# Patient Record
Sex: FEMALE | Race: WHITE | NOT HISPANIC OR LATINO
[De-identification: names, ages, dates, MRNs, and addresses within clinical notes are randomized per-mention and may not be internally consistent; named-entity substitution may affect disease eponyms.]

---

## 2019-01-01 ENCOUNTER — EXTERNAL RECORD (OUTPATIENT)
Dept: HEALTH INFORMATION MANAGEMENT | Facility: OTHER | Age: 69
End: 2019-01-01

## 2019-03-05 ENCOUNTER — PRIOR ORIGINAL RECORDS (OUTPATIENT)
Dept: HEALTH INFORMATION MANAGEMENT | Facility: OTHER | Age: 69
End: 2019-03-05

## 2019-03-18 ENCOUNTER — HOSPITAL (OUTPATIENT)
Dept: OTHER | Age: 69
End: 2019-03-18

## 2019-03-18 LAB
ABS LYMPH: 1.1 K/CUMM (ref 1–3.5)
ABS MONO: 0.4 K/CUMM (ref 0.1–0.8)
ABS NEUTRO: 5.7 K/CUMM (ref 2–8)
ANION GAP SERPL CALC-SCNC: 12 MEQ/L (ref 10–20)
BASOPHIL: 0 % (ref 0–1)
BUN SERPL-MCNC: 31 MG/DL (ref 6–20)
CALCIUM: 9.2 MG/DL (ref 8.4–10.2)
CHLORIDE: 106 MEQ/L (ref 97–107)
CREATININE: 1.39 MG/DL (ref 0.6–1.3)
DIFF_TYPE?: ABNORMAL
EOSINOPHIL: 3 % (ref 0–6)
GLUCOSE LVL: 209 MG/DL (ref 70–99)
HCT VFR BLD CALC: 35 % (ref 33–45)
HEMOLYSIS 2+: NEGATIVE
HGB BLD-MCNC: 11.8 G/DL (ref 11–15)
IMMATURE GRAN: 0.3 % (ref 0–0.3)
INSTR WBC: 7.4 K/CUMM (ref 4–11)
LYMPHOCYTE: 15 %
MCH RBC QN AUTO: 31 PG (ref 25–35)
MCHC RBC AUTO-ENTMCNC: 34 G/DL (ref 32–37)
MCV RBC AUTO: 92 FL (ref 78–97)
MONOCYTE: 5 %
NEUTROPHIL: 77 %
NRBC BLD MANUAL-RTO: 0 % (ref 0–0.2)
PLATELET: 141 K/CUMM (ref 150–450)
POTASSIUM: 4 MEQ/L (ref 3.5–5.1)
RBC # BLD: 3.82 M/CUMM (ref 3.7–5.2)
RDW: 13.4 % (ref 11.5–14.5)
SODIUM: 138 MEQ/L (ref 136–145)
TCO2: 24 MEQ/L (ref 19–29)
WBC # BLD: 7.4 K/CUMM (ref 4–11)

## 2019-03-28 ENCOUNTER — DIAGNOSTIC TRANS (OUTPATIENT)
Dept: OTHER | Age: 69
End: 2019-03-28

## 2019-03-28 ENCOUNTER — HOSPITAL (OUTPATIENT)
Dept: OTHER | Age: 69
End: 2019-03-28
Attending: EMERGENCY MEDICINE

## 2019-03-28 LAB
ANALYZER ANC (IANC): ABNORMAL
ANION GAP SERPL CALC-SCNC: 17 MMOL/L (ref 10–20)
BASOPHILS # BLD: 0 THOUSAND/MCL (ref 0–0.3)
BASOPHILS NFR BLD: 0 %
BUN SERPL-MCNC: 36 MG/DL (ref 6–20)
BUN/CREAT SERPL: 25 (ref 7–25)
CALCIUM SERPL-MCNC: 9.1 MG/DL (ref 8.4–10.2)
CHLORIDE: 105 MMOL/L (ref 98–107)
CO2 SERPL-SCNC: 21 MMOL/L (ref 21–32)
CREAT SERPL-MCNC: 1.46 MG/DL (ref 0.51–0.95)
D DIMER PPP FEU-MCNC: 1.47 MG/L FEU
DIFFERENTIAL METHOD BLD: ABNORMAL
EOSINOPHIL # BLD: 0.4 THOUSAND/MCL (ref 0.1–0.5)
EOSINOPHIL NFR BLD: 6 %
ERYTHROCYTE [DISTWIDTH] IN BLOOD: 13.3 % (ref 11–15)
GLUCOSE BLDC GLUCOMTR-MCNC: 183 MG/DL (ref 65–99)
GLUCOSE SERPL-MCNC: 200 MG/DL (ref 65–99)
HEMATOCRIT: 34.7 % (ref 36–46.5)
HGB BLD-MCNC: 11.7 GM/DL (ref 12–15.5)
IMM GRANULOCYTES # BLD AUTO: 0 THOUSAND/MCL (ref 0–0.2)
IMM GRANULOCYTES NFR BLD: 0 %
LYMPHOCYTES # BLD: 1.4 THOUSAND/MCL (ref 1–4)
LYMPHOCYTES NFR BLD: 23 %
MCH RBC QN AUTO: 31 PG (ref 26–34)
MCHC RBC AUTO-ENTMCNC: 33.7 GM/DL (ref 32–36.5)
MCV RBC AUTO: 92 FL (ref 78–100)
MONOCYTES # BLD: 0.5 THOUSAND/MCL (ref 0.3–0.9)
MONOCYTES NFR BLD: 8 %
NEUTROPHILS # BLD: 4 THOUSAND/MCL (ref 1.8–7.7)
NEUTROPHILS NFR BLD: 63 %
NEUTS SEG NFR BLD: ABNORMAL %
NRBC (NRBCRE): 0 /100 WBC
PLATELET # BLD: 171 THOUSAND/MCL (ref 140–450)
POTASSIUM SERPL-SCNC: 4.1 MMOL/L (ref 3.4–5.1)
RBC # BLD: 3.77 MILLION/MCL (ref 4–5.2)
SODIUM SERPL-SCNC: 139 MMOL/L (ref 135–145)
TROPONIN I SERPL HS-MCNC: 0.04 NG/ML
WBC # BLD: 6.3 THOUSAND/MCL (ref 4.2–11)

## 2019-03-29 ENCOUNTER — HOSPITAL (OUTPATIENT)
Dept: OTHER | Age: 69
End: 2019-03-29

## 2019-03-29 LAB
TROPONIN I SERPL HS-MCNC: 0.05 NG/ML
TROPONIN I SERPL HS-MCNC: 0.07 NG/ML
TSH SERPL-ACNC: 1.43 MCUNIT/ML (ref 0.35–5)

## 2019-03-30 ENCOUNTER — HOSPITAL (OUTPATIENT)
Dept: OTHER | Age: 69
End: 2019-03-30
Attending: INTERNAL MEDICINE

## 2019-03-30 LAB
AMORPH SED URNS QL MICRO: ABNORMAL
ANALYZER ANC (IANC): ABNORMAL
ANION GAP SERPL CALC-SCNC: 14 MMOL/L (ref 10–20)
APPEARANCE UR: CLEAR
BACTERIA #/AREA URNS HPF: ABNORMAL /HPF
BILIRUB UR QL: NEGATIVE
BUN SERPL-MCNC: 24 MG/DL (ref 6–20)
BUN/CREAT SERPL: 17 (ref 7–25)
CALCIUM SERPL-MCNC: 8.4 MG/DL (ref 8.4–10.2)
CAOX CRY URNS QL MICRO: ABNORMAL
CHLORIDE: 107 MMOL/L (ref 98–107)
CO2 SERPL-SCNC: 24 MMOL/L (ref 21–32)
COLOR UR: YELLOW
CREAT SERPL-MCNC: 1.38 MG/DL (ref 0.51–0.95)
EPITH CASTS #/AREA URNS LPF: ABNORMAL /[LPF]
ERYTHROCYTE [DISTWIDTH] IN BLOOD: 13.2 % (ref 11–15)
FATTY CASTS #/AREA URNS LPF: ABNORMAL /[LPF]
GLUCOSE BLDC GLUCOMTR-MCNC: 129 MG/DL (ref 65–99)
GLUCOSE BLDC GLUCOMTR-MCNC: 130 MG/DL (ref 65–99)
GLUCOSE BLDC GLUCOMTR-MCNC: 138 MG/DL (ref 65–99)
GLUCOSE BLDC GLUCOMTR-MCNC: 142 MG/DL (ref 65–99)
GLUCOSE SERPL-MCNC: 143 MG/DL (ref 65–99)
GLUCOSE UR-MCNC: NEGATIVE MG/DL
GRAN CASTS #/AREA URNS LPF: ABNORMAL /[LPF]
HEMATOCRIT: 33.2 % (ref 36–46.5)
HGB BLD-MCNC: 11.4 GM/DL (ref 12–15.5)
HGB UR QL: ABNORMAL
HYALINE CASTS #/AREA URNS LPF: ABNORMAL /LPF (ref 0–5)
KETONES UR-MCNC: NEGATIVE MG/DL
LEUKOCYTE ESTERASE UR QL STRIP: ABNORMAL
MAGNESIUM SERPL-MCNC: 1.7 MG/DL (ref 1.7–2.4)
MCH RBC QN AUTO: 31.2 PG (ref 26–34)
MCHC RBC AUTO-ENTMCNC: 34.3 GM/DL (ref 32–36.5)
MCV RBC AUTO: 91 FL (ref 78–100)
MIXED CELL CASTS #/AREA URNS LPF: ABNORMAL /[LPF]
MUCOUS THREADS URNS QL MICRO: ABNORMAL
NITRITE UR QL: NEGATIVE
NRBC (NRBCRE): 0 /100 WBC
PH UR: 6 UNIT (ref 5–7)
PLATELET # BLD: 157 THOUSAND/MCL (ref 140–450)
POTASSIUM SERPL-SCNC: 3.5 MMOL/L (ref 3.4–5.1)
PROT UR QL: 100 MG/DL
RBC # BLD: 3.65 MILLION/MCL (ref 4–5.2)
RBC #/AREA URNS HPF: ABNORMAL /HPF (ref 0–3)
RBC CASTS #/AREA URNS LPF: ABNORMAL /[LPF]
RENAL EPI CELLS #/AREA URNS HPF: ABNORMAL /[HPF]
SODIUM SERPL-SCNC: 141 MMOL/L (ref 135–145)
SP GR UR: 1.01 (ref 1–1.03)
SPECIMEN SOURCE: ABNORMAL
SPERM URNS QL MICRO: ABNORMAL
SQUAMOUS #/AREA URNS HPF: ABNORMAL /HPF (ref 0–5)
T VAGINALIS URNS QL MICRO: ABNORMAL
TRI-PHOS CRY URNS QL MICRO: ABNORMAL
URATE CRY URNS QL MICRO: ABNORMAL
URINE REFLEX: ABNORMAL
URNS CMNT MICRO: ABNORMAL
UROBILINOGEN UR QL: 0.2 MG/DL (ref 0–1)
WAXY CASTS #/AREA URNS LPF: ABNORMAL /[LPF]
WBC # BLD: 5.4 THOUSAND/MCL (ref 4.2–11)
WBC #/AREA URNS HPF: ABNORMAL /HPF (ref 0–5)
WBC CASTS #/AREA URNS LPF: ABNORMAL /[LPF]
YEAST HYPHAE URNS QL MICRO: ABNORMAL
YEAST URNS QL MICRO: ABNORMAL

## 2019-03-31 LAB
ANION GAP SERPL CALC-SCNC: 13 MMOL/L (ref 10–20)
BUN SERPL-MCNC: 25 MG/DL (ref 6–20)
BUN/CREAT SERPL: 19 (ref 7–25)
CALCIUM SERPL-MCNC: 8.6 MG/DL (ref 8.4–10.2)
CHLORIDE: 108 MMOL/L (ref 98–107)
CO2 SERPL-SCNC: 25 MMOL/L (ref 21–32)
CREAT SERPL-MCNC: 1.31 MG/DL (ref 0.51–0.95)
GLUCOSE BLDC GLUCOMTR-MCNC: 135 MG/DL (ref 65–99)
GLUCOSE BLDC GLUCOMTR-MCNC: 151 MG/DL (ref 65–99)
GLUCOSE BLDC GLUCOMTR-MCNC: 153 MG/DL (ref 65–99)
GLUCOSE BLDC GLUCOMTR-MCNC: 163 MG/DL (ref 65–99)
GLUCOSE BLDC GLUCOMTR-MCNC: 170 MG/DL (ref 65–99)
GLUCOSE SERPL-MCNC: 149 MG/DL (ref 65–99)
MAGNESIUM SERPL-MCNC: 1.7 MG/DL (ref 1.7–2.4)
POTASSIUM SERPL-SCNC: 4.1 MMOL/L (ref 3.4–5.1)
SODIUM SERPL-SCNC: 142 MMOL/L (ref 135–145)
TROPONIN I SERPL HS-MCNC: 0.04 NG/ML

## 2019-04-01 ENCOUNTER — HOSPITAL (OUTPATIENT)
Dept: OTHER | Age: 69
End: 2019-04-01

## 2019-04-01 LAB
CREAT SERPL-MCNC: 1.34 MG/DL (ref 0.51–0.95)
GLUCOSE BLDC GLUCOMTR-MCNC: 104 MG/DL (ref 65–99)
GLUCOSE BLDC GLUCOMTR-MCNC: 133 MG/DL (ref 65–99)
GLUCOSE BLDC GLUCOMTR-MCNC: 145 MG/DL (ref 65–99)
GLUCOSE BLDC GLUCOMTR-MCNC: 187 MG/DL (ref 65–99)
MAGNESIUM SERPL-MCNC: 1.9 MG/DL (ref 1.7–2.4)
TROPONIN I SERPL HS-MCNC: 0.04 NG/ML

## 2019-04-02 LAB
GLUCOSE BLDC GLUCOMTR-MCNC: 121 MG/DL (ref 65–99)
GLUCOSE BLDC GLUCOMTR-MCNC: 142 MG/DL (ref 65–99)
GLUCOSE BLDC GLUCOMTR-MCNC: 155 MG/DL (ref 65–99)
GLUCOSE BLDC GLUCOMTR-MCNC: 156 MG/DL (ref 65–99)
GLUCOSE BLDC GLUCOMTR-MCNC: 183 MG/DL (ref 65–99)

## 2019-04-03 LAB
GLUCOSE BLDC GLUCOMTR-MCNC: 105 MG/DL (ref 65–99)
GLUCOSE BLDC GLUCOMTR-MCNC: 113 MG/DL (ref 65–99)
GLUCOSE BLDC GLUCOMTR-MCNC: 120 MG/DL (ref 65–99)
GLUCOSE BLDC GLUCOMTR-MCNC: 153 MG/DL (ref 65–99)
GLUCOSE BLDC GLUCOMTR-MCNC: 181 MG/DL (ref 65–99)
MAGNESIUM SERPL-MCNC: 1.8 MG/DL (ref 1.7–2.4)
POTASSIUM SERPL-SCNC: 4.6 MMOL/L (ref 3.4–5.1)

## 2019-04-11 ENCOUNTER — HOSPITAL (OUTPATIENT)
Dept: OTHER | Age: 69
End: 2019-04-11

## 2019-04-17 RX ORDER — ZOLPIDEM TARTRATE 5 MG/1
TABLET ORAL
Refills: 0 | COMMUNITY
Start: 2019-04-04

## 2019-04-17 RX ORDER — NITROGLYCERIN 0.4 MG/1
TABLET SUBLINGUAL
Refills: 0 | COMMUNITY
Start: 2019-04-04

## 2019-04-22 ENCOUNTER — APPOINTMENT (OUTPATIENT)
Dept: CARDIOLOGY | Age: 69
End: 2019-04-22

## 2019-05-28 ENCOUNTER — HOSPITAL ENCOUNTER (EMERGENCY)
Facility: HOSPITAL | Age: 69
Discharge: HOME | End: 2019-05-28
Attending: EMERGENCY MEDICINE
Payer: MEDICARE

## 2019-05-28 VITALS
SYSTOLIC BLOOD PRESSURE: 130 MMHG | OXYGEN SATURATION: 91 % | TEMPERATURE: 98 F | RESPIRATION RATE: 16 BRPM | DIASTOLIC BLOOD PRESSURE: 79 MMHG | HEART RATE: 61 BPM | WEIGHT: 190 LBS

## 2019-05-28 DIAGNOSIS — N30.00 ACUTE CYSTITIS WITHOUT HEMATURIA: Primary | ICD-10-CM

## 2019-05-28 PROCEDURE — 87088 URINE BACTERIA CULTURE: CPT | Performed by: EMERGENCY MEDICINE

## 2019-05-28 PROCEDURE — 99284 EMERGENCY DEPT VISIT MOD MDM: CPT

## 2019-05-28 PROCEDURE — 87086 URINE CULTURE/COLONY COUNT: CPT | Performed by: EMERGENCY MEDICINE

## 2019-05-28 PROCEDURE — 96365 THER/PROPH/DIAG IV INF INIT: CPT

## 2019-05-28 PROCEDURE — 87186 SC STD MICRODIL/AGAR DIL: CPT | Performed by: EMERGENCY MEDICINE

## 2019-05-28 PROCEDURE — 81001 URINALYSIS AUTO W/SCOPE: CPT | Performed by: EMERGENCY MEDICINE

## 2019-05-28 PROCEDURE — 85025 COMPLETE CBC W/AUTO DIFF WBC: CPT | Performed by: EMERGENCY MEDICINE

## 2019-05-28 PROCEDURE — 80048 BASIC METABOLIC PNL TOTAL CA: CPT | Performed by: EMERGENCY MEDICINE

## 2019-05-28 RX ORDER — NITROFURANTOIN 25; 75 MG/1; MG/1
100 CAPSULE ORAL 2 TIMES DAILY
Qty: 14 CAPSULE | Refills: 0 | Status: SHIPPED | OUTPATIENT
Start: 2019-05-28 | End: 2019-06-04

## 2019-05-28 RX ORDER — PHENAZOPYRIDINE HYDROCHLORIDE 200 MG/1
200 TABLET, FILM COATED ORAL 3 TIMES DAILY PRN
Qty: 6 TABLET | Refills: 0 | Status: SHIPPED | OUTPATIENT
Start: 2019-05-28 | End: 2019-05-30

## 2019-05-29 NOTE — ED PROVIDER NOTES
Patient Seen in: [facility] AND [facility] Emergency Department    History   Patient presents with:  Urinary Symptoms (urologic)    Stated Complaint: dysuria    HPI    67-year-old female patient presents complaining of dysuria that has been progressively worseni injuries  Neuro: Normal speech, slightly weaker left upper and lower extremity.   Psych: Appropriate, not agitated      ED Course     Labs Reviewed   URINALYSIS WITH CULTURE REFLEX - Abnormal; Notable for the following components:       Result Value    Clar disposition on file for this visit. There is no disposition time on file for this visit.     Follow-up:  Ligia Rogers MD  [address] [facility] [address]          [place] [address]  [phone]

## 2019-05-29 NOTE — ED NOTES
Reviewed discharge information with patient. Patient verbalized understanding, no further questions or complaints at this time. Patient is alert and orientated x4, in no apparent distress. IV discontinued.  Instructed patient to return to ED for any new or

## 2019-06-08 ENCOUNTER — APPOINTMENT (OUTPATIENT)
Dept: GENERAL RADIOLOGY | Facility: HOSPITAL | Age: 69
DRG: 689 | End: 2019-06-08
Attending: EMERGENCY MEDICINE
Payer: MEDICARE

## 2019-06-08 ENCOUNTER — APPOINTMENT (OUTPATIENT)
Dept: CT IMAGING | Facility: HOSPITAL | Age: 69
DRG: 689 | End: 2019-06-08
Attending: EMERGENCY MEDICINE
Payer: MEDICARE

## 2019-06-08 ENCOUNTER — APPOINTMENT (OUTPATIENT)
Dept: GENERAL RADIOLOGY | Facility: HOSPITAL | Age: 69
DRG: 689 | End: 2019-06-08
Attending: UROLOGY
Payer: MEDICARE

## 2019-06-08 ENCOUNTER — HOSPITAL ENCOUNTER (INPATIENT)
Facility: HOSPITAL | Age: 69
LOS: 10 days | Discharge: HOME HEALTH | DRG: 689 | End: 2019-06-18
Attending: EMERGENCY MEDICINE | Admitting: INTERNAL MEDICINE
Payer: MEDICARE

## 2019-06-08 DIAGNOSIS — N20.1 LEFT URETERAL STONE: ICD-10-CM

## 2019-06-08 DIAGNOSIS — N30.01 ACUTE CYSTITIS WITH HEMATURIA: Primary | ICD-10-CM

## 2019-06-08 DIAGNOSIS — Z96.0 URETERAL STENT RETAINED: ICD-10-CM

## 2019-06-08 PROBLEM — R79.89 AZOTEMIA: Status: ACTIVE | Noted: 2019-06-08

## 2019-06-08 PROCEDURE — 71045 X-RAY EXAM CHEST 1 VIEW: CPT | Performed by: EMERGENCY MEDICINE

## 2019-06-08 PROCEDURE — 74176 CT ABD & PELVIS W/O CONTRAST: CPT | Performed by: EMERGENCY MEDICINE

## 2019-06-08 PROCEDURE — 72100 X-RAY EXAM L-S SPINE 2/3 VWS: CPT | Performed by: EMERGENCY MEDICINE

## 2019-06-08 PROCEDURE — 74021 RADEX ABDOMEN 3+ VIEWS: CPT | Performed by: UROLOGY

## 2019-06-08 PROCEDURE — 99223 1ST HOSP IP/OBS HIGH 75: CPT | Performed by: UROLOGY

## 2019-06-08 RX ORDER — POTASSIUM CHLORIDE 20 MEQ/1
20 TABLET, EXTENDED RELEASE ORAL DAILY
Status: DISCONTINUED | OUTPATIENT
Start: 2019-06-09 | End: 2019-06-18

## 2019-06-08 RX ORDER — POTASSIUM CHLORIDE 20 MEQ/1
20 TABLET, EXTENDED RELEASE ORAL DAILY
Status: ON HOLD | COMMUNITY
End: 2020-01-13 | Stop reason: ALTCHOICE

## 2019-06-08 RX ORDER — ATORVASTATIN CALCIUM 40 MG/1
40 TABLET, FILM COATED ORAL DAILY
Status: DISCONTINUED | OUTPATIENT
Start: 2019-06-09 | End: 2019-06-18

## 2019-06-08 RX ORDER — CHOLECALCIFEROL (VITAMIN D3) 125 MCG
100 CAPSULE ORAL DAILY
Status: DISCONTINUED | OUTPATIENT
Start: 2019-06-08 | End: 2019-06-08 | Stop reason: RX

## 2019-06-08 RX ORDER — FUROSEMIDE 20 MG/1
20 TABLET ORAL 2 TIMES DAILY WITH MEALS
Status: ON HOLD | COMMUNITY
End: 2019-06-18

## 2019-06-08 RX ORDER — LIDOCAINE 50 MG/G
1 PATCH TOPICAL EVERY 24 HOURS
Status: ON HOLD | COMMUNITY
End: 2020-01-13 | Stop reason: ALTCHOICE

## 2019-06-08 RX ORDER — METOCLOPRAMIDE 10 MG/1
5 TABLET ORAL EVERY 6 HOURS PRN
Status: DISCONTINUED | OUTPATIENT
Start: 2019-06-08 | End: 2019-06-18

## 2019-06-08 RX ORDER — ACETAMINOPHEN 325 MG/1
650 TABLET ORAL EVERY 6 HOURS PRN
Status: DISCONTINUED | OUTPATIENT
Start: 2019-06-08 | End: 2019-06-18

## 2019-06-08 RX ORDER — HYDRALAZINE HYDROCHLORIDE 25 MG/1
25 TABLET, FILM COATED ORAL 4 TIMES DAILY
Status: DISCONTINUED | OUTPATIENT
Start: 2019-06-08 | End: 2019-06-18

## 2019-06-08 RX ORDER — CHOLECALCIFEROL (VITAMIN D3) 125 MCG
100 CAPSULE ORAL DAILY
Status: ON HOLD | COMMUNITY
End: 2019-06-18

## 2019-06-08 RX ORDER — NITROGLYCERIN 0.4 MG/1
0.4 TABLET SUBLINGUAL EVERY 5 MIN PRN
COMMUNITY

## 2019-06-08 RX ORDER — ISOSORBIDE DINITRATE 20 MG/1
20 TABLET ORAL 3 TIMES DAILY
Status: DISCONTINUED | OUTPATIENT
Start: 2019-06-08 | End: 2019-06-18

## 2019-06-08 RX ORDER — ACETAMINOPHEN 325 MG/1
650 TABLET ORAL EVERY 6 HOURS PRN
COMMUNITY

## 2019-06-08 RX ORDER — TRAMADOL HYDROCHLORIDE 50 MG/1
50 TABLET ORAL ONCE
Status: DISCONTINUED | OUTPATIENT
Start: 2019-06-08 | End: 2019-06-08

## 2019-06-08 RX ORDER — LEVOTHYROXINE SODIUM 0.05 MG/1
50 TABLET ORAL
Status: ON HOLD | COMMUNITY
End: 2020-11-16

## 2019-06-08 RX ORDER — MELATONIN
325 2 TIMES DAILY WITH MEALS
Status: DISCONTINUED | OUTPATIENT
Start: 2019-06-08 | End: 2019-06-18

## 2019-06-08 RX ORDER — METOPROLOL SUCCINATE 25 MG/1
25 TABLET, EXTENDED RELEASE ORAL DAILY
Status: DISCONTINUED | OUTPATIENT
Start: 2019-06-09 | End: 2019-06-12

## 2019-06-08 RX ORDER — PANTOPRAZOLE SODIUM 40 MG/1
40 TABLET, DELAYED RELEASE ORAL
Status: DISCONTINUED | OUTPATIENT
Start: 2019-06-09 | End: 2019-06-18

## 2019-06-08 RX ORDER — BACLOFEN 10 MG/1
10 TABLET ORAL 2 TIMES DAILY PRN
Status: ON HOLD | COMMUNITY
End: 2020-03-05

## 2019-06-08 RX ORDER — BACLOFEN 10 MG/1
10 TABLET ORAL 2 TIMES DAILY
Status: DISCONTINUED | OUTPATIENT
Start: 2019-06-08 | End: 2019-06-18

## 2019-06-08 RX ORDER — NITROGLYCERIN 0.4 MG/1
0.4 TABLET SUBLINGUAL EVERY 5 MIN PRN
Status: DISCONTINUED | OUTPATIENT
Start: 2019-06-08 | End: 2019-06-18

## 2019-06-08 RX ORDER — ATORVASTATIN CALCIUM 40 MG/1
80 TABLET, FILM COATED ORAL NIGHTLY
COMMUNITY

## 2019-06-08 RX ORDER — AMLODIPINE BESYLATE 5 MG/1
5 TABLET ORAL DAILY
Status: DISCONTINUED | OUTPATIENT
Start: 2019-06-09 | End: 2019-06-15

## 2019-06-08 RX ORDER — FUROSEMIDE 10 MG/ML
40 INJECTION INTRAMUSCULAR; INTRAVENOUS ONCE
Status: COMPLETED | OUTPATIENT
Start: 2019-06-08 | End: 2019-06-08

## 2019-06-08 RX ORDER — METOPROLOL SUCCINATE 25 MG/1
25 TABLET, EXTENDED RELEASE ORAL DAILY
Status: ON HOLD | COMMUNITY
End: 2019-06-18

## 2019-06-08 RX ORDER — TRAMADOL HYDROCHLORIDE 50 MG/1
50 TABLET ORAL ONCE
Status: COMPLETED | OUTPATIENT
Start: 2019-06-08 | End: 2019-06-08

## 2019-06-08 RX ORDER — CHOLECALCIFEROL (VITAMIN D3) 125 MCG
5 CAPSULE ORAL NIGHTLY
Status: ON HOLD | COMMUNITY
End: 2020-01-13 | Stop reason: ALTCHOICE

## 2019-06-08 RX ORDER — TRAMADOL HYDROCHLORIDE 50 MG/1
50 TABLET ORAL 4 TIMES DAILY PRN
Status: DISCONTINUED | OUTPATIENT
Start: 2019-06-08 | End: 2019-06-08

## 2019-06-08 RX ORDER — MELATONIN
325 2 TIMES DAILY WITH MEALS
Status: ON HOLD | COMMUNITY
End: 2020-01-13 | Stop reason: ALTCHOICE

## 2019-06-08 RX ORDER — SODIUM CHLORIDE 9 MG/ML
INJECTION, SOLUTION INTRAVENOUS CONTINUOUS
Status: ACTIVE | OUTPATIENT
Start: 2019-06-08 | End: 2019-06-08

## 2019-06-08 RX ORDER — PHENAZOPYRIDINE HYDROCHLORIDE 200 MG/1
200 TABLET, FILM COATED ORAL 3 TIMES DAILY PRN
Status: ON HOLD | COMMUNITY
End: 2019-06-18

## 2019-06-08 RX ORDER — TRAMADOL HYDROCHLORIDE 50 MG/1
50 TABLET ORAL EVERY 12 HOURS PRN
Status: DISCONTINUED | OUTPATIENT
Start: 2019-06-08 | End: 2019-06-18

## 2019-06-08 RX ORDER — HYDRALAZINE HYDROCHLORIDE 25 MG/1
25 TABLET, FILM COATED ORAL 4 TIMES DAILY
Status: ON HOLD | COMMUNITY
End: 2020-01-07

## 2019-06-08 RX ORDER — PANTOPRAZOLE SODIUM 40 MG/1
40 TABLET, DELAYED RELEASE ORAL
Status: ON HOLD | COMMUNITY
End: 2020-02-10

## 2019-06-08 RX ORDER — FOLIC ACID/VIT B COMPLEX AND C 400 MCG
1 TABLET ORAL DAILY
Status: DISCONTINUED | OUTPATIENT
Start: 2019-06-09 | End: 2019-06-18

## 2019-06-08 RX ORDER — METOCLOPRAMIDE 5 MG/1
5 TABLET ORAL EVERY 6 HOURS PRN
Status: ON HOLD | COMMUNITY
End: 2020-01-13 | Stop reason: ALTCHOICE

## 2019-06-08 RX ORDER — ISOSORBIDE DINITRATE 20 MG/1
60 TABLET ORAL DAILY
Status: ON HOLD | COMMUNITY
End: 2020-02-10

## 2019-06-08 RX ORDER — AMLODIPINE BESYLATE 5 MG/1
5 TABLET ORAL DAILY
Status: ON HOLD | COMMUNITY
End: 2019-06-18

## 2019-06-08 RX ORDER — LEVOTHYROXINE SODIUM 0.05 MG/1
50 TABLET ORAL
Status: DISCONTINUED | OUTPATIENT
Start: 2019-06-09 | End: 2019-06-18

## 2019-06-08 RX ORDER — CLOPIDOGREL BISULFATE 75 MG/1
75 TABLET ORAL DAILY
Status: ON HOLD | COMMUNITY
End: 2019-06-18

## 2019-06-08 RX ORDER — ASPIRIN 81 MG/1
81 TABLET ORAL DAILY
Status: ON HOLD | COMMUNITY
End: 2019-06-18

## 2019-06-08 NOTE — CONSULTS
[facility]    Report of Consultation    Yadiel Quirogaallen Patient Status:  Inpatient    1950 MRN [id number]   Location [facility] 5SW/SE Attending Jessi Carcamo MD   Hosp Day # 0 PCP Sarmad Morataya MD     Date of Admission: disease ([facility]    • Anemia    • Congestive heart disease ([facility].)    • Diabetes ([facility].)    • Dysphagia    • Esophageal reflux    • Essential hypertension    • Heart attack ([facility]    • Hemiplegia and hemiparesis following cerebral infarction affecting unspecified and easy bruising  Integumentary:  Negative for pruritus and rash  Musculoskeletal: Severe left back pain on 6/7/2019.   Psychiatric:  Negative for inappropriate interaction and psychiatric symptoms  Respiratory:  Negative for cough, dyspnea and wheezing 51* 54*   CREATSERUM 1.87* 1.94*   BUNCREA 27.3* 27.8*   GFRAA 32* 30*   GFRNAA 27* 26*       Urinalysis Results (last three years):  Recent Labs     05/28/19 2027 06/08/19  1001   COLORUR Yellow Yellow   CLARITY Hazy* Hazy*   SPECGRAVITY 1.015 1.014   PH at that time; she recalls that a left ureteral stent was placed and then patient could not follow-up at [facility]. \"Care everywhere\" in epic--reveals that December 2018 [facility] left ureteral stent placed.   Patient states that she was to stop Heber the inferior pole moiety;     --4, suspected UTI  Urinalysis 6/8/2019 consistent with UTI--see above; urine culture sent 6/8/2019 and afterwards patient started on IV ceftriaxone, which patient is receiving.  5/20/2019 patient had UTI urine culture greater

## 2019-06-08 NOTE — ED PROVIDER NOTES
Patient Seen in: [facility] AND CLINICS 5sw/se    History   Patient presents with:  Back Pain (musculoskeletal)    Stated Complaint: Ureteral stent retained    HPI    The patient is a 50-year-old female who presents with left lower back pain that started yest Current:/71 (BP Location: Right arm)   Pulse 66   Temp 97.4 °F (36.3 °C) (Oral)   Resp 20   Ht 165.1 cm (5' 5\")   Wt 86.2 kg   SpO2 94%   BMI 31.62 kg/m²         Physical Exam   Constitutional: She is oriented to person, place, and time.  She appears BASIC METABOLIC PANEL (8) - Abnormal; Notable for the following components:    Glucose 114 (*)     BUN 54 (*)     Creatinine 1.94 (*)     BUN/CREA Ratio 27.8 (*)     Calculated Osmolality 306 (*)     GFR, Non- 26 (*)     GFR, -Americ 06/08/19 : 66  , sinus,  normal      Radiology findings: Xr Lumbar Spine (min 2 Views) (cpt=72100)    Result Date: 6/8/2019  CONCLUSION:  1. Mild anterolisthesis of L4 in relation L5. Mild anterior wedging of T12 more likely chronic.   Scattered spondylosi Radiology exams  Viewed and reviewed by myself and findings discussed with patient including need for follow up      Admission disposition: 6/8/2019 11:53 AM                 Disposition and Plan     Clinical Impression:  Acute cystitis with hematuria  (mary

## 2019-06-08 NOTE — ED NOTES
Orders for admission, patient is aware of plan and ready to go upstairs. Any questions, please call ED RN Rica Brain  at extension [phone]. Pt from [place], hx of stroke with residual left sided facial droop.

## 2019-06-08 NOTE — PLAN OF CARE
Blood glucose monitored as ordered.  No insulin coverage ordered at this time  Pt up with standby assist and walker, reported left sided weakness d/t hx of stroke     Problem: Patient Centered Care  Goal: Patient preferences are identified and integrated in

## 2019-06-09 PROBLEM — E11.59 TYPE 2 DIABETES MELLITUS WITH CIRCULATORY DISORDER, WITHOUT LONG-TERM CURRENT USE OF INSULIN (HCC): Status: ACTIVE | Noted: 2019-06-09

## 2019-06-09 PROBLEM — I50.43 ACUTE ON CHRONIC COMBINED SYSTOLIC AND DIASTOLIC HEART FAILURE (HCC): Status: ACTIVE | Noted: 2019-06-09

## 2019-06-09 PROBLEM — I25.9 ISCHEMIC HEART DISEASE: Status: ACTIVE | Noted: 2019-06-09

## 2019-06-09 PROBLEM — I34.0 NON-RHEUMATIC MITRAL REGURGITATION: Status: ACTIVE | Noted: 2019-06-09

## 2019-06-09 PROCEDURE — 99233 SBSQ HOSP IP/OBS HIGH 50: CPT | Performed by: UROLOGY

## 2019-06-09 RX ORDER — FUROSEMIDE 10 MG/ML
40 INJECTION INTRAMUSCULAR; INTRAVENOUS
Status: DISCONTINUED | OUTPATIENT
Start: 2019-06-09 | End: 2019-06-12

## 2019-06-09 RX ORDER — FUROSEMIDE 10 MG/ML
40 INJECTION INTRAMUSCULAR; INTRAVENOUS ONCE
Status: COMPLETED | OUTPATIENT
Start: 2019-06-09 | End: 2019-06-09

## 2019-06-09 NOTE — PROGRESS NOTES
Rapid response note    A rapid response was called to the patient's room for shortness of breath. Patient admitted earlier today for acute cystitis with hematuria. Given Rocephin in the ED and admitted.   Patient states that she suddenly started feeling s

## 2019-06-09 NOTE — PLAN OF CARE
RRT    *See RRT Documentation Record*    Reason the RRT was called: shortness of breath  Assessment of patient leading up to RRT: decreasing o2 sat on RA,   Interventions/Testing: NC 2l applied, then non rebreather 15 L , cxr, iv lasix,   Patient Outcome/D

## 2019-06-09 NOTE — H&P
[facility]    History and Physical    Diego Brambila Patient Status:  Inpatient    1950 MRN [id number]   Location [facility] 5SW/SE Attending Cathy Mccoy MD   Hosp Day # 1 PCP Pavan Mendez MD     Date:  2019  Date MG Oral Tab Take 10 mg by mouth 2 (two) times daily. Clopidogrel Bisulfate 75 MG Oral Tab Take 75 mg by mouth daily. Coenzyme Q-10 100 MG Oral Cap Take 100 mg by mouth daily.    ferrous sulfate 325 (65 FE) MG Oral Tab EC Take 325 mg by mouth 2 (two) violette %.    Nursing note and vitals reviewed. Constitutional: She is oriented to person, place, and time. She appears well-developed and well-nourished. No distress. HENT:   Head: Normocephalic and atraumatic.    Eyes: Pupils are equal, round, and reactive to present with the proximal coil situated in the renal pelvis of the upper pole moiety. At the lower pole moiety, there appears to be a calyceal calculus lodged at the pelvis along the course of the left ureteral stent.   2. Asymmetric left renal edema and le Dictated by (CST): Regis Barber MD on 6/08/2019 at 18:13     Approved by (CST): Regis Barber MD on 6/08/2019 at 18:16          Ekg 12-lead    Result Date: 6/9/2019  ECG Report  Interpretation  --------------------------       Assessment/Plan:     Acute

## 2019-06-09 NOTE — PROGRESS NOTES
[facility] Pharmacy Note:  Renal Dose Adjustment for Tramadol Janeen [facility]    Brian Chino has been prescribed Tramadol (ULTRAM) 50 mg orally every 6 hours as needed for pain. Estimated Creatinine Clearance: 25.3 mL/min (A) (based on SCr of 1.94 mg/dL (H)).

## 2019-06-09 NOTE — PROGRESS NOTES
Harry Juan is a 80year old female. HPI:   Patient presents with:  Back Pain (musculoskeletal)      History provided by patient and review of records.     6/8/2019 patient in [facility] ER for back pain and during work-up for back pain, LS plain spine fi • Essential hypertension    • Heart attack [facility])    • Hemiplegia and hemiparesis following cerebral infarction affecting unspecified side (HCC)    • Hyperlipidemia    • Muscle weakness    • NSTEMI (non-ST elevated myocardial infarction) (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   Patient [place] [facility] [place] when e patient started on IV ceftriaxone. .  5/20/2019 patient had UTI urine culture greater than 100,000 E. coli resistant to nitrofurantoin but sensitive to other agents tested and was treated with Keflex 500 mg twice daily for 7 days at that time.   Continue IV

## 2019-06-09 NOTE — CONSULTS
Below note from [facility]. Patient was interviewed and examined. Agree with assessment and plan with edits to the document performed as necessary.     [facility] AND [facility]  Report of Consultation    Leni Johnson Patient Status:  Inpatient     Onset   • Other (ULCERS) Father    • Cancer Mother         UTERINE/COLON      reports that she has quit smoking. She has never used smokeless tobacco. She reports that she drank alcohol. She reports that she does not use drugs.     Allergies:    Metformin (36.6 °C), Min:97.3 °F (36.3 °C), Max:98.8 °F (37.1 °C)    Wt Readings from Last 3 Encounters:  06/08/19 : 190 lb (86.2 kg)  05/28/19 : 190 lb (86.2 kg)      Telemetry:   General: Alert and oriented in no apparent distress. HEENT: No focal deficits.   Neck function is    not significantly reduced.    Right Atrium    The right atrium is normal in size.    Right Ventricle    The right ventricle is grossly normal in size and contractility.    Pericardial Effusion    No pericardial effusion is noted.     Labs:

## 2019-06-10 ENCOUNTER — APPOINTMENT (OUTPATIENT)
Dept: CV DIAGNOSTICS | Facility: HOSPITAL | Age: 69
DRG: 689 | End: 2019-06-10
Attending: NURSE PRACTITIONER
Payer: MEDICARE

## 2019-06-10 PROBLEM — I50.33 ACUTE ON CHRONIC DIASTOLIC CHF (CONGESTIVE HEART FAILURE) (HCC): Status: ACTIVE | Noted: 2019-06-10

## 2019-06-10 PROCEDURE — 93306 TTE W/DOPPLER COMPLETE: CPT | Performed by: NURSE PRACTITIONER

## 2019-06-10 PROCEDURE — 99231 SBSQ HOSP IP/OBS SF/LOW 25: CPT | Performed by: NURSE PRACTITIONER

## 2019-06-10 RX ORDER — CETIRIZINE HYDROCHLORIDE 5 MG/1
5 TABLET ORAL DAILY
Status: DISCONTINUED | OUTPATIENT
Start: 2019-06-10 | End: 2019-06-18

## 2019-06-10 NOTE — CM/SW NOTE
06/10/19 0900   CM/SW Screening   Referral Source Nurse;Case [facility] staff;Nursing rounds; Chart review   Patient's Mental Status Alert;Oriented   Patient's Home Environment Assisted Living  [facility])   [facility] Aneudy Name   (Conc

## 2019-06-10 NOTE — PLAN OF CARE
Problem: Patient Centered Care  Goal: Patient preferences are identified and integrated in the patient's plan of care  Description  Interventions:  - What would you like us to know as we care for you?  I have left sided weakness from a stroke  - Provide t

## 2019-06-10 NOTE — PLAN OF CARE
Blood glucose monitored as ordered  PRN pain medication given x1   Attempted to wean pt down on O2 from 15L.  Pt on 13L high flow nasal canula 95%  Pt denies SOB and chest pain during shift    Problem: Patient Centered Care  Goal: Patient preferences are id

## 2019-06-10 NOTE — PROGRESS NOTES
[facility] HOSP - [facility]    History and Physical    Eve Mejia Patient Status:  Inpatient    1950 MRN [id number]   Location [facility] 5SW/SE Attending Trevor Medrano MD   Hosp Day # 2 PCP Charity Elizondo MD     Date:  2019  Date MG Oral Tab Take 10 mg by mouth 2 (two) times daily. Clopidogrel Bisulfate 75 MG Oral Tab Take 75 mg by mouth daily. Coenzyme Q-10 100 MG Oral Cap Take 100 mg by mouth daily.    ferrous sulfate 325 (65 FE) MG Oral Tab EC Take 325 mg by mouth 2 (two) violette %.    Nursing note and vitals reviewed. Constitutional: She is oriented to person, place, and time. She appears well-developed and well-nourished. No distress. HENT:   Head: Normocephalic and atraumatic.    Eyes: Pupils are equal, round, and reactive to left stent at the proximal left ureter. Correlate clinically. Moderate feces throughout the colon.     Dictated by (CST): Angelica Lawler MD on 6/08/2019 at 18:13     Approved by (CST): Angelica Lawler MD on 6/08/2019 at 18:16          Ekg 12-lead    Result of illness, I anticipate that, after discharge, patient will require TBD.         Betsey Frye MD  6/10/2019

## 2019-06-10 NOTE — PROGRESS NOTES
Cardiology progress note    24 h events: echo      Medications:    Current Facility-Administered Medications:   •  furosemide (LASIX) injection 40 mg, 40 mg, Intravenous, BID (Diuretic)  •  CefTRIAXone Sodium (ROCEPHIN) 1 g in sodium chloride 0.9% 100 mL M +1 piting edema BLE. Peripheral pulses are 2+ bilateral pedal and radial sites. Neurologic: Alert and oriented, normal affect. Skin: Warm and dry.      Laboratories and Data:  Diagnostics:    CXR 6/8/19  =====  CONCLUSION: Cardiomegaly, pulmonary vascula Calculated Osmolality 275 - 295 mOsm/kg 306High     GFR, Non- >=60 26Low     GFR, -American >=60 30Low       Assessment/Plan:    1.  Acute  CHF  - has volume overload on exam as well as on CXR and pBNP elevated  - received lasix 40

## 2019-06-10 NOTE — PROGRESS NOTES
[place] [facility] - [facility]    Progress Note    [facility] Patient Status:  Inpatient    1950 MRN [id number]   Location [facility] 5SW/SE Attending Rachell Bloch, MD   Hosp Day # 2 PCP Sudha Barreto MD       Subjective:   Dewayne Session but left pelvocaliectasis present, particularly in the inferior pole moiety; On 6/8/2019 KUB reveals only one stone in the proximal most left ureter and the radiologist measures it to be 11.7 mm whereas I measured to be 14 mm.          I explain to patient 6/9/2019  CONCLUSION: Cardiomegaly, pulmonary vascular congestion, and patchy alveolar opacities throughout the lungs. Findings suggest CHF with possible superimposed edema. Multifocal pneumonia could also potentially give this appearance.    [employer]

## 2019-06-10 NOTE — PAYOR COMM NOTE
--------------  ADMISSION REVIEW     Payor: Zulma Funez #:  [id number]  Authorization Number: [id number]    Admit date: 6/8/19  Admit time: 1303       Admitting Physician: Olegario Mckeon MD  Attending Physician:  Olegario Mckeon MD  Primary Care Ph Tobacco Use      Smoking status: Former Smoker      Smokeless tobacco: Never Used    Alcohol use: Not Currently    Drug use: Never      Review of Systems    Positive for stated complaint: Ureteral stent retained  Other systems are as noted in HPI.   Const Differential diagnosis includes lumbar strain, sacroiliitis, kidney stone, UTI        ED Course     Labs Reviewed   URINALYSIS WITH CULTURE REFLEX - Abnormal; Notable for the following components:       Result Value    Clarity Urine Hazy (*)     Protein Ur Patient recently seen for UTI and placed on Macrobid. On lumbar spine film patient noted to have stent and when old records were reviewed from outside hospitals patient was found to have stent placed in December 2018 and never had urology follow-up.   Janeen CONCLUSION:  1. There appears to be duplication of the left collecting system. A left-sided double-J ureteral stent is present with the proximal coil situated in the renal pelvis of the upper pole moiety.  At the lower pole moiety, there appears to be a ca Present on Admission  Date Reviewed: 6/8/2019          ICD-10-CM Noted POA    * (Principal) Acute cystitis with hematuria N30.01 6/8/2019 Unknown    Azotemia R79.89 6/8/2019 Yes    Left ureteral stone N20.1 6/8/2019     Ureteral stent retained Z96.0 6/8/20 • UTI (urinary tract infection)      Past Surgical History:   Procedure Laterality Date   • CHOLECYSTECTOMY     • SINUS SURGERY         Family History   Problem Relation Age of Onset   • Other (ULCERS) Father    • Cancer Mother         UTERINE/COLON     So Pantoprazole Sodium 40 MG Oral Tab EC Take 40 mg by mouth every morning before breakfast.   Phenazopyridine HCl 200 MG Oral Tab Take 200 mg by mouth 3 (three) times daily as needed for Pain.    Potassium Chloride ER 20 MEQ Oral Tab CR Take 20 mEq by mouth d CREATSERUM 1.94 (H) 06/08/2019    BUN 54 (H) 06/08/2019     06/08/2019    K 4.4 06/08/2019     06/08/2019    CO2 22.0 06/08/2019     (H) 06/08/2019    CA 8.7 06/08/2019    TROP <0.045 06/09/2019     Xr Lumbar Spine (min 2 Views) (cpt=72 CONCLUSION:  1. Left-sided ureteral stent with an 11.7 x 8.7 mm calcified ovoid stone lung the course of the proximal left stent at the proximal left ureter. Correlate clinically. Moderate feces throughout the colon.     Dictated by (CST): Ez Simmons, Electronically signed by Hollis Baig MD on 6/9/2019  4:37 PM       Consults signed by Britney Wilde DO at 6/9/2019  6:05 PM     Author: Britney Wilde DO Service: Cardiology Author Type: Physician   Filed: 6/9/2019  6:05 PM Date of Service: 6/9/2019 • Congestive heart disease (HCC)     • Diabetes ([facility]     • Dysphagia     • Esophageal reflux     • Essential hypertension     • Heart attack [facility])     • Hemiplegia and hemiparesis following cerebral infarction affecting unspecified side ([facility]     • Hyperl •  Metoprolol Succinate ER (Toprol XL) 24 hr tab 25 mg, 25 mg, Oral, Daily  •  nitroGLYCERIN (NITROSTAT) SL tab 0.4 mg, 0.4 mg, Sublingual, Q5 Min PRN  •  Pantoprazole Sodium (PROTONIX) EC tab 40 mg, 40 mg, Oral, QAM AC  •  Potassium Chloride ER (K-DUR M20 anteriorly directed wall hugging jet. There is systolic blunting of    pulmonary venous flow. Moderate mitral annular calcification is present.    Aortic Valve    The aortic valve is trileaflet; its leaflets are thickened but open    adequately.  Mild aorti 5. Acute cystitis with hematuria  - left ureteral stone and stent     6. CKD 4. No ACEI or ARB.    Plan: CXR image personally reviewed, CHF. Will monitor patient on tele, give lasix 40 mg IV BID, check echo, EKG as above.  Trop negative.     Thank you.    Started 6/7/2019 when patient was at [facility] rehab facility; location was left lower back; severity was 8–9/10; quality was sharp; duration was constant; it was worse with standing up; laying still made it better.   Went to [facility] ER as above; there on 6/8/20 Genitourinary:  Negative for dysuria and hematuria  Gastrointestinal:  Negative for abdominal pain, constipation, decreased appetite, diarrhea and vomiting  Neurological: Positive for gait disturbance; patient states that prior to this admission, she was w Flanks--nontender bilaterally with no palpable flank masses  Bladder normal.  Normal secondary sexual characteristics   Pelvic exam--deferred.   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 coli resistant to nitrofurantoin but sensitive to other antimicrobials; patient treated with Keflex 500 mg 3 times daily x7 days [facility] ER.     I spent 70 minutes with patient, and majority of this time was spen Largely not urological in nature since has right kidney with no obstruction on CT so there is impaired function of the right kidney; furthermore, 6/8/2019 CT reports mild left pelvocaliectasis but no true left hydronephrosis.   Continue to follow with chris General: Mild respiratory distress, alert and oriented  Respiratory: Mild rales in bilateral bases, mildly elevated respiratory rate  Cardiac: Regular rate and rhythm, pulses intact all extremities  GI: Abdomen soft and nondistended  MS: Mild edema in bila •  ferrous sulfate EC tab 325 mg, 325 mg, Oral, BID with meals  •  hydrALAzine HCl (APRESOLINE) tab 25 mg, 25 mg, Oral, QID  •  isosorbide dinitrate (ISORDIL) tab 20 mg, 20 mg, Oral, TID  •  Levothyroxine Sodium (SYNTHROID) tab 50 mcg, 50 mcg, Oral, Before Septal- Anterolateral infarct (cited on or before 28-MAR-2019)  Prolonged QT/qu interval  Abnormal ECG     TTE 06/19  1. Left ventricle: The cavity size was normal. Wall thickness was     increased in a pattern of mild LVH.  Systolic function was     normal - controlled  - on amlodipine 5 mg daily, hydralazine 25 mg QID, metoprolol succinate 25 mg daily     5. Acute cystitis with hematuria  - left ureteral stone and stent     6. CKD 4. No ACEI or ARB.    Plan: CXR image personally reviewed, CHF.  Will monitor 6/9/2019 2032 Given 25 mg Oral Britany Hong RN    6/9/2019 1720 Given 25 mg Oral Belkis Aragon RN    6/9/2019 1405 Given 25 mg Oral Belkis Aragon RN      isosorbide dinitrate (ISORDIL) tab 20 mg     Date Action Dose Route User    6/10/2019 8651 Give

## 2019-06-11 PROCEDURE — 99231 SBSQ HOSP IP/OBS SF/LOW 25: CPT | Performed by: NURSE PRACTITIONER

## 2019-06-11 NOTE — PROGRESS NOTES
Cardiology progress note    24 h events: bradycardic       Medications:    Current Facility-Administered Medications:   •  Cetirizine HCl (ZYRTEC) 5 MG tab 5 mg, 5 mg, Oral, Daily  •  furosemide (LASIX) injection 40 mg, 40 mg, Intravenous, BID (Diuretic) excursions and effort. Abdomen: Soft, non-tender. Extremities: +1 piting edema BLE. Peripheral pulses are 2+ bilateral pedal and radial sites. Neurologic: Alert and oriented, normal affect. Skin: Warm and dry.      Laboratories and Data:  Diagnostics: Ratio 10.0 - 20.0 27.8High     Calcium, Total 8.5 - 10.1 mg/dL 8.7    Calculated Osmolality 275 - 295 mOsm/kg 306High     GFR, Non- >=60 26Low     GFR, -American >=60 30Low       Assessment/Plan:    1.  Acute  CHF  - has volume overlo

## 2019-06-11 NOTE — PROGRESS NOTES
[facility]    History and Physical    Yolette Mendez Patient Status:  Inpatient    1950 MRN [id number]   Location [facility] 5SW/SE Attending Jabari Sinclair MD   Hosp Day # 3 PCP Shankar Baum MD     Date:  2019  Date MG Oral Tab Take 10 mg by mouth 2 (two) times daily. Clopidogrel Bisulfate 75 MG Oral Tab Take 75 mg by mouth daily. Coenzyme Q-10 100 MG Oral Cap Take 100 mg by mouth daily.    ferrous sulfate 325 (65 FE) MG Oral Tab EC Take 325 mg by mouth 2 (two) violette %.    Nursing note and vitals reviewed. Constitutional: She is oriented to person, place, and time. She appears well-developed and well-nourished. No distress. HENT:   Head: Normocephalic and atraumatic.    Eyes: Pupils are equal, round, and reactive to stents. Long-term use of aspirin therapy  As above. Acute on chronic diastolic heart failure (HCC)  Acute pulmonary edema last night and B-NP > 6700. Echo showing grade 2 DD with preserved EF.  Dyspnea / hypoxia much improved after IV Lasix but ra

## 2019-06-11 NOTE — PLAN OF CARE
Dr. Augustine Arshad notified--Tele called X 2: patient's heart rate was 39-40--patient was sleeping at the time, easily aroused, no complaints. BP now 118/47, heart rate:46; order given to hold Apresoline and Isordil if systolic  BP less than 063.   No order given

## 2019-06-11 NOTE — PROGRESS NOTES
[facility]    Progress Note    Nicole Roche Patient Status:  Inpatient    1950 MRN [id number]   Location [facility] 5SW/SE Attending João Fam MD   Hosp Day # 3 PCP Angi Shin MD       Subjective:   Erica Owens 6/8/2019 KUB reveals only one stone in the proximal most left ureter and the radiologist measures it to be 11.7 mm whereas I measured to be 14 mm.         I explain to patient that  we will contemplate surgery on the stone only when she is completely stabl APRN  6/11/2019

## 2019-06-12 NOTE — PROGRESS NOTES
[facility] HOSP - [facility]    History and Physical    Jenene Cabot Patient Status:  Inpatient    1950 MRN [id number]   Location [facility] 5SW/SE Attending Bebo Ledesma MD   Hosp Day # 4 PCP Monika Meek MD     Date:  2019  Date MG Oral Tab Take 10 mg by mouth 2 (two) times daily. Clopidogrel Bisulfate 75 MG Oral Tab Take 75 mg by mouth daily. Coenzyme Q-10 100 MG Oral Cap Take 100 mg by mouth daily.    ferrous sulfate 325 (65 FE) MG Oral Tab EC Take 325 mg by mouth 2 (two) violette 100 %.    Nursing note and vitals reviewed. Constitutional: She is oriented to person, place, and time. She appears well-developed and well-nourished. No distress. HENT:   Head: Normocephalic and atraumatic.    Eyes: Pupils are equal, round, and reactiv cardiac stents. Long-term use of aspirin therapy  As above. Acute on chronic diastolic heart failure (HCC)  Acute pulmonary edema last night and B-NP > 6700. Echo showing grade 2 DD with preserved EF.  Dyspnea / hypoxia have improved after IV [facility] present. Pulmonary/Chest: Effort normal. She has rales. Abdominal: Soft. Bowel sounds are normal. She exhibits no distension. There is no tenderness. Musculoskeletal: Normal range of motion.    Neurological: She is alert and oriented to person, place,

## 2019-06-12 NOTE — PROGRESS NOTES
Tele called that patients HR dropped down to 35. Vitals and assessment were done on pt. Cardiology notified. Per order- stop metoprolol for now.

## 2019-06-12 NOTE — PLAN OF CARE
Problem: Diabetes/Glucose Control  Goal: Glucose maintained within prescribed range  Description  INTERVENTIONS:  - Monitor Blood Glucose as ordered  - Assess for signs and symptoms of hyperglycemia and hypoglycemia  - Administer ordered medications to m appropriate  Outcome: Progressing     Problem: PAIN - ADULT  Goal: Verbalizes/displays adequate comfort level or patient's stated pain goal  Description  INTERVENTIONS:  - Encourage pt to monitor pain and request assistance  - Assess pain using appropriate resources and transportation as appropriate  - Identify discharge learning needs (meds, wound care, etc)  - Arrange for interpreters to assist at discharge as needed  - Consider post-discharge preferences of patient/family/discharge partner  - Complete THAO

## 2019-06-12 NOTE — PROGRESS NOTES
Sob better. Mejia to 35s asx.     Medications:    Current Facility-Administered Medications:   •  Cetirizine HCl (ZYRTEC) 5 MG tab 5 mg, 5 mg, Oral, Daily  •  furosemide (LASIX) injection 40 mg, 40 mg, Intravenous, BID (Diuretic)  •  CefTRIAXone Sodium (JESSI bilateral pedal and radial sites. Neurologic: Alert and oriented, normal affect. Skin: Warm and dry.      Laboratories and Data:  Diagnostics:    CXR 6/8/19  =====  CONCLUSION: Cardiomegaly, pulmonary vascular congestion, and patchy alveolar opacities thr 306High     GFR, Non- >=60 26Low     GFR, -American >=60 30Low       Assessment/Plan:    1.  Acute  CHF  - has volume overload on exam as well as on CXR and pBNP elevated  - received lasix 40 mg IV  - possible she has systolic CHF- pr

## 2019-06-12 NOTE — PLAN OF CARE
Problem: Patient Centered Care  Goal: Patient preferences are identified and integrated in the patient's plan of care  Description  Interventions:  - What would you like us to know as we care for you?  I have left sided weakness from a stroke  - Provide t for signs and symptoms of electrolyte imbalances  - Administer electrolyte replacement as ordered  - Monitor response to electrolyte replacements, including rhythm and repeat lab results as appropriate  - Fluid restriction as ordered  - Instruct patient on FALL  Goal: Free from fall injury  Description  INTERVENTIONS:  - Assess pt frequently for physical needs  - Identify cognitive and physical deficits and behaviors that affect risk of falls.   - [place] fall precautions as indicated by assessment.  - Educ

## 2019-06-13 PROCEDURE — 99233 SBSQ HOSP IP/OBS HIGH 50: CPT | Performed by: UROLOGY

## 2019-06-13 NOTE — PROGRESS NOTES
Slowly improved status.     Medications:    Current Facility-Administered Medications:   •  furosemide (LASIX) tab 60 mg, 60 mg, Oral, BID (Diuretic)  •  Cetirizine HCl (ZYRTEC) 5 MG tab 5 mg, 5 mg, Oral, Daily  •  CefTRIAXone Sodium (ROCEPHIN) 1 g in sodiu normal affect. Skin: Warm and dry. Laboratories and Data:  Diagnostics:    CXR 6/8/19  =====  CONCLUSION: Cardiomegaly, pulmonary vascular congestion, and patchy alveolar opacities throughout the lungs.   Findings suggest CHF with possible superimposed -American >=60 30Low       Assessment/Plan:    1. Acute  CHF  - has volume overload on exam as well as on CXR and pBNP elevated  - received lasix 40 mg IV  - previous CONRAD stated overall preserved EF and HK in the anterior wall in LAD distribution.  Jose Manuel Prater

## 2019-06-13 NOTE — PLAN OF CARE
Problem: Diabetes/Glucose Control  Goal: Glucose maintained within prescribed range  Description  INTERVENTIONS:  - Monitor Blood Glucose as ordered  - Assess for signs and symptoms of hyperglycemia and hypoglycemia  - Administer ordered medications to m goal  Description  INTERVENTIONS:  - Encourage pt to monitor pain and request assistance  - Assess pain using appropriate pain scale  - Administer analgesics based on type and severity of pain and evaluate response  - Implement non-pharmacological measures

## 2019-06-13 NOTE — PROGRESS NOTES
[facility] HOSP - [facility]    History and Physical    [facility] Patient Status:  Inpatient    1950 MRN [id number]   Location [facility] 5SW/SE Attending Jasper Stuart MD   Hosp Day # 5 PCP Jas Cr MD     Date:  2019  Date MG Oral Tab Take 10 mg by mouth 2 (two) times daily. Clopidogrel Bisulfate 75 MG Oral Tab Take 75 mg by mouth daily. Coenzyme Q-10 100 MG Oral Cap Take 100 mg by mouth daily.    ferrous sulfate 325 (65 FE) MG Oral Tab EC Take 325 mg by mouth 2 (two) violette %.    Nursing note and vitals reviewed. Constitutional: She is oriented to person, place, and time. She appears well-developed and well-nourished. No distress. HENT:   Head: Normocephalic and atraumatic.    Eyes: Pupils are equal, round, and reactive to stents. Long-term use of aspirin therapy  As above. Acute on chronic diastolic heart failure (HCC)  Acute pulmonary edema last night and B-NP > 6700. Echo showing grade 2 DD with preserved EF.  Dyspnea / hypoxia have improved after IV Lasix but ra

## 2019-06-13 NOTE — PLAN OF CARE
Problem: Diabetes/Glucose Control  Goal: Glucose maintained within prescribed range  Description  INTERVENTIONS:  - Monitor Blood Glucose as ordered  - Assess for signs and symptoms of hyperglycemia and hypoglycemia  - Administer ordered medications to m appropriate  Outcome: Progressing     Problem: PAIN - ADULT  Goal: Verbalizes/displays adequate comfort level or patient's stated pain goal  Description  INTERVENTIONS:  - Encourage pt to monitor pain and request assistance  - Assess pain using appropriate

## 2019-06-14 RX ORDER — MORPHINE SULFATE 2 MG/ML
2 INJECTION, SOLUTION INTRAMUSCULAR; INTRAVENOUS ONCE
Status: COMPLETED | OUTPATIENT
Start: 2019-06-14 | End: 2019-06-14

## 2019-06-14 RX ORDER — MORPHINE SULFATE 4 MG/ML
4 INJECTION, SOLUTION INTRAMUSCULAR; INTRAVENOUS EVERY 2 HOUR PRN
Status: DISCONTINUED | OUTPATIENT
Start: 2019-06-14 | End: 2019-06-18

## 2019-06-14 RX ORDER — FUROSEMIDE 10 MG/ML
60 INJECTION INTRAMUSCULAR; INTRAVENOUS ONCE
Status: COMPLETED | OUTPATIENT
Start: 2019-06-14 | End: 2019-06-14

## 2019-06-14 RX ORDER — FUROSEMIDE 10 MG/ML
60 INJECTION INTRAMUSCULAR; INTRAVENOUS ONCE
Status: DISCONTINUED | OUTPATIENT
Start: 2019-06-14 | End: 2019-06-14

## 2019-06-14 RX ORDER — MORPHINE SULFATE 2 MG/ML
2 INJECTION, SOLUTION INTRAMUSCULAR; INTRAVENOUS EVERY 2 HOUR PRN
Status: DISCONTINUED | OUTPATIENT
Start: 2019-06-14 | End: 2019-06-18

## 2019-06-14 NOTE — PROGRESS NOTES
Iris Ford is a 80year old female. HPI:   Patient presents with:  Back Pain (musculoskeletal)      History provided by patient and review of records.     6/8/2019 patient in [facility] ER for back pain and during work-up for back pain, LS plain spine fi valve regurgitation  \"Follow-up with cardiologist Kayleigh Gabriel outpatient for possible mitral valve repair and catheterization; he has implanted her stent\" as per Dr. Scottie Galvez on 6/13/2019    HISTORY:  Past Medical History:   Diagnosis Date   • Acute isch Before breakfast  •  lidocaine-menthol 4-1 % 1 patch, 1 patch, Transdermal, Q24H  •  linagliptin (TRADJENTA) tab TABS 5 mg, 5 mg, Oral, Daily  •  melatonin cap/tab 5 mg, 5 mg, Oral, Nightly  •  Metoclopramide HCl (REGLAN) tab 5 mg, 5 mg, Oral, Q6H PRN  • NITRITE Negative Negative   UROBILINOGEN <2.0 <2.0   LEUUR Large* Large*   UASA Negative Negative   WBCUR 47* 136*   RBCUR 13* 11*   BACUR Many* Moderate*         Hematology:  WBC   Date Value Ref Range Status   06/08/2019 7.5 4.0 - 11.0 x10(3) uL Final Yahir Amos MD on 6/08/2019 at 10:56               6/9/2019 EKG  Sinus bradycardia. Diffuse low voltage. Old inferior infarct.       Review of previous records:      Patient recalls being in [facility] December 2018 when evaluated for left kidney sto ureteral stent present with proximal coil in upper pole moiety; in the lower pole moiety renal pelvis, 1.1 cm calculus along the course of left ureteral stent; in the lower pole moiety, additional 7 mm calculus.   No hydronephrosis but left pelvocaliectasis urological surgery on this patient because our group does not accept patient's insurance. Patient understands.                     Yi Matthews MD

## 2019-06-14 NOTE — PLAN OF CARE
Problem: Patient Centered Care  Goal: Patient preferences are identified and integrated in the patient's plan of care  Description  Interventions:  - What would you like us to know as we care for you?  I have left sided weakness from a stroke  - Provide t of fever/infection during anticipated neutropenic period  Description  INTERVENTIONS  - Monitor WBC  - Administer growth factors as ordered  - Implement neutropenic guidelines  Outcome: Progressing     Problem: SAFETY ADULT - FALL  Goal: Free from fall inj

## 2019-06-14 NOTE — PLAN OF CARE
Problem: Patient Centered Care  Goal: Patient preferences are identified and integrated in the patient's plan of care  Description  Interventions:  - What would you like us to know as we care for you?  I have left sided weakness from a stroke  - Provide t for signs and symptoms of electrolyte imbalances  - Administer electrolyte replacement as ordered  - Monitor response to electrolyte replacements, including rhythm and repeat lab results as appropriate  - Fluid restriction as ordered  - Instruct patient on FALL  Goal: Free from fall injury  Description  INTERVENTIONS:  - Assess pt frequently for physical needs  - Identify cognitive and physical deficits and behaviors that affect risk of falls.   - Jbphh fall precautions as indicated by assessment.  - Educ

## 2019-06-14 NOTE — PAYOR COMM NOTE
--------------  CONTINUED STAY REVIEW    Payor: [place] #:  [id number]  Authorization Number: [id number]    Admit date: 6/8/19  Admit time: 1303    Admitting Physician: Mindi Kan MD  Attending Physician:  Mindi Kan MD  Primary Car Date Action Dose Route User    6/14/2019 0930 Given 25 mg Oral Amanda Kingston, KATIE    6/13/2019 2135 Given 25 mg Oral Bianka Browne RN    6/13/2019 1632 Given 25 mg Oral Darlene Sharma RN    6/13/2019 1259 Given 25 mg Oral Darlene Sharma RN      isos •  CefTRIAXone Sodium (ROCEPHIN) 1 g in sodium chloride 0.9% 100 mL MBP/ADD-vantage, 1 g, Intravenous, Q24H  •  acetaminophen (TYLENOL) tab 650 mg, 650 mg, Oral, Q6H PRN  •  amLODIPine Besylate (NORVASC) tab 5 mg, 5 mg, Oral, Daily  •  atorvastatin (LIPITO Diagnostics:     CXR 6/8/19  =====  CONCLUSION: Cardiomegaly, pulmonary vascular congestion, and patchy alveolar opacities throughout the lungs.  Findings suggest CHF with possible superimposed edema.  Multifocal pneumonia could also potentially give this - has volume overload on exam as well as on CXR and pBNP elevated  - received lasix 40 mg IV  - possible she has systolic CHF- previous CONRAD stated overall preserved EF and HK in the anterior wall in LAD distribution.  She is on hydralazine/isosorbide combo Meg Castellanos is a(n) 80year old female. Resting in bed. Denies pain. On 12L NC, states she feels like her breathing is improving. No dysuria.   No gross hematuria.     Objective:   Vital Signs:  Blood pressure 106/44, pulse 64, temperature 97.9 °F       I explain to patient that  we will contemplate surgery on the stone only when she is completely stable from a respiratory as well as cardiac         4,  suspected UTI  Urinalysis 6/8/2019 consistent with UTI--see above; urine culture sent 6/8/2019 an ED to Hosp-Admission (Current) on 6/8/2019            Detailed Report        Chart Review: Note Routing History     No Routing History on File     6/12  Rosemary Mejia DO   Physician   Cardiology   Progress Notes   Signed   Date of Service:  6/12/2019  4: Blood pressure 110/42, pulse 60, temperature 97.8 °F (36.6 °C), resp. rate 18, height 165.1 cm (5' 5\"), weight 178 lb 4.8 oz (80.9 kg), SpO2 97 %.     General: Alert and oriented in no apparent distress. HEENT: No focal deficits.   Neck: + JVD, carotids 2 5. Tricuspid valve: Moderate regurgitation.   Labs:      Glucose 70 - 99 mg/dL 114High     Sodium 136 - 145 mmol/L 140    Potassium 3.5 - 5.1 mmol/L 4.4    Chloride 98 - 112 mmol/L 110    CO2 21.0 - 32.0 mmol/L 22.0    Anion Gap 0 - 18 mmol/L 8    BUN 7 - 1 •  furosemide (LASIX) tab 60 mg, 60 mg, Oral, BID (Diuretic)  •  Cetirizine HCl (ZYRTEC) 5 MG tab 5 mg, 5 mg, Oral, Daily  •  CefTRIAXone Sodium (ROCEPHIN) 1 g in sodium chloride 0.9% 100 mL MBP/ADD-vantage, 1 g, Intravenous, Q24H  •  acetaminophen (TYLENO CXR 6/8/19  =====  CONCLUSION: Cardiomegaly, pulmonary vascular congestion, and patchy alveolar opacities throughout the lungs.  Findings suggest CHF with possible superimposed edema.  Multifocal pneumonia could also potentially give this appearance.     E - has volume overload on exam as well as on CXR and pBNP elevated  - received lasix 40 mg IV  - previous CONRAD stated overall preserved EF and HK in the anterior wall in LAD distribution.  She is on hydralazine/isosorbide combo as well as metoprolol succinate Started 6/7/2019 when patient was at [facility] rehab facility; location was left lower back; severity was 8–9/10; quality was sharp; duration was constant; it was worse with standing up; laying still made it better.  Went to [facility] ER as above; there on 6/8/20 • Thyroid disease     • UTI (urinary tract infection)                 Past Surgical History:   Procedure Laterality Date   • CHOLECYSTECTOMY       • SINUS SURGERY             Family History   Problem Relation Age of Onset   • Other (ULCERS) Father     • Ca General review of systems -- please see HPI section above. She does have shortness of breath. Denies chest pain. Denies constipation. She has loose stool and is incontinent of stool  PHYSICAL EXAM:   Temperature 97.6. Pulse 58.   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          I explain to patient that urological surgery is not appropriate now because there is no urological Detailed Report        Chart Review: Note Routing History     No Routing History on File     PLEASE FAX DAYS CERTIFIED AND NEXT REVIEW DATE

## 2019-06-14 NOTE — PROGRESS NOTES
[facility]    History and Physical    Ayala Nino Patient Status:  Inpatient    1950 MRN [id number]   Location [facility] 5SW/SE Attending Dhara Valentin MD   Hosp Day # 6 PCP Alesha Hercules MD     Date:  2019  Date MG Oral Tab Take 10 mg by mouth 2 (two) times daily. Clopidogrel Bisulfate 75 MG Oral Tab Take 75 mg by mouth daily. Coenzyme Q-10 100 MG Oral Cap Take 100 mg by mouth daily.    ferrous sulfate 325 (65 FE) MG Oral Tab EC Take 325 mg by mouth 2 (two) violette %.    Nursing note and vitals reviewed. Constitutional: She is oriented to person, place, and time. She appears well-developed and well-nourished. No distress. HENT:   Head: Normocephalic and atraumatic.    Eyes: Pupils are equal, round, and reactive to Long-term use of aspirin therapy  As above. Acute on chronic diastolic heart failure (HCC)  Acute pulmonary edema last night and B-NP > 6700. Echo showing grade 2 DD with preserved EF.  Dyspnea / hypoxia have improved after IV Lasix but rales and edema

## 2019-06-14 NOTE — PROGRESS NOTES
Cardiology progress note    24 h events VS stable       Medications:    Current Facility-Administered Medications:   •  furosemide (LASIX) tab 60 mg, 60 mg, Oral, BID (Diuretic)  •  Cetirizine HCl (ZYRTEC) 5 MG tab 5 mg, 5 mg, Oral, Daily  •  CefTRIAXone S sites.  Neurologic: Alert and oriented, normal affect. Skin: Warm and dry. Laboratories and Data:  Diagnostics:    CXR 6/8/19  =====  CONCLUSION: Cardiomegaly, pulmonary vascular congestion, and patchy alveolar opacities throughout the lungs.   Finding American >=60 26Low     GFR, -American >=60 30Low       Assessment/Plan:    1.  Acute  CHF  - has volume overload on exam as well as on CXR and pBNP elevated  - received lasix 40 mg IV  - previous CONRAD stated overall preserved EF and HK in the anterio

## 2019-06-14 NOTE — CARDIAC REHAB
CARDIAC REHAB HEART FAILURE EDUCATION    Handouts provided and reviewed: CHF Booklet.      Activity: Chair for all meals: yes       Ambulation: up to bathroom w assist       Tolerated Activity: walker used         Disease Process: Disease process reviewed w

## 2019-06-15 NOTE — SIGNIFICANT EVENT
ICU nocturnalist    RRT called to room 529. Patient is a 43-year-old female, in bed appears comfortable however complaining of some chest tightness, RRT was called for chest pain.   Patient has been complaining of shortness of breath prior notes indicate

## 2019-06-15 NOTE — PROGRESS NOTES
[facility]    History and Physical    Tereso Gregory Patient Status:  Inpatient    1950 MRN [id number]   Location [facility] 5SW/SE Attending Sabra Ureña MD   Hosp Day # 7 PCP Edilberto Beck MD     Date:  2019  Date MG Oral Tab Take 10 mg by mouth 2 (two) times daily. Clopidogrel Bisulfate 75 MG Oral Tab Take 75 mg by mouth daily. Coenzyme Q-10 100 MG Oral Cap Take 100 mg by mouth daily.    ferrous sulfate 325 (65 FE) MG Oral Tab EC Take 325 mg by mouth 2 (two) violette kg), SpO2 97 %. Nursing note and vitals reviewed. Constitutional: She is oriented to person, place, and time. She appears well-developed and well-nourished. No distress. HENT:   Head: Normocephalic and atraumatic.    Eyes: Pupils are equal, round, an diuresis suggesting possible prerenal component. .      On clopidogrel therapy  This is for cardiac stents. Long-term use of aspirin therapy  As above.       Acute on chronic diastolic heart failure (HCC)  Acute pulmonary edema last night and B-NP > 670

## 2019-06-15 NOTE — PLAN OF CARE
RRT    *See RRT Documentation Record*    Reason the RRT was called: Chest pain   Assessment of patient leading up to RRT: Vitals, no shortness of breath, patient currently on 3L of oxygen.   Interventions/Testin lead EKG, Troponin, Nitroglycerin admini

## 2019-06-15 NOTE — CM/SW NOTE
MDO received for SNF    SW met w/ pt to discuss. Pt agreeable. Pt stated she has hx of [facility]. Pt requesting referral to [facility] as pt's MD goes there. SW placed referral to [facility] and requested DON from [facility].     Magnus Denver, [address]

## 2019-06-15 NOTE — PLAN OF CARE
Problem: Patient Centered Care  Goal: Patient preferences are identified and integrated in the patient's plan of care  Description  Interventions:  - What would you like us to know as we care for you?  I have left sided weakness from a stroke  - Provide t Consider cultural and social influences on pain and pain management  - Manage/alleviate anxiety  - Utilize distraction and/or relaxation techniques  - Monitor for opioid side effects  - Notify MD/LIP if interventions unsuccessful or patient reports new justice

## 2019-06-15 NOTE — CM/SW NOTE
MDO received for SNF    SW spoke w/ RN and requested PT/OT to evaluate    Kelly Lopez, [address]

## 2019-06-15 NOTE — PLAN OF CARE
Problem: METABOLIC/FLUID AND ELECTROLYTES - ADULT  Goal: Glucose maintained within prescribed range  Description  INTERVENTIONS:  - Monitor Blood Glucose as ordered  - Assess for signs and symptoms of hyperglycemia and hypoglycemia  - Administer ordered me on pain and pain management  - Manage/alleviate anxiety  - Utilize distraction and/or relaxation techniques  - Monitor for opioid side effects  - Notify MD/LIP if interventions unsuccessful or patient reports new pain  - Anticipate increased pain with acti services based on physician/LIP order or complex needs related to functional status, cognitive ability or social support system  Outcome: Progressing    Positive orthostatics this am  - Bp stable this evening and meds resumed per Dr Yuri Ortiz  Pt comfortabl

## 2019-06-15 NOTE — PROGRESS NOTES
Had sob last night. Stated in the chart cp but asking pt she states sob. Better.  Had dizziness and orthostatsis earlier    Medications:    Current Facility-Administered Medications:   •  morphINE sulfate (PF) 2 MG/ML injection 2 mg, 2 mg, Intravenous, Q2H carotids 2+ no bruits. Cardiac: Regular rate and rhythm, 1/6 murmur apex  Lungs: Crackles but improved  Abdomen: Soft, non-tender. Extremities: edema improved  Peripheral pulses are 2+ bilateral pedal and radial sites.   Neurologic: Alert and oriented, n 18 mg/dL 54High     Creatinine 0.55 - 1.02 mg/dL 1. 94High     BUN/CREA Ratio 10.0 - 20.0 27.8High     Calcium, Total 8.5 - 10.1 mg/dL 8.7    Calculated Osmolality 275 - 295 mOsm/kg 306High     GFR, Non- >=60 26Low     GFR, -American

## 2019-06-16 NOTE — PROGRESS NOTES
No chest pains or shortness of breath.     Medications:    Current Facility-Administered Medications:   •  Vancomycin HCl (FIRVANQ) 50 MG/ML oral solution 125 mg, 125 mg, Oral, Daily  •  morphINE sulfate (PF) 2 MG/ML injection 2 mg, 2 mg, Intravenous, Q2H P apex  Lungs: Clear to auscultation  Abdomen: Soft, non-tender. Extremities: edema improved  Peripheral pulses are 2+ bilateral pedal and radial sites. Neurologic: Alert and oriented, normal affect. Skin: Warm and dry.      Laboratories and Data:  [facility] BUN/CREA Ratio 10.0 - 20.0 27.8High     Calcium, Total 8.5 - 10.1 mg/dL 8.7    Calculated Osmolality 275 - 295 mOsm/kg 306High     GFR, Non- >=60 26Low     GFR, -American >=60 30Low       Assessment/Plan:    1.  Acute  CHF  - has vo

## 2019-06-16 NOTE — PLAN OF CARE
Problem: Patient Centered Care  Goal: Patient preferences are identified and integrated in the patient's plan of care  Description  Interventions:  - What would you like us to know as we care for you?  I have left sided weakness from a stroke  - Provide t adequate nutritional intake and initiate nutrition consult as needed  - Instruct patient on self management of diabetes  Outcome: Progressing  Goal: Electrolytes maintained within normal limits  Description  INTERVENTIONS:  - Monitor labs and rhythm and as RISK FOR INFECTION - ADULT  Goal: Absence of fever/infection during anticipated neutropenic period  Description  INTERVENTIONS  - Monitor WBC  - Administer growth factors as ordered  - Implement neutropenic guidelines  Outcome: Progressing     Problem: SAF

## 2019-06-16 NOTE — PLAN OF CARE
Problem: Patient Centered Care  Goal: Patient preferences are identified and integrated in the patient's plan of care  Description  Interventions:  - What would you like us to know as we care for you?  I have left sided weakness from a stroke  - Provide t for signs and symptoms of electrolyte imbalances  - Administer electrolyte replacement as ordered  - Monitor response to electrolyte replacements, including rhythm and repeat lab results as appropriate  - Fluid restriction as ordered  - Instruct patient on FALL  Goal: Free from fall injury  Description  INTERVENTIONS:  - Assess pt frequently for physical needs  - Identify cognitive and physical deficits and behaviors that affect risk of falls.   - [place] fall precautions as indicated by assessment.  - Educ

## 2019-06-16 NOTE — PROGRESS NOTES
[facility]    CDI Prediction Tool Protocol (Vancomycin Initiated)    OVP (oral vancomycin prophylaxis) 125 mg PO Daily is being started in this patient based on a score of 13.       Score Breakdown:  High risk antibiotic use (5 points)  Hospital

## 2019-06-17 NOTE — PAYOR COMM NOTE
--------------  CONTINUED STAY REVIEW    Payor: Trena Burton #:  [id number]  Authorization Number: [id number]    Admit date: 6/8/19  Admit time: 1303    Admitting Physician: Nadine Mayorga MD  Attending Physician:  Nadine Mayorga MD  Primary Car Oral Maritza Ha, RN    6/16/2019 8363 Given 25 mg Oral Maritza Ha, RN      isosorbide dinitrate (ISORDIL) tab 20 mg     Date Action Dose Route User    6/17/2019 0829 Given 20 mg Oral [place]    6/16/2019 2013 Given 20 mg Oral Ale Humphreys

## 2019-06-17 NOTE — PROGRESS NOTES
No chest pains or shortness of breath.     Medications:    Current Facility-Administered Medications:   •  Vancomycin HCl (FIRVANQ) 50 MG/ML oral solution 125 mg, 125 mg, Oral, Daily  •  morphINE sulfate (PF) 2 MG/ML injection 2 mg, 2 mg, Intravenous, Q2H P are 2+ bilateral pedal and radial sites. Neurologic: Alert and oriented, normal affect. Skin: Warm and dry.      Laboratories and Data:  Diagnostics:    CXR 6/8/19  =====  CONCLUSION: Cardiomegaly, pulmonary vascular congestion, and patchy alveolar opacit 306High     GFR, Non- >=60 26Low     GFR, -American >=60 30Low       Assessment/Plan:    1.  Acute  CHF  - has volume overload on exam as well as on CXR and pBNP elevated  - received lasix 40 mg IV  - previous CONRAD stated overall prese

## 2019-06-17 NOTE — PROGRESS NOTES
[facility]    History and Physical    Diego Brambila Patient Status:  Inpatient    1950 MRN [id number]   Location [facility] 5SW/SE Attending Cathy Mccoy MD   [facility] Day # 9 PCP Pavan Mendez MD     Date:  2019  Date MG Oral Tab Take 10 mg by mouth 2 (two) times daily. Clopidogrel Bisulfate 75 MG Oral Tab Take 75 mg by mouth daily. Coenzyme Q-10 100 MG Oral Cap Take 100 mg by mouth daily.    ferrous sulfate 325 (65 FE) MG Oral Tab EC Take 325 mg by mouth 2 (two) violette SpO2 94 %. Nursing note and vitals reviewed. Constitutional: She is oriented to person, place, and time. She appears well-developed and well-nourished. No distress. HENT:   Head: Normocephalic and atraumatic.    Eyes: Pupils are equal, round, and jigna therapy  This is for cardiac stents. Long-term use of aspirin therapy  As above. Acute on chronic diastolic heart failure (HCC)  Acute pulmonary edema last night and B-NP > 6700. Echo showing grade 2 DD with preserved EF.  Dyspnea / hypoxia have i

## 2019-06-17 NOTE — PLAN OF CARE
Problem: Patient Centered Care  Goal: Patient preferences are identified and integrated in the patient's plan of care  Description  Interventions:  - What would you like us to know as we care for you?  I have left sided weakness from a stroke  - Provide t adequate nutritional intake and initiate nutrition consult as needed  - Instruct patient on self management of diabetes  Outcome: Progressing  Goal: Electrolytes maintained within normal limits  Description  INTERVENTIONS:  - Monitor labs and rhythm and as RISK FOR INFECTION - ADULT  Goal: Absence of fever/infection during anticipated neutropenic period  Description  INTERVENTIONS  - Monitor WBC  - Administer growth factors as ordered  - Implement neutropenic guidelines  Outcome: Progressing     Problem: SAF done.

## 2019-06-18 VITALS
SYSTOLIC BLOOD PRESSURE: 120 MMHG | HEIGHT: 65 IN | BODY MASS INDEX: 28.27 KG/M2 | TEMPERATURE: 98 F | OXYGEN SATURATION: 96 % | DIASTOLIC BLOOD PRESSURE: 80 MMHG | HEART RATE: 73 BPM | WEIGHT: 169.69 LBS | RESPIRATION RATE: 18 BRPM

## 2019-06-18 RX ORDER — FUROSEMIDE 20 MG/1
60 TABLET ORAL
Qty: 180 TABLET | Refills: 0 | Status: ON HOLD | OUTPATIENT
Start: 2019-06-18 | End: 2020-01-13 | Stop reason: ALTCHOICE

## 2019-06-18 RX ORDER — TRAMADOL HYDROCHLORIDE 50 MG/1
50 TABLET ORAL EVERY 12 HOURS PRN
Qty: 60 TABLET | Refills: 1 | Status: ON HOLD | OUTPATIENT
Start: 2019-06-18 | End: 2020-01-07

## 2019-06-18 NOTE — PHYSICAL THERAPY NOTE
PHYSICAL THERAPY EVALUATION - INPATIENT     Room Number: 529/529-A  Evaluation Date: 6/18/2019  Type of Evaluation: Initial   Physician Order: PT Eval and Treat    Presenting Problem: Acute cystitis without hematuria and left lower back pain  Reason for T Good  Frequency (Obs): 3x/week       PHYSICAL THERAPY MEDICAL/SOCIAL HISTORY     History related to current admission: Patient with hx of UTI, CVA, NSTEMI, HLD, hemiplegia, anemia, and CVA      Problem List  Principal Problem:    Acute cystitis with hematu Bed/chair alarm  Fall Risk: Standard fall risk    WEIGHT BEARING RESTRICTION  Weight Bearing Restriction: None                PAIN ASSESSMENT  Ratin     Management Techniques: (\"Just stiff\")    COGNITION  · Overall Cognitive Status:  WFL - within fun Transfers: Supervision     Exercise/Education Provided:  Bed mobility  Body mechanics  Energy conservation  Gait training  Posture  Strengthening  Transfer training    Patient End of Session: Up in chair;RN aware of session/findings; Needs met;Call [facility]

## 2019-06-18 NOTE — PLAN OF CARE
Problem: Patient Centered Care  Goal: Patient preferences are identified and integrated in the patient's plan of care  Description  Interventions:  - What would you like us to know as we care for you?  I have left sided weakness from a stroke  - Provide t Assess barriers to adequate nutritional intake and initiate nutrition consult as needed  - Instruct patient on self management of diabetes  Outcome: Progressing  Goal: Electrolytes maintained within normal limits  Description  INTERVENTIONS:  - Monitor lab Progressing     Problem: RISK FOR INFECTION - ADULT  Goal: Absence of fever/infection during anticipated neutropenic period  Description  INTERVENTIONS  - Monitor WBC  - Administer growth factors as ordered  - Implement neutropenic guidelines  Outcome: Pro

## 2019-06-18 NOTE — PLAN OF CARE
Problem: Patient Centered Care  Goal: Patient preferences are identified and integrated in the patient's plan of care  Description  Interventions:  - What would you like us to know as we care for you?  I have left sided weakness from a stroke  - Provide t within target range  - Assess barriers to adequate nutritional intake and initiate nutrition consult as needed  - Instruct patient on self management of diabetes  Outcome: Adequate for Discharge  Goal: Electrolytes maintained within normal limits  Descript activity and pre-medicate as appropriate  Outcome: Adequate for Discharge     Problem: RISK FOR INFECTION - ADULT  Goal: Absence of fever/infection during anticipated neutropenic period  Description  INTERVENTIONS  - Monitor WBC  - Administer growth factor

## 2019-06-18 NOTE — OCCUPATIONAL THERAPY NOTE
OCCUPATIONAL THERAPY EVALUATION - INPATIENT     Room Number: 529/529-A  Evaluation Date: 6/18/2019  Type of Evaluation: Initial  Presenting Problem: cystitis, pulmonary edema    Physician Order: IP Consult to Occupational Therapy  Reason for Therapy: ADL/I Acute on chronic diastolic CHF (congestive heart failure) [facility])      Past Medical History  Past Medical History:   Diagnosis Date   • Acute ischemic heart disease ([facility] [address]    • Anemia    • Congestive heart disease ([facility] [address]    • Diabetes ([facility] [address]    • Dysphagia    • Short Form  How much help from another person does the patient currently need…  -   Putting on and taking off regular lower body clothing?: None  -   Bathing (including washing, rinsing, drying)?: A Little  -   Toileting, which includes using toilet, bedpa

## 2019-06-18 NOTE — PAYOR COMM NOTE
--------------  CONTINUED STAY REVIEW    Payor: Alberto Sayminoo #:  [id number]  Authorization Number: [id number]    Admit date: 6/8/19  Admit time: 1303    Admitting Physician: Marck Castano MD  Attending Physician:  Marck Castano MD  Primary Car 6/18/2019 0852 Given 25 mg Oral Ashley [place]    6/17/2019 2055 Given 25 mg Oral Luz Winter RN    6/17/2019 1721 Given 25 mg Oral Imtiaz Fofana RN      isosorbide dinitrate (ISORDIL) tab 20 mg     Date Action Dose Route User    6/18/2019 252

## 2019-06-18 NOTE — CM/SW NOTE
Hollie met with patient at bedside   Patient refusing [address] and is requesting to go to [address] [facility] [address] where she resides    Orders received for home health care  Referral sent to [address] in [facility]- unable to accept due to insurance  Multiple re

## 2019-06-18 NOTE — PROGRESS NOTES
[facility]    History and Physical    Tonny Winters Patient Status:  Inpatient    1950 MRN [id number]   Location [facility] 5SW/SE Attending Brice Jacobs MD   Hosp Day # 8 PCP Nieves Hunter MD     Date:  2019  Abrahan Joshua mouth 2 (two) times daily with meals. hydrALAzine HCl 25 MG Oral Tab Take 25 mg by mouth 4 (four) times daily. isosorbide dinitrate 20 MG Oral Tab Take 20 mg by mouth 3 (three) times daily.    Levothyroxine Sodium 50 MCG Oral Tab Take 50 mcg by mouth be 98.3 °F (36.8 °C), temperature source Oral, resp. rate 18, height 5' 5\" (1.651 m), weight 169 lb 11.2 oz (77 kg), SpO2 94 %. Nursing note and vitals reviewed. Constitutional: She is oriented to person, place, and time.  She appears well-developed and creat has gone up with diuresis suggesting possible prerenal component. .6/16 creat now stable at a CKD 4 level. On clopidogrel therapy  This is for cardiac stents. Long-term use of aspirin therapy  As above.       Acute on chronic diastolic heart

## 2019-06-24 NOTE — PAYOR COMM NOTE
--------------  DISCHARGE REVIEW    Payor: Lenny Hernando #:  [id number]  Authorization Number: [id number]    Admit date: 6/8/19  Admit time:  1303  Discharge Date: 6/18/2019  6:00 PM     Admitting Physician: Allison Harrell MD  Attending Physician

## 2019-07-02 ENCOUNTER — HOSPITAL ENCOUNTER (INPATIENT)
Facility: HOSPITAL | Age: 69
LOS: 3 days | Discharge: HOME | DRG: 286 | End: 2019-07-05
Attending: EMERGENCY MEDICINE | Admitting: HOSPITALIST
Payer: MEDICARE

## 2019-07-02 ENCOUNTER — APPOINTMENT (OUTPATIENT)
Dept: GENERAL RADIOLOGY | Facility: HOSPITAL | Age: 69
DRG: 286 | End: 2019-07-02
Attending: EMERGENCY MEDICINE
Payer: MEDICARE

## 2019-07-02 DIAGNOSIS — I34.0 MITRAL VALVE INSUFFICIENCY, UNSPECIFIED ETIOLOGY: Primary | ICD-10-CM

## 2019-07-02 DIAGNOSIS — R07.9 ACUTE CHEST PAIN: ICD-10-CM

## 2019-07-02 LAB
ANION GAP SERPL CALC-SCNC: 9 MMOL/L (ref 0–18)
BASOPHILS # BLD AUTO: 0.02 X10(3) UL (ref 0–0.2)
BASOPHILS NFR BLD AUTO: 0.2 %
BUN BLD-MCNC: 46 MG/DL (ref 7–18)
BUN/CREAT SERPL: 23.6 (ref 10–20)
CALCIUM BLD-MCNC: 8.7 MG/DL (ref 8.5–10.1)
CHLORIDE SERPL-SCNC: 105 MMOL/L (ref 98–112)
CO2 SERPL-SCNC: 25 MMOL/L (ref 21–32)
CREAT BLD-MCNC: 1.95 MG/DL (ref 0.55–1.02)
DEPRECATED RDW RBC AUTO: 47.4 FL (ref 35.1–46.3)
EOSINOPHIL # BLD AUTO: 0.23 X10(3) UL (ref 0–0.7)
EOSINOPHIL NFR BLD AUTO: 2.5 %
ERYTHROCYTE [DISTWIDTH] IN BLOOD BY AUTOMATED COUNT: 13.5 % (ref 11–15)
EST. AVERAGE GLUCOSE BLD GHB EST-MCNC: 128 MG/DL (ref 68–126)
GLUCOSE BLD-MCNC: 238 MG/DL (ref 70–99)
GLUCOSE BLDC GLUCOMTR-MCNC: 105 MG/DL (ref 70–99)
GLUCOSE BLDC GLUCOMTR-MCNC: 136 MG/DL (ref 70–99)
HBA1C MFR BLD HPLC: 6.1 % (ref ?–5.7)
HCT VFR BLD AUTO: 37.3 % (ref 35–48)
HGB BLD-MCNC: 12.6 G/DL (ref 12–16)
IMM GRANULOCYTES # BLD AUTO: 0.03 X10(3) UL (ref 0–1)
IMM GRANULOCYTES NFR BLD: 0.3 %
LYMPHOCYTES # BLD AUTO: 1.38 X10(3) UL (ref 1–4)
LYMPHOCYTES NFR BLD AUTO: 15.1 %
MCH RBC QN AUTO: 32.3 PG (ref 26–34)
MCHC RBC AUTO-ENTMCNC: 33.8 G/DL (ref 31–37)
MCV RBC AUTO: 95.6 FL (ref 80–100)
MONOCYTES # BLD AUTO: 0.5 X10(3) UL (ref 0.1–1)
MONOCYTES NFR BLD AUTO: 5.5 %
MRSA DNA SPEC QL NAA+PROBE: NEGATIVE
NEUTROPHILS # BLD AUTO: 6.95 X10 (3) UL (ref 1.5–7.7)
NEUTROPHILS # BLD AUTO: 6.95 X10(3) UL (ref 1.5–7.7)
NEUTROPHILS NFR BLD AUTO: 76.4 %
NT-PROBNP SERPL-MCNC: 4275 PG/ML (ref ?–125)
OSMOLALITY SERPL CALC.SUM OF ELEC: 308 MOSM/KG (ref 275–295)
PLATELET # BLD AUTO: 154 10(3)UL (ref 150–450)
POTASSIUM SERPL-SCNC: 3.9 MMOL/L (ref 3.5–5.1)
RBC # BLD AUTO: 3.9 X10(6)UL (ref 3.8–5.3)
SODIUM SERPL-SCNC: 139 MMOL/L (ref 136–145)
TROPONIN I SERPL-MCNC: <0.045 NG/ML (ref ?–0.04)
WBC # BLD AUTO: 9.1 X10(3) UL (ref 4–11)

## 2019-07-02 PROCEDURE — 71045 X-RAY EXAM CHEST 1 VIEW: CPT | Performed by: EMERGENCY MEDICINE

## 2019-07-02 PROCEDURE — 99223 1ST HOSP IP/OBS HIGH 75: CPT | Performed by: HOSPITALIST

## 2019-07-02 RX ORDER — ACETAMINOPHEN 325 MG/1
650 TABLET ORAL EVERY 6 HOURS PRN
Status: DISCONTINUED | OUTPATIENT
Start: 2019-07-02 | End: 2019-07-05

## 2019-07-02 RX ORDER — METOCLOPRAMIDE 10 MG/1
5 TABLET ORAL EVERY 6 HOURS PRN
Status: DISCONTINUED | OUTPATIENT
Start: 2019-07-02 | End: 2019-07-05

## 2019-07-02 RX ORDER — FUROSEMIDE 10 MG/ML
40 INJECTION INTRAMUSCULAR; INTRAVENOUS 2 TIMES DAILY
Status: DISCONTINUED | OUTPATIENT
Start: 2019-07-02 | End: 2019-07-03

## 2019-07-02 RX ORDER — DEXTROSE MONOHYDRATE 25 G/50ML
50 INJECTION, SOLUTION INTRAVENOUS AS NEEDED
Status: DISCONTINUED | OUTPATIENT
Start: 2019-07-02 | End: 2019-07-05

## 2019-07-02 RX ORDER — ATORVASTATIN CALCIUM 40 MG/1
40 TABLET, FILM COATED ORAL DAILY
Status: DISCONTINUED | OUTPATIENT
Start: 2019-07-03 | End: 2019-07-05

## 2019-07-02 RX ORDER — MELATONIN
325 2 TIMES DAILY WITH MEALS
Status: DISCONTINUED | OUTPATIENT
Start: 2019-07-02 | End: 2019-07-05

## 2019-07-02 RX ORDER — SODIUM CHLORIDE 0.9 % (FLUSH) 0.9 %
3 SYRINGE (ML) INJECTION AS NEEDED
Status: DISCONTINUED | OUTPATIENT
Start: 2019-07-02 | End: 2019-07-05

## 2019-07-02 RX ORDER — HYDRALAZINE HYDROCHLORIDE 25 MG/1
25 TABLET, FILM COATED ORAL 4 TIMES DAILY
Status: DISCONTINUED | OUTPATIENT
Start: 2019-07-02 | End: 2019-07-03

## 2019-07-02 RX ORDER — TRAMADOL HYDROCHLORIDE 50 MG/1
50 TABLET ORAL EVERY 12 HOURS PRN
Status: DISCONTINUED | OUTPATIENT
Start: 2019-07-02 | End: 2019-07-05

## 2019-07-02 RX ORDER — ONDANSETRON 2 MG/ML
4 INJECTION INTRAMUSCULAR; INTRAVENOUS EVERY 6 HOURS PRN
Status: DISCONTINUED | OUTPATIENT
Start: 2019-07-02 | End: 2019-07-05

## 2019-07-02 RX ORDER — BACLOFEN 10 MG/1
10 TABLET ORAL 2 TIMES DAILY
Status: DISCONTINUED | OUTPATIENT
Start: 2019-07-02 | End: 2019-07-05

## 2019-07-02 RX ORDER — ASPIRIN 81 MG/1
81 TABLET, CHEWABLE ORAL DAILY
Status: DISCONTINUED | OUTPATIENT
Start: 2019-07-02 | End: 2019-07-05

## 2019-07-02 RX ORDER — ACETAMINOPHEN 325 MG/1
650 TABLET ORAL EVERY 6 HOURS PRN
Status: DISCONTINUED | OUTPATIENT
Start: 2019-07-02 | End: 2019-07-02

## 2019-07-02 RX ORDER — HEPARIN SODIUM 5000 [USP'U]/ML
5000 INJECTION, SOLUTION INTRAVENOUS; SUBCUTANEOUS EVERY 12 HOURS SCHEDULED
Status: DISCONTINUED | OUTPATIENT
Start: 2019-07-02 | End: 2019-07-05

## 2019-07-02 RX ORDER — SODIUM CHLORIDE 0.9 % (FLUSH) 0.9 %
10 SYRINGE (ML) INJECTION AS NEEDED
Status: DISCONTINUED | OUTPATIENT
Start: 2019-07-02 | End: 2019-07-05

## 2019-07-02 RX ORDER — NITROGLYCERIN 0.4 MG/1
0.4 TABLET SUBLINGUAL EVERY 5 MIN PRN
Status: DISCONTINUED | OUTPATIENT
Start: 2019-07-02 | End: 2019-07-02

## 2019-07-02 RX ORDER — NITROGLYCERIN 0.4 MG/1
0.4 TABLET SUBLINGUAL EVERY 5 MIN PRN
Status: DISCONTINUED | OUTPATIENT
Start: 2019-07-02 | End: 2019-07-05

## 2019-07-02 RX ORDER — PANTOPRAZOLE SODIUM 40 MG/1
40 TABLET, DELAYED RELEASE ORAL
Status: DISCONTINUED | OUTPATIENT
Start: 2019-07-03 | End: 2019-07-05

## 2019-07-02 RX ORDER — LEVOTHYROXINE SODIUM 0.05 MG/1
50 TABLET ORAL
Status: DISCONTINUED | OUTPATIENT
Start: 2019-07-03 | End: 2019-07-05

## 2019-07-02 RX ORDER — ISOSORBIDE DINITRATE 20 MG/1
20 TABLET ORAL 3 TIMES DAILY
Status: DISCONTINUED | OUTPATIENT
Start: 2019-07-02 | End: 2019-07-05

## 2019-07-02 NOTE — PROGRESS NOTES
[facility] Note:  Renal Adjustment for cefazolin (ANCEF)    Malen Mates is a 80year old female who has been prescribed cefazolin (ANCEF) 1000 mg every 8 hrs.   CrCl is estimated creatinine clearance is 25.2 mL/min (A) (based on SCr of 1.95 mg/dL (H

## 2019-07-02 NOTE — CONSULTS
[facility] HOSP - [facility]    Report of Consultation    Leni Elizabeth Patient Status:  Emergency    1950 MRN [id number]   Location [address] Attending Quita Peng MD   Hosp Day # 0 PCP Linda Ma MD     Shawn RASH    Review of Systems:    Pertinent items are noted in HPI. Physical Exam:   Blood pressure 131/82, pulse 84, temperature 98 °F (36.7 °C), temperature source Oral, resp. rate 19, height 165.1 cm (5' 5\"), weight 193 lb (87.5 kg), SpO2 97 %.   Intake/ euvolemic and pBNP down  - continue lasix 60 po BID  -  She is on hydralazine/isosorbide combo as well as metoprolol succinate  - current echo preserved EF     3. Severe mitral regurg.  Follow up with her cardiologist Winsome Apodaca outpatient for possible MV

## 2019-07-02 NOTE — ED INITIAL ASSESSMENT (HPI)
Pt from [facility] with sternal chest pain which onset today after eating breakfast. Rates pain 3-4/10. States pain worse with deep inspirations. Full dose aspirin received pta. Pt hospitalized last month with mitral valve problem.

## 2019-07-02 NOTE — H&P
[facility]    PATIENT'S NAME: Aniya Hernandez   ATTENDING PHYSICIAN: Ruth Palacio MD   PATIENT ACCOUNT#:   [id number]    LOCATION:  [place] [address]  MEDICAL RECORD #:   [id number]       YOB: 1950  ADMISSION DATE:       07/0 use.  Again, noncompliant with low-salt diet. REVIEW OF SYSTEMS:  The patient reports progressive dyspnea on exertion, increased leg swelling and mild erythema both legs, increased tightness in her chest.  The patient had orthopnea.   She said she aziza for her bilateral lower leg cellulitis. Further recommendations to follow.     Dictated By Cruz Singleton MD  d: 07/02/2019 15:55:42  t: 07/02/2019 16:15:25  [facility] [id number]  /

## 2019-07-02 NOTE — ED PROVIDER NOTES
Patient Seen in: [facility] AND [facility] Emergency Department    History   Patient presents with:  Chest Pain Angina (cardiovascular)    Stated Complaint: chest pain    HPI    This patient presents with chest discomfort that she states that she has had chronic EC,  Take 325 mg by mouth 2 (two) times daily with meals. hydrALAzine HCl 25 MG Oral Tab,  Take 25 mg by mouth 4 (four) times daily. isosorbide dinitrate 20 MG Oral Tab,  Take 20 mg by mouth 3 (three) times daily.    Levothyroxine Sodium 50 MCG Oral Tab edema to the lower extremities  Respiratory:  Lungs clear to auscultation bilaterally with good effort. No wheezes, ronchi, or rales. Abdomen:  Soft, non-tender, non-distended. No masses, no hepato-splenomegaly. Musculoskeletal:  Good muscle tone.   [place] panel order CBC WITH DIFFERENTIAL WITH PLATELET.   Procedure                               Abnormality         Status                     ---------                               -----------         ------                     CBC W/ DIFFERENTIAL[[id number]]

## 2019-07-03 ENCOUNTER — APPOINTMENT (OUTPATIENT)
Dept: INTERVENTIONAL RADIOLOGY/VASCULAR | Facility: HOSPITAL | Age: 69
DRG: 286 | End: 2019-07-03
Attending: INTERNAL MEDICINE
Payer: MEDICARE

## 2019-07-03 ENCOUNTER — APPOINTMENT (OUTPATIENT)
Dept: NUCLEAR MEDICINE | Facility: HOSPITAL | Age: 69
DRG: 286 | End: 2019-07-03
Attending: HOSPITALIST
Payer: MEDICARE

## 2019-07-03 ENCOUNTER — APPOINTMENT (OUTPATIENT)
Dept: CV DIAGNOSTICS | Facility: HOSPITAL | Age: 69
DRG: 286 | End: 2019-07-03
Attending: HOSPITALIST
Payer: MEDICARE

## 2019-07-03 LAB
ANION GAP SERPL CALC-SCNC: 5 MMOL/L (ref 0–18)
BASOPHILS # BLD AUTO: 0.01 X10(3) UL (ref 0–0.2)
BASOPHILS NFR BLD AUTO: 0.2 %
BUN BLD-MCNC: 41 MG/DL (ref 7–18)
BUN/CREAT SERPL: 25.8 (ref 10–20)
CALCIUM BLD-MCNC: 8.7 MG/DL (ref 8.5–10.1)
CHLORIDE SERPL-SCNC: 107 MMOL/L (ref 98–112)
CO2 SERPL-SCNC: 31 MMOL/L (ref 21–32)
CREAT BLD-MCNC: 1.59 MG/DL (ref 0.55–1.02)
DEPRECATED RDW RBC AUTO: 46.5 FL (ref 35.1–46.3)
EOSINOPHIL # BLD AUTO: 0.2 X10(3) UL (ref 0–0.7)
EOSINOPHIL NFR BLD AUTO: 3 %
ERYTHROCYTE [DISTWIDTH] IN BLOOD BY AUTOMATED COUNT: 13.2 % (ref 11–15)
GLUCOSE BLD-MCNC: 120 MG/DL (ref 70–99)
GLUCOSE BLDC GLUCOMTR-MCNC: 118 MG/DL (ref 70–99)
GLUCOSE BLDC GLUCOMTR-MCNC: 137 MG/DL (ref 70–99)
GLUCOSE BLDC GLUCOMTR-MCNC: 162 MG/DL (ref 70–99)
HAV IGM SER QL: 2.1 MG/DL (ref 1.6–2.6)
HCT VFR BLD AUTO: 36.7 % (ref 35–48)
HGB BLD-MCNC: 12.4 G/DL (ref 12–16)
IMM GRANULOCYTES # BLD AUTO: 0.02 X10(3) UL (ref 0–1)
IMM GRANULOCYTES NFR BLD: 0.3 %
LYMPHOCYTES # BLD AUTO: 1.33 X10(3) UL (ref 1–4)
LYMPHOCYTES NFR BLD AUTO: 20.1 %
MCH RBC QN AUTO: 32.3 PG (ref 26–34)
MCHC RBC AUTO-ENTMCNC: 33.8 G/DL (ref 31–37)
MCV RBC AUTO: 95.6 FL (ref 80–100)
MONOCYTES # BLD AUTO: 0.41 X10(3) UL (ref 0.1–1)
MONOCYTES NFR BLD AUTO: 6.2 %
NEUTROPHILS # BLD AUTO: 4.64 X10 (3) UL (ref 1.5–7.7)
NEUTROPHILS # BLD AUTO: 4.64 X10(3) UL (ref 1.5–7.7)
NEUTROPHILS NFR BLD AUTO: 70.2 %
OSMOLALITY SERPL CALC.SUM OF ELEC: 307 MOSM/KG (ref 275–295)
PLATELET # BLD AUTO: 133 10(3)UL (ref 150–450)
POTASSIUM SERPL-SCNC: 3.6 MMOL/L (ref 3.5–5.1)
POTASSIUM SERPL-SCNC: 4.4 MMOL/L (ref 3.5–5.1)
RBC # BLD AUTO: 3.84 X10(6)UL (ref 3.8–5.3)
SODIUM SERPL-SCNC: 143 MMOL/L (ref 136–145)
WBC # BLD AUTO: 6.6 X10(3) UL (ref 4–11)

## 2019-07-03 PROCEDURE — 4A023N7 MEASUREMENT OF CARDIAC SAMPLING AND PRESSURE, LEFT HEART, PERCUTANEOUS APPROACH: ICD-10-PCS | Performed by: INTERNAL MEDICINE

## 2019-07-03 PROCEDURE — 78452 HT MUSCLE IMAGE SPECT MULT: CPT | Performed by: HOSPITALIST

## 2019-07-03 PROCEDURE — B2111ZZ FLUOROSCOPY OF MULTIPLE CORONARY ARTERIES USING LOW OSMOLAR CONTRAST: ICD-10-PCS | Performed by: INTERNAL MEDICINE

## 2019-07-03 PROCEDURE — 93017 CV STRESS TEST TRACING ONLY: CPT | Performed by: HOSPITALIST

## 2019-07-03 PROCEDURE — 99232 SBSQ HOSP IP/OBS MODERATE 35: CPT | Performed by: HOSPITALIST

## 2019-07-03 RX ORDER — HYDRALAZINE HYDROCHLORIDE 50 MG/1
50 TABLET, FILM COATED ORAL 4 TIMES DAILY
Status: DISCONTINUED | OUTPATIENT
Start: 2019-07-03 | End: 2019-07-03

## 2019-07-03 RX ORDER — POTASSIUM CHLORIDE 20 MEQ/1
40 TABLET, EXTENDED RELEASE ORAL EVERY 4 HOURS
Status: COMPLETED | OUTPATIENT
Start: 2019-07-03 | End: 2019-07-03

## 2019-07-03 RX ORDER — DIPHENHYDRAMINE HYDROCHLORIDE 50 MG/ML
INJECTION INTRAMUSCULAR; INTRAVENOUS
Status: COMPLETED
Start: 2019-07-03 | End: 2019-07-03

## 2019-07-03 RX ORDER — AMLODIPINE BESYLATE 5 MG/1
5 TABLET ORAL DAILY
Status: DISCONTINUED | OUTPATIENT
Start: 2019-07-03 | End: 2019-07-05

## 2019-07-03 RX ORDER — SODIUM CHLORIDE 9 MG/ML
INJECTION, SOLUTION INTRAVENOUS
Status: DISPENSED
Start: 2019-07-03 | End: 2019-07-04

## 2019-07-03 RX ORDER — 0.9 % SODIUM CHLORIDE 0.9 %
VIAL (ML) INJECTION
Status: COMPLETED
Start: 2019-07-03 | End: 2019-07-03

## 2019-07-03 RX ORDER — LIDOCAINE HYDROCHLORIDE 20 MG/ML
INJECTION, SOLUTION EPIDURAL; INFILTRATION; INTRACAUDAL; PERINEURAL
Status: COMPLETED
Start: 2019-07-03 | End: 2019-07-03

## 2019-07-03 RX ORDER — HYDRALAZINE HYDROCHLORIDE 50 MG/1
50 TABLET, FILM COATED ORAL EVERY 8 HOURS SCHEDULED
Status: DISCONTINUED | OUTPATIENT
Start: 2019-07-03 | End: 2019-07-05

## 2019-07-03 RX ORDER — FUROSEMIDE 40 MG/1
40 TABLET ORAL
Status: DISCONTINUED | OUTPATIENT
Start: 2019-07-03 | End: 2019-07-03

## 2019-07-03 NOTE — PROGRESS NOTES
Patient seen in follow up. Symptoms discussed. No cp today.     07/03/19  1653   BP: 156/83   Pulse: 80   Resp: 15   Temp:        Intake/Output Summary (Last 24 hours) at 7/3/2019 1703  Last data filed at 7/3/2019 1600  Gross per 24 hour   Intake 790 Levothyroxine Sodium (SYNTHROID) tab 50 mcg 50 mcg Oral Before breakfast   lidocaine-menthol 4-1 % 1 patch 1 patch Transdermal Daily PRN   linagliptin (TRADJENTA) tab TABS 5 mg 5 mg Oral Daily   melatonin cap/tab 5 mg 5 mg Oral Nightly   Metoclopramide HCl CONCLUSION:  1. Mild cardiomegaly. Prominent central pulmonary vasculature. 2. Pulmonary vascular congestive changes have improved. Mild residual perihilar bibasilar interstitial congestion remaining.     Dictated by (CST): Amy Hanley MD on 7/02/

## 2019-07-03 NOTE — OPERATIVE REPORT
Preop diagnosis: cp  Post op diagnosis: cad  Procedures: [facility] cors  Findings: patent stents, small vessel disease  EBL: 20 mls  Specimens: None

## 2019-07-03 NOTE — PROGRESS NOTES
[facility]    Progress Note    Goyo Filter Patient Status:  Inpatient    1950 MRN [id number]   Location [facility] 3W/SW Attending Kena Block., MD   Hosp Day # 1 PCP Janice Prather MD        Subjective:     Con cardiology, Follow up with her cardiologist Pramod Dean outpatient for possible MV repair and cath    Hypertension  -controlled  -amlodipine 5 mg daily, hydralazine 25 mg QID, imdur    Diabetes mellitus type 2  - A1C- 6.1  - Sliding scale with meals   - Kelly Archer 12-lead    Result Date: 7/2/2019  ECG Report  Interpretation  -------------------------- Possible atrial fibrillation Low voltage in precordial leads Right bundle branch block. -Old inferior-apical infarct -Old anterior infarct.  ABNORMAL When compared with

## 2019-07-03 NOTE — CARDIAC REHAB
CARDIAC REHAB HEART FAILURE EDUCATION    Handouts provided and reviewed: CHF Booklet. Activity: Chair for all meals: Yes       Ambulation: Only to bathroom       Tolerated Activity: Well         Disease Process: Disease process reviewed.     Reviewed th

## 2019-07-03 NOTE — PLAN OF CARE
Stress Test done. Pt had her lunch upon coming back to the floor. Cardiologist called RN to schedule pt for cardiac cath. Doctor aware that patient had just eaten. Doctor talked to patient over the phone. Skin prep done. Consent obtained.

## 2019-07-03 NOTE — PLAN OF CARE
Problem: Patient Centered Care  Goal: Patient preferences are identified and integrated in the patient's plan of care  Description  Interventions:  - What would you like us to know as we care for you?   - Provide timely, complete, and accurate informatio artery perfusion - ex.  Angina  - Evaluate fluid balance, assess for edema, trend weights  Outcome: Progressing  Goal: Absence of cardiac arrhythmias or at baseline  Description  INTERVENTIONS:  - Continuous cardiac monitoring, monitor vital signs, obtain 1

## 2019-07-03 NOTE — PAYOR COMM NOTE
--------------  ADMISSION REVIEW     Payor: Josee Claribel #:  [id number]  Authorization Number: [id number]    Admit date: 7/2/19  Admit time: 5051       Admitting Physician: Lorena Belcher MD  Attending Physician:  MD Mesfin Rodas MG Oral Tab,  Take 1 tablet (50 mg total) by mouth every 12 (twelve) hours as needed. atorvastatin 40 MG Oral Tab,  Take 40 mg by mouth daily. B Complex-C-Folic Acid Oral Tab,  Take 1 tablet by mouth daily.    acetaminophen 325 MG Oral Tab,  Take 650 mg 131/82   Pulse 84   Temp 98 °F (36.7 °C) (Oral)   Resp 19   Ht 165.1 cm (5' 5\")   Wt 87.5 kg   SpO2 97%   BMI 32.12 kg/m²          Physical Exam  Constitutional:  Alert, well nourished adult lying in bed in no distress. Vital signs noted.   Eye:  No scler PEPTIDE - Abnormal; Notable for the following components:    Pro-Beta Natriuretic Peptide 4,275 (*)     All other components within normal limits   CBC W/ DIFFERENTIAL - Abnormal; Notable for the following components:    RDW-SD 47.4 (*)     All other compo in today to the emergency department for evaluation of progressive shortness of breath and chest tightness. EKG showed atrial fibrillation. Compared to prior EKGs from recent hospitalization, she was in sinus bradycardia.   Today, troponin 2 sets negative Atraumatic. Oropharynx clear. Moist mucous membranes. Normal hard and soft palate. Eyes:  Anicteric sclerae. Pupils equal, round, reactive to light and accommodation. Extraocular muscle movement intact. Ears, nose normal.  NECK:  Supple.   No lymphad Action Dose Route User    7/2/2019 1557 New Bag 1 g Intravenous Mateus Michelle, KATIE      ceFAZolin (ANCEF) IVPB 1g/100ml in 0.9% NaCl minibag/add-van     Date Action Dose Route User    7/3/2019 0534 New Bag 1 g Intravenous Sammy Cai, RN      ferrous s ADMISSION

## 2019-07-03 NOTE — PROGRESS NOTES
Songsamuel Ceci  [id number]  7/3/2019    Post procedure/ recovery hand-off report given to roc WILSON. Patient's vital signs stable, access site dry and intact, no signs and symptoms of bleeding/ hematoma.     Tigre Fair RN

## 2019-07-04 LAB
ALBUMIN SERPL-MCNC: 2.7 G/DL (ref 3.4–5)
ALBUMIN/GLOB SERPL: 0.7 {RATIO} (ref 1–2)
ALP LIVER SERPL-CCNC: 198 U/L (ref 55–142)
ALT SERPL-CCNC: 19 U/L (ref 13–56)
ANION GAP SERPL CALC-SCNC: 6 MMOL/L (ref 0–18)
AST SERPL-CCNC: 25 U/L (ref 15–37)
BASOPHILS # BLD AUTO: 0.02 X10(3) UL (ref 0–0.2)
BASOPHILS NFR BLD AUTO: 0.4 %
BILIRUB SERPL-MCNC: 0.5 MG/DL (ref 0.1–2)
BUN BLD-MCNC: 43 MG/DL (ref 7–18)
BUN/CREAT SERPL: 23.9 (ref 10–20)
CALCIUM BLD-MCNC: 8.9 MG/DL (ref 8.5–10.1)
CHLORIDE SERPL-SCNC: 106 MMOL/L (ref 98–112)
CO2 SERPL-SCNC: 29 MMOL/L (ref 21–32)
CREAT BLD-MCNC: 1.8 MG/DL (ref 0.55–1.02)
DEPRECATED RDW RBC AUTO: 45.4 FL (ref 35.1–46.3)
EOSINOPHIL # BLD AUTO: 0.17 X10(3) UL (ref 0–0.7)
EOSINOPHIL NFR BLD AUTO: 3.1 %
ERYTHROCYTE [DISTWIDTH] IN BLOOD BY AUTOMATED COUNT: 13.1 % (ref 11–15)
GLOBULIN PLAS-MCNC: 3.9 G/DL (ref 2.8–4.4)
GLUCOSE BLD-MCNC: 100 MG/DL (ref 70–99)
GLUCOSE BLDC GLUCOMTR-MCNC: 107 MG/DL (ref 70–99)
GLUCOSE BLDC GLUCOMTR-MCNC: 129 MG/DL (ref 70–99)
GLUCOSE BLDC GLUCOMTR-MCNC: 171 MG/DL (ref 70–99)
GLUCOSE BLDC GLUCOMTR-MCNC: 182 MG/DL (ref 70–99)
HCT VFR BLD AUTO: 34 % (ref 35–48)
HGB BLD-MCNC: 11.4 G/DL (ref 12–16)
IMM GRANULOCYTES # BLD AUTO: 0.01 X10(3) UL (ref 0–1)
IMM GRANULOCYTES NFR BLD: 0.2 %
LYMPHOCYTES # BLD AUTO: 1.48 X10(3) UL (ref 1–4)
LYMPHOCYTES NFR BLD AUTO: 26.7 %
M PROTEIN MFR SERPL ELPH: 6.6 G/DL (ref 6.4–8.2)
MCH RBC QN AUTO: 31.9 PG (ref 26–34)
MCHC RBC AUTO-ENTMCNC: 33.5 G/DL (ref 31–37)
MCV RBC AUTO: 95.2 FL (ref 80–100)
MONOCYTES # BLD AUTO: 0.37 X10(3) UL (ref 0.1–1)
MONOCYTES NFR BLD AUTO: 6.7 %
NEUTROPHILS # BLD AUTO: 3.5 X10 (3) UL (ref 1.5–7.7)
NEUTROPHILS # BLD AUTO: 3.5 X10(3) UL (ref 1.5–7.7)
NEUTROPHILS NFR BLD AUTO: 62.9 %
OSMOLALITY SERPL CALC.SUM OF ELEC: 303 MOSM/KG (ref 275–295)
PLATELET # BLD AUTO: 125 10(3)UL (ref 150–450)
POTASSIUM SERPL-SCNC: 3.9 MMOL/L (ref 3.5–5.1)
RBC # BLD AUTO: 3.57 X10(6)UL (ref 3.8–5.3)
SODIUM SERPL-SCNC: 141 MMOL/L (ref 136–145)
WBC # BLD AUTO: 5.6 X10(3) UL (ref 4–11)

## 2019-07-04 PROCEDURE — 99232 SBSQ HOSP IP/OBS MODERATE 35: CPT | Performed by: HOSPITALIST

## 2019-07-04 NOTE — PLAN OF CARE
Problem: Patient Centered Care  Goal: Patient preferences are identified and integrated in the patient's plan of care  Description  Interventions:  - What would you like us to know as we care for you?   - Provide timely, complete, and accurate informatio vasoactive medications to optimize hemodynamic stability  - Monitor arterial and/or venous puncture sites for bleeding and/or hematoma  - Assess quality of pulses, skin color and temperature  - Assess for signs of decreased coronary artery perfusion - ex.

## 2019-07-04 NOTE — PLAN OF CARE
Problem: Diabetes/Glucose Control  Goal: Glucose maintained within prescribed range  Description  INTERVENTIONS:  - Monitor Blood Glucose as ordered  - Assess for signs and symptoms of hyperglycemia and hypoglycemia  - Administer ordered medications to m medications as ordered  - Initiate emergency measures for life threatening arrhythmias  - Monitor electrolytes and administer replacement therapy as ordered  Outcome: Progressing     Left heart cath completed, no interventions done.  Heparin SQ for DVT prop

## 2019-07-04 NOTE — PROGRESS NOTES
Patient seen in follow up. Symptoms discussed. No cp today.     07/04/19  0827   BP: 140/78   Pulse: 72   Resp: 19   Temp: 97.5 °F (36.4 °C)       Intake/Output Summary (Last 24 hours) at 7/4/2019 1000  Last data filed at 7/4/2019 0915  Gross per 24 h isosorbide dinitrate (ISORDIL) tab 20 mg 20 mg Oral TID   Levothyroxine Sodium (SYNTHROID) tab 50 mcg 50 mcg Oral Before breakfast   lidocaine-menthol 4-1 % 1 patch 1 patch Transdermal Daily PRN   linagliptin (TRADJENTA) tab TABS 5 mg 5 mg Oral Daily   jose c CONCLUSION:  1. Mild cardiomegaly. Prominent central pulmonary vasculature. 2. Pulmonary vascular congestive changes have improved. Mild residual perihilar bibasilar interstitial congestion remaining.     Dictated by (CST): Ry Calvin MD on 7/02/

## 2019-07-04 NOTE — PROGRESS NOTES
[facility]    Progress Note    Nat Bruno Patient Status:  Inpatient    1950 MRN [id number]   Location [facility] 3W/SW Attending Chirag Amador MD   Hosp Day # 2 PCP Omid Huynh MD        Subjective:     Con Severe mitral regurgitation  - per cardiology, Follow up with her cardiologist Addie Stephen outpatient for possible MV repair    Hypertension  -controlled  -amlodipine 5 mg daily, hydralazine 25 mg QID, isordil     Diabetes mellitus type 2  - A1C- 6.1 ECG of 07/02/2019 10:38:24 No change Electronically signed on 07/02/2019 at 15:39 by DONTE Johns MD  7/4/2019    ATTENDING ADDENDUM  PT WAS SEEN AND EXAMINED INDEPENDENTLY  AGREE WITH ABOVE  MONITOR CR  DC IN AM  WILL JULIA

## 2019-07-05 VITALS
DIASTOLIC BLOOD PRESSURE: 46 MMHG | SYSTOLIC BLOOD PRESSURE: 137 MMHG | WEIGHT: 177.88 LBS | RESPIRATION RATE: 18 BRPM | HEART RATE: 70 BPM | TEMPERATURE: 98 F | BODY MASS INDEX: 29.63 KG/M2 | OXYGEN SATURATION: 93 % | HEIGHT: 65 IN

## 2019-07-05 LAB
ANION GAP SERPL CALC-SCNC: 6 MMOL/L (ref 0–18)
BUN BLD-MCNC: 45 MG/DL (ref 7–18)
BUN/CREAT SERPL: 23.8 (ref 10–20)
CALCIUM BLD-MCNC: 8.7 MG/DL (ref 8.5–10.1)
CHLORIDE SERPL-SCNC: 104 MMOL/L (ref 98–112)
CO2 SERPL-SCNC: 27 MMOL/L (ref 21–32)
CREAT BLD-MCNC: 1.89 MG/DL (ref 0.55–1.02)
GLUCOSE BLD-MCNC: 126 MG/DL (ref 70–99)
GLUCOSE BLDC GLUCOMTR-MCNC: 117 MG/DL (ref 70–99)
GLUCOSE BLDC GLUCOMTR-MCNC: 133 MG/DL (ref 70–99)
OSMOLALITY SERPL CALC.SUM OF ELEC: 297 MOSM/KG (ref 275–295)
POTASSIUM SERPL-SCNC: 3.8 MMOL/L (ref 3.5–5.1)
SODIUM SERPL-SCNC: 137 MMOL/L (ref 136–145)

## 2019-07-05 PROCEDURE — 99239 HOSP IP/OBS DSCHRG MGMT >30: CPT | Performed by: HOSPITALIST

## 2019-07-05 RX ORDER — POTASSIUM CHLORIDE 20 MEQ/1
40 TABLET, EXTENDED RELEASE ORAL ONCE
Status: DISCONTINUED | OUTPATIENT
Start: 2019-07-05 | End: 2019-07-05

## 2019-07-05 RX ORDER — AMLODIPINE BESYLATE 10 MG/1
10 TABLET ORAL DAILY
Status: DISCONTINUED | OUTPATIENT
Start: 2019-07-06 | End: 2019-07-05

## 2019-07-05 RX ORDER — CLINDAMYCIN HYDROCHLORIDE 300 MG/1
300 CAPSULE ORAL 3 TIMES DAILY
Qty: 30 CAPSULE | Refills: 0 | Status: SHIPPED | OUTPATIENT
Start: 2019-07-05 | End: 2019-07-15

## 2019-07-05 RX ORDER — POTASSIUM CHLORIDE 20 MEQ/1
40 TABLET, EXTENDED RELEASE ORAL ONCE
Status: COMPLETED | OUTPATIENT
Start: 2019-07-05 | End: 2019-07-05

## 2019-07-05 RX ORDER — ASPIRIN 81 MG/1
81 TABLET, CHEWABLE ORAL DAILY
Qty: 30 TABLET | Refills: 0 | Status: SHIPPED | OUTPATIENT
Start: 2019-07-06

## 2019-07-05 RX ORDER — AMLODIPINE BESYLATE 5 MG/1
10 TABLET ORAL DAILY
Qty: 60 TABLET | Refills: 0 | Status: SHIPPED | OUTPATIENT
Start: 2019-07-06 | End: 2019-08-05

## 2019-07-05 NOTE — CM/SW NOTE
07/05/19 1500   Discharge disposition   Expected discharge disposition [facility]   Name of [address] [place] [address],[address] [address] Nurs   Discharge transportation   [facility])    consulted to assist with transportation home to [facility]

## 2019-07-05 NOTE — PROGRESS NOTES
Patient seen in follow up. Symptoms discussed. No cp today.     07/05/19  0900   BP:    Pulse:    Resp:    Temp: 97.6 °F (36.4 °C)       Intake/Output Summary (Last 24 hours) at 7/5/2019 1426  Last data filed at 7/5/2019 1100  Gross per 24 hour   [place] isosorbide dinitrate (ISORDIL) tab 20 mg 20 mg Oral TID   Levothyroxine Sodium (SYNTHROID) tab 50 mcg 50 mcg Oral Before breakfast   lidocaine-menthol 4-1 % 1 patch 1 patch Transdermal Daily PRN   linagliptin (TRADJENTA) tab TABS 5 mg 5 mg Oral Daily   jose c BUN 45 07/05/2019     07/05/2019    K 3.8 07/05/2019     07/05/2019    CO2 27.0 07/05/2019     07/05/2019    CA 8.7 07/05/2019       1. Chronic diastolic CHF   lasix 60 bid at home resume     2. Severe mitral regurg.  Follow up with her

## 2019-07-05 NOTE — PLAN OF CARE
Problem: Patient Centered Care  Goal: Patient preferences are identified and integrated in the patient's plan of care  Description  Interventions:  - What would you like us to know as we care for you? \"I live at [facility]. \"  - Provide violette hypotension and signs of decreased cardiac output  - Evaluate effectiveness of vasoactive medications to optimize hemodynamic stability  - Monitor arterial and/or venous puncture sites for bleeding and/or hematoma  - Assess quality of pulses, skin color an

## 2019-07-05 NOTE — PLAN OF CARE
Problem: Diabetes/Glucose Control  Goal: Glucose maintained within prescribed range  Description  INTERVENTIONS:  - Monitor Blood Glucose as ordered  - Assess for signs and symptoms of hyperglycemia and hypoglycemia  - Administer ordered medications to m medications as ordered  - Initiate emergency measures for life threatening arrhythmias  - Monitor electrolytes and administer replacement therapy as ordered  Outcome: Progressing     Paient up with 1 assist and walker. IV ABX continue for BLE cellulitis.  P

## 2019-07-05 NOTE — PROCEDURES
[facility]    PATIENT'S NAME: Travsi Diallo   ATTENDING PHYSICIAN: Aurelia Reed MD   OPERATING PHYSICIAN: Sebastian Butt DO   PATIENT ACCOUNT#:   [de-identified]    LOCATION:  [place] [address]  MEDICAL RECORD #:   [id number]       DATE left coronary system, again, demonstrates diffuse stents all the way up to the LAD ostium. The LAD ostium has about a 40% to 50% in-stent restenosis, but noncritical.   Then, the vessel is diffusely stented all the way up to the mid to apical portion.   Sulema Barthel

## 2019-07-05 NOTE — DISCHARGE SUMMARY
[facility] Hospitalist Discharge Summary   Patient ID:  Eve Mejia  [id number]  80year old  11/9/1950    Admit date: 7/2/2019  Discharge date: 7/5/2019  Primary Care Physician:  Charity Elizondo MD   Attending Physician: Aminata Sauer MD   Consults: mellitus type 2  - A1C- 6.1  - Sliding scale with meals   - Linagliptin 5mg daily      Chronic renal insufficiency stage 3  - BUN/Creat at baseline.   - monitor      H/x Left renal calculus  - Urology follow up once cardiac issues stabilized  - Had stent p PROTONIX     Potassium Chloride ER 20 MEQ Tbcr  Commonly known as:  K-DUR M20     traMADol HCl 50 MG Tabs  Commonly known as:  ULTRAM  Take 1 tablet (50 mg total) by mouth every 12 (twelve) hours as needed.            Where to Get Your Medications      You

## 2019-07-05 NOTE — PAYOR COMM NOTE
--------------  CONTINUED STAY REVIEW    Payor: Jeronimo Swartz #:  [id number]  Authorization Number: [id number]    Admit date: 7/2/19  Admit time: 1629    Admitting Physician: Becca Okeefe MD  Attending Physician:  Will Chambers here or in [facility] as outpatient, ok to discharge home    Jose Agustin MD   General Cardiology & Advanced Heart Failure, Cardiac Transplant and Assisted Devices     Dispo: pending, possibly d/c tomorrow  Component Value Date     WBC 5.6 07/04/2019     HGB 11 Fletcher Mcdowell RN      ceFAZolin (ANCEF) IVPB 1g/100ml in 0.9% NaCl minibag/add-van     Date Action Dose Route User    7/5/2019 0310 New Bag 1 g Intravenous Fletcher Mcdowell RN    7/4/2019 1607 New Bag 1 g Intravenous Carla Grande RN      ferrous Harmon-Pro (K-DUR M20) CR tab 40 mEq     Date Action Dose Route User    7/5/2019 0856 Given 40 mEq Oral Laura Pierre RN      traMADol HCl Karlynn Section) tab 50 mg     Date Action Dose Route User    7/5/2019 0010 Given 50 mg Oral Carol Mohamud RN      furosemide (LASIX)

## 2019-07-05 NOTE — DISCHARGE PLANNING
Patient states that she is from the [facility] facility states that her room number is 906.  I called and spoke with nurse Afia Muller RN and she is aware of patient's angiogram to right femoral artery with need to follow up in 1 w

## 2019-07-06 NOTE — PAYOR COMM NOTE
--------------  DISCHARGE REVIEW    Payor: [facility] #:  [id number]  Authorization Number: [id number]    Admit date: 7/2/19  Admit time:  1629  Discharge Date: 7/5/2019  4:10 PM     Admitting Physician: Ivet Rodriguez MD  [facility] on consult  - Deborah Speaker 7/3, abnormal  - s/p cath, patent stents. Small vessel disease.   - ASA 81mg daily .  Started on antianginal therapy with amlodipine.   - Troponin negative      Acute on chronic diastolic heart failure  - pBNP down from 6/9 Tabs  Commonly known as:  LIORESAL     ferrous sulfate 325 (65 FE) MG Tbec     furosemide 20 MG Tabs  Commonly known as:  LASIX  Take 3 tablets (60 mg total) by mouth BID (Diuretic).      hydrALAzine HCl 25 MG Tabs  Commonly known as:  APRESOLINE     [facility].            Total Time Coordinating Care: Greater than 30 minutes    Patient had opportunity to ask questions, state understanding, and agree with therapeutic plan as outlined    Khalif Quiles MD  Hospitalist  7/5/2019      Electronically signed

## 2019-07-17 ENCOUNTER — HOSPITAL ENCOUNTER (EMERGENCY)
Facility: HOSPITAL | Age: 69
Discharge: HOME | End: 2019-07-17
Attending: EMERGENCY MEDICINE
Payer: MEDICARE

## 2019-07-17 VITALS
SYSTOLIC BLOOD PRESSURE: 106 MMHG | HEIGHT: 65 IN | RESPIRATION RATE: 16 BRPM | OXYGEN SATURATION: 94 % | HEART RATE: 70 BPM | DIASTOLIC BLOOD PRESSURE: 59 MMHG | BODY MASS INDEX: 30.82 KG/M2 | TEMPERATURE: 99 F | WEIGHT: 185 LBS

## 2019-07-17 DIAGNOSIS — M79.89 LEG SWELLING: Primary | ICD-10-CM

## 2019-07-17 LAB
ANION GAP SERPL CALC-SCNC: 9 MMOL/L (ref 0–18)
BASOPHILS # BLD AUTO: 0.02 X10(3) UL (ref 0–0.2)
BASOPHILS NFR BLD AUTO: 0.3 %
BUN BLD-MCNC: 54 MG/DL (ref 7–18)
BUN/CREAT SERPL: 26 (ref 10–20)
CALCIUM BLD-MCNC: 8.5 MG/DL (ref 8.5–10.1)
CHLORIDE SERPL-SCNC: 104 MMOL/L (ref 98–112)
CO2 SERPL-SCNC: 25 MMOL/L (ref 21–32)
CREAT BLD-MCNC: 2.08 MG/DL (ref 0.55–1.02)
DEPRECATED RDW RBC AUTO: 46.2 FL (ref 35.1–46.3)
EOSINOPHIL # BLD AUTO: 0.26 X10(3) UL (ref 0–0.7)
EOSINOPHIL NFR BLD AUTO: 4 %
ERYTHROCYTE [DISTWIDTH] IN BLOOD BY AUTOMATED COUNT: 13.4 % (ref 11–15)
GLUCOSE BLD-MCNC: 130 MG/DL (ref 70–99)
HCT VFR BLD AUTO: 36.2 % (ref 35–48)
HGB BLD-MCNC: 12.4 G/DL (ref 12–16)
IMM GRANULOCYTES # BLD AUTO: 0.01 X10(3) UL (ref 0–1)
IMM GRANULOCYTES NFR BLD: 0.2 %
LYMPHOCYTES # BLD AUTO: 1.56 X10(3) UL (ref 1–4)
LYMPHOCYTES NFR BLD AUTO: 23.9 %
MCH RBC QN AUTO: 32.3 PG (ref 26–34)
MCHC RBC AUTO-ENTMCNC: 34.3 G/DL (ref 31–37)
MCV RBC AUTO: 94.3 FL (ref 80–100)
MONOCYTES # BLD AUTO: 0.57 X10(3) UL (ref 0.1–1)
MONOCYTES NFR BLD AUTO: 8.7 %
NEUTROPHILS # BLD AUTO: 4.11 X10 (3) UL (ref 1.5–7.7)
NEUTROPHILS # BLD AUTO: 4.11 X10(3) UL (ref 1.5–7.7)
NEUTROPHILS NFR BLD AUTO: 62.9 %
NT-PROBNP SERPL-MCNC: 3288 PG/ML (ref ?–125)
OSMOLALITY SERPL CALC.SUM OF ELEC: 303 MOSM/KG (ref 275–295)
PLATELET # BLD AUTO: 171 10(3)UL (ref 150–450)
POTASSIUM SERPL-SCNC: 3.7 MMOL/L (ref 3.5–5.1)
RBC # BLD AUTO: 3.84 X10(6)UL (ref 3.8–5.3)
SODIUM SERPL-SCNC: 138 MMOL/L (ref 136–145)
WBC # BLD AUTO: 6.5 X10(3) UL (ref 4–11)

## 2019-07-17 PROCEDURE — 85025 COMPLETE CBC W/AUTO DIFF WBC: CPT | Performed by: EMERGENCY MEDICINE

## 2019-07-17 PROCEDURE — 80048 BASIC METABOLIC PNL TOTAL CA: CPT | Performed by: EMERGENCY MEDICINE

## 2019-07-17 PROCEDURE — 83880 ASSAY OF NATRIURETIC PEPTIDE: CPT | Performed by: EMERGENCY MEDICINE

## 2019-07-17 PROCEDURE — 99283 EMERGENCY DEPT VISIT LOW MDM: CPT

## 2019-07-17 PROCEDURE — 36415 COLL VENOUS BLD VENIPUNCTURE: CPT

## 2019-07-17 NOTE — ED PROVIDER NOTES
Patient Seen in: [facility] AND [facility] Emergency Department    History   Patient presents with:  Cellulitis (integumentary, infectious)    Stated Complaint: cellulitis    HPI    58-year-old female with history of CHF, diabetes, hypertension, hyperlipidemia, nausea and vomiting. Genitourinary: Negative for dysuria, flank pain and frequency. Musculoskeletal: Negative for back pain. Skin: Positive for rash. Neurological: Negative for weakness, light-headedness and headaches.    All other systems reviewed is warm and dry. No rash noted. She is not diaphoretic. Psychiatric: She has a normal mood and affect. Nursing note and vitals reviewed. ED Course     Pulse Oximeter:  Pulse oximetry on room air is 96%, indicating adequate oxygenation.     PROC Available and Reviewed by me while in ED:          EMERGENCY DEPARTMENT COURSE AND TREATMENT:  Patient's condition was stable during Emergency Department evaluation.      68yoF with leg swelling/redness  - I personally reviewed and interpreted all the ED vi pressure.     Medications Prescribed:  Current Discharge Medication List

## 2019-07-17 NOTE — ED NOTES
Patient cleared for discharge by MD. Santanas with patient. Patient discharge instructions reviewed with patient including when and how to follow up  with healthcare provider and when to seek medical treatment. Peripheral IV removed.

## 2019-07-17 NOTE — CM/SW NOTE
Called by ER triage to complete PCS form as [facility] here to pick patient up. PCS form completed. [facility], copy given to registration to scan and second copy placed in completed PCS file in [place] [facility] office.

## 2019-07-17 NOTE — DISCHARGE SUMMARY
[facility]    PATIENT'S NAME: Theresa Armenta   ATTENDING PHYSICIAN: Sharyle Asa.  John Thompson MD   PATIENT ACCOUNT#:   [id number]    LOCATION:  [address] [place] [address] [place] RECORD #:   [id number]       YOB: 1950  ADMISSION DATE:       06/08/ was alert and oriented x3. There were no residual cranial nerve sensory or motor deficits from prior stroke. SKIN:  Intact. PSYCHIATRIC:  Normal mood and affect. LABORATORY DATA:  White count was 7.5, hemoglobin 11.2, and platelet count 615,306.

## 2019-09-05 PROCEDURE — 99305 1ST NF CARE MODERATE MDM 35: CPT | Performed by: NURSE PRACTITIONER

## 2019-09-10 NOTE — ED INITIAL ASSESSMENT (HPI)
Pt from [address] for \"infection\". Pt states she was hospitalized 2 weeks ago for the same thing, finished her ABx at home and isn't feeling any better. Denies fevers or chills or any other symptoms.  BLE edema noted - pt states she doen't really follow The patient has been re-examined and I agree with the above assessment or I updated with my findings. The patient has been re-examined and I agree with the above assessment or I updated with my findings.

## 2019-09-12 PROCEDURE — 99308 SBSQ NF CARE LOW MDM 20: CPT | Performed by: INTERNAL MEDICINE

## 2019-09-29 ENCOUNTER — HOSPITAL ENCOUNTER (EMERGENCY)
Facility: HOSPITAL | Age: 69
Discharge: HOME | End: 2019-09-29
Attending: EMERGENCY MEDICINE
Payer: COMMERCIAL

## 2019-09-29 VITALS
HEART RATE: 67 BPM | DIASTOLIC BLOOD PRESSURE: 77 MMHG | OXYGEN SATURATION: 95 % | BODY MASS INDEX: 33 KG/M2 | TEMPERATURE: 98 F | RESPIRATION RATE: 20 BRPM | SYSTOLIC BLOOD PRESSURE: 144 MMHG | WEIGHT: 197 LBS

## 2019-09-29 DIAGNOSIS — R07.9 CHEST PAIN OF UNCERTAIN ETIOLOGY: Primary | ICD-10-CM

## 2019-09-29 PROCEDURE — 99283 EMERGENCY DEPT VISIT LOW MDM: CPT

## 2019-09-29 PROCEDURE — 80048 BASIC METABOLIC PNL TOTAL CA: CPT | Performed by: EMERGENCY MEDICINE

## 2019-09-29 PROCEDURE — 84484 ASSAY OF TROPONIN QUANT: CPT | Performed by: EMERGENCY MEDICINE

## 2019-09-29 PROCEDURE — 93005 ELECTROCARDIOGRAM TRACING: CPT

## 2019-09-29 PROCEDURE — 85025 COMPLETE CBC W/AUTO DIFF WBC: CPT | Performed by: EMERGENCY MEDICINE

## 2019-09-29 PROCEDURE — 36415 COLL VENOUS BLD VENIPUNCTURE: CPT

## 2019-09-29 PROCEDURE — 93010 ELECTROCARDIOGRAM REPORT: CPT | Performed by: EMERGENCY MEDICINE

## 2019-09-29 NOTE — ED NOTES
The patient is cleared for discharge per Emergency Department physician. Discharge instructions were reviewed with patient including when and how to follow up with healthcare providers and when to seek emergency care. Peripheral IV discontinued.

## 2019-09-29 NOTE — ED PROVIDER NOTES
Patient Seen in: [facility] AND [facility] Emergency Department    History   Patient presents with:  Chest Pain Angina (cardiovascular)      HPI    Patient presents to the ED from [address] living for chest pain for the past several hours.   She sta negative. Constitutional and vital signs reviewed. Social History and Family History elements reviewed from today, pertinent positives to the presenting problem noted.     Physical Exam     ED Triage Vitals [09/29/19 0300]   BP (!) 161/90   Pulse 55 CBC W/ DIFFERENTIAL[[id number]]                              Final result                 Please view results for these tests on the individual orders.    RAINBOW DRAW BLUE   RAINBOW DRAW LAVENDER   RAINBOW DRAW LIGHT GREEN   RAINBOW DRAW GOLD uncertain etiology  (primary encounter diagnosis)    Disposition:  Discharge    Follow-up:  Dolly Fabry, [place]  [address] [facility] [address] [phone]    Schedule an appointment as soon as possible for a visit in 3 days        Medicatio

## 2019-09-29 NOTE — ED INITIAL ASSESSMENT (HPI)
The patient arrived via EMS from [facility] AND [facility] with complaint of one hour of chest pain with shortness of breath. EMS reported giving 324 mg of aspirin, and 1 SL nitro.

## 2019-09-29 NOTE — ED NOTES
Report attempted to [facility] who do not have a RN on duty at this time.

## 2019-10-14 ENCOUNTER — APPOINTMENT (OUTPATIENT)
Dept: GENERAL RADIOLOGY | Facility: HOSPITAL | Age: 69
End: 2019-10-14
Attending: EMERGENCY MEDICINE
Payer: COMMERCIAL

## 2019-10-14 ENCOUNTER — HOSPITAL ENCOUNTER (EMERGENCY)
Facility: HOSPITAL | Age: 69
Discharge: HOME | End: 2019-10-14
Attending: EMERGENCY MEDICINE
Payer: COMMERCIAL

## 2019-10-14 VITALS
TEMPERATURE: 98 F | BODY MASS INDEX: 29.99 KG/M2 | RESPIRATION RATE: 16 BRPM | HEIGHT: 65 IN | WEIGHT: 180 LBS | OXYGEN SATURATION: 94 % | HEART RATE: 58 BPM | DIASTOLIC BLOOD PRESSURE: 95 MMHG | SYSTOLIC BLOOD PRESSURE: 144 MMHG
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
## 2019-10-14 DIAGNOSIS — S39.012A STRAIN OF LUMBAR REGION, INITIAL ENCOUNTER: ICD-10-CM

## 2019-10-14 DIAGNOSIS — S46.911A STRAIN OF RIGHT SHOULDER, INITIAL ENCOUNTER: ICD-10-CM

## 2019-10-14 PROCEDURE — 99284 EMERGENCY DEPT VISIT MOD MDM: CPT

## 2019-10-14 PROCEDURE — 72100 X-RAY EXAM L-S SPINE 2/3 VWS: CPT | Performed by: EMERGENCY MEDICINE

## 2019-10-14 PROCEDURE — 73030 X-RAY EXAM OF SHOULDER: CPT | Performed by: EMERGENCY MEDICINE

## 2019-10-14 PROCEDURE — 93005 ELECTROCARDIOGRAM TRACING: CPT

## 2019-10-14 PROCEDURE — 93010 ELECTROCARDIOGRAM REPORT: CPT | Performed by: EMERGENCY MEDICINE

## 2019-10-14 RX ORDER — HYDROCODONE BITARTRATE AND ACETAMINOPHEN 5; 325 MG/1; MG/1
1 TABLET ORAL ONCE
Status: COMPLETED | OUTPATIENT
Start: 2019-10-14 | End: 2019-10-14

## 2019-10-14 NOTE — ED INITIAL ASSESSMENT (HPI)
Arrived via ems for fall in shower d/t slipping. Pt was in shower for 1hr before staff at [facility] found patient. Fell on bottom. No LOC. Did not hit head. No emesis. Orientated x4.

## 2019-10-14 NOTE — ED NOTES
Patient slipped in the shower this morning and was unable to get up until someone came to get her an hour later. States there was no LOC and denies hitting head. Now c/o right shoulder and tailbone pain. Patient alert and oriented.

## 2019-10-14 NOTE — ED PROVIDER NOTES
Patient Seen in: [facility] AND [facility] Emergency Department      History   Patient presents with:  Fall (musculoskeletal, neurologic)    Stated Complaint: fall    HPI    60-year-old female presents for complaint of a fall.   Patient states that she was in the fall  Other systems are as noted in HPI. Constitutional and vital signs reviewed. All other systems reviewed and negative except as noted above.     Physical Exam     ED Triage Vitals [10/14/19 0658]   BP 97/46   Pulse (!) 49   Resp 18   Temp 97.6 °F sign.   Musculoskeletal:      Right shoulder: She exhibits tenderness. She exhibits normal range of motion, no bony tenderness, no swelling, no effusion, no crepitus and no deformity.       Left shoulder: Normal.      Right elbow: Normal.     Left elbow: No SHOULDER, COMPLETE (MIN 2 VIEWS), RIGHT (CPT=73030)  COMPARISON: None. INDICATIONS: Anterior shoulder pain post fall today. TECHNIQUE: 3 views were obtained. FINDINGS:  BONES: The humeral head is dislocated anteriorly from the glenoid.   No well-defined unchanged. Minimal anterolisthesis of L4 in relation to L5 less conspicuous than seen previously. Mild spondylosis as discussed above. Left ureteral stent with left lower pole kidney stones identified as discussed.     Dictated by (CST): Malissa Marcano

## 2019-10-14 NOTE — ED NOTES
Pt alert and interactive. Denies pain. Appears sob and deep inspirations. LS clear bilaterally. Speaking in short sentences. Does not normally wear O2 at home, stable on room air.

## 2019-11-19 ENCOUNTER — HOSPITAL ENCOUNTER (EMERGENCY)
Facility: HOSPITAL | Age: 69
Discharge: HOME | End: 2019-11-19
Attending: EMERGENCY MEDICINE
Payer: MEDICARE

## 2019-11-19 ENCOUNTER — APPOINTMENT (OUTPATIENT)
Dept: GENERAL RADIOLOGY | Facility: HOSPITAL | Age: 69
End: 2019-11-19
Payer: MEDICARE

## 2019-11-19 VITALS
BODY MASS INDEX: 28.13 KG/M2 | SYSTOLIC BLOOD PRESSURE: 128 MMHG | TEMPERATURE: 99 F | HEART RATE: 86 BPM | WEIGHT: 175 LBS | HEIGHT: 66 IN | DIASTOLIC BLOOD PRESSURE: 74 MMHG | OXYGEN SATURATION: 95 % | RESPIRATION RATE: 15 BRPM

## 2019-11-19 DIAGNOSIS — R42 DIZZINESS: ICD-10-CM

## 2019-11-19 DIAGNOSIS — R07.9 CHEST PAIN OF UNCERTAIN ETIOLOGY: Primary | ICD-10-CM

## 2019-11-19 PROCEDURE — 80048 BASIC METABOLIC PNL TOTAL CA: CPT | Performed by: EMERGENCY MEDICINE

## 2019-11-19 PROCEDURE — 84484 ASSAY OF TROPONIN QUANT: CPT | Performed by: EMERGENCY MEDICINE

## 2019-11-19 PROCEDURE — 80048 BASIC METABOLIC PNL TOTAL CA: CPT

## 2019-11-19 PROCEDURE — 93005 ELECTROCARDIOGRAM TRACING: CPT

## 2019-11-19 PROCEDURE — 85025 COMPLETE CBC W/AUTO DIFF WBC: CPT | Performed by: EMERGENCY MEDICINE

## 2019-11-19 PROCEDURE — 36415 COLL VENOUS BLD VENIPUNCTURE: CPT

## 2019-11-19 PROCEDURE — 85025 COMPLETE CBC W/AUTO DIFF WBC: CPT

## 2019-11-19 PROCEDURE — 84484 ASSAY OF TROPONIN QUANT: CPT

## 2019-11-19 PROCEDURE — 99284 EMERGENCY DEPT VISIT MOD MDM: CPT

## 2019-11-19 PROCEDURE — 71045 X-RAY EXAM CHEST 1 VIEW: CPT

## 2019-11-19 PROCEDURE — 93010 ELECTROCARDIOGRAM REPORT: CPT | Performed by: INTERNAL MEDICINE

## 2019-11-19 NOTE — ED INITIAL ASSESSMENT (HPI)
Pt from [facility]. States she felt dizzy and short of breath while in PT. C/o chest tightness also. 4 baby aspirin given en route by medics.

## 2019-11-20 NOTE — ED PROVIDER NOTES
Patient Seen in: [facility] AND [facility] Emergency Department    History   Patient presents with:  Dizziness (neurologic)  Chest Pain Angina (cardiovascular)  Dyspnea CINDY SOB (respiratory)      HPI    Patient presents to the ED from [address] [place] [address] reviewed from today, pertinent positives to the presenting problem noted.     Physical Exam     ED Triage Vitals [11/19/19 1657]   /75   Pulse 89   Resp 22   Temp 99.4 °F (37.4 °C)   Temp src Oral   SpO2 97 %   O2 Device None (Room air)       Physical -----------         ------                                     CBC W/ DIFFERENTIAL[[id number]]          Abnormal            Final result                                                 Please view results for these tests on the individual orders.    RAINBOW Work-up unremarkable for acute coronary process. Recent angiography without significant obstructive CAD. Patient reassured and stable for discharge home.     Additional verbal instructions and return precautions were discussed with the patient and/or care

## 2019-12-06 ENCOUNTER — APPOINTMENT (OUTPATIENT)
Dept: GENERAL RADIOLOGY | Facility: HOSPITAL | Age: 69
End: 2019-12-06
Attending: EMERGENCY MEDICINE
Payer: MEDICARE

## 2019-12-06 ENCOUNTER — HOSPITAL ENCOUNTER (EMERGENCY)
Facility: HOSPITAL | Age: 69
Discharge: HOME | End: 2019-12-06
Attending: EMERGENCY MEDICINE
Payer: MEDICARE

## 2019-12-06 VITALS
WEIGHT: 190 LBS | OXYGEN SATURATION: 96 % | DIASTOLIC BLOOD PRESSURE: 71 MMHG | HEART RATE: 54 BPM | TEMPERATURE: 98 F | RESPIRATION RATE: 15 BRPM | SYSTOLIC BLOOD PRESSURE: 156 MMHG | HEIGHT: 65 IN | BODY MASS INDEX: 31.65 KG/M2

## 2019-12-06 DIAGNOSIS — R07.9 CHEST PAIN, UNSPECIFIED TYPE: Primary | ICD-10-CM

## 2019-12-06 PROCEDURE — 96374 THER/PROPH/DIAG INJ IV PUSH: CPT

## 2019-12-06 PROCEDURE — 85025 COMPLETE CBC W/AUTO DIFF WBC: CPT | Performed by: EMERGENCY MEDICINE

## 2019-12-06 PROCEDURE — 99285 EMERGENCY DEPT VISIT HI MDM: CPT

## 2019-12-06 PROCEDURE — 85379 FIBRIN DEGRADATION QUANT: CPT | Performed by: EMERGENCY MEDICINE

## 2019-12-06 PROCEDURE — 71045 X-RAY EXAM CHEST 1 VIEW: CPT | Performed by: EMERGENCY MEDICINE

## 2019-12-06 PROCEDURE — 80048 BASIC METABOLIC PNL TOTAL CA: CPT | Performed by: EMERGENCY MEDICINE

## 2019-12-06 PROCEDURE — 93005 ELECTROCARDIOGRAM TRACING: CPT

## 2019-12-06 PROCEDURE — 93010 ELECTROCARDIOGRAM REPORT: CPT | Performed by: EMERGENCY MEDICINE

## 2019-12-06 PROCEDURE — 84484 ASSAY OF TROPONIN QUANT: CPT | Performed by: EMERGENCY MEDICINE

## 2019-12-06 RX ORDER — RANOLAZINE 500 MG/1
500 TABLET, EXTENDED RELEASE ORAL 2 TIMES DAILY
Qty: 60 TABLET | Refills: 0 | Status: ON HOLD | OUTPATIENT
Start: 2019-12-06 | End: 2020-01-07

## 2019-12-06 RX ORDER — MORPHINE SULFATE 4 MG/ML
2 INJECTION, SOLUTION INTRAMUSCULAR; INTRAVENOUS ONCE
Status: COMPLETED | OUTPATIENT
Start: 2019-12-06 | End: 2019-12-06

## 2019-12-06 NOTE — ED INITIAL ASSESSMENT (HPI)
Patient here form [facility] with substernal chest pain that began about an hour ago. Patient took two nitro's without relief, and EMS gave 4 ASA.  Denies SOB

## 2019-12-07 NOTE — ED PROVIDER NOTES
Patient Seen in: [facility] AND [facility] Emergency Department      History   Patient presents with:  Chest Pain Angina    Stated Complaint: Chest pain    HPI    43-year-old female with history of hypertension, hyperlipidemia, coronary artery disease status pos Temp 98 °F (36.7 °C)   Temp src    SpO2 94 %   O2 Device None (Room air)       Current:/71   Pulse 54   Temp 98 °F (36.7 °C)   Resp 15   Ht 165.1 cm (5' 5\")   Wt 86.2 kg   SpO2 96%   BMI 31.62 kg/m²         Physical Exam      General Appearance: a DIFFERENTIAL[[id number]]          Abnormal            Final result                 Please view results for these tests on the individual orders. EKG    Rate, intervals and axes as noted on EKG Report.   Rate: 65  Rhythm: Sinus Rhythm  Axis: Normal  Readi

## 2019-12-07 NOTE — ED NOTES
Second Troponin and EKG performed as ordered. Patient alert and oriented x 4. Respirations even and unlabored. No acute distress noted. Patient stable aware of plan of care.

## 2020-01-03 ENCOUNTER — HOSPITAL ENCOUNTER (INPATIENT)
Facility: HOSPITAL | Age: 70
LOS: 3 days | Discharge: HOME | DRG: 309 | End: 2020-01-07
Attending: EMERGENCY MEDICINE | Admitting: INTERNAL MEDICINE
Payer: MEDICARE

## 2020-01-03 ENCOUNTER — APPOINTMENT (OUTPATIENT)
Dept: GENERAL RADIOLOGY | Facility: HOSPITAL | Age: 70
DRG: 309 | End: 2020-01-03
Payer: MEDICARE

## 2020-01-03 DIAGNOSIS — R07.9 ACUTE CHEST PAIN: ICD-10-CM

## 2020-01-03 DIAGNOSIS — I44.2 COMPLETE HEART BLOCK (HCC): Primary | ICD-10-CM

## 2020-01-03 LAB
ANION GAP SERPL CALC-SCNC: 8 MMOL/L (ref 0–18)
BASOPHILS # BLD AUTO: 0.02 X10(3) UL (ref 0–0.2)
BASOPHILS NFR BLD AUTO: 0.2 %
BUN BLD-MCNC: 38 MG/DL (ref 7–18)
BUN/CREAT SERPL: 17 (ref 10–20)
CALCIUM BLD-MCNC: 8.5 MG/DL (ref 8.5–10.1)
CHLORIDE SERPL-SCNC: 103 MMOL/L (ref 98–112)
CO2 SERPL-SCNC: 25 MMOL/L (ref 21–32)
CREAT BLD-MCNC: 2.24 MG/DL (ref 0.55–1.02)
DEPRECATED RDW RBC AUTO: 46.5 FL (ref 35.1–46.3)
EOSINOPHIL # BLD AUTO: 0.25 X10(3) UL (ref 0–0.7)
EOSINOPHIL NFR BLD AUTO: 2.9 %
ERYTHROCYTE [DISTWIDTH] IN BLOOD BY AUTOMATED COUNT: 13.5 % (ref 11–15)
GLUCOSE BLD-MCNC: 108 MG/DL (ref 70–99)
HCT VFR BLD AUTO: 34.2 % (ref 35–48)
HGB BLD-MCNC: 11.8 G/DL (ref 12–16)
IMM GRANULOCYTES # BLD AUTO: 0.03 X10(3) UL (ref 0–1)
IMM GRANULOCYTES NFR BLD: 0.4 %
LYMPHOCYTES # BLD AUTO: 2.24 X10(3) UL (ref 1–4)
LYMPHOCYTES NFR BLD AUTO: 26.4 %
MCH RBC QN AUTO: 32.6 PG (ref 26–34)
MCHC RBC AUTO-ENTMCNC: 34.5 G/DL (ref 31–37)
MCV RBC AUTO: 94.5 FL (ref 80–100)
MONOCYTES # BLD AUTO: 0.56 X10(3) UL (ref 0.1–1)
MONOCYTES NFR BLD AUTO: 6.6 %
NEUTROPHILS # BLD AUTO: 5.4 X10 (3) UL (ref 1.5–7.7)
NEUTROPHILS # BLD AUTO: 5.4 X10(3) UL (ref 1.5–7.7)
NEUTROPHILS NFR BLD AUTO: 63.5 %
OSMOLALITY SERPL CALC.SUM OF ELEC: 292 MOSM/KG (ref 275–295)
PLATELET # BLD AUTO: 166 10(3)UL (ref 150–450)
POTASSIUM SERPL-SCNC: 4.5 MMOL/L (ref 3.5–5.1)
RBC # BLD AUTO: 3.62 X10(6)UL (ref 3.8–5.3)
SODIUM SERPL-SCNC: 136 MMOL/L (ref 136–145)
TROPONIN I SERPL-MCNC: <0.045 NG/ML (ref ?–0.04)
WBC # BLD AUTO: 8.5 X10(3) UL (ref 4–11)

## 2020-01-03 PROCEDURE — 93005 ELECTROCARDIOGRAM TRACING: CPT

## 2020-01-03 PROCEDURE — 83036 HEMOGLOBIN GLYCOSYLATED A1C: CPT | Performed by: INTERNAL MEDICINE

## 2020-01-03 PROCEDURE — 96374 THER/PROPH/DIAG INJ IV PUSH: CPT

## 2020-01-03 PROCEDURE — 84484 ASSAY OF TROPONIN QUANT: CPT | Performed by: EMERGENCY MEDICINE

## 2020-01-03 PROCEDURE — 99291 CRITICAL CARE FIRST HOUR: CPT

## 2020-01-03 PROCEDURE — 93010 ELECTROCARDIOGRAM REPORT: CPT | Performed by: EMERGENCY MEDICINE

## 2020-01-03 PROCEDURE — 96375 TX/PRO/DX INJ NEW DRUG ADDON: CPT

## 2020-01-03 PROCEDURE — 71046 X-RAY EXAM CHEST 2 VIEWS: CPT | Performed by: EMERGENCY MEDICINE

## 2020-01-03 PROCEDURE — 80048 BASIC METABOLIC PNL TOTAL CA: CPT | Performed by: EMERGENCY MEDICINE

## 2020-01-03 PROCEDURE — 85025 COMPLETE CBC W/AUTO DIFF WBC: CPT | Performed by: EMERGENCY MEDICINE

## 2020-01-03 PROCEDURE — 96376 TX/PRO/DX INJ SAME DRUG ADON: CPT

## 2020-01-03 RX ORDER — ACETAMINOPHEN 325 MG/1
650 TABLET ORAL ONCE
Status: COMPLETED | OUTPATIENT
Start: 2020-01-03 | End: 2020-01-03

## 2020-01-03 RX ORDER — MORPHINE SULFATE 4 MG/ML
4 INJECTION, SOLUTION INTRAMUSCULAR; INTRAVENOUS ONCE
Status: COMPLETED | OUTPATIENT
Start: 2020-01-03 | End: 2020-01-03

## 2020-01-03 RX ORDER — ATORVASTATIN CALCIUM 40 MG/1
40 TABLET, FILM COATED ORAL NIGHTLY
Status: DISCONTINUED | OUTPATIENT
Start: 2020-01-03 | End: 2020-01-07

## 2020-01-03 RX ORDER — ASPIRIN 81 MG/1
81 TABLET, CHEWABLE ORAL DAILY
Status: DISCONTINUED | OUTPATIENT
Start: 2020-01-04 | End: 2020-01-07

## 2020-01-03 NOTE — ED PROVIDER NOTES
Patient Seen in: [facility] Emergency Department      History   Patient presents with:  Chest Pain Angina    Stated Complaint: midsternal chest pain     HPI    52-year-old female with past medical history significant for CAD, CHF, diabetes, GERD, 01/03/20 1734 18   Temp 01/03/20 1734 98.2 °F (36.8 °C)   Temp src 01/03/20 1734 Oral   SpO2 01/03/20 1734 96 %   O2 Device 01/03/20 1739 None (Room air)       Current:BP (!) 120/109   Pulse (!) 48   Temp 98.2 °F (36.8 °C) (Oral)   Resp 19   Ht 165.1 cm (5 DIFFERENTIAL[[id number]]          Abnormal            Final result                 Please view results for these tests on the individual orders.    RAINBOW DRAW BLUE   RAINBOW DRAW LAVENDER   RAINBOW DRAW LIGHT GREEN   RAINBOW DRAW GOLD     EKG    Rate, inte provider specified. We recommend that you schedule follow up care with a primary care provider within the next three months to obtain basic health screening including reassessment of your blood pressure.     Medications Prescribed:  Current Discharge Medic

## 2020-01-03 NOTE — ED INITIAL ASSESSMENT (HPI)
Pt to the ed with midsternal chest pain that started today around 1615 today. Hx of MI. Pt states that she feels sob but denies any dizziness.

## 2020-01-04 ENCOUNTER — APPOINTMENT (OUTPATIENT)
Dept: CV DIAGNOSTICS | Facility: HOSPITAL | Age: 70
DRG: 309 | End: 2020-01-04
Attending: HOSPITALIST
Payer: MEDICARE

## 2020-01-04 PROBLEM — I50.32 CHRONIC DIASTOLIC HF (HEART FAILURE) (HCC): Status: ACTIVE | Noted: 2019-06-10

## 2020-01-04 LAB
BILIRUB UR QL: NEGATIVE
COLOR UR: YELLOW
EST. AVERAGE GLUCOSE BLD GHB EST-MCNC: 126 MG/DL (ref 68–126)
GLUCOSE BLDC GLUCOMTR-MCNC: 106 MG/DL (ref 70–99)
GLUCOSE BLDC GLUCOMTR-MCNC: 113 MG/DL (ref 70–99)
GLUCOSE BLDC GLUCOMTR-MCNC: 123 MG/DL (ref 70–99)
GLUCOSE BLDC GLUCOMTR-MCNC: 208 MG/DL (ref 70–99)
GLUCOSE BLDC GLUCOMTR-MCNC: 98 MG/DL (ref 70–99)
GLUCOSE UR-MCNC: NEGATIVE MG/DL
HBA1C MFR BLD HPLC: 6 % (ref ?–5.7)
KETONES UR-MCNC: NEGATIVE MG/DL
MRSA DNA SPEC QL NAA+PROBE: NEGATIVE
NITRITE UR QL STRIP.AUTO: NEGATIVE
PH UR: 5 [PH] (ref 5–8)
PROT UR-MCNC: 100 MG/DL
RBC #/AREA URNS AUTO: 88 /HPF
SP GR UR STRIP: 1.02 (ref 1–1.03)
T4 FREE SERPL-MCNC: 1.7 NG/DL (ref 0.8–1.7)
TROPONIN I SERPL-MCNC: <0.045 NG/ML (ref ?–0.04)
TROPONIN I SERPL-MCNC: <0.045 NG/ML (ref ?–0.04)
TSI SER-ACNC: 2.43 MIU/ML (ref 0.36–3.74)
UROBILINOGEN UR STRIP-ACNC: <2
WBC #/AREA URNS AUTO: 208 /HPF

## 2020-01-04 PROCEDURE — 87086 URINE CULTURE/COLONY COUNT: CPT | Performed by: INTERNAL MEDICINE

## 2020-01-04 PROCEDURE — 87186 SC STD MICRODIL/AGAR DIL: CPT | Performed by: INTERNAL MEDICINE

## 2020-01-04 PROCEDURE — 84484 ASSAY OF TROPONIN QUANT: CPT | Performed by: HOSPITALIST

## 2020-01-04 PROCEDURE — 81001 URINALYSIS AUTO W/SCOPE: CPT | Performed by: INTERNAL MEDICINE

## 2020-01-04 PROCEDURE — 84439 ASSAY OF FREE THYROXINE: CPT | Performed by: HOSPITALIST

## 2020-01-04 PROCEDURE — 87077 CULTURE AEROBIC IDENTIFY: CPT | Performed by: INTERNAL MEDICINE

## 2020-01-04 PROCEDURE — 93306 TTE W/DOPPLER COMPLETE: CPT | Performed by: HOSPITALIST

## 2020-01-04 PROCEDURE — 82962 GLUCOSE BLOOD TEST: CPT

## 2020-01-04 PROCEDURE — 87641 MR-STAPH DNA AMP PROBE: CPT | Performed by: INTERNAL MEDICINE

## 2020-01-04 PROCEDURE — 84443 ASSAY THYROID STIM HORMONE: CPT | Performed by: HOSPITALIST

## 2020-01-04 RX ORDER — LISINOPRIL 10 MG/1
10 TABLET ORAL DAILY
Status: DISCONTINUED | OUTPATIENT
Start: 2020-01-05 | End: 2020-01-07

## 2020-01-04 RX ORDER — PANTOPRAZOLE SODIUM 40 MG/1
40 TABLET, DELAYED RELEASE ORAL
Status: DISCONTINUED | OUTPATIENT
Start: 2020-01-04 | End: 2020-01-07

## 2020-01-04 RX ORDER — LEVOTHYROXINE SODIUM 0.05 MG/1
50 TABLET ORAL
Status: DISCONTINUED | OUTPATIENT
Start: 2020-01-05 | End: 2020-01-07

## 2020-01-04 RX ORDER — TRAMADOL HYDROCHLORIDE 50 MG/1
50 TABLET ORAL EVERY 12 HOURS PRN
Status: DISCONTINUED | OUTPATIENT
Start: 2020-01-04 | End: 2020-01-07

## 2020-01-04 RX ORDER — METOCLOPRAMIDE 10 MG/1
5 TABLET ORAL EVERY 6 HOURS PRN
Status: DISCONTINUED | OUTPATIENT
Start: 2020-01-04 | End: 2020-01-07

## 2020-01-04 RX ORDER — MELATONIN
325 2 TIMES DAILY WITH MEALS
Status: DISCONTINUED | OUTPATIENT
Start: 2020-01-04 | End: 2020-01-07

## 2020-01-04 RX ORDER — CARVEDILOL 3.12 MG/1
3.12 TABLET ORAL 2 TIMES DAILY WITH MEALS
Status: ON HOLD | COMMUNITY
End: 2020-01-07

## 2020-01-04 RX ORDER — BACLOFEN 10 MG/1
10 TABLET ORAL 2 TIMES DAILY PRN
Status: DISCONTINUED | OUTPATIENT
Start: 2020-01-04 | End: 2020-01-07

## 2020-01-04 RX ORDER — IPRATROPIUM BROMIDE AND ALBUTEROL SULFATE 2.5; .5 MG/3ML; MG/3ML
3 SOLUTION RESPIRATORY (INHALATION) EVERY 4 HOURS PRN
Status: DISCONTINUED | OUTPATIENT
Start: 2020-01-04 | End: 2020-01-07

## 2020-01-04 RX ORDER — ISOSORBIDE MONONITRATE 60 MG/1
60 TABLET, EXTENDED RELEASE ORAL DAILY
Status: DISCONTINUED | OUTPATIENT
Start: 2020-01-05 | End: 2020-01-07

## 2020-01-04 RX ORDER — FOLIC ACID/VIT B COMPLEX AND C 400 MCG
1 TABLET ORAL DAILY
Status: DISCONTINUED | OUTPATIENT
Start: 2020-01-04 | End: 2020-01-07

## 2020-01-04 RX ORDER — FUROSEMIDE 40 MG/1
40 TABLET ORAL 2 TIMES DAILY
Status: DISCONTINUED | OUTPATIENT
Start: 2020-01-04 | End: 2020-01-06

## 2020-01-04 RX ORDER — CLOPIDOGREL BISULFATE 75 MG/1
75 TABLET ORAL DAILY
COMMUNITY

## 2020-01-04 RX ORDER — ACETAMINOPHEN 325 MG/1
650 TABLET ORAL EVERY 6 HOURS PRN
Status: DISCONTINUED | OUTPATIENT
Start: 2020-01-04 | End: 2020-01-07

## 2020-01-04 RX ORDER — TRAZODONE HYDROCHLORIDE 50 MG/1
50 TABLET ORAL NIGHTLY PRN
Status: DISCONTINUED | OUTPATIENT
Start: 2020-01-04 | End: 2020-01-07

## 2020-01-04 RX ORDER — NITROGLYCERIN 0.4 MG/1
0.4 TABLET SUBLINGUAL EVERY 5 MIN PRN
Status: DISCONTINUED | OUTPATIENT
Start: 2020-01-04 | End: 2020-01-07

## 2020-01-04 RX ORDER — TRAZODONE HYDROCHLORIDE 50 MG/1
50 TABLET ORAL NIGHTLY
Status: ON HOLD | COMMUNITY
End: 2020-11-07

## 2020-01-04 RX ORDER — ACETAMINOPHEN 325 MG/1
650 TABLET ORAL EVERY 6 HOURS PRN
Status: DISCONTINUED | OUTPATIENT
Start: 2020-01-04 | End: 2020-01-05

## 2020-01-04 RX ORDER — POTASSIUM CHLORIDE 20 MEQ/1
20 TABLET, EXTENDED RELEASE ORAL DAILY
Status: DISCONTINUED | OUTPATIENT
Start: 2020-01-04 | End: 2020-01-07

## 2020-01-04 RX ORDER — DEXTROSE MONOHYDRATE 25 G/50ML
50 INJECTION, SOLUTION INTRAVENOUS AS NEEDED
Status: DISCONTINUED | OUTPATIENT
Start: 2020-01-04 | End: 2020-01-07

## 2020-01-04 RX ORDER — LISINOPRIL 10 MG/1
10 TABLET ORAL DAILY
Status: ON HOLD | COMMUNITY
End: 2020-02-14

## 2020-01-04 RX ORDER — IPRATROPIUM BROMIDE AND ALBUTEROL SULFATE 2.5; .5 MG/3ML; MG/3ML
3 SOLUTION RESPIRATORY (INHALATION) EVERY 4 HOURS PRN
Status: ON HOLD | COMMUNITY
End: 2021-03-30

## 2020-01-04 RX ORDER — RANOLAZINE 500 MG/1
500 TABLET, EXTENDED RELEASE ORAL 2 TIMES DAILY
Status: DISCONTINUED | OUTPATIENT
Start: 2020-01-04 | End: 2020-01-04

## 2020-01-04 NOTE — ED NOTES
Care assumed from [place]. Pt states her chest pain is coming back. Dr. [place] notified.  Pt states she also took 2 nitro before EMS arrival.

## 2020-01-04 NOTE — CONSULTS
[facility]    Report of Consultation    Brian Juaresallen Patient Status:  Inpatient    1950 MRN [id number]   Location [facility] 2W/SW Attending Dinah Purdy MD   Hosp Day # 0 PCP Betsey Frye MD     Date of Admission:  1 Smoking status: Former Smoker      Smokeless tobacco: Never Used    Alcohol use: Not Currently    Drug use: Never           Current Medications:  acetaminophen (TYLENOL) tab 650 mg, 650 mg, Oral, Q6H PRN  acetaminophen (TYLENOL) tab 650 mg, 650 mg, Oral, Q mouth daily. traZODone HCl 50 MG Oral Tab, Take 50 mg by mouth nightly as needed for Sleep. Ranolazine ER (RANEXA) 500 MG Oral Tablet 12 Hr, Take 1 tablet (500 mg total) by mouth 2 (two) times daily.   aspirin 81 MG Oral Chew Tab, Chew 1 tablet (81 mg tot noted in HPI. Physical Exam:   Blood pressure 137/57, pulse (!) 41, temperature 97.1 °F (36.2 °C), temperature source Temporal, resp. rate 16, height 165.1 cm (5' 5\"), weight 184 lb (83.5 kg), SpO2 96 %.   Intake/Output:   Last 3 shifts: No intake/outpu 12/06/2019    MG 2.1 07/03/2019    TROP <0.045 01/04/2020         Imaging:  Xr Chest Pa + Lat Chest (cpt=71046)    Result Date: 1/3/2020  CONCLUSION: No acute cardiopulmonary abnormality.    Dictated by (CST): Tina Oneil MD on 1/03/2020 at 17:58     Appro

## 2020-01-04 NOTE — PLAN OF CARE
Patient alert and oriented X4. Mild complaints of chest pain 1; 0-10 scale. 3rd degree complete heart block; bradycardia. 2L NC shortness of breath with exertion. Voids incontinent. NPO since midnight. Skin intact. Will continue to monitor.      Problem: Pa Problem: CARDIOVASCULAR - ADULT  Goal: Absence of cardiac arrhythmias or at baseline  Description  INTERVENTIONS:  - Continuous cardiac monitoring, monitor vital signs, obtain 12 lead EKG if indicated  - Evaluate effectiveness of antiarrhythmic and heart

## 2020-01-04 NOTE — PROGRESS NOTES
Case discussed with ER MD and ICU nurse, orders added, chart reviewed, full note in AM      Thank you for allowing me to participate in the care of your patient. please call if you have any question.      Johanna Baca MD   General Cardiology & Advanced Heart

## 2020-01-04 NOTE — H&P
[facility]    History and Physical    Lucinda Gamez Patient Status:  Inpatient    1950 MRN [id number]   Location [facility] 2W/SW Attending Olegario Mckeon MD   Hosp Day # 0 PCP Maryam Samayoa MD     Date:  2020  Date o Oral Tab, Take 10 mg by mouth daily. traZODone HCl 50 MG Oral Tab, Take 50 mg by mouth nightly as needed for Sleep. Ranolazine ER (RANEXA) 500 MG Oral Tablet 12 Hr, Take 1 tablet (500 mg total) by mouth 2 (two) times daily.   aspirin 81 MG Oral Chew Tab, Cardiovascular: Positive for chest pain. Negative for palpitations and leg swelling. Gastrointestinal: Negative. Negative for nausea. Endocrine: Negative. Genitourinary: Positive for dysuria, urgency and frequency. Musculoskeletal: Negative. 07/04/2019    BILT 0.5 07/04/2019    TP 6.6 07/04/2019    AST 25 07/04/2019    ALT 19 07/04/2019    DDIMER 0.65 12/06/2019    MG 2.1 07/03/2019    TROP <0.045 01/04/2020     Xr Chest Pa + Lat Chest (cpt=71046)    Result Date: 1/3/2020  CONCLUSION: No acute

## 2020-01-05 PROBLEM — I44.30 HEART BLOCK, AV: Status: ACTIVE | Noted: 2020-01-03

## 2020-01-05 LAB
GLUCOSE BLDC GLUCOMTR-MCNC: 135 MG/DL (ref 70–99)
GLUCOSE BLDC GLUCOMTR-MCNC: 188 MG/DL (ref 70–99)
GLUCOSE BLDC GLUCOMTR-MCNC: 225 MG/DL (ref 70–99)
GLUCOSE BLDC GLUCOMTR-MCNC: 238 MG/DL (ref 70–99)

## 2020-01-05 PROCEDURE — 82962 GLUCOSE BLOOD TEST: CPT

## 2020-01-05 NOTE — PROGRESS NOTES
[facility]  PROGRESS NOTE    Luz  Patient Status:  Inpatient    1950 MRN [id number]   Location [facility] 2W/SW Attending Catalino Forrester MD   Hosp Day # 1 PCP Marina Wiley MD     Date:  2020  Date of Admissi Oral Tab, Take 10 mg by mouth daily. traZODone HCl 50 MG Oral Tab, Take 50 mg by mouth nightly as needed for Sleep. Ranolazine ER (RANEXA) 500 MG Oral Tablet 12 Hr, Take 1 tablet (500 mg total) by mouth 2 (two) times daily.   aspirin 81 MG Oral Chew Tab, Cardiovascular: Positive for chest pain. Negative for palpitations and leg swelling. Gastrointestinal: Negative. Negative for nausea. Endocrine: Negative. Genitourinary: Positive for dysuria, urgency and frequency. Musculoskeletal: Negative. 198 (H) 07/04/2019    BILT 0.5 07/04/2019    TP 6.6 07/04/2019    AST 25 07/04/2019    ALT 19 07/04/2019    T4F 1.7 01/04/2020    TSH 2.430 01/04/2020    DDIMER 0.65 12/06/2019    MG 2.1 07/03/2019    TROP <0.045 01/04/2020     Xr Chest Pa + Lat Chest (cpt Certification    Patient will require inpatient services that will reasonably be expected to span two midnight's based on the clinical documentation in H+P.    Based on patients current state of illness, I anticipate that, after discharge, patient will requ

## 2020-01-05 NOTE — PLAN OF CARE
Pt alert and orientated in no distress, denies pain or discomfort. HR still in the 40's at time but other vitals stable.      Problem: CARDIOVASCULAR - ADULT  Goal: Maintains optimal cardiac output and hemodynamic stability  Description  INTERVENTIONS:  - M

## 2020-01-05 NOTE — PROGRESS NOTES
Cardiology progress note        24 h events still in mobitz type 1    Current Medications:  CefTRIAXone Sodium (ROCEPHIN) 1 g in sodium chloride 0.9% 100 mL MBP/ADD-vantage, 1 g, Intravenous, Q24H  acetaminophen (TYLENOL) tab 650 mg, 650 mg, Oral, Q6H PRN 500 MG Oral Tablet 12 Hr, Take 1 tablet (500 mg total) by mouth 2 (two) times daily. aspirin 81 MG Oral Chew Tab, Chew 1 tablet (81 mg total) by mouth daily. furosemide 20 MG Oral Tab, Take 3 tablets (60 mg total) by mouth BID (Diuretic).  (Patient taking at 1/5/2020 1652  Last data filed at 1/5/2020 0600  Gross per 24 hour   Intake 440 ml   Output —   Net 440 ml       General appearance:  alert, appears stated age and cooperative  Head: Normocephalic, without obvious abnormality, atraumatic  Pulmonary: david having the pads on her chest repeat the echo today, trop x 3, avoid any olivia blocking agent, check TSH and free T4.       Chronic diastolic HF (heart failure) (HCC) euvolemic, repeat echo today        Acute chest pain pain does not look cardiac, trop x 3,

## 2020-01-05 NOTE — CM/SW NOTE
MDO to  for d/c planning. Per chart review, pt admitted 1/3/20 for chest pain. [facility] [address] [facility] Pt does not drive and has a cg part time to assist with medication management. Pt does not have family support system. Pt current on 4LO2.   No ho

## 2020-01-05 NOTE — PLAN OF CARE
Patient was seen by cardiologist yesterday and it was determined patient was in 2nd degree type 2 heart block, not complete heart block. Cardiology does not want to place a pacemaker at this time. Patient still with heart rate in 40's, asymptomatic.   [facility] CARDIOVASCULAR - ADULT  Goal: Maintains optimal cardiac output and hemodynamic stability  Description  INTERVENTIONS:  - Monitor vital signs, rhythm, and trends  - Monitor for bleeding, hypotension and signs of decreased cardiac output  - Evaluate effectiv

## 2020-01-06 LAB
GLUCOSE BLDC GLUCOMTR-MCNC: 126 MG/DL (ref 70–99)
GLUCOSE BLDC GLUCOMTR-MCNC: 163 MG/DL (ref 70–99)
GLUCOSE BLDC GLUCOMTR-MCNC: 176 MG/DL (ref 70–99)
GLUCOSE BLDC GLUCOMTR-MCNC: 189 MG/DL (ref 70–99)

## 2020-01-06 PROCEDURE — 82962 GLUCOSE BLOOD TEST: CPT

## 2020-01-06 RX ORDER — FUROSEMIDE 40 MG/1
40 TABLET ORAL DAILY
Status: DISCONTINUED | OUTPATIENT
Start: 2020-01-07 | End: 2020-01-07

## 2020-01-06 NOTE — PLAN OF CARE
Problem: Patient Centered Care  Goal: Patient preferences are identified and integrated in the patient's plan of care  Description  Interventions:  - What would you like us to know as we care for you? Keep me updated on my care.   - Provide timely, comple

## 2020-01-06 NOTE — PROGRESS NOTES
Cardiology progress note        24 h events still in mobitz type 1    Current Medications:  CefTRIAXone Sodium (ROCEPHIN) 1 g in sodium chloride 0.9% 100 mL MBP/ADD-vantage, 1 g, Intravenous, Q24H  acetaminophen (TYLENOL) tab 650 mg, 650 mg, Oral, Q6H PRN 500 MG Oral Tablet 12 Hr, Take 1 tablet (500 mg total) by mouth 2 (two) times daily. aspirin 81 MG Oral Chew Tab, Chew 1 tablet (81 mg total) by mouth daily. furosemide 20 MG Oral Tab, Take 3 tablets (60 mg total) by mouth BID (Diuretic).  (Patient taking 1/6/2020 0950  Last data filed at 1/6/2020 0654  Gross per 24 hour   Intake 5 ml   Output 250 ml   Net -245 ml       General appearance:  alert, appears stated age and cooperative  Head: Normocephalic, without obvious abnormality, atraumatic  Pulmonary: cl cardiac, trop x 3, she had [facility] 7/19 non-obstructive disease, medical therapy advised, continue ASA 81, lipitor 40 and trop x 3 , restart imdur and stop ranexa can cause bradycarida. Severe mitral regurg.  Follow up with her cardiologist Nicola villa

## 2020-01-06 NOTE — PROGRESS NOTES
[facility]  PROGRESS NOTE    Nat Bruno Patient Status:  Inpatient    1950 MRN [id number]   Location [facility] 2W/SW Attending Ni Saxena MD   [facility] Day # 2 PCP Aroldo Morton MD     Date:  2020  Date of Admissi Oral Tab, Take 10 mg by mouth daily. traZODone HCl 50 MG Oral Tab, Take 50 mg by mouth nightly as needed for Sleep. Ranolazine ER (RANEXA) 500 MG Oral Tablet 12 Hr, Take 1 tablet (500 mg total) by mouth 2 (two) times daily.   aspirin 81 MG Oral Chew Tab, Cardiovascular: Positive for chest pain. Negative for palpitations and leg swelling. Gastrointestinal: Negative. Negative for nausea. Endocrine: Negative. Genitourinary: Positive for dysuria, urgency and frequency. Musculoskeletal: Negative. (H) 07/04/2019    BILT 0.5 07/04/2019    TP 6.6 07/04/2019    AST 25 07/04/2019    ALT 19 07/04/2019    T4F 1.7 01/04/2020    TSH 2.430 01/04/2020    DDIMER 0.65 12/06/2019    MG 2.1 07/03/2019    TROP <0.045 01/04/2020               Assessment/Plan:     H

## 2020-01-07 VITALS
DIASTOLIC BLOOD PRESSURE: 113 MMHG | HEART RATE: 48 BPM | BODY MASS INDEX: 30.6 KG/M2 | WEIGHT: 183.69 LBS | RESPIRATION RATE: 18 BRPM | OXYGEN SATURATION: 95 % | HEIGHT: 65 IN | SYSTOLIC BLOOD PRESSURE: 144 MMHG | TEMPERATURE: 98 F

## 2020-01-07 LAB
GLUCOSE BLDC GLUCOMTR-MCNC: 113 MG/DL (ref 70–99)
GLUCOSE BLDC GLUCOMTR-MCNC: 176 MG/DL (ref 70–99)

## 2020-01-07 PROCEDURE — 82962 GLUCOSE BLOOD TEST: CPT

## 2020-01-07 RX ORDER — SULFAMETHOXAZOLE AND TRIMETHOPRIM 800; 160 MG/1; MG/1
1 TABLET ORAL 2 TIMES DAILY
Qty: 20 TABLET | Refills: 0 | Status: SHIPPED | OUTPATIENT
Start: 2020-01-07 | End: 2020-01-10

## 2020-01-07 RX ORDER — TRAMADOL HYDROCHLORIDE 50 MG/1
50 TABLET ORAL EVERY 12 HOURS PRN
Qty: 60 TABLET | Refills: 1 | Status: ON HOLD | OUTPATIENT
Start: 2020-01-07 | End: 2020-03-31

## 2020-01-07 RX ORDER — FUROSEMIDE 40 MG/1
40 TABLET ORAL 2 TIMES DAILY
Qty: 60 TABLET | Refills: 3 | Status: ON HOLD | OUTPATIENT
Start: 2020-01-07 | End: 2020-02-14

## 2020-01-07 NOTE — PLAN OF CARE
Problem: Patient Centered Care  Goal: Patient preferences are identified and integrated in the patient's plan of care  Description  Interventions:  - What would you like us to know as we care for you? Pt is from [place].   - Provide timely, complete, and hematoma  - Assess quality of pulses, skin color and temperature  - Assess for signs of decreased coronary artery perfusion - ex.  Angina  - Evaluate fluid balance, assess for edema, trend weights  Outcome: Adequate for Discharge  Goal: Absence of cardiac a

## 2020-01-07 NOTE — CM/SW NOTE
10: 35AM  Per RN rounds - pt may benefit from [facility] services. SW contacted [facility] and requested referral be made to [facility] via [facility]. Pt has hx w/ their services. 11:45AM  Received MDO for advanced directives.  SW spoke w/ pt - pt reports having her dtr,

## 2020-01-07 NOTE — PLAN OF CARE
Problem: Patient Centered Care  Goal: Patient preferences are identified and integrated in the patient's plan of care  Description  Interventions:  - What would you like us to know as we care for you?  - Provide timely, complete, and accurate information signs of decreased coronary artery perfusion - ex.  Angina  - Evaluate fluid balance, assess for edema, trend weights  Outcome: Progressing  Goal: Absence of cardiac arrhythmias or at baseline  Description  INTERVENTIONS:  - Continuous cardiac monitoring, m

## 2020-01-07 NOTE — PLAN OF CARE
Problem: Patient Centered Care  Goal: Patient preferences are identified and integrated in the patient's plan of care  Description  Interventions:  - What would you like us to know as we care for you?  I like to be informed  - Provide timely, complete, an emergency measures for life threatening arrhythmias  - Monitor electrolytes and administer replacement therapy as ordered  Outcome: Progressing   VSS overnight. From [facility]. On 3L O2. Will need desat study today.  HR in 40s on monitor overnight- cardiology

## 2020-01-07 NOTE — PROGRESS NOTES
[facility]  PROGRESS NOTE    Leni Johnson Patient Status:  Inpatient    1950 MRN [id number]   Location [facility] 2W/SW Attending Nadine Mayorga MD   [facility] Day # 3 PCP Chiara Guzman MD     Date:  2020  Date of Admissi nightly as needed for Sleep. aspirin 81 MG Oral Chew Tab, Chew 1 tablet (81 mg total) by mouth daily. furosemide 20 MG Oral Tab, Take 3 tablets (60 mg total) by mouth BID (Diuretic). (Patient taking differently: Take 40 mg by mouth 2 (two) times daily. Negative. HENT: Negative. Respiratory: Negative. Cardiovascular: Positive for chest pain. Negative for palpitations and leg swelling. Gastrointestinal: Negative. Negative for nausea. Endocrine: Negative.     Genitourinary: Positive for dysuria CA 8.5 01/03/2020    ALB 2.7 (L) 07/04/2019    ALKPHO 198 (H) 07/04/2019    BILT 0.5 07/04/2019    TP 6.6 07/04/2019    AST 25 07/04/2019    ALT 19 07/04/2019    T4F 1.7 01/04/2020    TSH 2.430 01/04/2020    DDIMER 0.65 12/06/2019    MG 2.1 07/03/2019

## 2020-01-07 NOTE — PROGRESS NOTES
Cardiology progress note        24 h events still in mobitz type 1    Current Medications:  furosemide (LASIX) tab 40 mg, 40 mg, Oral, Daily  Atropine Sulfate 0.1 MG/ML injection 0.5 mg, 0.5 mg, Intravenous, Once  CefTRIAXone Sodium (ROCEPHIN) 1 g in sodiu Take 50 mg by mouth nightly as needed for Sleep. Ranolazine ER (RANEXA) 500 MG Oral Tablet 12 Hr, Take 1 tablet (500 mg total) by mouth 2 (two) times daily. aspirin 81 MG Oral Chew Tab, Chew 1 tablet (81 mg total) by mouth daily.   furosemide 20 MG Oral T 183 lb 11.2 oz (83.3 kg), SpO2 95 %.     Intake/Output Summary (Last 24 hours) at 1/7/2020 1246  Last data filed at 1/6/2020 2155  Gross per 24 hour   Intake 640 ml   Output 650 ml   Net -10 ml       General appearance:  alert, appears stated age and ashwin failure) ([facility] [address] euvolemic, repeat echo today        Acute chest pain pain does not look cardiac, trop x 3, she had [facility] 7/19 non-obstructive disease, medical therapy advised, continue ASA 81, lipitor 40 and trop x 3 , restart imdur and stop ranexa can cause

## 2020-01-13 ENCOUNTER — HOSPITAL ENCOUNTER (OUTPATIENT)
Facility: HOSPITAL | Age: 70
Setting detail: OBSERVATION
LOS: 1 days | Discharge: HOME | End: 2020-01-16
Attending: EMERGENCY MEDICINE | Admitting: INTERNAL MEDICINE
Payer: MEDICARE

## 2020-01-13 ENCOUNTER — APPOINTMENT (OUTPATIENT)
Dept: CV DIAGNOSTICS | Facility: HOSPITAL | Age: 70
End: 2020-01-13
Attending: HOSPITALIST
Payer: MEDICARE

## 2020-01-13 ENCOUNTER — APPOINTMENT (OUTPATIENT)
Dept: GENERAL RADIOLOGY | Facility: HOSPITAL | Age: 70
End: 2020-01-13
Attending: EMERGENCY MEDICINE
Payer: MEDICARE

## 2020-01-13 DIAGNOSIS — R07.9 CHEST PAIN, UNSPECIFIED TYPE: ICD-10-CM

## 2020-01-13 DIAGNOSIS — I44.1 HEART BLOCK AV SECOND DEGREE: Primary | ICD-10-CM

## 2020-01-13 LAB
ANION GAP SERPL CALC-SCNC: 6 MMOL/L (ref 0–18)
BASOPHILS # BLD AUTO: 0.03 X10(3) UL (ref 0–0.2)
BASOPHILS NFR BLD AUTO: 0.4 %
BUN BLD-MCNC: 35 MG/DL (ref 7–18)
BUN/CREAT SERPL: 15.4 (ref 10–20)
CALCIUM BLD-MCNC: 8 MG/DL (ref 8.5–10.1)
CHLORIDE SERPL-SCNC: 107 MMOL/L (ref 98–112)
CO2 SERPL-SCNC: 23 MMOL/L (ref 21–32)
CREAT BLD-MCNC: 2.27 MG/DL (ref 0.55–1.02)
DEPRECATED RDW RBC AUTO: 47.7 FL (ref 35.1–46.3)
EOSINOPHIL # BLD AUTO: 0.32 X10(3) UL (ref 0–0.7)
EOSINOPHIL NFR BLD AUTO: 4.4 %
ERYTHROCYTE [DISTWIDTH] IN BLOOD BY AUTOMATED COUNT: 13.6 % (ref 11–15)
GLUCOSE BLD-MCNC: 126 MG/DL (ref 70–99)
GLUCOSE BLDC GLUCOMTR-MCNC: 103 MG/DL (ref 70–99)
GLUCOSE BLDC GLUCOMTR-MCNC: 108 MG/DL (ref 70–99)
GLUCOSE BLDC GLUCOMTR-MCNC: 109 MG/DL (ref 70–99)
GLUCOSE BLDC GLUCOMTR-MCNC: 139 MG/DL (ref 70–99)
HCT VFR BLD AUTO: 32.1 % (ref 35–48)
HGB BLD-MCNC: 10.9 G/DL (ref 12–16)
IMM GRANULOCYTES # BLD AUTO: 0.02 X10(3) UL (ref 0–1)
IMM GRANULOCYTES NFR BLD: 0.3 %
LYMPHOCYTES # BLD AUTO: 1.67 X10(3) UL (ref 1–4)
LYMPHOCYTES NFR BLD AUTO: 23.1 %
MCH RBC QN AUTO: 32.8 PG (ref 26–34)
MCHC RBC AUTO-ENTMCNC: 34 G/DL (ref 31–37)
MCV RBC AUTO: 96.7 FL (ref 80–100)
MONOCYTES # BLD AUTO: 0.38 X10(3) UL (ref 0.1–1)
MONOCYTES NFR BLD AUTO: 5.3 %
MRSA DNA SPEC QL NAA+PROBE: NEGATIVE
NEUTROPHILS # BLD AUTO: 4.81 X10 (3) UL (ref 1.5–7.7)
NEUTROPHILS # BLD AUTO: 4.81 X10(3) UL (ref 1.5–7.7)
NEUTROPHILS NFR BLD AUTO: 66.5 %
NT-PROBNP SERPL-MCNC: 7010 PG/ML (ref ?–125)
OSMOLALITY SERPL CALC.SUM OF ELEC: 292 MOSM/KG (ref 275–295)
PLATELET # BLD AUTO: 206 10(3)UL (ref 150–450)
POTASSIUM SERPL-SCNC: 4.6 MMOL/L (ref 3.5–5.1)
RBC # BLD AUTO: 3.32 X10(6)UL (ref 3.8–5.3)
SODIUM SERPL-SCNC: 136 MMOL/L (ref 136–145)
TROPONIN I SERPL-MCNC: <0.045 NG/ML (ref ?–0.04)
TROPONIN I SERPL-MCNC: <0.045 NG/ML (ref ?–0.04)
WBC # BLD AUTO: 7.2 X10(3) UL (ref 4–11)

## 2020-01-13 PROCEDURE — 99285 EMERGENCY DEPT VISIT HI MDM: CPT

## 2020-01-13 PROCEDURE — 85025 COMPLETE CBC W/AUTO DIFF WBC: CPT | Performed by: EMERGENCY MEDICINE

## 2020-01-13 PROCEDURE — 82962 GLUCOSE BLOOD TEST: CPT

## 2020-01-13 PROCEDURE — 93016 CV STRESS TEST SUPVJ ONLY: CPT | Performed by: HOSPITALIST

## 2020-01-13 PROCEDURE — 83880 ASSAY OF NATRIURETIC PEPTIDE: CPT | Performed by: EMERGENCY MEDICINE

## 2020-01-13 PROCEDURE — 93018 CV STRESS TEST I&R ONLY: CPT | Performed by: HOSPITALIST

## 2020-01-13 PROCEDURE — 87641 MR-STAPH DNA AMP PROBE: CPT | Performed by: EMERGENCY MEDICINE

## 2020-01-13 PROCEDURE — 93010 ELECTROCARDIOGRAM REPORT: CPT | Performed by: EMERGENCY MEDICINE

## 2020-01-13 PROCEDURE — 84484 ASSAY OF TROPONIN QUANT: CPT | Performed by: EMERGENCY MEDICINE

## 2020-01-13 PROCEDURE — 93017 CV STRESS TEST TRACING ONLY: CPT

## 2020-01-13 PROCEDURE — 93350 STRESS TTE ONLY: CPT | Performed by: HOSPITALIST

## 2020-01-13 PROCEDURE — 93350 STRESS TTE ONLY: CPT

## 2020-01-13 PROCEDURE — 93005 ELECTROCARDIOGRAM TRACING: CPT

## 2020-01-13 PROCEDURE — 36415 COLL VENOUS BLD VENIPUNCTURE: CPT

## 2020-01-13 PROCEDURE — 71045 X-RAY EXAM CHEST 1 VIEW: CPT | Performed by: EMERGENCY MEDICINE

## 2020-01-13 PROCEDURE — 94640 AIRWAY INHALATION TREATMENT: CPT

## 2020-01-13 PROCEDURE — 80048 BASIC METABOLIC PNL TOTAL CA: CPT | Performed by: EMERGENCY MEDICINE

## 2020-01-13 RX ORDER — ASPIRIN 81 MG/1
81 TABLET, CHEWABLE ORAL DAILY
Status: DISCONTINUED | OUTPATIENT
Start: 2020-01-14 | End: 2020-01-16

## 2020-01-13 RX ORDER — ONDANSETRON 2 MG/ML
4 INJECTION INTRAMUSCULAR; INTRAVENOUS EVERY 6 HOURS PRN
Status: DISCONTINUED | OUTPATIENT
Start: 2020-01-13 | End: 2020-01-16

## 2020-01-13 RX ORDER — PANTOPRAZOLE SODIUM 40 MG/1
40 TABLET, DELAYED RELEASE ORAL
Status: DISCONTINUED | OUTPATIENT
Start: 2020-01-13 | End: 2020-01-16

## 2020-01-13 RX ORDER — LEVOTHYROXINE SODIUM 0.05 MG/1
50 TABLET ORAL
Status: DISCONTINUED | OUTPATIENT
Start: 2020-01-13 | End: 2020-01-16

## 2020-01-13 RX ORDER — NITROGLYCERIN 0.4 MG/1
0.4 TABLET SUBLINGUAL ONCE
Status: COMPLETED | OUTPATIENT
Start: 2020-01-13 | End: 2020-01-13

## 2020-01-13 RX ORDER — NITROGLYCERIN 0.4 MG/1
0.4 TABLET SUBLINGUAL EVERY 5 MIN PRN
Status: DISCONTINUED | OUTPATIENT
Start: 2020-01-13 | End: 2020-01-16

## 2020-01-13 RX ORDER — DEXTROSE MONOHYDRATE 25 G/50ML
50 INJECTION, SOLUTION INTRAVENOUS AS NEEDED
Status: DISCONTINUED | OUTPATIENT
Start: 2020-01-13 | End: 2020-01-16

## 2020-01-13 RX ORDER — ISOSORBIDE DINITRATE 20 MG/1
60 TABLET ORAL DAILY
Status: DISCONTINUED | OUTPATIENT
Start: 2020-01-13 | End: 2020-01-16

## 2020-01-13 RX ORDER — LISINOPRIL 10 MG/1
10 TABLET ORAL DAILY
Status: DISCONTINUED | OUTPATIENT
Start: 2020-01-13 | End: 2020-01-16

## 2020-01-13 RX ORDER — DOBUTAMINE HYDROCHLORIDE 100 MG/100ML
INJECTION INTRAVENOUS
Status: COMPLETED
Start: 2020-01-13 | End: 2020-01-13

## 2020-01-13 RX ORDER — TRAZODONE HYDROCHLORIDE 50 MG/1
50 TABLET ORAL NIGHTLY PRN
Status: DISCONTINUED | OUTPATIENT
Start: 2020-01-13 | End: 2020-01-13

## 2020-01-13 RX ORDER — SULFAMETHOXAZOLE AND TRIMETHOPRIM 800; 160 MG/1; MG/1
1 TABLET ORAL 2 TIMES DAILY
COMMUNITY
Start: 2020-01-07 | End: 2020-01-17

## 2020-01-13 RX ORDER — BACLOFEN 10 MG/1
10 TABLET ORAL 2 TIMES DAILY PRN
Status: DISCONTINUED | OUTPATIENT
Start: 2020-01-13 | End: 2020-01-16

## 2020-01-13 RX ORDER — AMLODIPINE BESYLATE 5 MG/1
5 TABLET ORAL DAILY
Status: DISCONTINUED | OUTPATIENT
Start: 2020-01-13 | End: 2020-01-16

## 2020-01-13 RX ORDER — TRAMADOL HYDROCHLORIDE 50 MG/1
50 TABLET ORAL EVERY 12 HOURS PRN
Status: DISCONTINUED | OUTPATIENT
Start: 2020-01-13 | End: 2020-01-16

## 2020-01-13 RX ORDER — ACETAMINOPHEN 325 MG/1
650 TABLET ORAL EVERY 6 HOURS PRN
Status: DISCONTINUED | OUTPATIENT
Start: 2020-01-13 | End: 2020-01-15

## 2020-01-13 RX ORDER — TRAZODONE HYDROCHLORIDE 50 MG/1
50 TABLET ORAL NIGHTLY PRN
Status: DISCONTINUED | OUTPATIENT
Start: 2020-01-13 | End: 2020-01-16

## 2020-01-13 RX ORDER — CLOPIDOGREL BISULFATE 75 MG/1
75 TABLET ORAL DAILY
Status: DISCONTINUED | OUTPATIENT
Start: 2020-01-13 | End: 2020-01-16

## 2020-01-13 RX ORDER — ATORVASTATIN CALCIUM 40 MG/1
80 TABLET, FILM COATED ORAL NIGHTLY
Status: DISCONTINUED | OUTPATIENT
Start: 2020-01-13 | End: 2020-01-16

## 2020-01-13 RX ORDER — IPRATROPIUM BROMIDE AND ALBUTEROL SULFATE 2.5; .5 MG/3ML; MG/3ML
3 SOLUTION RESPIRATORY (INHALATION) EVERY 4 HOURS PRN
Status: DISCONTINUED | OUTPATIENT
Start: 2020-01-13 | End: 2020-01-16

## 2020-01-13 RX ORDER — FOLIC ACID/VIT B COMPLEX AND C 400 MCG
1 TABLET ORAL DAILY
Status: DISCONTINUED | OUTPATIENT
Start: 2020-01-13 | End: 2020-01-16

## 2020-01-13 RX ORDER — FUROSEMIDE 40 MG/1
40 TABLET ORAL 2 TIMES DAILY
Status: DISCONTINUED | OUTPATIENT
Start: 2020-01-13 | End: 2020-01-16

## 2020-01-13 RX ORDER — ONDANSETRON 2 MG/ML
4 INJECTION INTRAMUSCULAR; INTRAVENOUS ONCE
Status: DISCONTINUED | OUTPATIENT
Start: 2020-01-13 | End: 2020-01-13

## 2020-01-13 RX ORDER — SULFAMETHOXAZOLE AND TRIMETHOPRIM 800; 160 MG/1; MG/1
1 TABLET ORAL EVERY 12 HOURS SCHEDULED
Status: DISCONTINUED | OUTPATIENT
Start: 2020-01-13 | End: 2020-01-16

## 2020-01-13 NOTE — H&P
[facility] HOSP - [facility]    History and Physical    Henry Antarya Patient Status:  Inpatient    1950 MRN [id number]   Location [facility]5W Attending Jasper Stuart MD   Hosp Day # 0 PCP Carolyn Morales MD     Date:  2020  Date of A 50 mg by mouth nightly as needed for Sleep. aspirin 81 MG Oral Chew Tab, Chew 1 tablet (81 mg total) by mouth daily. atorvastatin 40 MG Oral Tab, Take 80 mg by mouth nightly.     acetaminophen 325 MG Oral Tab, Take 650 mg by mouth every 6 (six) hours as n range of motion. Cardiovascular: Regular rhythm. Bradycardia present. Exam reveals no gallop. Murmur heard. Systolic murmur is present with a grade of 3/6. Pulmonary/Chest: She has rales in the right lower field and the left lower field.    Abdomi with OHA. Chronic diastolic HF (heart failure) (HCC)  B-NP > 7000 and clinically with mild to moderate HF. Chronotropic?       Chest pain, unspecified type  Prior ischemic work ups (-) and troponin normal.        **Certification      PHYSICIAN Certific

## 2020-01-13 NOTE — ED PROVIDER NOTES
Patient Seen in: [facility] Emergency Department      History   Patient presents with:  Chest Pain Angina    Stated Complaint: chest pain    HPI  History is provided by EMS and patient.     46-year-old female with history of CHF, previous MI with s Negative for pain, discharge and redness. Respiratory: Positive for shortness of breath. Negative for cough and wheezing. Cardiovascular: Positive for chest pain. Gastrointestinal: Negative for abdominal pain, diarrhea, nausea and vomiting.    Genito Skin is warm and dry. Findings: No rash. Neurological:      Mental Status: She is alert and oriented to person, place, and time.       Comments: Moving all extremities spontaneously         ED Course     EKG    Rate, intervals and axes as noted on EK -American 25 (L) >=60   TROPONIN I    Collection Time: 01/13/20  3:35 AM   Result Value Ref Range    Troponin <0.045 <0.045 ng/mL   CBC W/ DIFFERENTIAL    Collection Time: 01/13/20  3:35 AM   Result Value Ref Range    WBC 7.2 4.0 - 11.0 x10(3) uL 01/13/2020/DT:  01/13/2020      EMERGENCY DEPARTMENT COURSE AND TREATMENT:  Patient's condition was fair during Emergency Department evaluation.      69yoF with chest pain  - I personally reviewed and interpreted all the ED vitals  - afebrile, hemodynamical that you schedule follow up care with a primary care provider within the next three months to obtain basic health screening including reassessment of your blood pressure.     Medications Prescribed:  Current Discharge Medication List                   Prese

## 2020-01-13 NOTE — ED INITIAL ASSESSMENT (HPI)
Pt arrived via medics to rm 22 for complaint of left sided chest pain, states \"issue\" with mitral valve, pt feels sob on arrival

## 2020-01-13 NOTE — CONSULTS
[facility] HOSP - [facility]    Report of Consultation    Ayala Nino Patient Status:  Inpatient    1950 MRN [id number]   Location [facility]5W Attending Dhara Valentin MD   Hosp Day # 0 PCP Edison Linares MD     Date of Admission:   chewable tab 81 mg, 81 mg, Oral, Daily  atorvastatin (LIPITOR) tab 80 mg, 80 mg, Oral, Nightly  RAF-BEE/C (ALBEE/C) tab TABS 1 tablet, 1 tablet, Oral, Daily  baclofen (LIORESAL) tab 10 mg, 10 mg, Oral, BID PRN  Clopidogrel Bisulfate (PLAVIX) tab 75 mg, 75 Oral Tab, Take 60 mg by mouth daily.     Levothyroxine Sodium 50 MCG Oral Tab, Take 50 mcg by mouth before breakfast.  Pantoprazole Sodium 40 MG Oral Tab EC, Take 40 mg by mouth every morning before breakfast.  linagliptin 5 mg Oral Tab, Take 5 mg by mouth atraumatic  Pulmonary: clear to auscultation bilaterally  Cardiovascular: S1, S2 normal, no murmur, click, rub or gallop, regular rate and rhythm  Abdominal: soft, non-tender; bowel sounds normal; no masses,  no organomegaly  Extremities: extremities lorelei Add Norvasc 5. DSE today.         4 Severe mitral regurg. Follow up with her cardiologist Estherjeffrey Tapia outpatient for possible MV repair         Thank you for allowing me to participate in the care of your patient.     Dorothy Samples  1/13/2020

## 2020-01-14 ENCOUNTER — APPOINTMENT (OUTPATIENT)
Dept: CT IMAGING | Facility: HOSPITAL | Age: 70
End: 2020-01-14
Attending: INTERNAL MEDICINE
Payer: MEDICARE

## 2020-01-14 LAB
GLUCOSE BLDC GLUCOMTR-MCNC: 128 MG/DL (ref 70–99)
GLUCOSE BLDC GLUCOMTR-MCNC: 147 MG/DL (ref 70–99)
GLUCOSE BLDC GLUCOMTR-MCNC: 89 MG/DL (ref 70–99)

## 2020-01-14 PROCEDURE — 82962 GLUCOSE BLOOD TEST: CPT

## 2020-01-14 PROCEDURE — 71250 CT THORAX DX C-: CPT | Performed by: INTERNAL MEDICINE

## 2020-01-14 NOTE — PROGRESS NOTES
Cardiology progress note    24 h events DSE done normal          Current Medications:  acetaminophen (TYLENOL) tab 650 mg, 650 mg, Oral, Q6H PRN  aspirin chewable tab 81 mg, 81 mg, Oral, Daily  atorvastatin (LIPITOR) tab 80 mg, 80 mg, Oral, Nightly  RAF-B nightly as needed for Sleep. aspirin 81 MG Oral Chew Tab, Chew 1 tablet (81 mg total) by mouth daily. atorvastatin 40 MG Oral Tab, Take 80 mg by mouth nightly. acetaminophen 325 MG Oral Tab, Take 650 mg by mouth every 6 (six) hours as needed for Pain. CREATSERUM 2.27 (H) 01/13/2020    BUN 35 (H) 01/13/2020     01/13/2020    K 4.6 01/13/2020     01/13/2020    CO2 23.0 01/13/2020     (H) 01/13/2020    CA 8.0 (L) 01/13/2020    ALB 2.7 (L) 07/04/2019    ALKPHO 198 (H) 07/04/2019    TP 6.6

## 2020-01-14 NOTE — CM/SW NOTE
Patient failed inpatient criteria. Second level of review completed and supports observation. UR committee in agreement. Discussed with Dr. CRAWFORD [facility]  who approves observation status. MOON given to the patient and order written.

## 2020-01-14 NOTE — PLAN OF CARE
Problem: Diabetes/Glucose Control  Goal: Glucose maintained within prescribed range  Description  INTERVENTIONS:  - Monitor Blood Glucose as ordered  - Assess for signs and symptoms of hyperglycemia and hypoglycemia  - Administer ordered medications to m walker. Patient moans and groans while walking, but states no pain.

## 2020-01-14 NOTE — CM/SW NOTE
COND 44: Patient failed Inpatient criteria. Second level of review completed and supports Observation. UR committee in agreement. Discussed with Dr Reynolds Orourke approves.

## 2020-01-14 NOTE — PROGRESS NOTES
[facility]    PROGRESS NOTE    Brian Chino Patient Status:  Inpatient    1950 MRN [id number]   Location [facility]5W Attending Dinah Purdy MD   Hosp Day # 1 PCP Betsey Frye MD     Date:  2020  Date of Admissio HCl 50 MG Oral Tab, Take 50 mg by mouth nightly as needed for Sleep. aspirin 81 MG Oral Chew Tab, Chew 1 tablet (81 mg total) by mouth daily. atorvastatin 40 MG Oral Tab, Take 80 mg by mouth nightly.     acetaminophen 325 MG Oral Tab, Take 650 mg by mouth Neck: Normal range of motion. Cardiovascular: Regular rhythm. Bradycardia present. Exam reveals no gallop. Murmur heard. Systolic murmur is present with a grade of 3/6.   Pulmonary/Chest: She has rales in the right lower field and the left lower significant changes have occurred Electronically signed on 01/13/2020 at 17:08 by Michael Guevara MD    Ekg 12-lead    Result Date: 1/13/2020  ECG Report  Interpretation  -------------------------- Sinus Bradycardia - Abhijit BLANCO Low voltage in precordial leads.

## 2020-01-14 NOTE — PLAN OF CARE
Problem: Diabetes/Glucose Control  Goal: Glucose maintained within prescribed range  Description  INTERVENTIONS:  - Monitor Blood Glucose as ordered  - Assess for signs and symptoms of hyperglycemia and hypoglycemia  - Administer ordered medications to m management  - Manage/alleviate anxiety  - Utilize distraction and/or relaxation techniques  - Monitor for opioid side effects  - Notify MD/LIP if interventions unsuccessful or patient reports new pain  - Anticipate increased pain with activity and pre-medi collaborating with pharmacy to review patient's medication profile  - Implement strategies to promote bladder emptying  Outcome: Progressing     Problem: METABOLIC/FLUID AND ELECTROLYTES - ADULT  Goal: Electrolytes maintained within normal limits  Descript

## 2020-01-15 ENCOUNTER — APPOINTMENT (OUTPATIENT)
Dept: GENERAL RADIOLOGY | Facility: HOSPITAL | Age: 70
End: 2020-01-15
Attending: INTERNAL MEDICINE
Payer: MEDICARE

## 2020-01-15 ENCOUNTER — APPOINTMENT (OUTPATIENT)
Dept: INTERVENTIONAL RADIOLOGY/VASCULAR | Facility: HOSPITAL | Age: 70
End: 2020-01-15
Attending: INTERNAL MEDICINE
Payer: MEDICARE

## 2020-01-15 LAB
GLUCOSE BLDC GLUCOMTR-MCNC: 119 MG/DL (ref 70–99)
GLUCOSE BLDC GLUCOMTR-MCNC: 132 MG/DL (ref 70–99)
GLUCOSE BLDC GLUCOMTR-MCNC: 140 MG/DL (ref 70–99)
GLUCOSE BLDC GLUCOMTR-MCNC: 90 MG/DL (ref 70–99)
GLUCOSE BLDC GLUCOMTR-MCNC: 95 MG/DL (ref 70–99)

## 2020-01-15 PROCEDURE — 02H63JZ INSERTION OF PACEMAKER LEAD INTO RIGHT ATRIUM, PERCUTANEOUS APPROACH: ICD-10-PCS | Performed by: INTERNAL MEDICINE

## 2020-01-15 PROCEDURE — 99152 MOD SED SAME PHYS/QHP 5/>YRS: CPT

## 2020-01-15 PROCEDURE — 71045 X-RAY EXAM CHEST 1 VIEW: CPT | Performed by: INTERNAL MEDICINE

## 2020-01-15 PROCEDURE — 33208 INSRT HEART PM ATRIAL & VENT: CPT

## 2020-01-15 PROCEDURE — 82962 GLUCOSE BLOOD TEST: CPT

## 2020-01-15 PROCEDURE — 02HK3JZ INSERTION OF PACEMAKER LEAD INTO RIGHT VENTRICLE, PERCUTANEOUS APPROACH: ICD-10-PCS | Performed by: INTERNAL MEDICINE

## 2020-01-15 PROCEDURE — 87641 MR-STAPH DNA AMP PROBE: CPT | Performed by: INTERNAL MEDICINE

## 2020-01-15 PROCEDURE — 93005 ELECTROCARDIOGRAM TRACING: CPT

## 2020-01-15 PROCEDURE — 0JH606Z INSERTION OF PACEMAKER, DUAL CHAMBER INTO CHEST SUBCUTANEOUS TISSUE AND FASCIA, OPEN APPROACH: ICD-10-PCS | Performed by: INTERNAL MEDICINE

## 2020-01-15 PROCEDURE — 99153 MOD SED SAME PHYS/QHP EA: CPT

## 2020-01-15 PROCEDURE — 93010 ELECTROCARDIOGRAM REPORT: CPT | Performed by: INTERNAL MEDICINE

## 2020-01-15 RX ORDER — LIDOCAINE HYDROCHLORIDE AND EPINEPHRINE 10; 10 MG/ML; UG/ML
INJECTION, SOLUTION INFILTRATION; PERINEURAL
Status: COMPLETED
Start: 2020-01-15 | End: 2020-01-15

## 2020-01-15 RX ORDER — ACETAMINOPHEN AND CODEINE PHOSPHATE 300; 30 MG/1; MG/1
1 TABLET ORAL EVERY 4 HOURS PRN
Status: DISCONTINUED | OUTPATIENT
Start: 2020-01-15 | End: 2020-01-16

## 2020-01-15 RX ORDER — CHLORHEXIDINE GLUCONATE 4 G/100ML
30 SOLUTION TOPICAL
Status: ACTIVE | OUTPATIENT
Start: 2020-01-16 | End: 2020-01-16

## 2020-01-15 RX ORDER — ONDANSETRON 2 MG/ML
INJECTION INTRAMUSCULAR; INTRAVENOUS
Status: COMPLETED
Start: 2020-01-15 | End: 2020-01-15

## 2020-01-15 RX ORDER — CEFAZOLIN SODIUM/WATER 2 G/20 ML
2 SYRINGE (ML) INTRAVENOUS EVERY 8 HOURS
Status: COMPLETED | OUTPATIENT
Start: 2020-01-15 | End: 2020-01-16

## 2020-01-15 RX ORDER — ACETAMINOPHEN AND CODEINE PHOSPHATE 300; 30 MG/1; MG/1
2 TABLET ORAL EVERY 4 HOURS PRN
Status: DISCONTINUED | OUTPATIENT
Start: 2020-01-15 | End: 2020-01-16

## 2020-01-15 RX ORDER — ONDANSETRON 2 MG/ML
4 INJECTION INTRAMUSCULAR; INTRAVENOUS EVERY 6 HOURS PRN
Status: DISCONTINUED | OUTPATIENT
Start: 2020-01-15 | End: 2020-01-15

## 2020-01-15 RX ORDER — BACITRACIN 50000 [USP'U]/1
INJECTION, POWDER, LYOPHILIZED, FOR SOLUTION INTRAMUSCULAR
Status: COMPLETED
Start: 2020-01-15 | End: 2020-01-15

## 2020-01-15 RX ORDER — MIDAZOLAM HYDROCHLORIDE 1 MG/ML
INJECTION INTRAMUSCULAR; INTRAVENOUS
Status: COMPLETED
Start: 2020-01-15 | End: 2020-01-15

## 2020-01-15 RX ORDER — CEFAZOLIN SODIUM/WATER 2 G/20 ML
2 SYRINGE (ML) INTRAVENOUS
Status: DISPENSED | OUTPATIENT
Start: 2020-01-15 | End: 2020-01-16

## 2020-01-15 RX ORDER — SODIUM CHLORIDE 9 MG/ML
INJECTION, SOLUTION INTRAVENOUS
Status: ACTIVE | OUTPATIENT
Start: 2020-01-16 | End: 2020-01-16

## 2020-01-15 RX ORDER — CEFAZOLIN SODIUM/WATER 2 G/20 ML
SYRINGE (ML) INTRAVENOUS
Status: DISCONTINUED
Start: 2020-01-15 | End: 2020-01-15 | Stop reason: WASHOUT

## 2020-01-15 RX ORDER — ACETAMINOPHEN 325 MG/1
650 TABLET ORAL EVERY 4 HOURS PRN
Status: DISCONTINUED | OUTPATIENT
Start: 2020-01-15 | End: 2020-01-16

## 2020-01-15 NOTE — PROGRESS NOTES
0221: pt woke up with chest pain, 3/10, similar to pain that brought her in. No other associated symptoms (SOB, nausea, dizziness, diaphoresis). VS as follows 149/49, HR62, RR18, 93%RA, T 98.3. EKG obtained. Unchanged from previous.  asked patient if nitro

## 2020-01-15 NOTE — PLAN OF CARE
Problem: Diabetes/Glucose Control  Goal: Glucose maintained within prescribed range  Description  INTERVENTIONS:  - Monitor Blood Glucose as ordered  - Assess for signs and symptoms of hyperglycemia and hypoglycemia  - Administer ordered medications to m replacement therapy as ordered  Outcome: Progressing     Problem: PAIN - ADULT  Goal: Verbalizes/displays adequate comfort level or patient's stated pain goal  Description  INTERVENTIONS:  - Encourage pt to monitor pain and request assistance  - Assess justice and perform as needed  - Assess and instruct to report SOB or any respiratory difficulty  - Respiratory Therapy support as indicated  - Manage/alleviate anxiety  - Monitor for signs/symptoms of CO2 retention  Outcome: Progressing     Problem: GENITOURINARY

## 2020-01-15 NOTE — PROGRESS NOTES
Notified Dr. Lottie Belcher of changes on Telemetry. Dr. Zay Salazar will come access patient and look at [facility] strips. No new orders for now. Keeping patient NPO for possible pacemaker placement.

## 2020-01-15 NOTE — PROGRESS NOTES
[facility]    PROGRESS NOTE    Lucernemines Cos Patient Status:  Inpatient    1950 MRN [id number]   Location [facility]5W Attending Celine Sharma MD   Hosp Day # 1 PCP Moses Mcintyre MD     Date:  1/15/2020  Date of Admissio HCl 50 MG Oral Tab, Take 50 mg by mouth nightly as needed for Sleep. aspirin 81 MG Oral Chew Tab, Chew 1 tablet (81 mg total) by mouth daily. atorvastatin 40 MG Oral Tab, Take 80 mg by mouth nightly.     acetaminophen 325 MG Oral Tab, Take 650 mg by mouth normal.   Neck: Normal range of motion. Cardiovascular: Regular rhythm. Bradycardia present. Exam reveals no gallop. Murmur heard. Systolic murmur is present with a grade of 3/6.   Pulmonary/Chest: She has rales in the right lower field and the lef --------------------------       Assessment/Plan:     Heart block AV second degree  HR in 30's in ED. B-blocker and Ranexa stopped on last admission and HR appeared stable. Cardiology consulted. Ureteral stent retained  On Rx for UTI.       Stage 4 chr

## 2020-01-16 VITALS
OXYGEN SATURATION: 93 % | WEIGHT: 175.5 LBS | SYSTOLIC BLOOD PRESSURE: 110 MMHG | DIASTOLIC BLOOD PRESSURE: 38 MMHG | HEART RATE: 65 BPM | TEMPERATURE: 98 F | BODY MASS INDEX: 29.24 KG/M2 | HEIGHT: 65 IN | RESPIRATION RATE: 18 BRPM

## 2020-01-16 LAB
GLUCOSE BLDC GLUCOMTR-MCNC: 107 MG/DL (ref 70–99)
GLUCOSE BLDC GLUCOMTR-MCNC: 193 MG/DL (ref 70–99)
MRSA DNA SPEC QL NAA+PROBE: NEGATIVE

## 2020-01-16 PROCEDURE — 82962 GLUCOSE BLOOD TEST: CPT

## 2020-01-16 PROCEDURE — 96376 TX/PRO/DX INJ SAME DRUG ADON: CPT

## 2020-01-16 PROCEDURE — 96374 THER/PROPH/DIAG INJ IV PUSH: CPT

## 2020-01-16 NOTE — PLAN OF CARE
Patient discharged on 1/16 at 3:15 pm via [facility] to [facility] assisted living. Attempted to call for report x3. No answer, voicemail left. Patient alert and oriented. Belongings gathered and sent with patient.  Discharge instructions and prescriptions sent w

## 2020-01-16 NOTE — IVS NOTE
Procedure hand off report given to [facility]. Pt's vital signs are stable. Procedural access site is dry and intact with no signs and symptoms of bleeding and hematoma. Left arm sling in place    Dr. STOUT spoke with patient post procedure.

## 2020-01-16 NOTE — PROGRESS NOTES
[facility]    PROGRESS NOTE    Shirley Mcburney Patient Status:  Inpatient    1950 MRN [id number]   Location [facility]5W Attending Bishop Olesya MD   Hosp Day # 1 PCP Elif Terry MD     Date:  1/15/2020  Date of Admissio HCl 50 MG Oral Tab, Take 50 mg by mouth nightly as needed for Sleep. aspirin 81 MG Oral Chew Tab, Chew 1 tablet (81 mg total) by mouth daily. atorvastatin 40 MG Oral Tab, Take 80 mg by mouth nightly.     acetaminophen 325 MG Oral Tab, Take 650 mg by mouth normal.   Neck: Normal range of motion. Cardiovascular: Regular rhythm. Bradycardia present. Exam reveals no gallop. Murmur heard. Systolic murmur is present with a grade of 3/6.   Pulmonary/Chest: She has rales in the right lower field and the lef 1/15/2020  CONCLUSION:  1. Right atrial and right ventricular leads. No pneumothorax. Normal heart size with prominent central pulmonary vasculature.     Dictated by (CST): Scarlett Dixon MD on 1/15/2020 at 19:23     Approved by (CST): Scarlett Dixon MD o clinical documentation in H+P. Based on patients current state of illness, I anticipate that, after discharge, patient will require TBD.         Sol Dowell MD  1/15/2020

## 2020-01-16 NOTE — CM/SW NOTE
SW was [facility] SW spoke to pt who states that she has some assistance from staff with getting to/from cafeteria for meals; otherwise, independent within unit.      Pt is agreeable to [facility] services [facility]

## 2020-01-16 NOTE — PROCEDURES
[facility]    PATIENT'S NAME: Tamera Hernandez   ATTENDING PHYSICIAN: Deyanira Kelly.  Leonila Reich MD   OPERATING PHYSICIAN: Sebastian Arshad DO   PATIENT ACCOUNT#:   [de-identified]    LOCATION:  5W [address] #:   [id number]       DATE OF BIRTH:  12 pacemaker pocket appreciated. Therefore, we put hemostatic agent into the pocket and we did achieve hemostasis. The pacemaker leads were then sutured to the prepectoralis fascia over the collar with 2 sutures on each lead using 0 silk sutures.   The pacem

## 2020-01-16 NOTE — OPERATIVE REPORT
Preop diagnosis: mobitz 1 and 2 block.  Symptomatic bradycardia  Post op diagnosis: ddd ppm  Procedures: ddd ppm  Findings: as above  EBL: 20 mls  Specimens: None

## 2020-01-16 NOTE — PROGRESS NOTES
Patient seen in follow up.  Patient denies any chest pain or sob.     01/16/20  0856   BP: 104/48   Pulse: 68   Resp: 18   Temp: 97.8 °F (36.6 °C)       Intake/Output Summary (Last 24 hours) at 1/16/2020 1145  Last data filed at 1/16/2020 4487  Gross Glucose-Vitamin C (DEX-4) chewable tab 4 tablet, 4 tablet, Oral, Q15 Min PRN  glucose (DEX4) oral liquid 15 g, 15 g, Oral, Q15 Min PRN  Insulin Aspart Pen (NOVOLOG) 100 UNIT/ML flexpen 1-5 Units, 1-5 Units, Subcutaneous, TID CC  traZODone HCl (DESYREL) tab Result Date: 1/14/2020  CONCLUSION:  1. No acute finding. 2. Pseudo thickening of the esophagus from luminal underdistention versus is esophagitis. 3. Regions of differential lung attenuation probably reflecting air trapping.  4. Multivessel coronary artery

## 2020-01-17 ENCOUNTER — HOSPITAL ENCOUNTER (EMERGENCY)
Facility: HOSPITAL | Age: 70
Discharge: HOME | End: 2020-01-17
Attending: EMERGENCY MEDICINE
Payer: MEDICARE

## 2020-01-17 VITALS
TEMPERATURE: 97 F | BODY MASS INDEX: 29.32 KG/M2 | RESPIRATION RATE: 15 BRPM | DIASTOLIC BLOOD PRESSURE: 62 MMHG | HEART RATE: 60 BPM | SYSTOLIC BLOOD PRESSURE: 113 MMHG | HEIGHT: 65 IN | OXYGEN SATURATION: 94 % | WEIGHT: 176 LBS

## 2020-01-17 DIAGNOSIS — N17.9 AKI (ACUTE KIDNEY INJURY) (HCC): ICD-10-CM

## 2020-01-17 DIAGNOSIS — R07.89 CHEST WALL PAIN: Primary | ICD-10-CM

## 2020-01-17 DIAGNOSIS — R77.8 ELEVATED TROPONIN: ICD-10-CM

## 2020-01-17 LAB
ANION GAP SERPL CALC-SCNC: 8 MMOL/L (ref 0–18)
BASOPHILS # BLD AUTO: 0.03 X10(3) UL (ref 0–0.2)
BASOPHILS NFR BLD AUTO: 0.3 %
BUN BLD-MCNC: 52 MG/DL (ref 7–18)
BUN/CREAT SERPL: 14.5 (ref 10–20)
CALCIUM BLD-MCNC: 8.4 MG/DL (ref 8.5–10.1)
CHLORIDE SERPL-SCNC: 101 MMOL/L (ref 98–112)
CHOLEST SMN-MCNC: 106 MG/DL (ref ?–200)
CO2 SERPL-SCNC: 23 MMOL/L (ref 21–32)
CREAT BLD-MCNC: 3.59 MG/DL (ref 0.55–1.02)
DEPRECATED RDW RBC AUTO: 52.2 FL (ref 35.1–46.3)
EOSINOPHIL # BLD AUTO: 0.35 X10(3) UL (ref 0–0.7)
EOSINOPHIL NFR BLD AUTO: 3.2 %
ERYTHROCYTE [DISTWIDTH] IN BLOOD BY AUTOMATED COUNT: 14.6 % (ref 11–15)
GLUCOSE BLD-MCNC: 112 MG/DL (ref 70–99)
HCT VFR BLD AUTO: 29.5 % (ref 35–48)
HDLC SERPL-MCNC: 38 MG/DL (ref 40–59)
HGB BLD-MCNC: 10 G/DL (ref 12–16)
IMM GRANULOCYTES # BLD AUTO: 0.04 X10(3) UL (ref 0–1)
IMM GRANULOCYTES NFR BLD: 0.4 %
LDLC SERPL CALC-MCNC: 28 MG/DL (ref ?–100)
LYMPHOCYTES # BLD AUTO: 2.36 X10(3) UL (ref 1–4)
LYMPHOCYTES NFR BLD AUTO: 21.5 %
MCH RBC QN AUTO: 33.4 PG (ref 26–34)
MCHC RBC AUTO-ENTMCNC: 33.9 G/DL (ref 31–37)
MCV RBC AUTO: 98.7 FL (ref 80–100)
MONOCYTES # BLD AUTO: 0.64 X10(3) UL (ref 0.1–1)
MONOCYTES NFR BLD AUTO: 5.8 %
NEUTROPHILS # BLD AUTO: 7.57 X10 (3) UL (ref 1.5–7.7)
NEUTROPHILS # BLD AUTO: 7.57 X10(3) UL (ref 1.5–7.7)
NEUTROPHILS NFR BLD AUTO: 68.8 %
NONHDLC SERPL-MCNC: 68 MG/DL (ref ?–130)
OSMOLALITY SERPL CALC.SUM OF ELEC: 289 MOSM/KG (ref 275–295)
PLATELET # BLD AUTO: 226 10(3)UL (ref 150–450)
POTASSIUM SERPL-SCNC: 5.1 MMOL/L (ref 3.5–5.1)
RBC # BLD AUTO: 2.99 X10(6)UL (ref 3.8–5.3)
SODIUM SERPL-SCNC: 132 MMOL/L (ref 136–145)
TRIGL SERPL-MCNC: 199 MG/DL (ref 30–149)
TROPONIN I SERPL-MCNC: 0.09 NG/ML (ref ?–0.04)
TROPONIN I SERPL-MCNC: 0.1 NG/ML (ref ?–0.04)
VLDLC SERPL CALC-MCNC: 40 MG/DL (ref 0–30)
WBC # BLD AUTO: 11 X10(3) UL (ref 4–11)

## 2020-01-17 PROCEDURE — 85025 COMPLETE CBC W/AUTO DIFF WBC: CPT | Performed by: EMERGENCY MEDICINE

## 2020-01-17 PROCEDURE — 84484 ASSAY OF TROPONIN QUANT: CPT | Performed by: EMERGENCY MEDICINE

## 2020-01-17 PROCEDURE — 80048 BASIC METABOLIC PNL TOTAL CA: CPT | Performed by: EMERGENCY MEDICINE

## 2020-01-17 PROCEDURE — 80061 LIPID PANEL: CPT | Performed by: EMERGENCY MEDICINE

## 2020-01-17 PROCEDURE — 99285 EMERGENCY DEPT VISIT HI MDM: CPT

## 2020-01-17 PROCEDURE — 93005 ELECTROCARDIOGRAM TRACING: CPT

## 2020-01-17 PROCEDURE — 93010 ELECTROCARDIOGRAM REPORT: CPT | Performed by: EMERGENCY MEDICINE

## 2020-01-17 PROCEDURE — 36415 COLL VENOUS BLD VENIPUNCTURE: CPT

## 2020-01-17 RX ORDER — ACETAMINOPHEN 500 MG
1000 TABLET ORAL ONCE
Status: COMPLETED | OUTPATIENT
Start: 2020-01-17 | End: 2020-01-17

## 2020-01-17 RX ORDER — ASPIRIN 81 MG/1
324 TABLET, CHEWABLE ORAL ONCE
Status: DISCONTINUED | OUTPATIENT
Start: 2020-01-17 | End: 2020-01-18

## 2020-01-18 NOTE — ED NOTES
Patient alert and oriented x 4. Respirations even and unlabored. No distress noted. To collect follow up troponin now and perform sequential EKG.

## 2020-01-18 NOTE — ED NOTES
Patient with respirations even and unlabored. No distress noted. Patient to be transferred back to [facility], waiting on [facility].

## 2020-01-18 NOTE — CM/SW NOTE
[facility] arranged to bring patient back to [facility]. ETA 30 mins (approx 2200)    PCS form completed and copy placed in medical records tray for scanning. RN updated.

## 2020-01-18 NOTE — ED NOTES
Patient alert and oriented x 4. Respirations even and unlabored. Elevated troponin per . Dr. Marisa Guzman aware.

## 2020-01-18 NOTE — ED PROVIDER NOTES
Patient Seen in: [facility] AND [facility] Emergency Department      History   Patient presents with:  Chest Pain Angina    Stated Complaint: chest pain    HPI    81-year-old female with history of second-degree heart block status post pacemaker placement, hyper Negative for cough, shortness of breath and wheezing. Cardiovascular: Positive for chest pain. Gastrointestinal: Negative for abdominal pain, diarrhea, nausea and vomiting. Genitourinary: Negative for dysuria, flank pain and frequency.    Musculoskel distension. Palpations: Abdomen is soft. Tenderness: There is no tenderness. There is no guarding. Musculoskeletal: Normal range of motion. General: No tenderness. Skin:     General: Skin is warm and dry. Findings: No rash.    Nasim Collection Time: 01/17/20  6:12 PM   Result Value Ref Range    Hold Blue Auto Resulted    RAINBOW DRAW LAVENDER    Collection Time: 01/17/20  6:12 PM   Result Value Ref Range    Hold Lavender Auto Resulted    RAINBOW DRAW LIGHT GREEN    Collection Time: 01 pain  - I personally reviewed and interpreted all the ED vitals  - afebrile, hemodynamically stable  - I ordered and personally reviewed the labs and imaging and found troponin elevated, no leukocytosis, anemia, PHILIP, electrolytes otherwise reassuring  - ty

## 2020-01-18 NOTE — ED INITIAL ASSESSMENT (HPI)
From [facility] by [facility] for chest pain.  States pain is near her recently inserted pace maker site

## 2020-02-10 ENCOUNTER — APPOINTMENT (OUTPATIENT)
Dept: GENERAL RADIOLOGY | Facility: HOSPITAL | Age: 70
DRG: 291 | End: 2020-02-10
Attending: EMERGENCY MEDICINE
Payer: MEDICARE

## 2020-02-10 ENCOUNTER — HOSPITAL ENCOUNTER (INPATIENT)
Facility: HOSPITAL | Age: 70
LOS: 4 days | Discharge: NURSING, REHAB OR LONG-TERM CARE | DRG: 291 | End: 2020-02-14
Attending: EMERGENCY MEDICINE | Admitting: INTERNAL MEDICINE
Payer: MEDICARE

## 2020-02-10 DIAGNOSIS — I50.9 CONGESTIVE HEART FAILURE, UNSPECIFIED HF CHRONICITY, UNSPECIFIED HEART FAILURE TYPE (HCC): Primary | ICD-10-CM

## 2020-02-10 PROBLEM — E11.59 TYPE 2 DIABETES MELLITUS WITH CIRCULATORY DISORDER, WITHOUT LONG-TERM CURRENT USE OF INSULIN (HCC): Chronic | Status: ACTIVE | Noted: 2019-06-09

## 2020-02-10 PROBLEM — I44.30 HEART BLOCK, AV: Chronic | Status: ACTIVE | Noted: 2020-01-03

## 2020-02-10 PROBLEM — I34.0 MITRAL VALVE INSUFFICIENCY: Chronic | Status: ACTIVE | Noted: 2019-07-02

## 2020-02-10 LAB
ANION GAP SERPL CALC-SCNC: 7 MMOL/L (ref 0–18)
BASOPHILS # BLD AUTO: 0.03 X10(3) UL (ref 0–0.2)
BASOPHILS NFR BLD AUTO: 0.4 %
BUN BLD-MCNC: 28 MG/DL (ref 7–18)
BUN/CREAT SERPL: 18.7 (ref 10–20)
CALCIUM BLD-MCNC: 8.7 MG/DL (ref 8.5–10.1)
CHLORIDE SERPL-SCNC: 108 MMOL/L (ref 98–112)
CO2 SERPL-SCNC: 20 MMOL/L (ref 21–32)
CREAT BLD-MCNC: 1.5 MG/DL (ref 0.55–1.02)
DEPRECATED RDW RBC AUTO: 53.1 FL (ref 35.1–46.3)
EOSINOPHIL # BLD AUTO: 0.35 X10(3) UL (ref 0–0.7)
EOSINOPHIL NFR BLD AUTO: 4.4 %
ERYTHROCYTE [DISTWIDTH] IN BLOOD BY AUTOMATED COUNT: 14.6 % (ref 11–15)
FLUAV + FLUBV RNA SPEC NAA+PROBE: NEGATIVE
GLUCOSE BLD-MCNC: 125 MG/DL (ref 70–99)
GLUCOSE BLDC GLUCOMTR-MCNC: 124 MG/DL (ref 70–99)
GLUCOSE BLDC GLUCOMTR-MCNC: 161 MG/DL (ref 70–99)
GLUCOSE BLDC GLUCOMTR-MCNC: 167 MG/DL (ref 70–99)
HCT VFR BLD AUTO: 37.2 % (ref 35–48)
HGB BLD-MCNC: 12.6 G/DL (ref 12–16)
IMM GRANULOCYTES # BLD AUTO: 0.02 X10(3) UL (ref 0–1)
IMM GRANULOCYTES NFR BLD: 0.3 %
LYMPHOCYTES # BLD AUTO: 1.72 X10(3) UL (ref 1–4)
LYMPHOCYTES NFR BLD AUTO: 21.8 %
MCH RBC QN AUTO: 33.7 PG (ref 26–34)
MCHC RBC AUTO-ENTMCNC: 33.9 G/DL (ref 31–37)
MCV RBC AUTO: 99.5 FL (ref 80–100)
MONOCYTES # BLD AUTO: 0.45 X10(3) UL (ref 0.1–1)
MONOCYTES NFR BLD AUTO: 5.7 %
NEUTROPHILS # BLD AUTO: 5.32 X10 (3) UL (ref 1.5–7.7)
NEUTROPHILS # BLD AUTO: 5.32 X10(3) UL (ref 1.5–7.7)
NEUTROPHILS NFR BLD AUTO: 67.4 %
NT-PROBNP SERPL-MCNC: ABNORMAL PG/ML (ref ?–125)
OSMOLALITY SERPL CALC.SUM OF ELEC: 287 MOSM/KG (ref 275–295)
PLATELET # BLD AUTO: 170 10(3)UL (ref 150–450)
POTASSIUM SERPL-SCNC: 4.3 MMOL/L (ref 3.5–5.1)
RBC # BLD AUTO: 3.74 X10(6)UL (ref 3.8–5.3)
SODIUM SERPL-SCNC: 135 MMOL/L (ref 136–145)
TROPONIN I SERPL-MCNC: <0.045 NG/ML (ref ?–0.04)
WBC # BLD AUTO: 7.9 X10(3) UL (ref 4–11)

## 2020-02-10 PROCEDURE — 93005 ELECTROCARDIOGRAM TRACING: CPT

## 2020-02-10 PROCEDURE — 93010 ELECTROCARDIOGRAM REPORT: CPT | Performed by: EMERGENCY MEDICINE

## 2020-02-10 PROCEDURE — 82962 GLUCOSE BLOOD TEST: CPT

## 2020-02-10 PROCEDURE — 87631 RESP VIRUS 3-5 TARGETS: CPT | Performed by: EMERGENCY MEDICINE

## 2020-02-10 PROCEDURE — 84484 ASSAY OF TROPONIN QUANT: CPT | Performed by: EMERGENCY MEDICINE

## 2020-02-10 PROCEDURE — 71045 X-RAY EXAM CHEST 1 VIEW: CPT | Performed by: EMERGENCY MEDICINE

## 2020-02-10 PROCEDURE — 80048 BASIC METABOLIC PNL TOTAL CA: CPT | Performed by: EMERGENCY MEDICINE

## 2020-02-10 PROCEDURE — 94640 AIRWAY INHALATION TREATMENT: CPT

## 2020-02-10 PROCEDURE — 96374 THER/PROPH/DIAG INJ IV PUSH: CPT

## 2020-02-10 PROCEDURE — 99285 EMERGENCY DEPT VISIT HI MDM: CPT

## 2020-02-10 PROCEDURE — 85025 COMPLETE CBC W/AUTO DIFF WBC: CPT | Performed by: EMERGENCY MEDICINE

## 2020-02-10 PROCEDURE — 83880 ASSAY OF NATRIURETIC PEPTIDE: CPT | Performed by: EMERGENCY MEDICINE

## 2020-02-10 RX ORDER — CLOPIDOGREL BISULFATE 75 MG/1
75 TABLET ORAL DAILY
Status: DISCONTINUED | OUTPATIENT
Start: 2020-02-10 | End: 2020-02-14

## 2020-02-10 RX ORDER — FUROSEMIDE 10 MG/ML
40 INJECTION INTRAMUSCULAR; INTRAVENOUS ONCE
Status: COMPLETED | OUTPATIENT
Start: 2020-02-10 | End: 2020-02-10

## 2020-02-10 RX ORDER — DEXTROSE MONOHYDRATE 25 G/50ML
50 INJECTION, SOLUTION INTRAVENOUS
Status: DISCONTINUED | OUTPATIENT
Start: 2020-02-10 | End: 2020-02-14

## 2020-02-10 RX ORDER — IPRATROPIUM BROMIDE AND ALBUTEROL SULFATE 2.5; .5 MG/3ML; MG/3ML
3 SOLUTION RESPIRATORY (INHALATION) EVERY 4 HOURS PRN
Status: DISCONTINUED | OUTPATIENT
Start: 2020-02-10 | End: 2020-02-14

## 2020-02-10 RX ORDER — ACETAMINOPHEN 325 MG/1
650 TABLET ORAL EVERY 6 HOURS PRN
Status: DISCONTINUED | OUTPATIENT
Start: 2020-02-10 | End: 2020-02-14

## 2020-02-10 RX ORDER — CARVEDILOL 6.25 MG/1
6.25 TABLET ORAL 2 TIMES DAILY WITH MEALS
Status: DISCONTINUED | OUTPATIENT
Start: 2020-02-10 | End: 2020-02-14

## 2020-02-10 RX ORDER — ASPIRIN 81 MG/1
81 TABLET, CHEWABLE ORAL DAILY
Status: DISCONTINUED | OUTPATIENT
Start: 2020-02-10 | End: 2020-02-14

## 2020-02-10 RX ORDER — ATORVASTATIN CALCIUM 40 MG/1
80 TABLET, FILM COATED ORAL NIGHTLY
Status: DISCONTINUED | OUTPATIENT
Start: 2020-02-10 | End: 2020-02-14

## 2020-02-10 RX ORDER — NITROGLYCERIN 0.4 MG/1
0.4 TABLET SUBLINGUAL EVERY 5 MIN PRN
Status: DISCONTINUED | OUTPATIENT
Start: 2020-02-10 | End: 2020-02-14

## 2020-02-10 RX ORDER — ISOSORBIDE MONONITRATE 60 MG/1
60 TABLET, EXTENDED RELEASE ORAL DAILY
Status: DISCONTINUED | OUTPATIENT
Start: 2020-02-10 | End: 2020-02-14

## 2020-02-10 RX ORDER — LISINOPRIL 10 MG/1
10 TABLET ORAL DAILY
Status: DISCONTINUED | OUTPATIENT
Start: 2020-02-10 | End: 2020-02-10

## 2020-02-10 RX ORDER — LEVOTHYROXINE SODIUM 0.05 MG/1
50 TABLET ORAL
Status: DISCONTINUED | OUTPATIENT
Start: 2020-02-11 | End: 2020-02-14

## 2020-02-10 RX ORDER — AMLODIPINE BESYLATE 10 MG/1
10 TABLET ORAL DAILY
Status: DISCONTINUED | OUTPATIENT
Start: 2020-02-10 | End: 2020-02-14

## 2020-02-10 RX ORDER — FUROSEMIDE 10 MG/ML
60 INJECTION INTRAMUSCULAR; INTRAVENOUS
Status: DISCONTINUED | OUTPATIENT
Start: 2020-02-10 | End: 2020-02-13

## 2020-02-10 RX ORDER — TRAZODONE HYDROCHLORIDE 50 MG/1
50 TABLET ORAL NIGHTLY PRN
Status: DISCONTINUED | OUTPATIENT
Start: 2020-02-10 | End: 2020-02-14

## 2020-02-10 RX ORDER — BACLOFEN 10 MG/1
10 TABLET ORAL 2 TIMES DAILY PRN
Status: DISCONTINUED | OUTPATIENT
Start: 2020-02-10 | End: 2020-02-14

## 2020-02-10 RX ORDER — ISOSORBIDE MONONITRATE 60 MG/1
60 TABLET, EXTENDED RELEASE ORAL DAILY
Status: ON HOLD | COMMUNITY
End: 2020-11-17

## 2020-02-10 RX ORDER — IPRATROPIUM BROMIDE AND ALBUTEROL SULFATE 2.5; .5 MG/3ML; MG/3ML
3 SOLUTION RESPIRATORY (INHALATION) ONCE
Status: COMPLETED | OUTPATIENT
Start: 2020-02-10 | End: 2020-02-10

## 2020-02-10 RX ORDER — TRAMADOL HYDROCHLORIDE 50 MG/1
50 TABLET ORAL EVERY 12 HOURS PRN
Status: DISCONTINUED | OUTPATIENT
Start: 2020-02-10 | End: 2020-02-14

## 2020-02-10 RX ORDER — SODIUM CHLORIDE 0.9 % (FLUSH) 0.9 %
3 SYRINGE (ML) INJECTION AS NEEDED
Status: DISCONTINUED | OUTPATIENT
Start: 2020-02-10 | End: 2020-02-14

## 2020-02-10 NOTE — CONSULTS
[facility] HOSP - [facility]    Report of Consultation    Lucinda Gamez Patient Status:  Inpatient    1950 MRN [id number]   Location [address] [facility] Attending Olegario Mckeon MD   Hosp Day # 0 PCP Maryam Samayoa MD     Date of Admission:  2/10 (DEX-4) chewable tab 4 tablet, 4 tablet, Oral, Q15 Min PRN    Or  dextrose 50 % injection 50 mL, 50 mL, Intravenous, Q15 Min PRN    Or  glucose (DEX4) oral liquid 30 g, 30 g, Oral, Q15 Min PRN    Or  Glucose-Vitamin C (DEX-4) chewable tab 8 tablet, 8 table needed for Sleep. aspirin 81 MG Oral Chew Tab, Chew 1 tablet (81 mg total) by mouth daily. atorvastatin 40 MG Oral Tab, Take 80 mg by mouth nightly. acetaminophen 325 MG Oral Tab, Take 650 mg by mouth every 6 (six) hours as needed for Pain.   baclofen B/L    Results:     Laboratory Data:  Lab Results   Component Value Date    WBC 7.9 02/10/2020    HGB 12.6 02/10/2020    HCT 37.2 02/10/2020    .0 02/10/2020    CREATSERUM 1.50 (H) 02/10/2020    BUN 28 (H) 02/10/2020     (L) 02/10/2020    K 4. Transesophageal echocardiogram to better assess  the mitral valve.   Impression/ Recommendations:     1 Congestive heart failure, unspecified HF chronicity, unspecified heart failure type ([address]  With abnormal CXR and BNP 4 times her last BNP, LE edema  - co

## 2020-02-10 NOTE — ED PROVIDER NOTES
Patient Seen in: [facility] AND [facility] Emergency Department      History   Patient presents with:  Dyspnea CINDY SOB    Stated Complaint: SOB    HPI    Patient is a 56-year-old female with a history of coronary artery disease congestive heart failure and diabe Resp 18   Ht 165.1 cm (5' 5\")   Wt 79.4 kg   SpO2 96%   BMI 29.12 kg/m²         Physical Exam    Constitutional: Oriented to person, place, and time. Appears well-developed and well-nourished. HEENT:   Head: Normocephalic and atraumatic.    Right Ear: BUN/CREA Ratio 21.3 (*)     Calculated Osmolality 296 (*)     GFR, Non- 34 (*)     GFR, -American 39 (*)     Alkaline Phosphatase 162 (*)     Albumin 2.6 (*)     A/G Ratio 0.7 (*)     All other components within normal limits   URINA INFLUENZA A/B(FLU) AND RSV PCR - Normal   CBC WITH DIFFERENTIAL WITH PLATELET    Narrative: The following orders were created for panel order CBC WITH DIFFERENTIAL WITH PLATELET.   Procedure                               Abnormality         Status recommend that you schedule follow up care with a primary care provider within the next three months to obtain basic health screening including reassessment of your blood pressure.     Medications Prescribed:  Current Discharge Medication List

## 2020-02-10 NOTE — ED INITIAL ASSESSMENT (HPI)
Pt brought by EMS from [facility] AND [facility] for SOB that started last night. Pt at 90% on RA, placed on 4L NC to 96%. Denies cough, fevers, pain.

## 2020-02-11 ENCOUNTER — APPOINTMENT (OUTPATIENT)
Dept: CV DIAGNOSTICS | Facility: HOSPITAL | Age: 70
DRG: 291 | End: 2020-02-11
Attending: HOSPITALIST
Payer: MEDICARE

## 2020-02-11 PROBLEM — N18.30 STAGE 3 CHRONIC KIDNEY DISEASE (HCC): Status: ACTIVE | Noted: 2020-02-11

## 2020-02-11 PROBLEM — N18.30 STAGE 3 CHRONIC KIDNEY DISEASE (HCC): Chronic | Status: ACTIVE | Noted: 2020-02-11

## 2020-02-11 LAB
ALBUMIN SERPL-MCNC: 2.6 G/DL (ref 3.4–5)
ALBUMIN/GLOB SERPL: 0.7 {RATIO} (ref 1–2)
ALP LIVER SERPL-CCNC: 162 U/L (ref 55–142)
ALT SERPL-CCNC: 14 U/L (ref 13–56)
ANION GAP SERPL CALC-SCNC: 5 MMOL/L (ref 0–18)
AST SERPL-CCNC: 17 U/L (ref 15–37)
BASOPHILS # BLD AUTO: 0.02 X10(3) UL (ref 0–0.2)
BASOPHILS NFR BLD AUTO: 0.3 %
BILIRUB SERPL-MCNC: 0.8 MG/DL (ref 0.1–2)
BILIRUB UR QL: NEGATIVE
BUN BLD-MCNC: 33 MG/DL (ref 7–18)
BUN/CREAT SERPL: 21.3 (ref 10–20)
CALCIUM BLD-MCNC: 8.8 MG/DL (ref 8.5–10.1)
CHLORIDE SERPL-SCNC: 106 MMOL/L (ref 98–112)
CO2 SERPL-SCNC: 28 MMOL/L (ref 21–32)
COLOR UR: YELLOW
CREAT BLD-MCNC: 1.55 MG/DL (ref 0.55–1.02)
DEPRECATED RDW RBC AUTO: 51.9 FL (ref 35.1–46.3)
EOSINOPHIL # BLD AUTO: 0.31 X10(3) UL (ref 0–0.7)
EOSINOPHIL NFR BLD AUTO: 5 %
ERYTHROCYTE [DISTWIDTH] IN BLOOD BY AUTOMATED COUNT: 14.3 % (ref 11–15)
GLOBULIN PLAS-MCNC: 3.8 G/DL (ref 2.8–4.4)
GLUCOSE BLD-MCNC: 109 MG/DL (ref 70–99)
GLUCOSE BLDC GLUCOMTR-MCNC: 105 MG/DL (ref 70–99)
GLUCOSE BLDC GLUCOMTR-MCNC: 164 MG/DL (ref 70–99)
GLUCOSE BLDC GLUCOMTR-MCNC: 181 MG/DL (ref 70–99)
GLUCOSE BLDC GLUCOMTR-MCNC: 185 MG/DL (ref 70–99)
GLUCOSE UR-MCNC: NEGATIVE MG/DL
HCT VFR BLD AUTO: 33.6 % (ref 35–48)
HGB BLD-MCNC: 11.1 G/DL (ref 12–16)
HYALINE CASTS #/AREA URNS AUTO: 3 /LPF
IMM GRANULOCYTES # BLD AUTO: 0.01 X10(3) UL (ref 0–1)
IMM GRANULOCYTES NFR BLD: 0.2 %
KETONES UR-MCNC: NEGATIVE MG/DL
LYMPHOCYTES # BLD AUTO: 1.82 X10(3) UL (ref 1–4)
LYMPHOCYTES NFR BLD AUTO: 29.6 %
M PROTEIN MFR SERPL ELPH: 6.4 G/DL (ref 6.4–8.2)
MCH RBC QN AUTO: 32.6 PG (ref 26–34)
MCHC RBC AUTO-ENTMCNC: 33 G/DL (ref 31–37)
MCV RBC AUTO: 98.5 FL (ref 80–100)
MONOCYTES # BLD AUTO: 0.34 X10(3) UL (ref 0.1–1)
MONOCYTES NFR BLD AUTO: 5.5 %
NEUTROPHILS # BLD AUTO: 3.65 X10 (3) UL (ref 1.5–7.7)
NEUTROPHILS # BLD AUTO: 3.65 X10(3) UL (ref 1.5–7.7)
NEUTROPHILS NFR BLD AUTO: 59.4 %
NITRITE UR QL STRIP.AUTO: POSITIVE
OSMOLALITY SERPL CALC.SUM OF ELEC: 296 MOSM/KG (ref 275–295)
PH UR: 5 [PH] (ref 5–8)
PLATELET # BLD AUTO: 134 10(3)UL (ref 150–450)
POTASSIUM SERPL-SCNC: 3.9 MMOL/L (ref 3.5–5.1)
PROT UR-MCNC: 30 MG/DL
RBC # BLD AUTO: 3.41 X10(6)UL (ref 3.8–5.3)
RBC #/AREA URNS AUTO: 75 /HPF
SODIUM SERPL-SCNC: 139 MMOL/L (ref 136–145)
SP GR UR STRIP: 1.01 (ref 1–1.03)
TROPONIN I SERPL-MCNC: <0.045 NG/ML (ref ?–0.04)
UROBILINOGEN UR STRIP-ACNC: <2
WBC # BLD AUTO: 6.2 X10(3) UL (ref 4–11)
WBC #/AREA URNS AUTO: 217 /HPF

## 2020-02-11 PROCEDURE — 87086 URINE CULTURE/COLONY COUNT: CPT | Performed by: INTERNAL MEDICINE

## 2020-02-11 PROCEDURE — 87186 SC STD MICRODIL/AGAR DIL: CPT | Performed by: INTERNAL MEDICINE

## 2020-02-11 PROCEDURE — 80053 COMPREHEN METABOLIC PANEL: CPT | Performed by: INTERNAL MEDICINE

## 2020-02-11 PROCEDURE — 93306 TTE W/DOPPLER COMPLETE: CPT | Performed by: HOSPITALIST

## 2020-02-11 PROCEDURE — 85025 COMPLETE CBC W/AUTO DIFF WBC: CPT | Performed by: INTERNAL MEDICINE

## 2020-02-11 PROCEDURE — 81001 URINALYSIS AUTO W/SCOPE: CPT | Performed by: INTERNAL MEDICINE

## 2020-02-11 PROCEDURE — 84484 ASSAY OF TROPONIN QUANT: CPT | Performed by: HOSPITALIST

## 2020-02-11 PROCEDURE — 82962 GLUCOSE BLOOD TEST: CPT

## 2020-02-11 PROCEDURE — 87077 CULTURE AEROBIC IDENTIFY: CPT | Performed by: INTERNAL MEDICINE

## 2020-02-11 PROCEDURE — 94640 AIRWAY INHALATION TREATMENT: CPT

## 2020-02-11 NOTE — CONSULTS
Cardiology progress note    24 h events patient still SOB         Current Medications:  glucose (DEX4) oral liquid 15 g, 15 g, Oral, Q15 Min PRN    Or  Glucose-Vitamin C (DEX-4) chewable tab 4 tablet, 4 tablet, Oral, Q15 Min PRN    Or  dextrose 50 % inject Bisulfate 75 MG Oral Tab, Take 75 mg by mouth daily. ipratropium-albuterol 0.5-2.5 (3) MG/3ML Inhalation Solution, Take 3 mL by nebulization every 4 (four) hours as needed. lisinopril 10 MG Oral Tab, Take 10 mg by mouth daily.   traZODone HCl 50 MG Oral T cooperative  Head: Normocephalic, without obvious abnormality, atraumatic  Pulmonary: bronchophony bibasilar  Cardiovascular: S1, S2 normal, 4/6 systolic murmur, click, rub or gallop, regular rate and rhythm  Abdominal: soft, non-tender; bowel sounds lorelei atrium: The atrium was mildly dilated. 5. Tricuspid valve: Moderate regurgitation. 6. Impressions: The right ventricular systolic pressure was     increased consistent with severe pulmonary hypertension.   7. Recommendations: Transesophageal echocardiogra

## 2020-02-11 NOTE — PLAN OF CARE
Pt had 2d echo today, still having headaches, pacemaker interrogation showed no issues. Ua and culture ordered.   Problem: Patient Centered Care  Goal: Patient preferences are identified and integrated in the patient's plan of care  Description  [place] Implement neutropenic guidelines  Outcome: Progressing     Problem: SAFETY ADULT - FALL  Goal: Free from fall injury  Description  INTERVENTIONS:  - Assess pt frequently for physical needs  - Identify cognitive and physical deficits and behaviors that affe handout, if applicable  - Encourage broncho-pulmonary hygiene including cough, deep breathe, Incentive Spirometry  - Assess the need for suctioning and perform as needed  - Assess and instruct to report SOB or any respiratory difficulty  - Respiratory Ther

## 2020-02-11 NOTE — H&P
[facility] HOSP - [facility]    History and Physical    Kareen Band Patient Status:  Inpatient    1950 MRN [id number]   Location [address] [employer] Attending Altagracia Boyle MD   Hosp Day # 1 PCP Shannon Chen MD     Date:  2020  Date of total) by mouth 2 (two) times daily. traMADol HCl 50 MG Oral Tab, Take 1 tablet (50 mg total) by mouth every 12 (twelve) hours as needed. Clopidogrel Bisulfate 75 MG Oral Tab, Take 75 mg by mouth daily.   ipratropium-albuterol 0.5-2.5 (3) MG/3ML Inhalatio Normocephalic and atraumatic. Eyes: Pupils are equal, round, and reactive to light. Neck: Normal range of motion. Cardiovascular: Normal rate and regular rhythm. Murmur heard. Stable 3/6 HSM. PPM site intact. Edema present.   Pulmonary/Chest: Effo unspecified HF chronicity, unspecified heart failure type ([facility] [address]  Unclear triggering events. Known DD-2. Diuresing with Lasix 60 mg IV q 12 hrs. Trend I&O's and weights.       Type 2 diabetes mellitus with circulatory disorder, without long-term current use

## 2020-02-11 NOTE — PLAN OF CARE
Problem: Patient Centered Care  Goal: Patient preferences are identified and integrated in the patient's plan of care  Description  Interventions:  - What would you like us to know as we care for you? Lives at [facility].  Uses a wheelchair 2/10/2020 2226 by Linda Zambrano RN  Outcome: Progressing  2/10/2020 2225 by Linda Zambrano RN  Outcome: Progressing     Problem: RISK FOR INFECTION - ADULT  Goal: Absence of fever/infection during anticipated neutropenic period  Descript planning if the patient needs post-hospital services based on physician/LIP order or complex needs related to functional status, cognitive ability or social support system  2/10/2020 2226 by Davis Thornton RN  Outcome: Progressing  2/10/2020 2225 b than earlier. Headache which she has had on and off at home. Would like to see neuro. Giving pain medication as needed.  Will continue to monitior

## 2020-02-12 PROBLEM — N39.0 URINARY TRACT INFECTION WITH HEMATURIA: Status: ACTIVE | Noted: 2020-02-12

## 2020-02-12 PROBLEM — R31.9 URINARY TRACT INFECTION WITH HEMATURIA: Status: ACTIVE | Noted: 2020-02-12

## 2020-02-12 LAB
ANION GAP SERPL CALC-SCNC: 6 MMOL/L (ref 0–18)
BUN BLD-MCNC: 40 MG/DL (ref 7–18)
BUN/CREAT SERPL: 24.2 (ref 10–20)
CALCIUM BLD-MCNC: 8.4 MG/DL (ref 8.5–10.1)
CHLORIDE SERPL-SCNC: 105 MMOL/L (ref 98–112)
CO2 SERPL-SCNC: 29 MMOL/L (ref 21–32)
CREAT BLD-MCNC: 1.65 MG/DL (ref 0.55–1.02)
GLUCOSE BLD-MCNC: 111 MG/DL (ref 70–99)
GLUCOSE BLDC GLUCOMTR-MCNC: 111 MG/DL (ref 70–99)
GLUCOSE BLDC GLUCOMTR-MCNC: 144 MG/DL (ref 70–99)
GLUCOSE BLDC GLUCOMTR-MCNC: 185 MG/DL (ref 70–99)
GLUCOSE BLDC GLUCOMTR-MCNC: 204 MG/DL (ref 70–99)
HAV IGM SER QL: 2.1 MG/DL (ref 1.6–2.6)
OSMOLALITY SERPL CALC.SUM OF ELEC: 300 MOSM/KG (ref 275–295)
POTASSIUM SERPL-SCNC: 3.8 MMOL/L (ref 3.5–5.1)
SODIUM SERPL-SCNC: 140 MMOL/L (ref 136–145)

## 2020-02-12 PROCEDURE — 80048 BASIC METABOLIC PNL TOTAL CA: CPT | Performed by: INTERNAL MEDICINE

## 2020-02-12 PROCEDURE — 82962 GLUCOSE BLOOD TEST: CPT

## 2020-02-12 PROCEDURE — 96365 THER/PROPH/DIAG IV INF INIT: CPT

## 2020-02-12 PROCEDURE — 83735 ASSAY OF MAGNESIUM: CPT | Performed by: INTERNAL MEDICINE

## 2020-02-12 NOTE — PLAN OF CARE
Problem: Patient Centered Care  Goal: Patient preferences are identified and integrated in the patient's plan of care  Description  Interventions:  - What would you like us to know as we care for you? Lives at [facility].  Uses a wheelchair started for uti     Problem: SAFETY ADULT - FALL  Goal: Free from fall injury  Description  INTERVENTIONS:  - Assess pt frequently for physical needs  - Identify cognitive and physical deficits and behaviors that affect risk of falls.   - Anderson fall pre based on oxygen saturation or ABGs  - Provide Smoking Cessation handout, if applicable  - Encourage broncho-pulmonary hygiene including cough, deep breathe, Incentive Spirometry  - Assess the need for suctioning and perform as needed  - Assess and instruct

## 2020-02-12 NOTE — DISCHARGE SUMMARY
[facility]    PATIENT'S NAME: Kobi Juan Manuel   ATTENDING PHYSICIAN: Real Vincent.  Verónica Garcia MD   PATIENT ACCOUNT#:   [id number]    LOCATION:  [address] [place] [address] RECORD #:   [id number]       YOB: 1950  ADMISSION DATE:       01/03/2 urinalysis showed pyuria and hematuria. Urine culture ultimately proved greater than 100,000 E. coli. Her troponin, on initial evaluation, was less than 0.045.   Her potassium and magnesium were normal.    HOSPITAL COURSE:  The patient was started on IV R

## 2020-02-12 NOTE — PROGRESS NOTES
[facility]    Progress Note    Creola Cheadle Patient Status:  Inpatient    1950 MRN [id number]   Location [address] [facility] Attending Todd Pro MD   Hosp Day # 2 PCP Azeb Caldwell MD     Date:  2020  Date of Admissi by mouth 2 (two) times daily. traMADol HCl 50 MG Oral Tab, Take 1 tablet (50 mg total) by mouth every 12 (twelve) hours as needed. Clopidogrel Bisulfate 75 MG Oral Tab, Take 75 mg by mouth daily.   ipratropium-albuterol 0.5-2.5 (3) MG/3ML Inhalation Solut Normocephalic and atraumatic. Eyes: Pupils are equal, round, and reactive to light. Neck: Normal range of motion. Cardiovascular: Normal rate and regular rhythm. Murmur heard. Stable 3/6 HSM. PPM site intact. Edema present.   Pulmonary/Chest: Effo disease ([facility] [address]  Stable at baseline. UTI with hematuria  IV Rocephin started.         **Certification      PHYSICIAN Certification of Need for Inpatient Hospitalization - Initial Certification    Patient will require inpatient services that will reasona

## 2020-02-13 LAB
GLUCOSE BLDC GLUCOMTR-MCNC: 107 MG/DL (ref 70–99)
GLUCOSE BLDC GLUCOMTR-MCNC: 140 MG/DL (ref 70–99)
GLUCOSE BLDC GLUCOMTR-MCNC: 152 MG/DL (ref 70–99)
GLUCOSE BLDC GLUCOMTR-MCNC: 156 MG/DL (ref 70–99)

## 2020-02-13 PROCEDURE — 82962 GLUCOSE BLOOD TEST: CPT

## 2020-02-13 RX ORDER — CIPROFLOXACIN 500 MG/1
500 TABLET, FILM COATED ORAL EVERY 24 HOURS
Status: DISCONTINUED | OUTPATIENT
Start: 2020-02-13 | End: 2020-02-14

## 2020-02-13 RX ORDER — CIPROFLOXACIN 500 MG/1
500 TABLET, FILM COATED ORAL EVERY 12 HOURS SCHEDULED
Status: DISCONTINUED | OUTPATIENT
Start: 2020-02-13 | End: 2020-02-13

## 2020-02-13 NOTE — PLAN OF CARE
Problem: Patient Centered Care  Goal: Patient preferences are identified and integrated in the patient's plan of care  Description  Interventions:  - What would you like us to know as we care for you? Lives at [facility].  Uses a wheelchair injury  Description  INTERVENTIONS:  - Assess pt frequently for physical needs  - Identify cognitive and physical deficits and behaviors that affect risk of falls.   - [place] fall precautions as indicated by assessment.  - Educate pt/family on patient sa Spirometry  - Assess the need for suctioning and perform as needed  - Assess and instruct to report SOB or any respiratory difficulty  - Respiratory Therapy support as indicated  - Manage/alleviate anxiety  - Monitor for signs/symptoms of CO2 retention  Sukhjinder Courts

## 2020-02-13 NOTE — PROGRESS NOTES
Patient seen in follow up. Patient denies any chest pain or sob. Breathing improved.     02/12/20 1952   BP: 155/68   Pulse:    Resp: 20   Temp: 98.8 °F (37.1 °C)       Intake/Output Summary (Last 24 hours) at 2/12/2020 2141  Last data filed at 2/12/ traZODone HCl (DESYREL) tab 50 mg, 50 mg, Oral, Nightly PRN  furosemide (LASIX) injection 60 mg, 60 mg, Intravenous, BID (Diuretic)  amLODIPine Besylate (NORVASC) tab 10 mg, 10 mg, Oral, Daily  carvedilol (COREG) tab 6.25 mg, 6.25 mg, Oral, BID with meals Severe MR. Patient is planning to see surgeon at [facility] and has cardiologist there as well. Small vessel CAD with jailed OM chronically in LCx    Mobitz 2 heart block. S/p PPM    PLAN:  Lungs are improved. Possibly discharge tomorrow if stable.     S/p D

## 2020-02-13 NOTE — PROGRESS NOTES
[facility] Note:  Renal Adjustment for ciprofloxacin (CIPRO)    Harry Juan is a 77year old female who has been prescribed ciprofloxacin (CIPRO) 500 mg every 12 hrs.   CrCl is estimated at 29.4 mL/min (A) (based on SCr of 1.65 mg/dL (H)) so the [facility]

## 2020-02-14 VITALS
HEART RATE: 61 BPM | DIASTOLIC BLOOD PRESSURE: 55 MMHG | WEIGHT: 175.88 LBS | BODY MASS INDEX: 29.3 KG/M2 | TEMPERATURE: 98 F | RESPIRATION RATE: 18 BRPM | SYSTOLIC BLOOD PRESSURE: 99 MMHG | OXYGEN SATURATION: 96 % | HEIGHT: 65 IN

## 2020-02-14 LAB
GLUCOSE BLDC GLUCOMTR-MCNC: 111 MG/DL (ref 70–99)
GLUCOSE BLDC GLUCOMTR-MCNC: 173 MG/DL (ref 70–99)
NT-PROBNP SERPL-MCNC: 2559 PG/ML (ref ?–125)

## 2020-02-14 PROCEDURE — 82962 GLUCOSE BLOOD TEST: CPT

## 2020-02-14 PROCEDURE — 83880 ASSAY OF NATRIURETIC PEPTIDE: CPT | Performed by: HOSPITALIST

## 2020-02-14 RX ORDER — CARVEDILOL 6.25 MG/1
6.25 TABLET ORAL 2 TIMES DAILY WITH MEALS
Qty: 30 TABLET | Refills: 2 | Status: ON HOLD | OUTPATIENT
Start: 2020-02-14 | End: 2020-10-02

## 2020-02-14 RX ORDER — FUROSEMIDE 20 MG/1
60 TABLET ORAL
Qty: 180 TABLET | Refills: 3 | Status: ON HOLD | OUTPATIENT
Start: 2020-02-14 | End: 2020-03-31

## 2020-02-14 RX ORDER — AMLODIPINE BESYLATE 10 MG/1
10 TABLET ORAL DAILY
Qty: 30 TABLET | Refills: 3 | Status: ON HOLD | OUTPATIENT
Start: 2020-02-15 | End: 2020-07-25

## 2020-02-14 RX ORDER — CIPROFLOXACIN 500 MG/1
500 TABLET, FILM COATED ORAL EVERY 24 HOURS
Qty: 14 TABLET | Refills: 0 | Status: ON HOLD | OUTPATIENT
Start: 2020-02-14 | End: 2020-03-05

## 2020-02-14 NOTE — PLAN OF CARE
Problem: Patient Centered Care  Goal: Patient preferences are identified and integrated in the patient's plan of care  Description  Interventions:  - What would you like us to know as we care for you? Lives at [facility].  Uses a wheelchair injury  Description  INTERVENTIONS:  - Assess pt frequently for physical needs  - Identify cognitive and physical deficits and behaviors that affect risk of falls.   - [place] fall precautions as indicated by assessment.  - Educate pt/family on patient sa Spirometry  - Assess the need for suctioning and perform as needed  - Assess and instruct to report SOB or any respiratory difficulty  - Respiratory Therapy support as indicated  - Manage/alleviate anxiety  - Monitor for signs/symptoms of CO2 retention  Hugo Wilson

## 2020-02-14 NOTE — PROGRESS NOTES
Patient seen in follow up. Patient denies any chest pain or sob. Breathing improved.     02/14/20  0813   BP: 119/61   Pulse: 60   Resp: 18   Temp: 97.9 °F (36.6 °C)       Intake/Output Summary (Last 24 hours) at 2/14/2020 0052  Last data filed at 2/1 traZODone HCl (DESYREL) tab 50 mg, 50 mg, Oral, Nightly PRN  amLODIPine Besylate (NORVASC) tab 10 mg, 10 mg, Oral, Daily  carvedilol (COREG) tab 6.25 mg, 6.25 mg, Oral, BID with meals  Normal Saline Flush 0.9 % injection 3 mL, 3 mL, Intravenous, PRN      I Thank you for allowing me to participate in the care of your patient. please call if you have any question.      Devora Hernandez MD   General Cardiology & Advanced Heart Failure, Cardiac Transplant and Assisted Devices  [facility]  Pager: [phone]

## 2020-02-14 NOTE — PROGRESS NOTES
[facility]    Progress Note    Ursula Ornelas Patient Status:  Inpatient    1950 MRN [id number]   Location [facility] Attending Reza Mendez MD   [facility] Day # 4 PCP Romain Fernandez MD     Date:  2020  Date of Admissi mouth daily. ipratropium-albuterol 0.5-2.5 (3) MG/3ML Inhalation Solution, Take 3 mL by nebulization every 4 (four) hours as needed. traZODone HCl 50 MG Oral Tab, Take 50 mg by mouth nightly as needed for Sleep.   aspirin 81 MG Oral Chew Tab, Chew 1 table well-developed and well-nourished. HENT:   Head: Normocephalic and atraumatic. Eyes: Pupils are equal, round, and reactive to light. Neck: Normal range of motion. Cardiovascular: Normal rate and regular rhythm. Murmur heard. Stable 3/6 HSM.  PPM Hyperlipidemia  On statin Rx. Hypothyroidism  On replacement. Stage 3 chronic kidney disease (HCC)  Stable at baseline. UTI with hematuria  IV Rocephin started.         **Certification      PHYSICIAN Certification of Need for Inpatient Hospi

## 2020-02-14 NOTE — CARDIAC REHAB
CARDIAC REHAB HEART FAILURE EDUCATION    Handouts provided and reviewed: CHF Booklet. Disease Process: Disease process reviewed.     Reviewed the following: DAILY WEIGHT MONITORING: reviewed      SODIUM RESTRICTION: reviewed      FLUID RESTRICTION:

## 2020-02-14 NOTE — CM/SW NOTE
Received notice that pt is ready for d/c today. Per pt's request: [facility] set for next avail (approx 2hrs, 2PM), PCS completed in [place].    SW contacted [facility] and informed of pt's return today.     PLAN: Return to [facility], Ling Jules set for ne

## 2020-02-14 NOTE — PLAN OF CARE
Problem: Patient Centered Care  Goal: Patient preferences are identified and integrated in the patient's plan of care  Description  Interventions:  - What would you like us to know as we care for you? Lives at [facility].  Uses a wheelchair injury  Description  INTERVENTIONS:  - Assess pt frequently for physical needs  - Identify cognitive and physical deficits and behaviors that affect risk of falls.   - [place] fall precautions as indicated by assessment.  - Educate pt/family on patient sa Assess the need for suctioning and perform as needed  - Assess and instruct to report SOB or any respiratory difficulty  - Respiratory Therapy support as indicated  - Manage/alleviate anxiety  - Monitor for signs/symptoms of CO2 retention  Outcome: Complet

## 2020-02-19 NOTE — DISCHARGE SUMMARY
[facility]    PATIENT'S NAME: Clifton Herzognch   ATTENDING PHYSICIAN: Nikunj Reveles.  Baldev Dubose MD   PATIENT ACCOUNT#:   [id number]    LOCATION:  [address] [place] [address] RECORD #:   [id number]       YOB: 1950  ADMISSION DATE:       01/13/202 0.045.  BNP, however, was elevated at greater than 7,000. HOSPITAL COURSE:  Due to persistent heart block and bradycardia associated with clinical signs of heart failure and elevated BNP, the patient was considered for pacemaker placement.   Preop dobu

## 2020-03-05 ENCOUNTER — APPOINTMENT (OUTPATIENT)
Dept: NUCLEAR MEDICINE | Facility: HOSPITAL | Age: 70
End: 2020-03-05
Attending: EMERGENCY MEDICINE
Payer: MEDICARE

## 2020-03-05 ENCOUNTER — APPOINTMENT (OUTPATIENT)
Dept: GENERAL RADIOLOGY | Facility: HOSPITAL | Age: 70
End: 2020-03-05
Attending: EMERGENCY MEDICINE
Payer: MEDICARE

## 2020-03-05 ENCOUNTER — HOSPITAL ENCOUNTER (OUTPATIENT)
Facility: HOSPITAL | Age: 70
Setting detail: OBSERVATION
Discharge: NURSING, REHAB OR LONG-TERM CARE | End: 2020-03-06
Attending: EMERGENCY MEDICINE | Admitting: INTERNAL MEDICINE
Payer: MEDICARE

## 2020-03-05 DIAGNOSIS — R07.9 ACUTE CHEST PAIN: Primary | ICD-10-CM

## 2020-03-05 PROCEDURE — 84484 ASSAY OF TROPONIN QUANT: CPT | Performed by: EMERGENCY MEDICINE

## 2020-03-05 PROCEDURE — 93005 ELECTROCARDIOGRAM TRACING: CPT

## 2020-03-05 PROCEDURE — 82962 GLUCOSE BLOOD TEST: CPT

## 2020-03-05 PROCEDURE — C9113 INJ PANTOPRAZOLE SODIUM, VIA: HCPCS | Performed by: EMERGENCY MEDICINE

## 2020-03-05 PROCEDURE — 93010 ELECTROCARDIOGRAM REPORT: CPT | Performed by: EMERGENCY MEDICINE

## 2020-03-05 PROCEDURE — 85379 FIBRIN DEGRADATION QUANT: CPT | Performed by: EMERGENCY MEDICINE

## 2020-03-05 PROCEDURE — 80048 BASIC METABOLIC PNL TOTAL CA: CPT | Performed by: EMERGENCY MEDICINE

## 2020-03-05 PROCEDURE — 78582 LUNG VENTILAT&PERFUS IMAGING: CPT | Performed by: EMERGENCY MEDICINE

## 2020-03-05 PROCEDURE — 85025 COMPLETE CBC W/AUTO DIFF WBC: CPT | Performed by: EMERGENCY MEDICINE

## 2020-03-05 PROCEDURE — 99285 EMERGENCY DEPT VISIT HI MDM: CPT

## 2020-03-05 PROCEDURE — 83690 ASSAY OF LIPASE: CPT | Performed by: EMERGENCY MEDICINE

## 2020-03-05 PROCEDURE — 96372 THER/PROPH/DIAG INJ SC/IM: CPT

## 2020-03-05 PROCEDURE — 71045 X-RAY EXAM CHEST 1 VIEW: CPT | Performed by: EMERGENCY MEDICINE

## 2020-03-05 PROCEDURE — 96374 THER/PROPH/DIAG INJ IV PUSH: CPT

## 2020-03-05 PROCEDURE — 80076 HEPATIC FUNCTION PANEL: CPT | Performed by: EMERGENCY MEDICINE

## 2020-03-05 RX ORDER — ATORVASTATIN CALCIUM 40 MG/1
80 TABLET, FILM COATED ORAL NIGHTLY
Status: DISCONTINUED | OUTPATIENT
Start: 2020-03-05 | End: 2020-03-06

## 2020-03-05 RX ORDER — CLOPIDOGREL BISULFATE 75 MG/1
75 TABLET ORAL DAILY
Status: DISCONTINUED | OUTPATIENT
Start: 2020-03-06 | End: 2020-03-06

## 2020-03-05 RX ORDER — POTASSIUM CHLORIDE 20 MEQ/1
40 TABLET, EXTENDED RELEASE ORAL EVERY 4 HOURS
Status: DISPENSED | OUTPATIENT
Start: 2020-03-05 | End: 2020-03-06

## 2020-03-05 RX ORDER — ASPIRIN 81 MG/1
81 TABLET, CHEWABLE ORAL DAILY
Status: DISCONTINUED | OUTPATIENT
Start: 2020-03-06 | End: 2020-03-06

## 2020-03-05 RX ORDER — MAGNESIUM HYDROXIDE/ALUMINUM HYDROXICE/SIMETHICONE 120; 1200; 1200 MG/30ML; MG/30ML; MG/30ML
30 SUSPENSION ORAL ONCE
Status: COMPLETED | OUTPATIENT
Start: 2020-03-05 | End: 2020-03-05

## 2020-03-05 RX ORDER — ACETAMINOPHEN 325 MG/1
650 TABLET ORAL EVERY 6 HOURS PRN
Status: DISCONTINUED | OUTPATIENT
Start: 2020-03-05 | End: 2020-03-06

## 2020-03-05 RX ORDER — ISOSORBIDE MONONITRATE 60 MG/1
60 TABLET, EXTENDED RELEASE ORAL DAILY
Status: DISCONTINUED | OUTPATIENT
Start: 2020-03-06 | End: 2020-03-06

## 2020-03-05 RX ORDER — NITROGLYCERIN 0.4 MG/1
0.4 TABLET SUBLINGUAL EVERY 5 MIN PRN
Status: DISCONTINUED | OUTPATIENT
Start: 2020-03-05 | End: 2020-03-06

## 2020-03-05 RX ORDER — CARVEDILOL 6.25 MG/1
6.25 TABLET ORAL 2 TIMES DAILY WITH MEALS
Status: DISCONTINUED | OUTPATIENT
Start: 2020-03-05 | End: 2020-03-06

## 2020-03-05 RX ORDER — HYDROCORTISONE 0.5 G/100G
CREAM TOPICAL 2 TIMES DAILY
Status: DISCONTINUED | OUTPATIENT
Start: 2020-03-05 | End: 2020-03-06

## 2020-03-05 RX ORDER — HEPARIN SODIUM 5000 [USP'U]/ML
5000 INJECTION, SOLUTION INTRAVENOUS; SUBCUTANEOUS EVERY 12 HOURS SCHEDULED
Status: DISCONTINUED | OUTPATIENT
Start: 2020-03-05 | End: 2020-03-06

## 2020-03-05 RX ORDER — TRAZODONE HYDROCHLORIDE 50 MG/1
50 TABLET ORAL NIGHTLY PRN
Status: DISCONTINUED | OUTPATIENT
Start: 2020-03-05 | End: 2020-03-06

## 2020-03-05 RX ORDER — LEVOTHYROXINE SODIUM 0.05 MG/1
50 TABLET ORAL
Status: DISCONTINUED | OUTPATIENT
Start: 2020-03-06 | End: 2020-03-06

## 2020-03-05 RX ORDER — TRAMADOL HYDROCHLORIDE 50 MG/1
50 TABLET ORAL EVERY 12 HOURS PRN
Status: DISCONTINUED | OUTPATIENT
Start: 2020-03-05 | End: 2020-03-06

## 2020-03-05 RX ORDER — AMLODIPINE BESYLATE 10 MG/1
10 TABLET ORAL DAILY
Status: DISCONTINUED | OUTPATIENT
Start: 2020-03-06 | End: 2020-03-06

## 2020-03-05 NOTE — ED INITIAL ASSESSMENT (HPI)
Pt with sudden onset of sternal CP that started about and hour ago w/ some difficulty breathing. Pt states she took a nitro w/ relief. Pt given a full dose of aspirin by EMS.

## 2020-03-06 VITALS
HEIGHT: 65 IN | RESPIRATION RATE: 15 BRPM | OXYGEN SATURATION: 95 % | WEIGHT: 183.88 LBS | TEMPERATURE: 98 F | SYSTOLIC BLOOD PRESSURE: 142 MMHG | HEART RATE: 64 BPM | DIASTOLIC BLOOD PRESSURE: 94 MMHG | BODY MASS INDEX: 30.64 KG/M2

## 2020-03-06 PROBLEM — I50.32 CHRONIC DIASTOLIC HF (HEART FAILURE) (HCC): Chronic | Status: ACTIVE | Noted: 2019-06-10

## 2020-03-06 PROCEDURE — 82962 GLUCOSE BLOOD TEST: CPT

## 2020-03-06 PROCEDURE — 84132 ASSAY OF SERUM POTASSIUM: CPT | Performed by: INTERNAL MEDICINE

## 2020-03-06 NOTE — H&P
[facility]    History and Physical    Kingwood Emanuel Patient Status:  Observation    1950 MRN [id number]   Location [facility] 3W/SW Attending Jasper Sutart MD   Hosp Day # 0 PCP Carolyn Morales MD     Date:  3/6/2020  Date ER 60 MG Oral Tablet 24 Hr, Take 60 mg by mouth daily. traMADol HCl 50 MG Oral Tab, Take 1 tablet (50 mg total) by mouth every 12 (twelve) hours as needed. Clopidogrel Bisulfate 75 MG Oral Tab, Take 75 mg by mouth daily.   traZODone HCl 50 MG Oral Tab, Ta tenderness. Mild tenderness over (L) parasternal costochondral junctions w/o external signs of inflammation. Abdominal: Soft. Bowel sounds are normal. She exhibits no distension. There is no tenderness. Musculoskeletal: Normal range of motion.    Neurol significant changes have occurred Electronically signed on 03/05/2020 at 17:03 by Luis Carlos Chery MD      Assessment/Plan:     Acute chest pain  Chest XR, V/Q scan, EKG, and troponin level are all normal and pain has resolved.  Advised pt to use her tramadol

## 2020-03-06 NOTE — CM/SW NOTE
Received notice from d/c RN Vianca Mcclain that pt is cleared for d/c today and needs [facility] transport. SW contacted [facility] and confirmed they do no need RN to RN report called. [facility] set for 1PM, PCS completed in [place].     PLAN: Linda Rowe

## 2020-03-19 NOTE — DISCHARGE SUMMARY
[facility]    PATIENT'S NAME: Karyn Krishnan   ATTENDING PHYSICIAN: Joan Marquez.  Zen Leo MD   PATIENT ACCOUNT#:   [id number]    LOCATION:  [address] [place] [address] RECORD #:   [id number]       YOB: 1950  ADMISSION DATE:       02/10/2 HOSPITAL COURSE:  The patient was started on IV Lasix 60 mg twice a day, and daily weights and ins and outs were monitored. Her rales decreased considerably, and the patient was weaned off oxygen.   The patient's ins and outs were persistently in negat

## 2020-03-22 ENCOUNTER — ANESTHESIA EVENT (OUTPATIENT)
Dept: MEDSURG UNIT | Facility: HOSPITAL | Age: 70
DRG: 208 | End: 2020-03-22
Payer: MEDICARE

## 2020-03-22 ENCOUNTER — ANESTHESIA (OUTPATIENT)
Dept: MEDSURG UNIT | Facility: HOSPITAL | Age: 70
DRG: 208 | End: 2020-03-22
Payer: MEDICARE

## 2020-03-22 ENCOUNTER — HOSPITAL ENCOUNTER (INPATIENT)
Facility: HOSPITAL | Age: 70
LOS: 9 days | Discharge: NURSING, REHAB OR LONG-TERM CARE | DRG: 208 | End: 2020-03-31
Attending: EMERGENCY MEDICINE | Admitting: INTERNAL MEDICINE
Payer: MEDICARE

## 2020-03-22 ENCOUNTER — APPOINTMENT (OUTPATIENT)
Dept: GENERAL RADIOLOGY | Facility: HOSPITAL | Age: 70
DRG: 208 | End: 2020-03-22
Attending: EMERGENCY MEDICINE
Payer: MEDICARE

## 2020-03-22 ENCOUNTER — APPOINTMENT (OUTPATIENT)
Dept: GENERAL RADIOLOGY | Facility: HOSPITAL | Age: 70
DRG: 208 | End: 2020-03-22
Attending: INTERNAL MEDICINE
Payer: MEDICARE

## 2020-03-22 DIAGNOSIS — R09.02 HYPOXIA: ICD-10-CM

## 2020-03-22 DIAGNOSIS — I50.9 ACUTE ON CHRONIC CONGESTIVE HEART FAILURE, UNSPECIFIED HEART FAILURE TYPE (HCC): Primary | ICD-10-CM

## 2020-03-22 DIAGNOSIS — J18.9 PNEUMONIA OF BOTH LOWER LOBES DUE TO INFECTIOUS ORGANISM: ICD-10-CM

## 2020-03-22 PROBLEM — Z96.0 URETERAL STENT RETAINED: Chronic | Status: ACTIVE | Noted: 2019-06-08

## 2020-03-22 PROCEDURE — 71045 X-RAY EXAM CHEST 1 VIEW: CPT | Performed by: EMERGENCY MEDICINE

## 2020-03-22 PROCEDURE — 0BH17EZ INSERTION OF ENDOTRACHEAL AIRWAY INTO TRACHEA, VIA NATURAL OR ARTIFICIAL OPENING: ICD-10-PCS | Performed by: ANESTHESIOLOGY

## 2020-03-22 PROCEDURE — 99291 CRITICAL CARE FIRST HOUR: CPT | Performed by: INTERNAL MEDICINE

## 2020-03-22 PROCEDURE — 5A1945Z RESPIRATORY VENTILATION, 24-96 CONSECUTIVE HOURS: ICD-10-PCS | Performed by: INTERNAL MEDICINE

## 2020-03-22 PROCEDURE — 71045 X-RAY EXAM CHEST 1 VIEW: CPT | Performed by: INTERNAL MEDICINE

## 2020-03-22 RX ORDER — FUROSEMIDE 10 MG/ML
INJECTION INTRAMUSCULAR; INTRAVENOUS
Status: DISPENSED
Start: 2020-03-22 | End: 2020-03-23

## 2020-03-22 RX ORDER — LORAZEPAM 2 MG/ML
1 INJECTION INTRAMUSCULAR EVERY 2 HOUR PRN
Status: DISCONTINUED | OUTPATIENT
Start: 2020-03-22 | End: 2020-03-31

## 2020-03-22 RX ORDER — HYDRALAZINE HYDROCHLORIDE 10 MG/1
10 TABLET, FILM COATED ORAL EVERY 8 HOURS SCHEDULED
Status: DISCONTINUED | OUTPATIENT
Start: 2020-03-22 | End: 2020-03-26

## 2020-03-22 RX ORDER — AMLODIPINE BESYLATE 10 MG/1
10 TABLET ORAL DAILY
Status: DISCONTINUED | OUTPATIENT
Start: 2020-03-22 | End: 2020-03-31

## 2020-03-22 RX ORDER — FUROSEMIDE 10 MG/ML
40 INJECTION INTRAMUSCULAR; INTRAVENOUS
Status: DISCONTINUED | OUTPATIENT
Start: 2020-03-22 | End: 2020-03-22

## 2020-03-22 RX ORDER — ATORVASTATIN CALCIUM 40 MG/1
80 TABLET, FILM COATED ORAL NIGHTLY
Status: DISCONTINUED | OUTPATIENT
Start: 2020-03-22 | End: 2020-03-31

## 2020-03-22 RX ORDER — VANCOMYCIN HYDROCHLORIDE 125 MG/1
125 CAPSULE ORAL DAILY
Status: DISCONTINUED | OUTPATIENT
Start: 2020-03-22 | End: 2020-03-22

## 2020-03-22 RX ORDER — TRAMADOL HYDROCHLORIDE 50 MG/1
50 TABLET ORAL EVERY 12 HOURS PRN
Status: DISCONTINUED | OUTPATIENT
Start: 2020-03-22 | End: 2020-03-31

## 2020-03-22 RX ORDER — SODIUM CHLORIDE 0.9 % (FLUSH) 0.9 %
3 SYRINGE (ML) INJECTION AS NEEDED
Status: DISCONTINUED | OUTPATIENT
Start: 2020-03-22 | End: 2020-03-31

## 2020-03-22 RX ORDER — HEPARIN SODIUM 5000 [USP'U]/ML
5000 INJECTION, SOLUTION INTRAVENOUS; SUBCUTANEOUS EVERY 12 HOURS SCHEDULED
Status: DISCONTINUED | OUTPATIENT
Start: 2020-03-22 | End: 2020-03-31

## 2020-03-22 RX ORDER — FUROSEMIDE 10 MG/ML
60 INJECTION INTRAMUSCULAR; INTRAVENOUS
Status: DISCONTINUED | OUTPATIENT
Start: 2020-03-23 | End: 2020-03-23

## 2020-03-22 RX ORDER — ACETAMINOPHEN 325 MG/1
650 TABLET ORAL EVERY 6 HOURS PRN
Status: DISCONTINUED | OUTPATIENT
Start: 2020-03-22 | End: 2020-03-31

## 2020-03-22 RX ORDER — DEXTROSE MONOHYDRATE 25 G/50ML
50 INJECTION, SOLUTION INTRAVENOUS
Status: DISCONTINUED | OUTPATIENT
Start: 2020-03-22 | End: 2020-03-31

## 2020-03-22 RX ORDER — ISOSORBIDE MONONITRATE 60 MG/1
60 TABLET, EXTENDED RELEASE ORAL DAILY
Status: DISCONTINUED | OUTPATIENT
Start: 2020-03-22 | End: 2020-03-31

## 2020-03-22 RX ORDER — CLOPIDOGREL BISULFATE 75 MG/1
75 TABLET ORAL DAILY
Status: DISCONTINUED | OUTPATIENT
Start: 2020-03-22 | End: 2020-03-31

## 2020-03-22 RX ORDER — CARVEDILOL 6.25 MG/1
6.25 TABLET ORAL 2 TIMES DAILY WITH MEALS
Status: DISCONTINUED | OUTPATIENT
Start: 2020-03-22 | End: 2020-03-31

## 2020-03-22 RX ORDER — ETOMIDATE 2 MG/ML
INJECTION INTRAVENOUS
Status: COMPLETED
Start: 2020-03-22 | End: 2020-03-22

## 2020-03-22 RX ORDER — VANCOMYCIN/0.9 % SOD CHLORIDE 1.75 G/5
25 PLASTIC BAG, INJECTION (ML) INTRAVENOUS ONCE
Status: COMPLETED | OUTPATIENT
Start: 2020-03-22 | End: 2020-03-22

## 2020-03-22 RX ORDER — LEVOTHYROXINE SODIUM 0.05 MG/1
50 TABLET ORAL
Status: DISCONTINUED | OUTPATIENT
Start: 2020-03-22 | End: 2020-03-31

## 2020-03-22 RX ORDER — ETOMIDATE 2 MG/ML
INJECTION INTRAVENOUS AS NEEDED
Status: DISCONTINUED | OUTPATIENT
Start: 2020-03-22 | End: 2020-03-22

## 2020-03-22 RX ORDER — FUROSEMIDE 10 MG/ML
20 INJECTION INTRAMUSCULAR; INTRAVENOUS ONCE
Status: COMPLETED | OUTPATIENT
Start: 2020-03-22 | End: 2020-03-22

## 2020-03-22 RX ORDER — ASPIRIN 81 MG/1
81 TABLET, CHEWABLE ORAL DAILY
Status: DISCONTINUED | OUTPATIENT
Start: 2020-03-22 | End: 2020-03-31

## 2020-03-22 RX ORDER — NITROGLYCERIN 0.4 MG/1
0.4 TABLET SUBLINGUAL EVERY 5 MIN PRN
Status: DISCONTINUED | OUTPATIENT
Start: 2020-03-22 | End: 2020-03-31

## 2020-03-22 RX ADMIN — ETOMIDATE 12 MG: 2 INJECTION INTRAVENOUS at 20:37:00

## 2020-03-22 NOTE — PLAN OF CARE
1915: Patient requesting intubation as she is getting tired. Report given to night nurse and anesthesia and RT called to bedside. Patient's daughter called and left voicemail. Son, Andrea Garcia called and notified.      Both patient's children notified of her ad

## 2020-03-22 NOTE — ED INITIAL ASSESSMENT (HPI)
PATIENT BROUGHT IN BY [facility] EMS WITH C/O SOB THAT WOKE HER FROM SLEEP. DENIES COUGH/FEVERS. +CHEST PAIN.

## 2020-03-22 NOTE — PROGRESS NOTES
[facility] Dosing Service    Initial Pharmacokinetic Consult for Vancomycin Dosing     Meg Castellanos is a 77year old female who is being treated for pneumonia.   Pharmacy has been asked to dose Vancomycin by Dr. Precious Gill    She is allergic to metformi

## 2020-03-22 NOTE — SLP NOTE
VFSS orders received. VFSS not able to be scheduled at this time. Per RN, Pt not appropriate for BSE today. Will f/u on 3/23/20 as appropriate.        Lalit Bernard M.S. CCC/SLP  Speech-Language Pathologist  [facility]  #[phone]

## 2020-03-22 NOTE — H&P
[facility] HOSP - [facility]    History and Physical    Leonardo Juarez Patient Status:  Inpatient    1950 MRN [id number]   Location [address] [facility] [employer] [employer] Attending Jeannie Stearns MD   Hosp Day # 0 PCP Dixie Patel MD     Date:  3/22/2020  Date of Mononitrate ER 60 MG Oral Tablet 24 Hr, Take 60 mg by mouth daily. traMADol HCl 50 MG Oral Tab, Take 1 tablet (50 mg total) by mouth every 12 (twelve) hours as needed. Clopidogrel Bisulfate 75 MG Oral Tab, Take 75 mg by mouth daily.   ipratropium-albutero rate, regular rhythm and normal heart sounds. Edema not present. Pulmonary/Chest: Effort normal. No respiratory distress. She has rales. Abdominal: Soft. Bowel sounds are normal. She exhibits no distension. There is tenderness.  Musculoskeletal: Norm Electronically signed on 03/22/2020 at 14:03 by Umer Long      Assessment/Plan:     Acute on chronic congestive heart failure, unspecified heart failure type ([facility] [address]  Probably triggered by acute respiratory infection.       Ureteral stent retained  H/o n

## 2020-03-22 NOTE — ED PROVIDER NOTES
Patient Seen in: [facility] AND [facility] Emergency Department      History   Patient presents with:  Dyspnea CINDY SOB    Stated Complaint: sob    HPI    76 y/o female w/ MMP w/ admit 1 month ago for onset of SOB since earlier this am.  No report of cough/fever. distress. Head: Normocephalic and atraumatic. Eyes: Conjunctivae are normal. Pupils are equal, round, and reactive to light. Neck: Normal range of motion. Neck supple. No stridor.   No JVD  Cardiovascular: Normal rate, regular rhythm and intact and e were created for panel order CBC WITH DIFFERENTIAL WITH PLATELET.   Procedure                               Abnormality         Status                     ---------                               -----------         ------                     CBC W/ DIFFERANAY 3/22/2020 at 8:20 AM     Finalized by (CST): Jolie Naranjo MD on 3/22/2020 at 8:22 AM          Xr Chest Ap Portable  (cpt=71045)    Result Date: 3/5/2020  PROCEDURE: XR CHEST AP PORTABLE (CPT=71010) TIME: 1617 hours  COMPARISON: [facility] (CST): Bob Castillo MD on 3/05/2020 at 7:19 PM                MDM     Pt will be admitted to Dr. Cristina Ly. Cardio on c/s as well as pulmonary. Will cover nosocomial pneumonia. Blood cultures and procalcitonin done.   Spoke to Dr. Cristina Ly and he agre care with a primary care provider within the next three months to obtain basic health screening including reassessment of your blood pressure.     Medications Prescribed:  Current Discharge Medication List                   Present on Admission  Date Review

## 2020-03-23 ENCOUNTER — APPOINTMENT (OUTPATIENT)
Dept: GENERAL RADIOLOGY | Facility: HOSPITAL | Age: 70
DRG: 208 | End: 2020-03-23
Attending: INTERNAL MEDICINE
Payer: MEDICARE

## 2020-03-23 PROCEDURE — 71045 X-RAY EXAM CHEST 1 VIEW: CPT | Performed by: INTERNAL MEDICINE

## 2020-03-23 PROCEDURE — 99233 SBSQ HOSP IP/OBS HIGH 50: CPT | Performed by: INTERNAL MEDICINE

## 2020-03-23 RX ORDER — FUROSEMIDE 10 MG/ML
60 INJECTION INTRAMUSCULAR; INTRAVENOUS 3 TIMES DAILY
Status: DISCONTINUED | OUTPATIENT
Start: 2020-03-23 | End: 2020-03-24

## 2020-03-23 RX ORDER — POTASSIUM CHLORIDE 1.5 G/1.77G
40 POWDER, FOR SOLUTION ORAL EVERY 4 HOURS
Status: COMPLETED | OUTPATIENT
Start: 2020-03-23 | End: 2020-03-23

## 2020-03-23 NOTE — PLAN OF CARE
Patient received from ER to [facility] room 102. Patient noted to have increased WOB and oxygen requirements are increasing. Patient pleasant and able to answer all questions in short sentences due to SOB. IV antibiotics started and IV lasix given.  Purewick in pl

## 2020-03-23 NOTE — CM/SW NOTE
SW reviewing case as pt is in overflow area. Due to isolation and visitor restriction, SW unable to meet with the pt at bedside. Pt lives at [facility] on the Supportive Floor.  SW discussed with the [facility] nurse/April who states the pt is typ

## 2020-03-23 NOTE — CONSULTS
[facility]    PATIENT'S NAME: Kobi Juan Manuel   ATTENDING PHYSICIAN: Real Vincent.  Veróncia Garcia MD   CONSULTING PHYSICIAN: Sebastian Nguyễn DO   PATIENT ACCOUNT#:   [de-identified]    LOCATION:  [address] [place] [address] #:   [id number]       DATE OF BIRTH:  2 presentation. Blood pressure has subsequently improved. 3.   Chronic kidney disease stage 3.  4.   Severe mitral regurgitation. Patient was following up with [facility] for surgery as outpatient and is seeing a surgeon there as well.     5.   Status post dual c

## 2020-03-23 NOTE — PROGRESS NOTES
[facility] HOSP - [facility]  Progress Note    Harry Juan Patient Status:  Inpatient    1950 MRN [id number]   Location [address] [facility] Attending Gaurav Hutchins MD   Hosp Day # 1 PCP Mana Powers MD     Date:  3/22/2020  Date of Admission 60 MG Oral Tablet 24 Hr, Take 60 mg by mouth daily. traMADol HCl 50 MG Oral Tab, Take 1 tablet (50 mg total) by mouth every 12 (twelve) hours as needed. Clopidogrel Bisulfate 75 MG Oral Tab, Take 75 mg by mouth daily.   ipratropium-albuterol 0.5-2.5 (3) M Papanicolaou contraindicated    Results:     Lab Results   Component Value Date    WBC 8.1 03/23/2020    HGB 11.2 (L) 03/23/2020    HCT 33.8 (L) 03/23/2020    .0 (L) 03/23/2020    CREATSERUM 1.65 (H) 03/23/2020    BUN 32 (H) 03/23/2020     03/ -------------------------- paced rhythm When compared with ECG of 03/22/2020 07:28:24No significant changes have occurred Electronically signed on 03/22/2020 at 14:04 by Malick Hoover 12-lead    Result Date: 3/22/2020  ECG Report  Interpretation  -

## 2020-03-23 NOTE — PLAN OF CARE
Patient remains on full vent support. During sedation vacation, patient follows commands. Squeezes hands and moves toes bilaterally. Propofol on for restlessness and increased RR. COVID 19 test resent today. Contact and airborne precautions maintained.  ABG for changes in mentation and behavior  - Position to facilitate oxygenation and minimize respiratory effort  - Oxygen supplementation based on oxygen saturation or ABGs  - Provide Smoking Cessation handout, if applicable  - Encourage broncho-pulmonary hygi Promote sleep, encourage patient's normal rest cycle i.e. lights off, TV off, minimize noise and interruptions  - Encourage family to assist in orientation and promotion of home routines  Outcome: Not Progressing

## 2020-03-23 NOTE — PROGRESS NOTES
cardiology progress note    24 h events patient intubated overnight    Insulin Regular Human (NOVOLIN R) 100 UNIT/ML injection 1-7 Units, 1-7 Units, Subcutaneous, 4 times per day, Clay Lester MD  potassium chloride (KLOR-CON) powder packet 40 mEq, 40 (DEX-4) chewable tab 4 tablet, 4 tablet, Oral, Q15 Min PRN, Gentry Webster MD    Or  dextrose 50 % injection 50 mL, 50 mL, Intravenous, Q15 Min PRN, Gentry Webster MD    Or  glucose (DEX4) oral liquid 30 g, 30 g, Oral, Q15 Min PRN, Gentry Webster MD    Or failure. Chest x-ray does appear to demonstrate pulmonary congestion and could be also bibasilar pneumonia. 2.   Hypertension with hypertensive emergency possible on current presentation. Blood pressure has subsequently improved.   3.   Chronic kidney di WDL

## 2020-03-23 NOTE — ANESTHESIA PROCEDURE NOTES
Airway  Date/Time: 3/22/2020 8:38 PM  Urgency: Elective    Airway not difficult    General Information and Staff    Patient location during procedure: OR  Anesthesiologist: Oralia Landin MD  Performed: anesthesiologist     Indications and Patient Conditi

## 2020-03-23 NOTE — SLP NOTE
Orders received and acknowledged. Pt now intubated and not appropriate for VFSS at this time. SLP to place patient on hold and new MD orders to be received for VFSS as pt appropriate. Thank you.     Jun Mensah M.S. [id number] [address] [place] [address]

## 2020-03-23 NOTE — PROGRESS NOTES
[facility]    Progress Note      Assessment and Plan:    1.   Acute on chronic respiratory failure– the patient has congestive heart failure with insidious dyspnea and bibasilar infiltrates with recent normal ejection fraction but with sever  03/23/2020    CA 8.6 03/23/2020    TROP <0.045 03/22/2020     Chest x-ray– slight decrease in basilar consolidation    Kalyan Amanda MD  Medical Director, [address], [facility]  Medical Director, [facility]  Pager: [phone]

## 2020-03-23 NOTE — PLAN OF CARE
Intubated early evening d/t worsening resp status, afebrile, diprivan drip infusing while pt on full vent support w/ sats adeq, tele av paced w/ bp stable, ng to lis w/ bileus drain, grajeda in place w/ good uo  Problem: Safety Risk - Non-Violent Restraints Evaluate effectiveness of vasoactive medications to optimize hemodynamic stability  - Monitor arterial and/or venous puncture sites for bleeding and/or hematoma  - Assess quality of pulses, skin color and temperature  - Assess for signs of decreased corona SKIN/TISSUE INTEGRITY - ADULT  Goal: Skin integrity remains intact  Description  INTERVENTIONS  - Assess and document risk factors for pressure ulcer development  - Assess and document skin integrity  - Monitor for areas of redness and/or skin breakdown  -

## 2020-03-24 ENCOUNTER — APPOINTMENT (OUTPATIENT)
Dept: GENERAL RADIOLOGY | Facility: HOSPITAL | Age: 70
DRG: 208 | End: 2020-03-24
Attending: INTERNAL MEDICINE
Payer: MEDICARE

## 2020-03-24 ENCOUNTER — APPOINTMENT (OUTPATIENT)
Dept: GENERAL RADIOLOGY | Facility: HOSPITAL | Age: 70
DRG: 208 | End: 2020-03-24
Attending: CLINICAL NURSE SPECIALIST
Payer: MEDICARE

## 2020-03-24 PROCEDURE — 99233 SBSQ HOSP IP/OBS HIGH 50: CPT | Performed by: INTERNAL MEDICINE

## 2020-03-24 PROCEDURE — 71045 X-RAY EXAM CHEST 1 VIEW: CPT | Performed by: CLINICAL NURSE SPECIALIST

## 2020-03-24 RX ORDER — FUROSEMIDE 10 MG/ML
60 INJECTION INTRAMUSCULAR; INTRAVENOUS
Status: DISCONTINUED | OUTPATIENT
Start: 2020-03-24 | End: 2020-03-25

## 2020-03-24 NOTE — PLAN OF CARE
Infection Control conferred with Dr. Juan Clark, and per Dr. Juan Clark and Infection Control, maintain the current isolation precautions with protective eye wear. Will monitor closely.

## 2020-03-24 NOTE — PLAN OF CARE
Tube feedings started. Tolerating at this time. Cdiff sample sent for testing. Pending results. Will attempt another SBT tomorrow.    Pt follows commands once off sedation, however she becomes very agitated (attempts to turn in bed) needs constant redir

## 2020-03-24 NOTE — PLAN OF CARE
Problem: ALTERED NUTRIENT INTAKE - ADULT  Goal: Nutrient intake appropriate for improving, restoring or maintaining nutritional needs  Description  INTERVENTIONS:  - Assess nutritional status and recommend course of action  - Monitor oral intaken when ea

## 2020-03-24 NOTE — PLAN OF CARE
Problem: Safety Risk - Non-Violent Restraints  Goal: Patient will remain free from self-harm  Description  INTERVENTIONS:  - Apply the least restrictive restraint to prevent harm  - Notify patient and family of reasons restraints applied  - Assess for an and temperature  - Assess for signs of decreased coronary artery perfusion - ex.  Angina  - Evaluate fluid balance, assess for edema, trend weights  Outcome: Progressing     Problem: GASTROINTESTINAL - ADULT  Goal: Maintains adequate nutritional intake (und pressure ulcer development  - Assess and document skin integrity  - Monitor for areas of redness and/or skin breakdown  - Initiate interventions, skin care algorithm/standards of care as needed  Outcome: Progressing     Problem: Delirium  Goal: Minimize du

## 2020-03-24 NOTE — PROGRESS NOTES
[facility] HOSP - [facility]  Progress Note    Harry Juan Patient Status:  Inpatient    1950 MRN [id number]   Location [address] [facility] Attending Gaurav Hutchins MD   Hosp Day # 2 PCP Mana Powers MD     Date:  3/24/2020  Date of Admission 60 MG Oral Tablet 24 Hr, Take 60 mg by mouth daily. traMADol HCl 50 MG Oral Tab, Take 1 tablet (50 mg total) by mouth every 12 (twelve) hours as needed. Clopidogrel Bisulfate 75 MG Oral Tab, Take 75 mg by mouth daily.   ipratropium-albuterol 0.5-2.5 (3) M Value Date    WBC 10.7 03/24/2020    HGB 12.0 03/24/2020    HCT 36.3 03/24/2020    .0 (L) 03/24/2020    CREATSERUM 1.94 (H) 03/24/2020    BUN 36 (H) 03/24/2020     03/24/2020    K 4.1 03/24/2020     03/24/2020    CO2 25.0 03/24/2020    G Marly Drew MD on 3/23/2020 at 7:05 AM                Assessment/Plan:     Acute on chronic congestive heart failure, unspecified heart failure type [facility])  Probably triggered by acute respiratory infection. 3/23: fewer rales on exam. Tolerating diures

## 2020-03-24 NOTE — PROGRESS NOTES
cardiology progress note    24 h events patient intubated overnight    Insulin Regular Human (NOVOLIN R) 100 UNIT/ML injection 1-7 Units, 1-7 Units, Subcutaneous, 4 times per day, Jay Wisdom MD  potassium chloride (KLOR-CON) powder packet 40 mEq, 40 (DEX-4) chewable tab 4 tablet, 4 tablet, Oral, Q15 Min PRN, Wayne Wiggins MD    Or  dextrose 50 % injection 50 mL, 50 mL, Intravenous, Q15 Min PRN, Wayne Wiggins MD    Or  glucose (DEX4) oral liquid 30 g, 30 g, Oral, Q15 Min PRN, Wayne Wiggins MD    Or respiratory failure. Chest x-ray does appear to demonstrate pulmonary congestion and could be also bibasilar pneumonia. 2.   Hypertension with hypertensive emergency possible on current presentation. Blood pressure has subsequently improved.   3.   Chron

## 2020-03-24 NOTE — RESPIRATORY THERAPY NOTE
Received patient intubated/mechanically ventilated on full support. FIO2 weaned to 55%. Pt sats appropriately. AC14/500/55%/+5. Suction provided as needed. no major events/changes overnight. RT will continue to monitor.

## 2020-03-24 NOTE — PROGRESS NOTES
Pt arrives to ICU , with RN, transport and RT. Bagged for transport and placed on ventilator. Remains sedated. VS WNL.

## 2020-03-24 NOTE — DIETARY NOTE
ADULT NUTRITION INITIAL ASSESSMENT    Pt is at high nutrition risk. Pt does not meet malnutrition criteria. RECOMMENDATIONS TO MD:  See Nutrition Intervention for tf rx.       NUTRITION DIAGNOSIS/PROBLEM:  Inadequate protein energy intake relate 5'5\"   WT: 82.5 kg (181 lb 12.8 oz)   BMI: Body mass index is 30.25 kg/m².   BMI CLASSIFICATION: 30-34.9 kg/m2 - obesity class I  IBW: 125  lbs        145 % IBW  Usual Body Wt: 175-183  lbs       100 % UBW    WEIGHT HISTORY:  Patient Weight(s) for the past Glucose-Vitamin C **OR** dextrose **OR** glucose **OR** Glucose-Vitamin C, LORazepam'      LABS: reviewed pt lethargic- phos added on this am. EN order set in place.    Recent Labs     03/22/20  0729 03/23/20  0701 03/23/20  2322 03/24/20  0500   * 1

## 2020-03-24 NOTE — PROGRESS NOTES
Assessment and Objective  \"Progressing\"  INTERVENTIONS:  - Assess for changes in respiratory status  - Assess for changes in mentation and behavior  - Position to facilitate oxygenation and minimize respiratory effort  - Oxygen supplementation based on o arrhythmias in the past 24 hours? Y   Is the patient's cough adequate? Y   Is the patient alert (neuro), able to follow commands?  Y   Daily Screen Meets All Criteria Yes   Spontaneous Parameters   Sedation holiday (date) 03/24/20   Sedation holiday (time)

## 2020-03-24 NOTE — PROGRESS NOTES
[facility] - [facility]    Progress Note      Assessment and Plan:    1.   Acute on chronic respiratory failure– the patient has congestive heart failure with insidious dyspnea and bibasilar infiltrates with recent normal ejection fraction but with sever 36 03/24/2020     03/24/2020    K 4.1 03/24/2020     03/24/2020    CO2 25.0 03/24/2020     03/24/2020    CA 8.9 03/24/2020     Chest x-ray– slight decrease in basilar consolidation and CHF    Charly Ball MD  Medical Director, [facility]

## 2020-03-25 ENCOUNTER — APPOINTMENT (OUTPATIENT)
Dept: GENERAL RADIOLOGY | Facility: HOSPITAL | Age: 70
DRG: 208 | End: 2020-03-25
Attending: CLINICAL NURSE SPECIALIST
Payer: MEDICARE

## 2020-03-25 PROBLEM — A04.72 C. DIFFICILE ENTERITIS: Status: ACTIVE | Noted: 2020-03-25

## 2020-03-25 PROCEDURE — 71045 X-RAY EXAM CHEST 1 VIEW: CPT | Performed by: CLINICAL NURSE SPECIALIST

## 2020-03-25 PROCEDURE — 99233 SBSQ HOSP IP/OBS HIGH 50: CPT | Performed by: INTERNAL MEDICINE

## 2020-03-25 RX ORDER — FUROSEMIDE 10 MG/ML
40 INJECTION INTRAMUSCULAR; INTRAVENOUS
Status: DISCONTINUED | OUTPATIENT
Start: 2020-03-25 | End: 2020-03-27

## 2020-03-25 RX ORDER — POTASSIUM CHLORIDE 1.5 G/1.77G
40 POWDER, FOR SOLUTION ORAL ONCE
Status: COMPLETED | OUTPATIENT
Start: 2020-03-25 | End: 2020-03-25

## 2020-03-25 NOTE — PROGRESS NOTES
Attempted to put pt on SBT, pt is not pulling enough volumes as she is too sleepy. RN was notified. Will  try again later.

## 2020-03-25 NOTE — PROGRESS NOTES
[facility]  Progress Note    Ayala Nino Patient Status:  Inpatient    1950 MRN [id number]   Location [address] [facility] Attending Dhara Valentin MD   Hosp Day # 3 PCP Edison Linares MD     Date:  3/25/2020  Date of Admission 60 MG Oral Tablet 24 Hr, Take 60 mg by mouth daily. traMADol HCl 50 MG Oral Tab, Take 1 tablet (50 mg total) by mouth every 12 (twelve) hours as needed. Clopidogrel Bisulfate 75 MG Oral Tab, Take 75 mg by mouth daily.   ipratropium-albuterol 0.5-2.5 (3) M affect.      Cervical Papanicolaou contraindicated    Results:     Lab Results   Component Value Date    WBC 9.4 03/25/2020    HGB 11.4 (L) 03/25/2020    HCT 34.7 (L) 03/25/2020    .0 03/25/2020    CREATSERUM 2.10 (H) 03/25/2020    BUN 43 (H) 03/25/2 Acute on chronic congestive heart failure, unspecified heart failure type ([facility] [address]  Probably triggered by acute respiratory infection. 3/23: fewer rales on exam. Tolerated diuresis and afterload reduction initially but as of 3/25 having to decrease diuretic d

## 2020-03-25 NOTE — PLAN OF CARE
Pt failed SBT. She required constant stimulation to stay up and open her eyes despite a long time off sedation. When awake, pt follows commands but remains drowsy. She had low TV and was placed back on full support. She attempts to reach for ETT.  Restraint

## 2020-03-25 NOTE — PROGRESS NOTES
Patient seen in follow up. Intubated and sedated.  Time spent > 35 min.   03/25/20  1100   BP: 96/61   Pulse: 77   Resp: 13   Temp:        Intake/Output Summary (Last 24 hours) at 3/25/2020 1150  Last data filed at 3/25/2020 0600  Gross per 24 hour Piperacillin Sod-Tazobactam So (ZOSYN) 3.375 g in dextrose 5 % 100 mL ADD-vantage, 3.375 g, Intravenous, Q8H  glucose (DEX4) oral liquid 15 g, 15 g, Oral, Q15 Min PRN    Or  Glucose-Vitamin C (DEX-4) chewable tab 4 tablet, 4 tablet, Oral, Q15 Min PRN    Or CONCLUSION:  1. Stable normal heart size with mild vascular prominence with slight redistribution unchanged.  2. Improving chest with some clearing of interstitial changes and continued infiltrate or atelectasis at the left base with a left effusion and cedric 4.       Severe mitral regurgitation. Patient was following up with [facility] for surgery as outpatient and is seeing a surgeon there as well. 5.       Status post dual chamber permanent pacemaker placement recently.     6.       History of chest pains with p

## 2020-03-25 NOTE — PROGRESS NOTES
[address] [facility] dosing service    Follow-up Pharmacokinetic Consult for Vancomycin Dosing     Harry Juan is a 77year old female who is being treated for pneumonia. Patient is on day 4 of Vancomycin and is currently receiving 1 gm IV Q 24 hours.   [facility]

## 2020-03-25 NOTE — PROGRESS NOTES
[facility]    Progress Note      Assessment and Plan:    1.   Acute on chronic respiratory failure– the patient has congestive heart failure with insidious dyspnea and bibasilar infiltrates with recent normal ejection fraction but with sever abnormality     Results:     Lab Results   Component Value Date    WBC 9.4 03/25/2020    HGB 11.4 03/25/2020    HCT 34.7 03/25/2020    .0 03/25/2020    CREATSERUM 2.10 03/25/2020    BUN 43 03/25/2020     03/25/2020    K 4.4 03/25/2020    CL 10

## 2020-03-25 NOTE — PLAN OF CARE
Pt remains intubated (14//+5/45%). When off sedation pt becomes restless and attempts to turn over in bed. Ativan and tramadol PRN for anxiety/pain. Propofol gtt. Bilateral soft wrist restraints continued with no signs of injury. C diff test positive. or ABGs  - Provide Smoking Cessation handout, if applicable  - Encourage broncho-pulmonary hygiene including cough, deep breathe, Incentive Spirometry  - Assess the need for suctioning and perform as needed  - Assess and instruct to report SOB or any respi patient on self management of diabetes  Outcome: Progressing  Goal: Hemodynamic stability and optimal renal function maintained  Description  INTERVENTIONS:  - Monitor labs and assess for signs and symptoms of volume excess or deficit  - Monitor intake, ou

## 2020-03-25 NOTE — PROGRESS NOTES
Patient unable to wean, RN aware. Patient is currently on full vent support settings: AC/14/500/40%/+5. Sats are above 92%. Diminished BS on ausculation. Suction as needed. Will continue to monitor.

## 2020-03-26 ENCOUNTER — APPOINTMENT (OUTPATIENT)
Dept: GENERAL RADIOLOGY | Facility: HOSPITAL | Age: 70
DRG: 208 | End: 2020-03-26
Attending: CLINICAL NURSE SPECIALIST
Payer: MEDICARE

## 2020-03-26 ENCOUNTER — APPOINTMENT (OUTPATIENT)
Dept: GENERAL RADIOLOGY | Facility: HOSPITAL | Age: 70
DRG: 208 | End: 2020-03-26
Attending: INTERNAL MEDICINE
Payer: MEDICARE

## 2020-03-26 PROCEDURE — 71045 X-RAY EXAM CHEST 1 VIEW: CPT | Performed by: CLINICAL NURSE SPECIALIST

## 2020-03-26 PROCEDURE — 99233 SBSQ HOSP IP/OBS HIGH 50: CPT | Performed by: INTERNAL MEDICINE

## 2020-03-26 PROCEDURE — 71045 X-RAY EXAM CHEST 1 VIEW: CPT | Performed by: INTERNAL MEDICINE

## 2020-03-26 RX ORDER — HYDRALAZINE HYDROCHLORIDE 25 MG/1
25 TABLET, FILM COATED ORAL EVERY 8 HOURS SCHEDULED
Status: DISCONTINUED | OUTPATIENT
Start: 2020-03-26 | End: 2020-03-31

## 2020-03-26 NOTE — PROGRESS NOTES
Patient seen in follow up. Intubated and sedated.  Time spent > 25 min.   03/26/20  1100   BP: 130/73   Pulse: 82   Resp: 18   Temp:        Intake/Output Summary (Last 24 hours) at 3/26/2020 1156  Last data filed at 3/26/2020 0600  Gross per 24 hour Piperacillin Sod-Tazobactam So (ZOSYN) 3.375 g in dextrose 5 % 100 mL ADD-vantage, 3.375 g, Intravenous, Q8H  glucose (DEX4) oral liquid 15 g, 15 g, Oral, Q15 Min PRN    Or  Glucose-Vitamin C (DEX-4) chewable tab 4 tablet, 4 tablet, Oral, Q15 Min PRN    Or CONCLUSION:  1. Endotracheal tube tip now projects approximately 2.7 cm above the level of the mehul. 2. Enteric tube tip projects at the expected location of the distal esophagus. Recommend advancement by at least 15 cm.  3. Left basilar and right perihi  03/26/2020    CA 8.8 03/26/2020       IMPRESSION:    1. Acute respiratory failure. Chest x-ray does appear to demonstrate pulmonary congestion and could be also bibasilar pneumonia.   2.       Hypertension with hypertensive emergency possibl

## 2020-03-26 NOTE — RESPIRATORY THERAPY NOTE
Pt received on full vent settings. Pt on AC 14,500,40% +5 PEEP. No weaning done on this shift. Pt comfortable and sedated. Diminished breath-sounds. Pt tolerating well and Rt will continue to monitor. Patient.

## 2020-03-26 NOTE — PROGRESS NOTES
03/26/20 0805   Readings   Total RR 15   Minute Ventilation (L/min) 7.3 L/min   Expiratory Tidal Volume 480 mL   PIP Observed (cm H2O) 29 cm H2O   MAP (cm H2O) 10   Auto PEEP Observed (cm H2O) 5 cm H2O   Plateau Pressure (cm H2O) 23 cm H2O

## 2020-03-26 NOTE — PLAN OF CARE
Problem: Patient Centered Care  Goal: Patient preferences are identified and integrated in the patient's plan of care  Description  Interventions:  - What would you like us to know as we care for you?  Lives in [place] [facility]- Provide timely, complete, and retention  Outcome: Progressing     Problem: GASTROINTESTINAL - ADULT  Goal: Maintains adequate nutritional intake (undernourished)  Description  INTERVENTIONS:  - Monitor percentage of each meal consumed  - Identify factors contributing to decreased intak Optimize oral hygiene and moisture  - Encourage food from home; allow for food preferences  - Enhance eating environment  Outcome: Progressing     Problem: METABOLIC/FLUID AND ELECTROLYTES - ADULT  Goal: Glucose maintained within prescribed range  Descript Encourage family to assist in orientation and promotion of home routines  Outcome: Progressing  Extubated at 11:52 am, on hf nasal cannuala at 3 liters

## 2020-03-26 NOTE — CONSULTS
[facility]    Report of Consultation    Yolette Mendez Patient Status:  Inpatient    1950 MRN [id number]   Location [facility] 2W/SW Attending Jabari Sinclair MD   Hosp Day # 4 PCP Aron Tierney MD     Date of Admission:  3 infection)        Past Surgical History  Past Surgical History:   Procedure Laterality Date   • CHOLECYSTECTOMY     • SINUS SURGERY           Family History  Family History   Problem Relation Age of Onset   • Other (ULCERS) Father    • Cancer Mother g, Intravenous, Q8H  glucose (DEX4) oral liquid 15 g, 15 g, Oral, Q15 Min PRN    Or  Glucose-Vitamin C (DEX-4) chewable tab 4 tablet, 4 tablet, Oral, Q15 Min PRN    Or  dextrose 50 % injection 50 mL, 50 mL, Intravenous, Q15 Min PRN    Or  glucose (DEX4) or pulse 75, temperature 97.3 °F (36.3 °C), resp. rate 21, weight 179 lb 8 oz (81.4 kg), SpO2 99 %. Intake/Output:   Last 3 shifts: I/O last 3 completed shifts: In: 3215.2 [I.V.:1345. 2; NG/GT:1870]  Out: 3000 [Urine:2925; Stool:75]   This shift: No intake/o organomegaly mass or appreciable aneurysm. Extremities are without cyanosis clubbing but there is significant bilateral edema. Pulses are equal and intact in all extremities.   Skin is warm and dry without obvious pathological lesions ulcerations or wound Tania Mccord MD on 3/26/2020 at 8:01 AM          Xr Chest Ap Portable  (cpt=71045)    Result Date: 3/25/2020  CONCLUSION:  1. Stable normal heart size with mild vascular prominence with slight redistribution unchanged.  2. Improving chest with some cl mid left anterior descending artery stents. 2.       Jailed marginal branch behind previously placed stent, but vessel size is small. 3.       Apical right coronary artery disease.   Vessel size is too small for stenting.     RECOMMENDATIONS:  Medical man Stay:  4.056%  Long Length of Stay:    Recommendation would be evaluation for mitral clip or surgery at a tertiary care center as she has multiple comorbidities as well as anatomic issues with calcified annulus to make her very poor surgical candidate    T

## 2020-03-26 NOTE — SLP NOTE
SLP orders received and acknowledged. Pt was intubated for >48 hours and recently extubated on 3/26/20 around 11am. Per SLP protocol, BSE will be completed 24 hours post extubation. KATIE jolly v/arya.     Thank you.     Genoveva Cespedes

## 2020-03-26 NOTE — PROGRESS NOTES
[facility]    Progress Note      Assessment and Plan:    1.   Acute on chronic respiratory failure– the patient has congestive heart failure with insidious dyspnea and bibasilar infiltrates with recent normal ejection fraction but with sever centimeters.     Loreta Guerra MD  Medical Director, Critical Care, [facility]  Medical Director, [address] [place] [address]. [phone]  Pager: [phone]

## 2020-03-26 NOTE — RESPIRATORY THERAPY NOTE
Patient received on full support A/C 14 500 40% +5 with 7.4 ett taped 22 @ the lip. No changes made, patient resting comfortably throughout the night.

## 2020-03-27 ENCOUNTER — APPOINTMENT (OUTPATIENT)
Dept: GENERAL RADIOLOGY | Facility: HOSPITAL | Age: 70
DRG: 208 | End: 2020-03-27
Attending: INTERNAL MEDICINE
Payer: MEDICARE

## 2020-03-27 PROCEDURE — 71045 X-RAY EXAM CHEST 1 VIEW: CPT | Performed by: INTERNAL MEDICINE

## 2020-03-27 PROCEDURE — 99233 SBSQ HOSP IP/OBS HIGH 50: CPT | Performed by: INTERNAL MEDICINE

## 2020-03-27 RX ORDER — DEXTROSE MONOHYDRATE 25 G/50ML
50 INJECTION, SOLUTION INTRAVENOUS
Status: DISCONTINUED | OUTPATIENT
Start: 2020-03-27 | End: 2020-03-27 | Stop reason: ALTCHOICE

## 2020-03-27 RX ORDER — POTASSIUM CHLORIDE 14.9 MG/ML
20 INJECTION INTRAVENOUS ONCE
Status: COMPLETED | OUTPATIENT
Start: 2020-03-27 | End: 2020-03-27

## 2020-03-27 RX ORDER — FUROSEMIDE 10 MG/ML
40 INJECTION INTRAMUSCULAR; INTRAVENOUS 3 TIMES DAILY
Status: DISCONTINUED | OUTPATIENT
Start: 2020-03-27 | End: 2020-03-29

## 2020-03-27 NOTE — OCCUPATIONAL THERAPY NOTE
OCCUPATIONAL THERAPY EVALUATION - INPATIENT     Room Number: 305/631-K  Evaluation Date: 3/27/2020  Type of Evaluation: Initial       Physician Order: IP Consult to Occupational Therapy  Reason for Therapy: ADL/IADL Dysfunction and Discharge Planning    OC simplification techniques;ADL training;Functional transfer training; Endurance training;Patient/Family education;Patient/Family training; Compensatory technique education       OCCUPATIONAL THERAPY MEDICAL/SOCIAL HISTORY     Problem List  Principal Problem: risk  ACTIVITY TOLERANCE  Pulse: 71     Resp: 23  BP: 142/68  BP Location: Right arm  BP Method: Automatic  Patient Position: Lying    O2 SATURATIONS        SPO2 Ambulation on Oxygen: 98  Ambulation oxygen flow (liters per minute): 4    COGNITION  Overall

## 2020-03-27 NOTE — PHYSICAL THERAPY NOTE
PHYSICAL THERAPY EVALUATION - INPATIENT     Room Number: 201/201-A  Evaluation Date: 3/27/2020  Type of Evaluation: Initial   Physician Order: See Comment for Specific Order(weakness)    Presenting Problem: p/w acute on chronic HF, B PNA, C Diff  Reason f Approx Degree of Impairment: 72.57% has been calculated based on documentation in the [facility] '6 clicks' Inpatient Basic Mobility Short Form. Research supports that patients with this level of impairment may benefit from rehab stay.  Pt was MOD IND with ADLs infarction) [facility])    • Peripheral vascular disease ([facility] [address]    • Pneumonia due to organism    • Stroke [facility])    • UTI (urinary tract infection)      Past Surgical History  Past Surgical History:   Procedure Laterality Date   • CHOLECYSTECTOMY     • SINUS SURG +  Static Standing: Poor -  Dynamic Standing: Poor -    ACTIVITY TOLERANCE  Pre-activity, supine:  SPO2 97% on 3L O2 NC  HR 80 bpm  BP /61 mmHg    Post-activity, supine:  SPO2 98% on 3L O2 NC  HR 73 bpm  BP /68 mmHg  *mild fatigue and SOB wit demonstrate supine - sit EOB @ level: CGA     Goal #1   Current Status    Goal #2 Patient is able to demonstrate transfers Sit to/from Stand at assistance level: CGA with walker - rolling     Goal #2  Current Status    Goal #3 Patient is able to ambulate 1

## 2020-03-27 NOTE — PLAN OF CARE
Problem: ALTERED NUTRIENT INTAKE - ADULT  Goal: Nutrient intake appropriate for improving, restoring or maintaining nutritional needs  Description  INTERVENTIONS:  - Assess nutritional status and recommend course of action  - Monitor oral intaken , labs,

## 2020-03-27 NOTE — DIETARY NOTE
ADULT NUTRITION REASSESSMENT     Pt is at moderate nutrition risk. Pt does not meet malnutrition criteria. RECOMMENDATIONS TO MD:  See Nutrition Intervention for tf rx.       NUTRITION DIAGNOSIS/PROBLEM:  Inadequate protein energy intake related only + DM + Cardiac as ordered. - Enteral Nutrition:  DCed. - Medical Food Supplements-none at present. - Vitamin and mineral supplements: none  - Feeding assistance: meal set up RN to feed if needed.    - Nutrition education: not appropriate  - Coor TID CC   • vancomycin HCl  250 mg Oral Q6H   • hydrALAzine HCl  25 mg Oral Q8H [facility]   • vancomycin  1,000 mg Intravenous Q48H   • pantoprazole (PROTONIX) IV push  40 mg Intravenous Q24H   • amLODIPine Besylate  10 mg Oral Daily   • aspirin  81 mg Oral Daily Food and Nutrient Intake:      Monitor: adequacy of PO intake and tolerance of PO intake future  - Anthropometric Measurement:      Monitor: wt and wt change  - Nutrition Goals:      allow wt loss due to fluid losses, PO greater than 75% of meals, labs acc

## 2020-03-27 NOTE — PLAN OF CARE
Pt confused and drowsy at times. Speech can be slurred and delayed. Velarde in place. Flexiseal removed per patient. CXR stable. Oxygen weaned as able. Restraint ordered renewed and maintained. IV lasix and antibiotics given.  Plan for SHERICE when medically stab Assess the need for suctioning and perform as needed  - Assess and instruct to report SOB or any respiratory difficulty  - Respiratory Therapy support as indicated  - Manage/alleviate anxiety  - Monitor for signs/symptoms of CO2 retention  Outcome: Opal maintained  Description  INTERVENTIONS:  - Monitor labs and assess for signs and symptoms of volume excess or deficit  - Monitor intake, output and patient weight  - Monitor urine specific gravity, serum osmolarity and serum sodium as indicated or ordered

## 2020-03-27 NOTE — PROGRESS NOTES
Pt transferring to room 333. Skin assessment done with second caregiver Ace. Skin conditions are as follows: Redness and swelling to the groin and buttocks, mepilex applied.  Redness and bruising to the right upper shoulder/back with a mepilex for protectio

## 2020-03-27 NOTE — PROGRESS NOTES
Patient seen in follow up. Extubated. Appears stable. Denies cp. I got CV surgery opinion while in house. I felt similar that her operative risk is very high and needs [facility] therefore I got official consult who agreed.  Time sp Isosorbide Mononitrate ER (IMDUR) 24 hr tab 60 mg, 60 mg, Oral, Daily  Levothyroxine Sodium (SYNTHROID) tab 50 mcg, 50 mcg, Oral, Before breakfast  nitroGLYCERIN (NITROSTAT) SL tab 0.4 mg, 0.4 mg, Sublingual, Q5 Min PRN  traMADol HCl (ULTRAM) tab 50 mg, 50 acetaminophen 325 MG Oral Tab, Take 650 mg by mouth every 6 (six) hours as needed for Pain.   Levothyroxine Sodium 50 MCG Oral Tab, Take 50 mcg by mouth before breakfast.  nitroGLYCERIN 0.4 MG Sublingual SL Tab, Place 0.4 mg under the tongue every 5 (five) CONCLUSION:  1. Continued bilateral infiltrates in the mid and lower lung fields with possible fluid in the base is unchanged from earlier study. 2. ET tube remains 1.7 cm above the mehul. NG tube appears to now be within the distal esophagus.      Dictat

## 2020-03-27 NOTE — PROGRESS NOTES
[facility] dosing service    Follow-up Pharmacokinetic Consult for Vancomycin Dosing     Nat Bruno is a 77year old female who is being treated for pneumonia.    Patient is on day 5 of Vancomycin and is currently receiving random dosing with last PharmD  3/26/2020  9:56 PM  [address]: [phone]

## 2020-03-27 NOTE — PROGRESS NOTES
[facility]  Progress Note    Yadiel Schultz Patient Status:  Inpatient    1950 MRN [id number]   Location [facility] Attending Jessi Carcamo MD   [facility] Day # 5 PCP Angely Basilio MD     Date:  3/27/2020  Date of Admission 60 MG Oral Tablet 24 Hr, Take 60 mg by mouth daily. traMADol HCl 50 MG Oral Tab, Take 1 tablet (50 mg total) by mouth every 12 (twelve) hours as needed. Clopidogrel Bisulfate 75 MG Oral Tab, Take 75 mg by mouth daily.   ipratropium-albuterol 0.5-2.5 (3) M present. Pulmonary/Chest: Effort normal. No respiratory distress. She has rales. Abdominal: Soft. Bowel sounds are normal. She exhibits no distension. There is no tenderness. There is no rebound and no guarding. Musculoskeletal: Normal range of motion. MD MARVIN on 3/26/2020 at 6:51 PM     Finalized by (CST): Yahir Amos MD on 3/26/2020 at 6:54 PM          Xr Chest Ap Portable  (cpt=71045)    Result Date: 3/26/2020  CONCLUSION:  1.  Endotracheal tube tip now projects approximately 2.7 cm above the level of ([facility]  DD and valvular HD. Stage 3 chronic kidney disease (HCC)  Creat and BUN slightly above baseline following diuresis. .       Acute respiratory failure with hypoxia  Due to pneumonia and HF.  Pt required intubation and vent support later in the day

## 2020-03-27 NOTE — PROGRESS NOTES
[facility]    Progress Note      Assessment and Plan:    1.   Acute on chronic respiratory failure– the patient has congestive heart failure with insidious dyspnea and bibasilar infiltrates with recent normal ejection fraction but with sever MD  Medical Director, [phone], [address] [facility]  Medical Director, [facility]  Pager: [phone]

## 2020-03-27 NOTE — CM/SW NOTE
Discharge Planning:    Received MDO for discharge planning. Patient is from [facility]. PT/OT is recommending [facility]. DON screen requested. Patient is now extubated, but mental status is not at baseline.    Attempted to reach adriana

## 2020-03-27 NOTE — SLP NOTE
ADULT SWALLOWING EVALUATION    ASSESSMENT    ASSESSMENT/OVERALL IMPRESSION:    This BSE was ordered secondary to Pt S/P extubation on 3/26/20 (intubation 3/23/20). Pt NPO with adequate vocal quality.  Pt presented with dysarthric speech and question Pt's ba be upgraded as tolerated. Will monitor for any new CXR results. Will complete VFSS as appropriate (may be required prior to upgrade to thin liquids). RN to communicate Pt's swallowing status with family d/t current visitor restrictions.           Min tract infection)        Prior Living Situation: Independent living  Diet Prior to Admission: Regular; Thin liquids(per Pt)  Precautions: Aspiration    Patient/Family Goals: to eat    SWALLOWING HISTORY  Current Diet Consistency: NPO  Dysphagia History: No P Upright 90 degrees with PRN feeding assistance 90 % of the time across 4 sessions.   In Progress   Goal #4 The patient will tolerate trial upgrade of pureed consistency and THIN liquids without overt signs or symptoms of aspiration with 100 % accuracy over

## 2020-03-27 NOTE — PLAN OF CARE
Problem: Patient Centered Care  Goal: Patient preferences are identified and integrated in the patient's plan of care  Description  Interventions:  - What would you like us to know as we care for you?   - Provide timely, complete, and accurate informatio Progressing     Problem: CARDIOVASCULAR - ADULT  Goal: Maintains optimal cardiac output and hemodynamic stability  Description  INTERVENTIONS:  - Monitor vital signs, rhythm, and trends  - Monitor for bleeding, hypotension and signs of decreased cardiac ou interventions for patient's volume status, including labs, urine output, blood pressure (other measures as available)  - Encourage oral intake as appropriate  - Instruct patient on fluid and nutrition restrictions as appropriate  Outcome: Progressing     P

## 2020-03-28 ENCOUNTER — APPOINTMENT (OUTPATIENT)
Dept: GENERAL RADIOLOGY | Facility: HOSPITAL | Age: 70
DRG: 208 | End: 2020-03-28
Attending: CLINICAL NURSE SPECIALIST
Payer: MEDICARE

## 2020-03-28 PROCEDURE — 71045 X-RAY EXAM CHEST 1 VIEW: CPT | Performed by: CLINICAL NURSE SPECIALIST

## 2020-03-28 PROCEDURE — 99232 SBSQ HOSP IP/OBS MODERATE 35: CPT | Performed by: INTERNAL MEDICINE

## 2020-03-28 RX ORDER — PANTOPRAZOLE SODIUM 40 MG/1
40 TABLET, DELAYED RELEASE ORAL
Status: DISCONTINUED | OUTPATIENT
Start: 2020-03-28 | End: 2020-03-31

## 2020-03-28 RX ORDER — ONDANSETRON 2 MG/ML
4 INJECTION INTRAMUSCULAR; INTRAVENOUS EVERY 6 HOURS PRN
Status: DISCONTINUED | OUTPATIENT
Start: 2020-03-28 | End: 2020-03-31

## 2020-03-28 NOTE — PLAN OF CARE
Patient slept well overnight. Patient remains in restraints at this time still forgetful and attempting to pull at IV lines and grajeda. Maintained IV abx for pna, as well as O2. Tolerating diet at this time. All isolation remains intact per protocol.  Rob Duran hypotension and signs of decreased cardiac output  - Evaluate effectiveness of vasoactive medications to optimize hemodynamic stability  - Monitor arterial and/or venous puncture sites for bleeding and/or hematoma  - Assess quality of pulses, skin color an appropriate  Outcome: Progressing     Problem: SKIN/TISSUE INTEGRITY - ADULT  Goal: Skin integrity remains intact  Description  INTERVENTIONS  - Assess and document risk factors for pressure ulcer development  - Assess and document skin integrity  - Monito

## 2020-03-28 NOTE — PROGRESS NOTES
[facility]  Progress Note    Meg Mccormickarya Patient Status:  Inpatient    1950 MRN [id number]   Location [facility] Attending Jasper Stuart MD   [facility] Day # 6 PCP Carolyn Morales MD     Date:  3/28/2020  Date of Admission 60 MG Oral Tablet 24 Hr, Take 60 mg by mouth daily. traMADol HCl 50 MG Oral Tab, Take 1 tablet (50 mg total) by mouth every 12 (twelve) hours as needed. Clopidogrel Bisulfate 75 MG Oral Tab, Take 75 mg by mouth daily.   ipratropium-albuterol 0.5-2.5 (3) M present. Pulmonary/Chest: Effort normal. No respiratory distress. She has rales. Abdominal: Soft. Bowel sounds are normal. She exhibits no distension. There is no tenderness. There is no rebound and no guarding. Musculoskeletal: Normal range of motion. Finalized by (CST): Wendy Craft MD on 3/27/2020 at 9:01 AM          Xr Chest Ap Portable  (cpt=71045)    Result Date: 3/26/2020  CONCLUSION:  1.  Mild cardiomegaly and mild to moderate pulmonary congestion has worsened from previous study with w metabolic encephalopathy  Pt alert but responses delayed and increased impulsivity noted ( pulled out NG tube and pulling at IV lines and telemetry). This is also likely the cause of her dysphagia.     **Certification      PHYSICIAN Certification of Need fo

## 2020-03-28 NOTE — PLAN OF CARE
Patient is A/O to person and place. Remains in restraints at this time due to impulsive tendencies and pulling at IV line and grajeda. IV abx d/c, continue diuresis with IV lasix. Breathing comfortably on 2L HFNC, O2 sat 98%.  Frequent rounding, q2hr turn, no vasoactive medications to optimize hemodynamic stability  - Monitor arterial and/or venous puncture sites for bleeding and/or hematoma  - Assess quality of pulses, skin color and temperature  - Assess for signs of decreased coronary artery perfusion - ex. ADULT  Goal: Skin integrity remains intact  Description  INTERVENTIONS  - Assess and document risk factors for pressure ulcer development  - Assess and document skin integrity  - Monitor for areas of redness and/or skin breakdown  - Initiate interventions,

## 2020-03-28 NOTE — PLAN OF CARE
Problem: Patient Centered Care  Goal: Patient preferences are identified and integrated in the patient's plan of care  Description  Interventions:  - What would you like us to know as we care for you?  I live at [facility]  - Provide timely, Spirometry  - Assess the need for suctioning and perform as needed  - Assess and instruct to report SOB or any respiratory difficulty  - Respiratory Therapy support as indicated  - Manage/alleviate anxiety  - Monitor for signs/symptoms of CO2 retention  3/ hyperglycemia and hypoglycemia  - Administer ordered medications to maintain glucose within target range  - Assess barriers to adequate nutritional intake and initiate nutrition consult as needed  - Instruct patient on self management of diabetes  3/28/202 Encourage family to assist in orientation and promotion of home routines  3/28/2020 1558 by Lars Cobb RN  Outcome: Progressing  3/28/2020 1204 by Lars Cobb RN  Outcome: Progressing     Problem: Patient/Family Goals  Goal: [facility]

## 2020-03-28 NOTE — PROGRESS NOTES
[facility]  Progress Note    Ayala Nino Patient Status:  Inpatient    1950 MRN [id number]   Location [facility] 3W/SW Attending Dhara Valentin MD   Hosp Day # 6 PCP Edison Linares MD     Subjective:  Patient seen in follow up.   Miquel Prophet pulses are 2+ bilateral pedal and radial sites. Neurologic: Alert and oriented, normal affect. Skin: Warm and dry. Laboratory/Data:  Diagnostics:     CXR 3/28/20  =====  CONCLUSION:   1.  Interval improvement in bibasilar airspace disease right more t (COREG) tab 6.25 mg, 6.25 mg, Oral, BID with meals  Clopidogrel Bisulfate (PLAVIX) tab 75 mg, 75 mg, Oral, Daily  Isosorbide Mononitrate ER (IMDUR) 24 hr tab 60 mg, 60 mg, Oral, Daily  Levothyroxine Sodium (SYNTHROID) tab 50 mcg, 50 mcg, Oral, Before break atorvastatin 80 mg daily, plavix, carvedilol    7. Hx CVA    Plan: will continue with lasix 40 mg TID, volume status somewhat improved    Above note from [facility]. Patient was interviewed and examined.  Agree with assessment and plan with edits to the do

## 2020-03-28 NOTE — PHYSICAL THERAPY NOTE
PHYSICAL THERAPY TREATMENT NOTE - INPATIENT     Room Number: 333/333-A       Presenting Problem: p/w acute on chronic HF, B PNA, C Diff    Problem List  Principal Problem:    Acute on chronic congestive heart failure, unspecified heart failure type ([facility] mobility; Patient education;Gait training;Strengthening;Transfer training;Balance training    SUBJECTIVE  I am OK    OBJECTIVE  Precautions: Bed/chair alarm(rectal, grajeda, restraints)    WEIGHT BEARING RESTRICTION  Weight Bearing Restriction: None reach;RN aware of session/findings; All patient questions and concerns addressed; Alarm set    CURRENT GOALS     Goals to be met by: 4/10/20  Patient Goal Patient's self-stated goal is: to be able to go home, walk again    Goal #1 Patient is able to [place]

## 2020-03-28 NOTE — PROGRESS NOTES
[facility]    Progress Note      Assessment and Plan:    1.   Acute on chronic respiratory failure– the patient has congestive heart failure with insidious dyspnea and bibasilar infiltrates with recent normal ejection fraction but with sever 03/28/2020     03/28/2020    CO2 25.0 03/28/2020     03/28/2020    CA 9.6 03/28/2020     Chest x-ray– improvement in basilar haziness    Chris Wynn MD  Medical Director, Critical Care, [address] [place] [address]  Medical Director, [address] [place] [address]

## 2020-03-29 PROCEDURE — 99233 SBSQ HOSP IP/OBS HIGH 50: CPT | Performed by: INTERNAL MEDICINE

## 2020-03-29 RX ORDER — FUROSEMIDE 80 MG
80 TABLET ORAL
Status: DISCONTINUED | OUTPATIENT
Start: 2020-03-29 | End: 2020-03-31

## 2020-03-29 NOTE — PROGRESS NOTES
Pulmonary/Critical Care Follow Up Note    HPI:   Luz Martínez is a 77year old female with Patient presents with:  Dyspnea CINDY SOB      PCP Marina Wiley MD  Admission Attending Steve Cortes 15 Day #7    Denies sob  Denies cough  No fever tab 60 mg, 60 mg, Oral, Daily  •  Levothyroxine Sodium (SYNTHROID) tab 50 mcg, 50 mcg, Oral, Before breakfast  •  nitroGLYCERIN (NITROSTAT) SL tab 0.4 mg, 0.4 mg, Sublingual, Q5 Min PRN  •  traMADol HCl (ULTRAM) tab 50 mg, 50 mg, Oral, Q12H PRN  •  Normal 104  --  107 108   CO2 28.0  --  25.0 28.0         ASSESSMENT/PLAN:     Acute on chronic respiratory failure  S/p intubation and extubation  2/2 CHF with severe MR and possible aspiration  Denies chest sx now  Plan  IV lasix   Follow I/Os   Coreg   IS    D

## 2020-03-29 NOTE — PLAN OF CARE
Intermittent moments of confusion and forgetfulness w/ some impulsiveness. C/o nausea treated w/ PRN IV Zofran. Velarde in place and intact. Plan: IV Lasix TID. PO abx (Vanco). PT/OT recommending [facility]ICE upon d/c.     Problem: Patient Centered Care  Goal: Patient for suctioning and perform as needed  - Assess and instruct to report SOB or any respiratory difficulty  - Respiratory Therapy support as indicated  - Manage/alleviate anxiety  - Monitor for signs/symptoms of CO2 retention  Outcome: Progressing     Problem labs and assess for signs and symptoms of volume excess or deficit  - Monitor intake, output and patient weight  - Monitor urine specific gravity, serum osmolarity and serum sodium as indicated or ordered  - Monitor response to interventions for patient's

## 2020-03-29 NOTE — SLP NOTE
SPEECH DAILY NOTE - INPATIENT    ASSESSMENT & PLAN   ASSESSMENT  Patient seen in follow up for diet tolerance and assessment for possible upgrade. Patient sleeping upon entry, though awakened to greeting and was agreeable to session.   Patient repositioned aspiration with 100 % accuracy over 2 session(s). Patient was observed with soft solids/thin liquids - tolerated without overt difficulty.     Revised goal:  The patient will tolerate Ground  consistency and Thin liquids without overt signs or symptoms o

## 2020-03-29 NOTE — PROGRESS NOTES
[facility]  Progress Note    Maulik Rodrigez Patient Status:  Inpatient    1950 MRN [id number]   Location [address] [facility] Attending Marck Castano MD   [facility] Day # 7 PCP Nona Orozco MD     Date:  3/28/2020  Date of Admission 60 MG Oral Tablet 24 Hr, Take 60 mg by mouth daily. traMADol HCl 50 MG Oral Tab, Take 1 tablet (50 mg total) by mouth every 12 (twelve) hours as needed. Clopidogrel Bisulfate 75 MG Oral Tab, Take 75 mg by mouth daily.   ipratropium-albuterol 0.5-2.5 (3) M present. Pulmonary/Chest: Effort normal. No respiratory distress. She has rales. Abdominal: Soft. Bowel sounds are normal. She exhibits no distension. There is no tenderness. There is no rebound and no guarding. Musculoskeletal: Normal range of motion. reduction initially but as of 3/25 having to decrease diuretic dose due to rising creat. Chest XR improving. 3/27: I&O's showing negative fluid balance and creat more or less stable.       Ureteral stent retained  H/o nephrolithiasis and obstructive uropathy

## 2020-03-29 NOTE — PROGRESS NOTES
Patient seen in follow up. Patient denies any chest pain or sob. Breathing is better.    03/29/20  1428   BP: 113/60   Pulse: 81   Resp: 16   Temp: 98.4 °F (36.9 °C)       Intake/Output Summary (Last 24 hours) at 3/29/2020 1521  Last data filed at 3/ glucose (DEX4) oral liquid 15 g, 15 g, Oral, Q15 Min PRN    Or  Glucose-Vitamin C (DEX-4) chewable tab 4 tablet, 4 tablet, Oral, Q15 Min PRN    Or  dextrose 50 % injection 50 mL, 50 mL, Intravenous, Q15 Min PRN    Or  glucose (DEX4) oral liquid 30 g, 30 g, CONCLUSION:  1. Interval improvement in bibasilar airspace disease right more than left with mild to moderate amount remaining. Probable superimposed left basal atelectasis. No large pleural effusion. Continued follow-up is advised.     Dictated by (CST)

## 2020-03-29 NOTE — PLAN OF CARE
Vitals stable. Weaned O2 from 2L to 1L. Pt was alert and oriented x3 today, pleasant and cooperative.  Pt up to chair with 1 assist.   Problem: Patient Centered Care  Goal: Patient preferences are identified and integrated in the patient's plan of care  Buck any respiratory difficulty  - Respiratory Therapy support as indicated  - Manage/alleviate anxiety  - Monitor for signs/symptoms of CO2 retention  Outcome: Progressing     Problem: CARDIOVASCULAR - ADULT  Goal: Maintains optimal cardiac output and hemodyna intake, output and patient weight  - Monitor urine specific gravity, serum osmolarity and serum sodium as indicated or ordered  - Monitor response to interventions for patient's volume status, including labs, urine output, blood pressure (other measures as

## 2020-03-30 PROCEDURE — 99232 SBSQ HOSP IP/OBS MODERATE 35: CPT | Performed by: INTERNAL MEDICINE

## 2020-03-30 NOTE — PROGRESS NOTES
[facility]  Progress Note    Kareen Band Patient Status:  Inpatient    1950 MRN [id number]   Location [facility] Attending Altagracia Boyle MD   [facility] Day # 8 PCP Shannon Chen MD     Date:  3/30/2020  Date of Admission 60 MG Oral Tablet 24 Hr, Take 60 mg by mouth daily. traMADol HCl 50 MG Oral Tab, Take 1 tablet (50 mg total) by mouth every 12 (twelve) hours as needed. Clopidogrel Bisulfate 75 MG Oral Tab, Take 75 mg by mouth daily.   ipratropium-albuterol 0.5-2.5 (3) M present. Pulmonary/Chest: Effort normal. No respiratory distress. She has rales. Abdominal: Soft. Bowel sounds are normal. She exhibits no distension. There is no tenderness. There is no rebound and no guarding. Musculoskeletal: Normal range of motion. OHA and diet. Mitral valve insufficiency  Valve replacement surgery in planning stages but will be high risk. Heart block, AV  Recent PPM placement for recurring high degree AV block. Congestive heart failure (HCC)  DD and valvular HD.

## 2020-03-30 NOTE — PHYSICAL THERAPY NOTE
PHYSICAL THERAPY TREATMENT NOTE - INPATIENT     Room Number: 333/333-A       Presenting Problem: p/w acute on chronic HF, B PNA, C Diff    Problem List  Principal Problem:    Acute on chronic congestive heart failure, unspecified heart failure type ([facility] 0  Location: bottom at rectal tube site  Management Techniques: Activity promotion; Body mechanics; Relaxation;Repositioning    BALANCE                                                                                                                     Static rolling     Goal #2  Current Status Min A with the RW   Goal #3 Patient is able to ambulate 100 feet with assist device: walker - rolling at assistance level: minimum assistance   Goal #3   Current Status 48' with the RW min A   Goal #4 Pt is able to sit E

## 2020-03-30 NOTE — PLAN OF CARE
Problem: Patient Centered Care  Goal: Patient preferences are identified and integrated in the patient's plan of care  Description  Interventions:  - What would you like us to know as we care for you?  I live at [facility]  - Provide timely, CO2 retention  Outcome: Progressing     Problem: CARDIOVASCULAR - ADULT  Goal: Maintains optimal cardiac output and hemodynamic stability  Description  INTERVENTIONS:  - Monitor vital signs, rhythm, and trends  - Monitor for bleeding, hypotension and signs Monitor response to interventions for patient's volume status, including labs, urine output, blood pressure (other measures as available)  - Encourage oral intake as appropriate  - Instruct patient on fluid and nutrition restrictions as appropriate  Outcom

## 2020-03-30 NOTE — PROGRESS NOTES
[facility]    Progress Note      Assessment and Plan:    1.   Acute on chronic respiratory failure– the patient has congestive heart failure with insidious dyspnea and bibasilar infiltrates with recent normal ejection fraction but with sever [facility]  Pager: [phone]

## 2020-03-30 NOTE — SLP NOTE
SPEECH DAILY NOTE - INPATIENT    ASSESSMENT & PLAN   ASSESSMENT  PPE REQUIRED. THIS SLP WORE GOWN, GLOVES, GOGGLES, AND DROPLET MASK.  HANDS SANITIZED/WASHED UPON ENTRANCE/EXIT.     RN, Hermelinda Celis, reports pt tolerates AM medication and meal with no difficultie 93-97 t/o oral trials. SLP to f/u with full meal assessment x1 as able.   Goal met/Revised                In progress    Goal #2 The patient/family/caregiver will demonstrate understanding and implementation of aspiration precautions and swallow strategies

## 2020-03-30 NOTE — CM/SW NOTE
Received MDO for d/c planning - SNF referral to [employer]. SW confirmed w/ CM [facility] - she spoke w/ pt and referrals were sent. SW confirmed via [facility] - referrals were sent to [place], [facility], and [facility]- [facility] for review.     Pending acceptance an

## 2020-03-30 NOTE — OCCUPATIONAL THERAPY NOTE
OCCUPATIONAL THERAPY TREATMENT NOTE - INPATIENT        Room Number: 333/333-A                Problem List  Principal Problem:    Acute on chronic congestive heart failure, unspecified heart failure type [facility])  Active Problems:    Ureteral stent retained off regular lower body clothing?: A Lot  -   Bathing (including washing, rinsing, drying)?: A Lot  -   Toileting, which includes using toilet, bedpan or urinal? : A Lot  -   Putting on and taking off regular upper body clothing?: A Lot  -   Taking care of

## 2020-03-30 NOTE — PLAN OF CARE
Problem: Patient Centered Care  Goal: Patient preferences are identified and integrated in the patient's plan of care  Description  Interventions:  - What would you like us to know as we care for you?  I live at [facility]  - Provide timely, CO2 retention  Outcome: Progressing     Problem: CARDIOVASCULAR - ADULT  Goal: Maintains optimal cardiac output and hemodynamic stability  Description  INTERVENTIONS:  - Monitor vital signs, rhythm, and trends  - Monitor for bleeding, hypotension and signs Monitor response to interventions for patient's volume status, including labs, urine output, blood pressure (other measures as available)  - Encourage oral intake as appropriate  - Instruct patient on fluid and nutrition restrictions as appropriate  Outcom

## 2020-03-30 NOTE — PROGRESS NOTES
Patient seen in follow up. Patient denies any chest pain or sob. Breathing is better.    03/30/20  0841   BP: 123/70   Pulse: 72   Resp: 18   Temp: 97.7 °F (36.5 °C)       Intake/Output Summary (Last 24 hours) at 3/30/2020 1102  Last data filed at 3/ glucose (DEX4) oral liquid 15 g, 15 g, Oral, Q15 Min PRN    Or  Glucose-Vitamin C (DEX-4) chewable tab 4 tablet, 4 tablet, Oral, Q15 Min PRN    Or  dextrose 50 % injection 50 mL, 50 mL, Intravenous, Q15 Min PRN    Or  glucose (DEX4) oral liquid 30 g, 30 g, 4. Severe mitral regurgitation  - has been following with [facility] for surgery as outpatient and is seeing a surgeon there as well she is high surgical risk which is why to be done at tertiary center once better if at all, she is also not a candidate for mitral

## 2020-03-31 VITALS
TEMPERATURE: 98 F | DIASTOLIC BLOOD PRESSURE: 81 MMHG | WEIGHT: 168.38 LBS | RESPIRATION RATE: 20 BRPM | BODY MASS INDEX: 28 KG/M2 | SYSTOLIC BLOOD PRESSURE: 103 MMHG | HEART RATE: 73 BPM | OXYGEN SATURATION: 98 %

## 2020-03-31 PROCEDURE — 99232 SBSQ HOSP IP/OBS MODERATE 35: CPT | Performed by: INTERNAL MEDICINE

## 2020-03-31 RX ORDER — PANTOPRAZOLE SODIUM 40 MG/1
40 TABLET, DELAYED RELEASE ORAL
Qty: 30 TABLET | Refills: 2 | Status: SHIPPED | OUTPATIENT
Start: 2020-04-01

## 2020-03-31 RX ORDER — FUROSEMIDE 80 MG
80 TABLET ORAL
Qty: 60 TABLET | Refills: 3 | Status: ON HOLD | OUTPATIENT
Start: 2020-03-31 | End: 2020-08-28

## 2020-03-31 RX ORDER — TRAMADOL HYDROCHLORIDE 50 MG/1
50 TABLET ORAL EVERY 12 HOURS PRN
Qty: 60 TABLET | Refills: 5 | Status: ON HOLD | OUTPATIENT
Start: 2020-03-31 | End: 2020-08-28

## 2020-03-31 RX ORDER — HYDRALAZINE HYDROCHLORIDE 25 MG/1
25 TABLET, FILM COATED ORAL EVERY 8 HOURS SCHEDULED
Qty: 90 TABLET | Refills: 3 | Status: ON HOLD | OUTPATIENT
Start: 2020-03-31 | End: 2020-07-25

## 2020-03-31 NOTE — PLAN OF CARE
Problem: Patient Centered Care  Goal: Patient preferences are identified and integrated in the patient's plan of care  Description  Interventions:  - What would you like us to know as we care for you?  I live at [facility]  - Provide timely, CO2 retention  Outcome: Progressing     Problem: CARDIOVASCULAR - ADULT  Goal: Maintains optimal cardiac output and hemodynamic stability  Description  INTERVENTIONS:  - Monitor vital signs, rhythm, and trends  - Monitor for bleeding, hypotension and signs Monitor response to interventions for patient's volume status, including labs, urine output, blood pressure (other measures as available)  - Encourage oral intake as appropriate  - Instruct patient on fluid and nutrition restrictions as appropriate  Outcom

## 2020-03-31 NOTE — PLAN OF CARE
Problem: Patient Centered Care  Goal: Patient preferences are identified and integrated in the patient's plan of care  Description  Interventions:  - What would you like us to know as we care for you?  I live at [facility]  - Provide timely, CO2 retention  Outcome: Progressing     Problem: CARDIOVASCULAR - ADULT  Goal: Maintains optimal cardiac output and hemodynamic stability  Description  INTERVENTIONS:  - Monitor vital signs, rhythm, and trends  - Monitor for bleeding, hypotension and signs Monitor response to interventions for patient's volume status, including labs, urine output, blood pressure (other measures as available)  - Encourage oral intake as appropriate  - Instruct patient on fluid and nutrition restrictions as appropriate  Outcom work with her in her home - told her this RN would pass on the question. Cecy VENCES continues, patient stating that her stools are soft but no longer watery. Monitoring.

## 2020-03-31 NOTE — SLP NOTE
SPEECH DAILY NOTE - INPATIENT    ASSESSMENT & PLAN   ASSESSMENT    PPE REQUIRED. THIS SLP WORE GOWN, GLOVES, GOGGLES, AND DROPLET MASK. HANDS SANITIZED/WASHED UPON ENTRANCE/EXIT. Pt alert, afebrile and on room air.  Pt seen for swallow analysis per BSE r Administration Recommendations: Whole in puree    Patient Experiencing Pain: No                Discharge Recommendations  Discharge Recommendations/Plan: Undetermined    Treatment Plan  Treatment Plan/Recommendations: Aspiration precautions    Interdiscipl for 100% of the trials.  Recommend f/u x1 with new CXR results as able.        Goal Progessing          FOLLOW UP  Follow Up Needed: Yes  SLP Follow-up Date: 04/01/20  Number of Visits to Meet Established Goals: 4    Session:  3    If you have any questions

## 2020-03-31 NOTE — PLAN OF CARE
Problem: Patient Centered Care  Goal: Patient preferences are identified and integrated in the patient's plan of care  Description  Interventions:  - What would you like us to know as we care for you?  I live at [facility]  - Provide timely, suctioning and perform as needed  - Assess and instruct to report SOB or any respiratory difficulty  - Respiratory Therapy support as indicated  - Manage/alleviate anxiety  - Monitor for signs/symptoms of CO2 retention  3/31/2020 1703 by Kamila Wilson maintain glucose within target range  - Assess barriers to adequate nutritional intake and initiate nutrition consult as needed  - Instruct patient on self management of diabetes  3/31/2020 1703 by Phoenix Ponce RN  Outcome: Completed  3/31/2020 1937 Mónica RN  Outcome: Completed  3/31/2020 1542 by Tammy Vincent RN  Outcome: Progressing     Problem: Patient/Family Goals  Goal: Patient/Family Long Term Goal  Description  Patient's Long Term Goal: to go back to [facility]    Interventions:  -

## 2020-03-31 NOTE — CM/SW NOTE
03/31/20 1600   Discharge disposition   Expected discharge disposition Skilled Nurs   Name of [address] [facility]   Discharge transportation [address] [facility]   MDO received for discharge to rehabilitation.   Patient has been accepted

## 2020-03-31 NOTE — PROGRESS NOTES
[facility]  Progress Note    Lucinda Gamez Patient Status:  Inpatient    1950 MRN [id number]   Location [facility] Attending Olegario Mckeon MD   [facility] Day # 9 PCP Maryam Samayoa MD     Date:  3/31/2020  Date of Admission Oral Tab, Take 75 mg by mouth daily. ipratropium-albuterol 0.5-2.5 (3) MG/3ML Inhalation Solution, Take 3 mL by nebulization every 4 (four) hours as needed. traZODone HCl 50 MG Oral Tab, Take 50 mg by mouth nightly as needed for Sleep.   aspirin 81 MG Ora heart sounds. Edema not present. Pulmonary/Chest: Effort normal. No respiratory distress. She has no rales. Abdominal: Soft. Bowel sounds are normal. She exhibits no distension. There is no tenderness. There is no rebound and no guarding.  Musculoske current use of insulin (HCC)  Controlled on OHA and diet. Mitral valve insufficiency  Valve replacement surgery in planning stages but will be high risk. Heart block, AV  Recent PPM placement for recurring high degree AV block.       Congestive he

## 2020-03-31 NOTE — PROGRESS NOTES
Patient seen in follow up. Patient denies any chest pain or sob. Breathing is better.    03/31/20  0554   BP: 132/59   Pulse: 73   Resp: 21   Temp:        Intake/Output Summary (Last 24 hours) at 3/31/2020 0930  Last data filed at 3/31/2020 0830  Osvaldo glucose (DEX4) oral liquid 15 g, 15 g, Oral, Q15 Min PRN    Or  Glucose-Vitamin C (DEX-4) chewable tab 4 tablet, 4 tablet, Oral, Q15 Min PRN    Or  dextrose 50 % injection 50 mL, 50 mL, Intravenous, Q15 Min PRN    Or  glucose (DEX4) oral liquid 30 g, 30 g, - has been following with [facility] for surgery as outpatient and is seeing a surgeon there as well she is high surgical risk which is why to be done at tertiary center once better if at all, she is also not a candidate for mitral clip due to severe MAC     5.  Shona Boleschild

## 2020-04-01 ENCOUNTER — PATIENT OUTREACH (OUTPATIENT)
Dept: CASE MANAGEMENT | Age: 70
End: 2020-04-01

## 2020-05-05 NOTE — DISCHARGE SUMMARY
[facility]    PATIENT'S NAME: Antonio Hinojosa   ATTENDING PHYSICIAN: Preethi Roach.  Gloria Justin MD   PATIENT ACCOUNT#:   [id number]    LOCATION:  [address] [place] [address] Road RECORD #:   [id number]       YOB: 1950  ADMISSION DATE:       03/22/2 murmur. ABDOMEN:  Soft. Bowel sounds were active. She was nontender, nondistended. EXTREMITIES:  No edema. NEUROLOGIC:  She was alert and oriented x3 with no appreciable focal deficits. PSYCHIATRIC:  Normal mood and affect.       LABORATORIES:

## 2020-07-21 ENCOUNTER — APPOINTMENT (OUTPATIENT)
Dept: GENERAL RADIOLOGY | Facility: HOSPITAL | Age: 70
DRG: 291 | End: 2020-07-21
Attending: EMERGENCY MEDICINE
Payer: MEDICARE

## 2020-07-21 ENCOUNTER — APPOINTMENT (OUTPATIENT)
Dept: CT IMAGING | Facility: HOSPITAL | Age: 70
DRG: 291 | End: 2020-07-21
Attending: EMERGENCY MEDICINE
Payer: MEDICARE

## 2020-07-21 ENCOUNTER — HOSPITAL ENCOUNTER (INPATIENT)
Facility: HOSPITAL | Age: 70
LOS: 5 days | Discharge: HOME HEALTH | DRG: 291 | End: 2020-07-26
Attending: EMERGENCY MEDICINE | Admitting: INTERNAL MEDICINE
Payer: MEDICARE

## 2020-07-21 DIAGNOSIS — I50.9 ACUTE ON CHRONIC CONGESTIVE HEART FAILURE, UNSPECIFIED HEART FAILURE TYPE (HCC): Primary | ICD-10-CM

## 2020-07-21 DIAGNOSIS — N30.00 ACUTE CYSTITIS WITHOUT HEMATURIA: ICD-10-CM

## 2020-07-21 PROBLEM — D69.6 THROMBOCYTOPENIA (HCC): Status: ACTIVE | Noted: 2020-07-21

## 2020-07-21 PROBLEM — D64.9 ANEMIA: Status: ACTIVE | Noted: 2020-07-21

## 2020-07-21 PROBLEM — R73.9 HYPERGLYCEMIA: Status: ACTIVE | Noted: 2020-07-21

## 2020-07-21 LAB
ANION GAP SERPL CALC-SCNC: 7 MMOL/L (ref 0–18)
APTT PPP: 30.1 SECONDS (ref 23.2–35.3)
BASOPHILS # BLD AUTO: 0.02 X10(3) UL (ref 0–0.2)
BASOPHILS NFR BLD AUTO: 0.3 %
BILIRUB UR QL: NEGATIVE
BUN BLD-MCNC: 28 MG/DL (ref 7–18)
BUN/CREAT SERPL: 15.1 (ref 10–20)
CALCIUM BLD-MCNC: 8.3 MG/DL (ref 8.5–10.1)
CHLORIDE SERPL-SCNC: 110 MMOL/L (ref 98–112)
CO2 SERPL-SCNC: 23 MMOL/L (ref 21–32)
COLOR UR: YELLOW
CREAT BLD-MCNC: 1.85 MG/DL (ref 0.55–1.02)
DEPRECATED RDW RBC AUTO: 47.2 FL (ref 35.1–46.3)
EOSINOPHIL # BLD AUTO: 0.24 X10(3) UL (ref 0–0.7)
EOSINOPHIL NFR BLD AUTO: 3.8 %
ERYTHROCYTE [DISTWIDTH] IN BLOOD BY AUTOMATED COUNT: 14.1 % (ref 11–15)
EST. AVERAGE GLUCOSE BLD GHB EST-MCNC: 114 MG/DL (ref 68–126)
GLUCOSE BLD-MCNC: 136 MG/DL (ref 70–99)
GLUCOSE BLDC GLUCOMTR-MCNC: 104 MG/DL (ref 70–99)
GLUCOSE UR-MCNC: NEGATIVE MG/DL
HBA1C MFR BLD HPLC: 5.6 % (ref ?–5.7)
HCT VFR BLD AUTO: 32.1 % (ref 35–48)
HGB BLD-MCNC: 10.8 G/DL (ref 12–16)
IMM GRANULOCYTES # BLD AUTO: 0.01 X10(3) UL (ref 0–1)
IMM GRANULOCYTES NFR BLD: 0.2 %
INR BLD: 1.24 (ref 0.9–1.2)
KETONES UR-MCNC: NEGATIVE MG/DL
LYMPHOCYTES # BLD AUTO: 1.59 X10(3) UL (ref 1–4)
LYMPHOCYTES NFR BLD AUTO: 25 %
MCH RBC QN AUTO: 30.9 PG (ref 26–34)
MCHC RBC AUTO-ENTMCNC: 33.6 G/DL (ref 31–37)
MCV RBC AUTO: 92 FL (ref 80–100)
MONOCYTES # BLD AUTO: 0.48 X10(3) UL (ref 0.1–1)
MONOCYTES NFR BLD AUTO: 7.5 %
NEUTROPHILS # BLD AUTO: 4.02 X10 (3) UL (ref 1.5–7.7)
NEUTROPHILS # BLD AUTO: 4.02 X10(3) UL (ref 1.5–7.7)
NEUTROPHILS NFR BLD AUTO: 63.2 %
NITRITE UR QL STRIP.AUTO: NEGATIVE
NT-PROBNP SERPL-MCNC: 4998 PG/ML (ref ?–125)
OSMOLALITY SERPL CALC.SUM OF ELEC: 298 MOSM/KG (ref 275–295)
PH UR: 5 [PH] (ref 5–8)
PLATELET # BLD AUTO: 133 10(3)UL (ref 150–450)
POTASSIUM SERPL-SCNC: 3.9 MMOL/L (ref 3.5–5.1)
PROT UR-MCNC: 100 MG/DL
PROTHROMBIN TIME: 15.4 SECONDS (ref 11.8–14.5)
RBC # BLD AUTO: 3.49 X10(6)UL (ref 3.8–5.3)
RBC #/AREA URNS AUTO: 449 /HPF
SARS-COV-2 RNA RESP QL NAA+PROBE: NOT DETECTED
SODIUM SERPL-SCNC: 140 MMOL/L (ref 136–145)
SP GR UR STRIP: 1.01 (ref 1–1.03)
TROPONIN I SERPL-MCNC: <0.045 NG/ML (ref ?–0.04)
UROBILINOGEN UR STRIP-ACNC: <2
WBC # BLD AUTO: 6.4 X10(3) UL (ref 4–11)
WBC #/AREA URNS AUTO: 700 /HPF

## 2020-07-21 PROCEDURE — 93005 ELECTROCARDIOGRAM TRACING: CPT

## 2020-07-21 PROCEDURE — 85730 THROMBOPLASTIN TIME PARTIAL: CPT | Performed by: EMERGENCY MEDICINE

## 2020-07-21 PROCEDURE — 96365 THER/PROPH/DIAG IV INF INIT: CPT

## 2020-07-21 PROCEDURE — 85610 PROTHROMBIN TIME: CPT | Performed by: EMERGENCY MEDICINE

## 2020-07-21 PROCEDURE — 71045 X-RAY EXAM CHEST 1 VIEW: CPT | Performed by: EMERGENCY MEDICINE

## 2020-07-21 PROCEDURE — 81001 URINALYSIS AUTO W/SCOPE: CPT | Performed by: EMERGENCY MEDICINE

## 2020-07-21 PROCEDURE — 82962 GLUCOSE BLOOD TEST: CPT

## 2020-07-21 PROCEDURE — 87088 URINE BACTERIA CULTURE: CPT | Performed by: EMERGENCY MEDICINE

## 2020-07-21 PROCEDURE — 80048 BASIC METABOLIC PNL TOTAL CA: CPT | Performed by: EMERGENCY MEDICINE

## 2020-07-21 PROCEDURE — 70450 CT HEAD/BRAIN W/O DYE: CPT | Performed by: EMERGENCY MEDICINE

## 2020-07-21 PROCEDURE — 99285 EMERGENCY DEPT VISIT HI MDM: CPT

## 2020-07-21 PROCEDURE — 87186 SC STD MICRODIL/AGAR DIL: CPT | Performed by: EMERGENCY MEDICINE

## 2020-07-21 PROCEDURE — 85025 COMPLETE CBC W/AUTO DIFF WBC: CPT | Performed by: EMERGENCY MEDICINE

## 2020-07-21 PROCEDURE — 87086 URINE CULTURE/COLONY COUNT: CPT | Performed by: EMERGENCY MEDICINE

## 2020-07-21 PROCEDURE — 83880 ASSAY OF NATRIURETIC PEPTIDE: CPT | Performed by: EMERGENCY MEDICINE

## 2020-07-21 PROCEDURE — 93010 ELECTROCARDIOGRAM REPORT: CPT | Performed by: EMERGENCY MEDICINE

## 2020-07-21 PROCEDURE — 96375 TX/PRO/DX INJ NEW DRUG ADDON: CPT

## 2020-07-21 PROCEDURE — 83036 HEMOGLOBIN GLYCOSYLATED A1C: CPT | Performed by: INTERNAL MEDICINE

## 2020-07-21 PROCEDURE — 84484 ASSAY OF TROPONIN QUANT: CPT | Performed by: EMERGENCY MEDICINE

## 2020-07-21 RX ORDER — VANCOMYCIN HYDROCHLORIDE 125 MG/1
125 CAPSULE ORAL DAILY
Status: DISCONTINUED | OUTPATIENT
Start: 2020-07-21 | End: 2020-07-21

## 2020-07-21 RX ORDER — POTASSIUM CHLORIDE 750 MG/1
20 TABLET, EXTENDED RELEASE ORAL
Status: ON HOLD | COMMUNITY
End: 2020-11-07

## 2020-07-21 RX ORDER — ACETAMINOPHEN 325 MG/1
650 TABLET ORAL EVERY 6 HOURS PRN
Status: DISCONTINUED | OUTPATIENT
Start: 2020-07-21 | End: 2020-07-26

## 2020-07-21 RX ORDER — HYDRALAZINE HYDROCHLORIDE 25 MG/1
25 TABLET, FILM COATED ORAL EVERY 8 HOURS SCHEDULED
Status: DISCONTINUED | OUTPATIENT
Start: 2020-07-21 | End: 2020-07-22

## 2020-07-21 RX ORDER — ECHINACEA PURPUREA EXTRACT 125 MG
2 TABLET ORAL 3 TIMES DAILY PRN
Status: ON HOLD | COMMUNITY
End: 2020-11-03

## 2020-07-21 RX ORDER — POTASSIUM CHLORIDE 750 MG/1
10 TABLET, EXTENDED RELEASE ORAL
Status: DISCONTINUED | OUTPATIENT
Start: 2020-07-21 | End: 2020-07-26

## 2020-07-21 RX ORDER — AMLODIPINE BESYLATE 10 MG/1
10 TABLET ORAL DAILY
Status: DISCONTINUED | OUTPATIENT
Start: 2020-07-21 | End: 2020-07-21

## 2020-07-21 RX ORDER — PANTOPRAZOLE SODIUM 40 MG/1
40 TABLET, DELAYED RELEASE ORAL
Status: DISCONTINUED | OUTPATIENT
Start: 2020-07-22 | End: 2020-07-26

## 2020-07-21 RX ORDER — FUROSEMIDE 10 MG/ML
40 INJECTION INTRAMUSCULAR; INTRAVENOUS ONCE
Status: COMPLETED | OUTPATIENT
Start: 2020-07-21 | End: 2020-07-21

## 2020-07-21 RX ORDER — NITROGLYCERIN 0.4 MG/1
0.4 TABLET SUBLINGUAL EVERY 5 MIN PRN
Status: DISCONTINUED | OUTPATIENT
Start: 2020-07-21 | End: 2020-07-26

## 2020-07-21 RX ORDER — FUROSEMIDE 10 MG/ML
40 INJECTION INTRAMUSCULAR; INTRAVENOUS
Status: DISCONTINUED | OUTPATIENT
Start: 2020-07-22 | End: 2020-07-23

## 2020-07-21 RX ORDER — LORATADINE 10 MG/1
10 TABLET ORAL DAILY
COMMUNITY

## 2020-07-21 RX ORDER — SODIUM CHLORIDE 0.9 % (FLUSH) 0.9 %
3 SYRINGE (ML) INJECTION AS NEEDED
Status: DISCONTINUED | OUTPATIENT
Start: 2020-07-21 | End: 2020-07-26

## 2020-07-21 RX ORDER — LEVOTHYROXINE SODIUM 0.05 MG/1
50 TABLET ORAL
Status: DISCONTINUED | OUTPATIENT
Start: 2020-07-22 | End: 2020-07-26

## 2020-07-21 RX ORDER — ISOSORBIDE MONONITRATE 60 MG/1
60 TABLET, EXTENDED RELEASE ORAL DAILY
Status: DISCONTINUED | OUTPATIENT
Start: 2020-07-22 | End: 2020-07-26

## 2020-07-21 RX ORDER — ASPIRIN 81 MG/1
81 TABLET, CHEWABLE ORAL DAILY
Status: DISCONTINUED | OUTPATIENT
Start: 2020-07-22 | End: 2020-07-26

## 2020-07-21 RX ORDER — AMLODIPINE BESYLATE 5 MG/1
5 TABLET ORAL DAILY
Status: DISCONTINUED | OUTPATIENT
Start: 2020-07-22 | End: 2020-07-22

## 2020-07-21 RX ORDER — FLUTICASONE PROPIONATE 50 MCG
2 SPRAY, SUSPENSION (ML) NASAL 2 TIMES DAILY
Status: ON HOLD | COMMUNITY
End: 2020-11-03

## 2020-07-21 RX ORDER — CARVEDILOL 6.25 MG/1
6.25 TABLET ORAL 2 TIMES DAILY WITH MEALS
Status: DISCONTINUED | OUTPATIENT
Start: 2020-07-21 | End: 2020-07-26

## 2020-07-21 RX ORDER — TRAZODONE HYDROCHLORIDE 50 MG/1
75 TABLET ORAL NIGHTLY
Status: DISCONTINUED | OUTPATIENT
Start: 2020-07-21 | End: 2020-07-22

## 2020-07-21 RX ORDER — HEPARIN SODIUM 5000 [USP'U]/ML
5000 INJECTION, SOLUTION INTRAVENOUS; SUBCUTANEOUS EVERY 12 HOURS SCHEDULED
Status: DISCONTINUED | OUTPATIENT
Start: 2020-07-21 | End: 2020-07-26

## 2020-07-21 RX ORDER — TRAMADOL HYDROCHLORIDE 50 MG/1
50 TABLET ORAL EVERY 12 HOURS PRN
Status: DISCONTINUED | OUTPATIENT
Start: 2020-07-21 | End: 2020-07-26

## 2020-07-21 RX ORDER — CLOPIDOGREL BISULFATE 75 MG/1
75 TABLET ORAL DAILY
Status: DISCONTINUED | OUTPATIENT
Start: 2020-07-22 | End: 2020-07-26

## 2020-07-21 RX ORDER — FLUTICASONE PROPIONATE 50 MCG
2 SPRAY, SUSPENSION (ML) NASAL 2 TIMES DAILY
Status: DISCONTINUED | OUTPATIENT
Start: 2020-07-21 | End: 2020-07-26

## 2020-07-21 RX ORDER — ATORVASTATIN CALCIUM 40 MG/1
80 TABLET, FILM COATED ORAL NIGHTLY
Status: DISCONTINUED | OUTPATIENT
Start: 2020-07-21 | End: 2020-07-26

## 2020-07-21 RX ORDER — VANCOMYCIN HYDROCHLORIDE 125 MG/1
125 CAPSULE ORAL DAILY
Status: DISCONTINUED | OUTPATIENT
Start: 2020-07-21 | End: 2020-07-26

## 2020-07-21 NOTE — ED PROVIDER NOTES
Patient Seen in: [facility] Emergency Department      History   Patient presents with:  Chest Pain Angina    Stated Complaint: cp    HPI    Patient presents to the emergency department complaining of chest pain or shortness of breath.   She states except as noted above.     Physical Exam     ED Triage Vitals   BP 07/21/20 1507 122/62   Pulse 07/21/20 1507 60   Resp 07/21/20 1507 26   Temp 07/21/20 1822 98 °F (36.7 °C)   Temp src 07/21/20 1822 Oral   SpO2 07/21/20 1507 96 %   O2 Device 07/21/20 1507 N following components:    Clarity Urine Cloudy (*)     Blood Urine Large (*)     Protein Urine 100  (*)     Leukocyte Esterase Urine Large (*)     WBC Urine 700 (*)     WBC Clump Many (*)     RBC URINE 449 (*)     Bacteria Urine Many (*)     All other compo ganglionic region bilaterally, more extensive on the right.     Dictated by (CST): Wendy Craft MD on 7/21/2020 at 4:32 PM     Finalized by (CST): Wendy Craft MD on 7/21/2020 at 4:36 PM          Xr Chest Ap Portable  (cpt=71045)    Re

## 2020-07-21 NOTE — PLAN OF CARE
Problem: Diabetes/Glucose Control  Goal: Glucose maintained within prescribed range  Description  INTERVENTIONS:  - Monitor Blood Glucose as ordered  - Assess for signs and symptoms of hyperglycemia and hypoglycemia  - Administer ordered medications to m hematoma  - Assess quality of pulses, skin color and temperature  - Assess for signs of decreased coronary artery perfusion - ex.  Angina  - Evaluate fluid balance, assess for edema, trend weights  Outcome: Progressing  Goal: Absence of cardiac arrhythmias

## 2020-07-21 NOTE — ED NOTES
Pt states she fell into a wall 1 week ago and hit head on the wall but did not fall to the ground. Bruising noted to right forehead. Denies loc but is taking a blood thinner. md aware.

## 2020-07-21 NOTE — ED NOTES
Orders for admission, patient is aware of plan and ready to go upstairs. Any questions, please call ED KATIE melissa  at extension [phone]. Alert and oriented x4. On room air. +incontinent. Pure wick in place in er after lasix administration.  Receiving zozyn no

## 2020-07-21 NOTE — ED INITIAL ASSESSMENT (HPI)
Substernal and left sided chest pain and sob. Speaking in multiple word sentences. o2 sats 96% on room air. Nitroglycerin taken prior to ems arrival with no relief. Asa given in route to hospital per ems.

## 2020-07-22 LAB
ALBUMIN SERPL-MCNC: 2.9 G/DL (ref 3.4–5)
ALBUMIN/GLOB SERPL: 0.7 {RATIO} (ref 1–2)
ALP LIVER SERPL-CCNC: 178 U/L (ref 55–142)
ALT SERPL-CCNC: 21 U/L (ref 13–56)
ANION GAP SERPL CALC-SCNC: 5 MMOL/L (ref 0–18)
AST SERPL-CCNC: 18 U/L (ref 15–37)
BASOPHILS # BLD AUTO: 0.02 X10(3) UL (ref 0–0.2)
BASOPHILS NFR BLD AUTO: 0.3 %
BILIRUB SERPL-MCNC: 0.6 MG/DL (ref 0.1–2)
BUN BLD-MCNC: 34 MG/DL (ref 7–18)
BUN/CREAT SERPL: 17.9 (ref 10–20)
CALCIUM BLD-MCNC: 8.5 MG/DL (ref 8.5–10.1)
CHLORIDE SERPL-SCNC: 107 MMOL/L (ref 98–112)
CO2 SERPL-SCNC: 27 MMOL/L (ref 21–32)
CREAT BLD-MCNC: 1.9 MG/DL (ref 0.55–1.02)
DEPRECATED RDW RBC AUTO: 46.5 FL (ref 35.1–46.3)
EOSINOPHIL # BLD AUTO: 0.22 X10(3) UL (ref 0–0.7)
EOSINOPHIL NFR BLD AUTO: 3.8 %
ERYTHROCYTE [DISTWIDTH] IN BLOOD BY AUTOMATED COUNT: 14.1 % (ref 11–15)
GLOBULIN PLAS-MCNC: 4 G/DL (ref 2.8–4.4)
GLUCOSE BLD-MCNC: 106 MG/DL (ref 70–99)
GLUCOSE BLDC GLUCOMTR-MCNC: 117 MG/DL (ref 70–99)
GLUCOSE BLDC GLUCOMTR-MCNC: 139 MG/DL (ref 70–99)
GLUCOSE BLDC GLUCOMTR-MCNC: 182 MG/DL (ref 70–99)
GLUCOSE BLDC GLUCOMTR-MCNC: 92 MG/DL (ref 70–99)
HCT VFR BLD AUTO: 31.7 % (ref 35–48)
HGB BLD-MCNC: 10.7 G/DL (ref 12–16)
IMM GRANULOCYTES # BLD AUTO: 0.01 X10(3) UL (ref 0–1)
IMM GRANULOCYTES NFR BLD: 0.2 %
LYMPHOCYTES # BLD AUTO: 1.45 X10(3) UL (ref 1–4)
LYMPHOCYTES NFR BLD AUTO: 25 %
M PROTEIN MFR SERPL ELPH: 6.9 G/DL (ref 6.4–8.2)
MCH RBC QN AUTO: 30.9 PG (ref 26–34)
MCHC RBC AUTO-ENTMCNC: 33.8 G/DL (ref 31–37)
MCV RBC AUTO: 91.6 FL (ref 80–100)
MONOCYTES # BLD AUTO: 0.39 X10(3) UL (ref 0.1–1)
MONOCYTES NFR BLD AUTO: 6.7 %
NEUTROPHILS # BLD AUTO: 3.7 X10 (3) UL (ref 1.5–7.7)
NEUTROPHILS # BLD AUTO: 3.7 X10(3) UL (ref 1.5–7.7)
NEUTROPHILS NFR BLD AUTO: 64 %
OSMOLALITY SERPL CALC.SUM OF ELEC: 296 MOSM/KG (ref 275–295)
PLATELET # BLD AUTO: 115 10(3)UL (ref 150–450)
POTASSIUM SERPL-SCNC: 3.5 MMOL/L (ref 3.5–5.1)
POTASSIUM SERPL-SCNC: 4.1 MMOL/L (ref 3.5–5.1)
RBC # BLD AUTO: 3.46 X10(6)UL (ref 3.8–5.3)
SODIUM SERPL-SCNC: 139 MMOL/L (ref 136–145)
TROPONIN I SERPL-MCNC: <0.045 NG/ML (ref ?–0.04)
WBC # BLD AUTO: 5.8 X10(3) UL (ref 4–11)

## 2020-07-22 PROCEDURE — 82962 GLUCOSE BLOOD TEST: CPT

## 2020-07-22 PROCEDURE — 84484 ASSAY OF TROPONIN QUANT: CPT | Performed by: HOSPITALIST

## 2020-07-22 PROCEDURE — 80053 COMPREHEN METABOLIC PANEL: CPT | Performed by: INTERNAL MEDICINE

## 2020-07-22 PROCEDURE — 85025 COMPLETE CBC W/AUTO DIFF WBC: CPT | Performed by: INTERNAL MEDICINE

## 2020-07-22 PROCEDURE — 84132 ASSAY OF SERUM POTASSIUM: CPT | Performed by: INTERNAL MEDICINE

## 2020-07-22 RX ORDER — VANCOMYCIN HYDROCHLORIDE 125 MG/1
125 CAPSULE ORAL DAILY
Status: DISCONTINUED | OUTPATIENT
Start: 2020-07-22 | End: 2020-07-22

## 2020-07-22 RX ORDER — TRAZODONE HYDROCHLORIDE 50 MG/1
100 TABLET ORAL NIGHTLY
Status: DISCONTINUED | OUTPATIENT
Start: 2020-07-22 | End: 2020-07-26

## 2020-07-22 RX ORDER — POTASSIUM CHLORIDE 20 MEQ/1
40 TABLET, EXTENDED RELEASE ORAL EVERY 4 HOURS
Status: COMPLETED | OUTPATIENT
Start: 2020-07-22 | End: 2020-07-22

## 2020-07-22 NOTE — PLAN OF CARE
Pt A&Ox4, IV lasix BID, IV zosyn for UTI, cellulitis to LLE. VSS, AV paced, on 2L O2 NC.    Problem: Diabetes/Glucose Control  Goal: Glucose maintained within prescribed range  Description  INTERVENTIONS:  - Monitor Blood Glucose as ordered  - Assess for si by Vivian Johnson RN  Outcome: Progressing     Problem: CARDIOVASCULAR - ADULT  Goal: Maintains optimal cardiac output and hemodynamic stability  Description  INTERVENTIONS:  - Monitor vital signs, rhythm, and trends  - Monitor for bleeding, hypotension and Monitor for signs/symptoms of CO2 retention  7/22/2020 0325 by Jocelyn Kuhn RN  Outcome: Progressing  7/22/2020 0244 by Jocelyn Kuhn RN  Outcome: Progressing

## 2020-07-22 NOTE — PROGRESS NOTES
Chart reviewed, full note in AM      Thank you for allowing me to participate in the care of your patient. please call if you have any question.      Melquiades Carrillo MD   General Cardiology & Advanced Heart Failure, Cardiac Transplant and Assisted Devices  [place]

## 2020-07-22 NOTE — H&P
[facility]    History and Physical    Leonardo Juarez Patient Status:  Inpatient    1950 MRN [id number]   Location [facility] 3W/SW Attending Jeannie Stearns MD   Hosp Day # 1 PCP Dxiie Patel MD     Date:  2020  Date Nasal [place] (DEEP SEA NASAL SPRAY) 0.65 % Nasal Solution, 2 sprays by Nasal route 3 (three) times daily as needed for congestion. furosemide 80 MG Oral Tab, Take 1 tablet (80 mg total) by mouth BID (Diuretic).   traMADol HCl 50 MG Oral Tab, Take 1 tablet ( Negative for flank pain. Musculoskeletal: Negative. Skin: Negative. Neurological: Negative. Psychiatric/Behavioral: Negative for behavioral problems.        Physical Exam:   Vital Signs:  Blood pressure 141/70, pulse 79, temperature 98.4 °F (36.9 03/25/2020    TROP <0.045 07/22/2020     Ct Brain Or Head (05173)    Result Date: 7/21/2020  CONCLUSION:  1. No evidence of intracranial hemorrhage. 2. Chronic infarcts involving the basal ganglionic region bilaterally, more extensive on the right.     Dict clinical documentation in H+P. Based on patients current state of illness, I anticipate that, after discharge, patient will require TBD.         Bobby Still MD  7/22/2020

## 2020-07-22 NOTE — CONSULTS
Goyo Filter Patient Status:  Inpatient    1950 MRN [id number]   Location [facility] 3W/SW Attending Jaden Mondragon MD   Hosp Day # 1 PCP Moshe Gonzalez MD         Subjective:   Goyo Filter is a(n) 77year old female seen erythema. Psychiatric: Appropriate mood and affect.   Results:            Lab Results   Component Value Date     WBC 5.8 07/22/2020     HGB 10.7 (L) 07/22/2020     HCT 31.7 (L) 07/22/2020     .0 (L) 07/22/2020     CREATSERUM 1.90 (H) 07/22/2020     mEq, Oral, Before dinner  traMADol HCl (ULTRAM) tab 50 mg, 50 mg, Oral, Q12H PRN  Normal Saline Flush 0.9 % injection 3 mL, 3 mL, Intravenous, PRN  Heparin Sodium (Porcine) 5000 UNIT/ML injection 5,000 Units, 5,000 Units, Subcutaneous, 2 times per day  amL Pain.  Levothyroxine Sodium 50 MCG Oral Tab, Take 50 mcg by mouth before breakfast.  nitroGLYCERIN 0.4 MG Sublingual SL Tab, Place 0.4 mg under the tongue every 5 (five) minutes as needed for Chest pain.   linagliptin 5 mg Oral Tab, Take 5 mg by mouth daily congestive heart failure - diastolic - echo in 8/4479 showed EF 55-60%, unable to evaluate diastolic fxn   - pro-BNP 0058       2. Hyperlipidemia  Atorvastatin 80 mg      3.  Dual chamber PPM recently   - S/P DDD PPM 1/16/20 biotroniVerivue serial number [id number]

## 2020-07-22 NOTE — PROGRESS NOTES
[facility]    Progress Note    Ayala Nino Patient Status:  Inpatient    1950 MRN [id number]   Location [facility] 3W/SW Attending Dhara Valentin MD   Hosp Day # 1 PCP Edison Linares MD       Subjective:   Alfonso English 07/22/2020    AST 18 07/22/2020    ALT 21 07/22/2020    PTT 30.1 07/21/2020    INR 1.24 (H) 07/21/2020    T4F 1.7 01/04/2020    TSH 2.430 01/04/2020     03/05/2020    DDIMER 1.23 (H) 03/05/2020    MG 2.3 03/25/2020    PHOS 4.7 03/25/2020    TROP <0. daily. Fluticasone Propionate 50 MCG/ACT Nasal Suspension, 2 sprays by Each Nare route 2 (two) times a day. Saline Nasal Spray (DEEP SEA NASAL SPRAY) 0.65 % Nasal Solution, 2 sprays by Nasal route 3 (three) times daily as needed for congestion.   yogii 7/21/2020 at 4:32 PM     Finalized by (CST): Wendy Craft MD on 7/21/2020 at 4:36 PM          Xr Chest Ap Portable  (cpt=71045)    Result Date: 7/21/2020  CONCLUSION: Stable mild cardiomegaly with mild central vascular congestion.     Dictated by apical RCA CAD and vessel being too small for stenting, 40-50% in-stent restenosis of the proximal mid LAD stents by cardiac cath 7/2019    7.  Severe mitral regurgitation  - has been following with [facility] for surgery as outpatient and is seeing a surgeon ther

## 2020-07-23 LAB
GLUCOSE BLDC GLUCOMTR-MCNC: 108 MG/DL (ref 70–99)
GLUCOSE BLDC GLUCOMTR-MCNC: 141 MG/DL (ref 70–99)
GLUCOSE BLDC GLUCOMTR-MCNC: 152 MG/DL (ref 70–99)
GLUCOSE BLDC GLUCOMTR-MCNC: 98 MG/DL (ref 70–99)

## 2020-07-23 PROCEDURE — 82962 GLUCOSE BLOOD TEST: CPT

## 2020-07-23 PROCEDURE — 93010 ELECTROCARDIOGRAM REPORT: CPT | Performed by: INTERNAL MEDICINE

## 2020-07-23 PROCEDURE — 87493 C DIFF AMPLIFIED PROBE: CPT | Performed by: INTERNAL MEDICINE

## 2020-07-23 PROCEDURE — 93005 ELECTROCARDIOGRAM TRACING: CPT

## 2020-07-23 PROCEDURE — 97161 PT EVAL LOW COMPLEX 20 MIN: CPT

## 2020-07-23 PROCEDURE — 97530 THERAPEUTIC ACTIVITIES: CPT

## 2020-07-23 RX ORDER — FUROSEMIDE 80 MG
80 TABLET ORAL
Status: DISCONTINUED | OUTPATIENT
Start: 2020-07-23 | End: 2020-07-26

## 2020-07-23 RX ORDER — VANCOMYCIN HYDROCHLORIDE 125 MG/1
125 CAPSULE ORAL DAILY
Status: DISCONTINUED | OUTPATIENT
Start: 2020-07-23 | End: 2020-07-23

## 2020-07-23 NOTE — PHYSICAL THERAPY NOTE
PHYSICAL THERAPY EVALUATION - INPATIENT     Room Number: 441/359-E  Evaluation Date: 7/23/2020  Type of Evaluation: Initial   Physician Order: PT Eval and Treat    Presenting Problem: CHF  Reason for Therapy: Mobility Dysfunction and Discharge Planning retained    Type 2 diabetes mellitus with circulatory disorder, without long-term current use of insulin (HCC)    Hyperlipidemia    Hypothyroidism    Stage 3 chronic kidney disease (HCC)    Thrombocytopenia (HCC)    Anemia    Hyperglycemia    Acute cystiti STRENGTH ASSESSMENT  Upper extremity ROM and strength are within functional limits    Lower extremity ROM is within functional limits    Lower extremity strength is within functional limits     BALANCE  Static Sitting: Good  Dynamic Sitting: Good  Static S self-stated goal is: to go home   Goal #1      Goal #1   Current Status    Goal #2 Patient is able to demonstrate transfers Sit to/from Stand at assistance level: modified independent with walker - rolling     Goal #2  Current Status    Goal #3 Patient is

## 2020-07-23 NOTE — PLAN OF CARE
Pt alert and oriented. Pain medication given as needed. BP dropped this morning and pt complained of lightheadedness. MD notified and fluid bolus given.  MD ordered this RN to hold all afternoon BP medications including lasix and he discontinued some medica Problem: CARDIOVASCULAR - ADULT  Goal: Maintains optimal cardiac output and hemodynamic stability  Description  INTERVENTIONS:  - Monitor vital signs, rhythm, and trends  - Monitor for bleeding, hypotension and signs of decreased cardiac output  - Evalua

## 2020-07-23 NOTE — CM/SW NOTE
11: 50AM  MONSE self referred pt for d/c planning. SW met w/ pt in her room wearing appropriate PPE. Pt verified her address and confirmed living at [place] [address]. Pt reports having the Aides check in 2-3x/day and 1-2x/night.      Pt stated she uses a rollator

## 2020-07-23 NOTE — PROGRESS NOTES
[facility]    Progress Note    Tereso Jenkins Patient Status:  Inpatient    1950 MRN [id number]   Location [facility] 3W/SW Attending Sabra Ureña MD   Hosp Day # 2 PCP Candido Moody MD     Date:  2020  Date of [place] (DEEP SEA NASAL SPRAY) 0.65 % Nasal Solution, 2 sprays by Nasal route 3 (three) times daily as needed for congestion. furosemide 80 MG Oral Tab, Take 1 tablet (80 mg total) by mouth BID (Diuretic).   traMADol HCl 50 MG Oral Tab, Take 1 tablet (50 mg Negative for flank pain. Musculoskeletal: Negative. Skin: Negative. Neurological: Negative. Psychiatric/Behavioral: Negative for behavioral problems.        Physical Exam:   Vital Signs:  Blood pressure 98/55, pulse 83, temperature 98.5 °F (36.9 Head (46801)    Result Date: 7/21/2020  CONCLUSION:  1. No evidence of intracranial hemorrhage. 2. Chronic infarcts involving the basal ganglionic region bilaterally, more extensive on the right.     Dictated by (CST): Ayan Damon MD on 7/21/202 to span two midnight's based on the clinical documentation in H+P. Based on patients current state of illness, I anticipate that, after discharge, patient will require TBD.         Maryam Samayoa MD  7/23/2020

## 2020-07-23 NOTE — CONSULTS
[facility]    Report of Consultation    Hadley Raoul Patient Status:  Inpatient    1950 MRN [id number]   Location [facility] 3W/SW Attending Zay Alvarado MD   Hosp Day # 2 PCP Angelica Mims MD     Date of Admission:  7 UTERINE/COLON       Social History  Social History    Tobacco Use      Smoking status: Former Smoker      Smokeless tobacco: Never Used    Alcohol use: Not Currently    Drug use: Never          Current Medications:  furosemide (LASIX) tab 80 mg, 80 mg, Ora BID (Diuretic). traMADol HCl 50 MG Oral Tab, Take 1 tablet (50 mg total) by mouth every 12 (twelve) hours as needed. hydrALAzine HCl 25 MG Oral Tab, Take 1 tablet (25 mg total) by mouth every 8 (eight) hours.   Pantoprazole Sodium 40 MG Oral Tab EC, Take bruising, bleeding, or clotting disorder  Psychiatric: denies anxiety or depression    Physical Exam:   Blood pressure 123/68, pulse 83, temperature 98.2 °F (36.8 °C), temperature source Oral, resp.  rate 18, height 66\", weight 173 lb 4.8 oz (78.6 kg), SpO ulcerations or wounds  Neurologic exam is grossly normal with motor and sensory intact and no obvious cranial nerve abnormalities.     Results:     Laboratory Data:  Lab Results   Component Value Date    WBC 5.8 07/22/2020    HGB 10.7 (L) 07/22/2020    HCT

## 2020-07-23 NOTE — PROGRESS NOTES
[facility] Pharmacy Note:  Renal Adjustment for meropenem (MERREM)    Ursula Ornelas is a 77year old patient who has been prescribed meropenem (MERREM) 500 mg every 8 hrs.   CrCl is estimated creatinine clearance is 26.5 mL/min (A) (based on SCr of 1.9 mg/dL (

## 2020-07-23 NOTE — PROGRESS NOTES
Seen with Christ Simons NP  [facility]    Progress Note    Ursula Ornelas Patient Status:  Inpatient    1950 MRN [id number]   Location [facility] 3W/SW Attending Reza Mendez MD   Hosp Day # 2 PCP Romain Fernandez MD T4F 1.7 01/04/2020    TSH 2.430 01/04/2020     03/05/2020    DDIMER 1.23 (H) 03/05/2020    MG 2.3 03/25/2020    PHOS 4.7 03/25/2020    TROP <0.045 07/22/2020       Current Medications:  traZODone HCl (DESYREL) tab 100 mg, 100 mg, Oral, Nightly  vanc by mouth BID (Diuretic). traMADol HCl 50 MG Oral Tab, Take 1 tablet (50 mg total) by mouth every 12 (twelve) hours as needed. hydrALAzine HCl 25 MG Oral Tab, Take 1 tablet (25 mg total) by mouth every 8 (eight) hours.   Pantoprazole Sodium 40 MG Oral Tab 3:55 PM     Finalized by (CST): Kirsty Aviles MD on 7/21/2020 at 3:56 PM          Ekg 12-lead    Result Date: 7/21/2020  ECG Report  Interpretation  -------------------------- Electronic dual-chamber pacemaker Pacemaker ECG, No further analysis INSUROSARIOI mitral clip due to severe MAC     Acute cystitis without hematuria     CKD4, cr baseline     Plan:BP improved. No fluid overload. Will switch to Lasix 80 Po bid.    Discussed with CV surgery again, high risk. Rec tertiary care center.   OK to discharge fr

## 2020-07-23 NOTE — PLAN OF CARE
Pt alert/oriented x4. IV abx for uti. IV lasix. All BP meds and lasix held 7/22 pm shift for low bp. Tylenol given for headache. O2 1L NC.      Problem: Diabetes/Glucose Control  Goal: Glucose maintained within prescribed range  Description  INTERVENTIONS: vasoactive medications to optimize hemodynamic stability  - Monitor arterial and/or venous puncture sites for bleeding and/or hematoma  - Assess quality of pulses, skin color and temperature  - Assess for signs of decreased coronary artery perfusion - ex. Progressing     Problem: Patient/Family Goals  Goal: Patient/Family Long Term Goal  Description  Patient's Long Term Goal: to return to [facility] with PT/OT    Interventions:  - SW involved  -nursing education   - See additional Care Plan goals for specific as ordered  - Initiate emergency measures for life threatening arrhythmias  - Monitor electrolytes and administer replacement therapy as ordered  Outcome: Progressing     Problem: RESPIRATORY - ADULT  Goal: Achieves optimal ventilation and oxygenation  Buck

## 2020-07-24 LAB
ANION GAP SERPL CALC-SCNC: 4 MMOL/L (ref 0–18)
BASOPHILS # BLD AUTO: 0.02 X10(3) UL (ref 0–0.2)
BASOPHILS NFR BLD AUTO: 0.4 %
BUN BLD-MCNC: 38 MG/DL (ref 7–18)
BUN/CREAT SERPL: 19.3 (ref 10–20)
C DIFF TOX B STL QL: NEGATIVE
CALCIUM BLD-MCNC: 8.6 MG/DL (ref 8.5–10.1)
CHLORIDE SERPL-SCNC: 107 MMOL/L (ref 98–112)
CO2 SERPL-SCNC: 28 MMOL/L (ref 21–32)
CREAT BLD-MCNC: 1.97 MG/DL (ref 0.55–1.02)
DEPRECATED RDW RBC AUTO: 48 FL (ref 35.1–46.3)
EOSINOPHIL # BLD AUTO: 0.18 X10(3) UL (ref 0–0.7)
EOSINOPHIL NFR BLD AUTO: 3.6 %
ERYTHROCYTE [DISTWIDTH] IN BLOOD BY AUTOMATED COUNT: 14.2 % (ref 11–15)
GLUCOSE BLD-MCNC: 106 MG/DL (ref 70–99)
GLUCOSE BLDC GLUCOMTR-MCNC: 106 MG/DL (ref 70–99)
GLUCOSE BLDC GLUCOMTR-MCNC: 130 MG/DL (ref 70–99)
GLUCOSE BLDC GLUCOMTR-MCNC: 137 MG/DL (ref 70–99)
GLUCOSE BLDC GLUCOMTR-MCNC: 143 MG/DL (ref 70–99)
HCT VFR BLD AUTO: 32 % (ref 35–48)
HGB BLD-MCNC: 10.7 G/DL (ref 12–16)
IMM GRANULOCYTES # BLD AUTO: 0.01 X10(3) UL (ref 0–1)
IMM GRANULOCYTES NFR BLD: 0.2 %
LYMPHOCYTES # BLD AUTO: 1.43 X10(3) UL (ref 1–4)
LYMPHOCYTES NFR BLD AUTO: 28.7 %
MCH RBC QN AUTO: 30.9 PG (ref 26–34)
MCHC RBC AUTO-ENTMCNC: 33.4 G/DL (ref 31–37)
MCV RBC AUTO: 92.5 FL (ref 80–100)
MONOCYTES # BLD AUTO: 0.4 X10(3) UL (ref 0.1–1)
MONOCYTES NFR BLD AUTO: 8 %
NEUTROPHILS # BLD AUTO: 2.94 X10 (3) UL (ref 1.5–7.7)
NEUTROPHILS # BLD AUTO: 2.94 X10(3) UL (ref 1.5–7.7)
NEUTROPHILS NFR BLD AUTO: 59.1 %
OSMOLALITY SERPL CALC.SUM OF ELEC: 297 MOSM/KG (ref 275–295)
PLATELET # BLD AUTO: 112 10(3)UL (ref 150–450)
POTASSIUM SERPL-SCNC: 3.8 MMOL/L (ref 3.5–5.1)
RBC # BLD AUTO: 3.46 X10(6)UL (ref 3.8–5.3)
SODIUM SERPL-SCNC: 139 MMOL/L (ref 136–145)
WBC # BLD AUTO: 5 X10(3) UL (ref 4–11)

## 2020-07-24 PROCEDURE — 82962 GLUCOSE BLOOD TEST: CPT

## 2020-07-24 PROCEDURE — 97116 GAIT TRAINING THERAPY: CPT

## 2020-07-24 PROCEDURE — 97110 THERAPEUTIC EXERCISES: CPT

## 2020-07-24 PROCEDURE — 85025 COMPLETE CBC W/AUTO DIFF WBC: CPT | Performed by: INTERNAL MEDICINE

## 2020-07-24 PROCEDURE — 80048 BASIC METABOLIC PNL TOTAL CA: CPT | Performed by: INTERNAL MEDICINE

## 2020-07-24 NOTE — CM/SW NOTE
Per RN rounds, pt may need home O2. Currently, pt does not have a qualifying diagnosis. MD will have to document Congestive Heart Failure for pt to qualify. It can not state \"acute on chronic. \"    RN confirmed she will complete O2 sats when pt is awak

## 2020-07-24 NOTE — PLAN OF CARE
Patient alert and oriented x 4. 1L O2 via nasal cannula, saturations in upper 90s, weaning as tolerated. Purewick in place. PO lasix. Iv meropenem. Stool sample sent for c.diff rule out.  Plan for possible discharge Saturday back to [facility] stability  Description  INTERVENTIONS:  - Monitor vital signs, rhythm, and trends  - Monitor for bleeding, hypotension and signs of decreased cardiac output  - Evaluate effectiveness of vasoactive medications to optimize hemodynamic stability  - Monitor ar

## 2020-07-24 NOTE — PROGRESS NOTES
[facility]    Progress Note    Delia Cha Patient Status:  Inpatient    1950 MRN [id number]   Location [facility] 3W/SW Attending Allison Harrell MD   Hosp Day # 3 PCP Veronica Arteaga MD       Subjective:   Lars Grajeda 01/04/2020     03/05/2020    DDIMER 1.23 (H) 03/05/2020    MG 2.3 03/25/2020    PHOS 4.7 03/25/2020    TROP <0.045 07/22/2020       Current Medications:  furosemide (LASIX) tab 80 mg, 80 mg, Oral, BID (Diuretic)  Meropenem (MERREM) 500 mg in sodium tablet (50 mg total) by mouth every 12 (twelve) hours as needed. hydrALAzine HCl 25 MG Oral Tab, Take 1 tablet (25 mg total) by mouth every 8 (eight) hours.   Pantoprazole Sodium 40 MG Oral Tab EC, Take 1 tablet (40 mg total) by mouth every morning before annulus. Normal thickened,     mildly calcified leaflets. Severe regurgitation.           Assessment and Plan:    1.  Acute on chronic congestive heart failure - diastolic - echo in 1/0975 showed EF 55-60%, unable to evaluate diastolic fxn   - pro-BNP 8104

## 2020-07-24 NOTE — PROGRESS NOTES
[facility]    Progress Note    Boston Galarza Patient Status:  Inpatient    1950 MRN [id number]   Location [facility] 3W/SW Attending Jose Carlos Castro MD   Hosp Day # 3 PCP Rosendo Gilman MD     Date:  2020  Date of Admi [place] (DEEP SEA NASAL SPRAY) 0.65 % Nasal Solution, 2 sprays by Nasal route 3 (three) times daily as needed for congestion. furosemide 80 MG Oral Tab, Take 1 tablet (80 mg total) by mouth BID (Diuretic).   traMADol HCl 50 MG Oral Tab, Take 1 tablet (50 mg Negative. Skin: Negative. Neurological: Negative. Psychiatric/Behavioral: Negative for behavioral problems. Physical Exam:   Vital Signs:  Blood pressure 125/61, pulse 69, temperature 98 °F (36.7 °C), temperature source Oral, resp.  rate 20, 7/23/2020  ECG Report  Interpretation  --------------------------       Assessment/Plan:     Acute on chronic congestive heart failure, unspecified heart failure type (HCC)  Good UOP on IV Lasix and creat stable. Trending weights and chems.  7/23: switched

## 2020-07-24 NOTE — PLAN OF CARE
Problem: Diabetes/Glucose Control  Goal: Glucose maintained within prescribed range  Description  INTERVENTIONS:  - Monitor Blood Glucose as ordered  - Assess for signs and symptoms of hyperglycemia and hypoglycemia  - Administer ordered medications to m quality of pulses, skin color and temperature  - Assess for signs of decreased coronary artery perfusion - ex.  Angina  - Evaluate fluid balance, assess for edema, trend weights  Outcome: Progressing  Goal: Absence of cardiac arrhythmias or at baseline  Buck

## 2020-07-24 NOTE — PHYSICAL THERAPY NOTE
PHYSICAL THERAPY TREATMENT NOTE - INPATIENT     Room Number: 634/494-N       Presenting Problem: CHF    Problem List  Principal Problem:    Acute on chronic congestive heart failure, unspecified heart failure type [facility])  Active Problems:    Ureteral stent activity)  Heart Rate Source: Monitor                   O2 WALK        SPO2 Ambulation on Oxygen: 90  Ambulation oxygen flow (liters per minute): 0.5      AM-PAC '6-Clicks' INPATIENT SHORT FORM - BASIC MOBILITY  How much difficulty does the patient current modified independent   Goal #3   Current Status  Pt demo amb with rw, 40' x 1 with sba, pt with c/o shortness of breath.     Goal #4     Goal #4   Current Status     Goal #5 Patient to demonstrate independence with home activity/exercise instructions provid

## 2020-07-25 LAB
ANION GAP SERPL CALC-SCNC: 4 MMOL/L (ref 0–18)
BUN BLD-MCNC: 29 MG/DL (ref 7–18)
BUN/CREAT SERPL: 16.2 (ref 10–20)
CALCIUM BLD-MCNC: 8.7 MG/DL (ref 8.5–10.1)
CHLORIDE SERPL-SCNC: 106 MMOL/L (ref 98–112)
CO2 SERPL-SCNC: 28 MMOL/L (ref 21–32)
CREAT BLD-MCNC: 1.79 MG/DL (ref 0.55–1.02)
GLUCOSE BLD-MCNC: 107 MG/DL (ref 70–99)
GLUCOSE BLDC GLUCOMTR-MCNC: 101 MG/DL (ref 70–99)
GLUCOSE BLDC GLUCOMTR-MCNC: 131 MG/DL (ref 70–99)
GLUCOSE BLDC GLUCOMTR-MCNC: 172 MG/DL (ref 70–99)
GLUCOSE BLDC GLUCOMTR-MCNC: 95 MG/DL (ref 70–99)
OSMOLALITY SERPL CALC.SUM OF ELEC: 292 MOSM/KG (ref 275–295)
POTASSIUM SERPL-SCNC: 4 MMOL/L (ref 3.5–5.1)
SODIUM SERPL-SCNC: 138 MMOL/L (ref 136–145)

## 2020-07-25 PROCEDURE — 80048 BASIC METABOLIC PNL TOTAL CA: CPT | Performed by: INTERNAL MEDICINE

## 2020-07-25 PROCEDURE — 82962 GLUCOSE BLOOD TEST: CPT

## 2020-07-25 RX ORDER — NITROFURANTOIN 25; 75 MG/1; MG/1
100 CAPSULE ORAL 2 TIMES DAILY
Qty: 10 CAPSULE | Refills: 0 | Status: SHIPPED | OUTPATIENT
Start: 2020-07-25 | End: 2020-07-30

## 2020-07-25 NOTE — PROGRESS NOTES
[facility]    Progress Note    Brian Chino Patient Status:  Inpatient    1950 MRN [id number]   Location [facility] 3W/SW Attending Dinah Purdy MD   [address] Day # 4 PCP Betsey Frye MD     Date:  2020  Date of Admi [place] (DEEP SEA NASAL SPRAY) 0.65 % Nasal Solution, 2 sprays by Nasal route 3 (three) times daily as needed for congestion. furosemide 80 MG Oral Tab, Take 1 tablet (80 mg total) by mouth BID (Diuretic).   traMADol HCl 50 MG Oral Tab, Take 1 tablet (50 mg flank pain. Musculoskeletal: Negative. Skin: Negative. Neurological: Negative. Psychiatric/Behavioral: Negative for behavioral problems.        Physical Exam:   Vital Signs:  Blood pressure 110/63, pulse 71, temperature 98.2 °F (36.8 °C), tempera 12-lead    Result Date: 7/23/2020  ECG Report  Interpretation  -------------------------- Electronic dual-chamber pacemaker When compared with ECG of 07/21/2020 15:07:18 No change Electronically signed on 27/75/5332 at 31:90 by Vijay Garcia MD dysuria, frequency and flank pain. Musculoskeletal: Negative. Skin: Negative. Neurological: Negative. Psychiatric/Behavioral: Negative for behavioral problems. Nursing note and vitals reviewed.    Constitutional: She is oriented to person

## 2020-07-25 NOTE — PROGRESS NOTES
D/C orders received.     Patient's O2 sat on room air is 86-_88___% at rest. Pt's O2 sat on room is __85__% when ambulating, and ___94_% on __2 liter while ambulating.

## 2020-07-25 NOTE — PROGRESS NOTES
[facility]    Progress Note    Creola Cheadle Patient Status:  Inpatient    1950 MRN [id number]   Location [facility] 3W/SW Attending Todd Pro MD   Hosp Day # 4 PCP Azeb Caldwell MD       Subjective:   Jeromy Lopez 01/04/2020     03/05/2020    DDIMER 1.23 (H) 03/05/2020    MG 2.3 03/25/2020    PHOS 4.7 03/25/2020    TROP <0.045 07/22/2020       Current Medications:  furosemide (LASIX) tab 80 mg, 80 mg, Oral, BID (Diuretic)  Meropenem (MERREM) 500 mg in sodium tablet (50 mg total) by mouth every 12 (twelve) hours as needed. hydrALAzine HCl 25 MG Oral Tab, Take 1 tablet (25 mg total) by mouth every 8 (eight) hours.   Pantoprazole Sodium 40 MG Oral Tab EC, Take 1 tablet (40 mg total) by mouth every morning before function. 2. Aortic valve: Mild regurgitation. Peak velocity (S): 2.5m/sec.     Mean gradient (S): 13mm Hg. Peak gradient (S): 25mm Hg. 3. Mitral valve: Moderately calcified annulus. Normal thickened,     mildly calcified leaflets. Severe regurgitation.

## 2020-07-25 NOTE — PLAN OF CARE
Pt given tylenol for headache - effective. On iv abx Esbl urine. PO lasix. Pt on 0.5 L of oxygen - attempted to wean off- pt sats drop to 88-89%. Kept on 0.5 L O2 via NC.      Problem: Diabetes/Glucose Control  Goal: Glucose maintained within prescribed ran Evaluate effectiveness of vasoactive medications to optimize hemodynamic stability  - Monitor arterial and/or venous puncture sites for bleeding and/or hematoma  - Assess quality of pulses, skin color and temperature  - Assess for signs of decreased corona

## 2020-07-25 NOTE — PLAN OF CARE
Problem: Diabetes/Glucose Control  Goal: Glucose maintained within prescribed range  Description  INTERVENTIONS:  - Monitor Blood Glucose as ordered  - Assess for signs and symptoms of hyperglycemia and hypoglycemia  - Administer ordered medications to m Maintains optimal cardiac output and hemodynamic stability  Description  INTERVENTIONS:  - Monitor vital signs, rhythm, and trends  - Monitor for bleeding, hypotension and signs of decreased cardiac output  - Evaluate effectiveness of vasoactive medication RN  Outcome: Completed  7/25/2020 1421 by Shelia Montgomery RN  Outcome: Progressing

## 2020-07-26 VITALS
RESPIRATION RATE: 20 BRPM | SYSTOLIC BLOOD PRESSURE: 129 MMHG | TEMPERATURE: 98 F | OXYGEN SATURATION: 99 % | HEIGHT: 66 IN | HEART RATE: 70 BPM | DIASTOLIC BLOOD PRESSURE: 67 MMHG | BODY MASS INDEX: 27.19 KG/M2 | WEIGHT: 169.19 LBS

## 2020-07-26 LAB
ANION GAP SERPL CALC-SCNC: 7 MMOL/L (ref 0–18)
BUN BLD-MCNC: 45 MG/DL (ref 7–18)
BUN/CREAT SERPL: 26 (ref 10–20)
CALCIUM BLD-MCNC: 8.5 MG/DL (ref 8.5–10.1)
CHLORIDE SERPL-SCNC: 105 MMOL/L (ref 98–112)
CO2 SERPL-SCNC: 27 MMOL/L (ref 21–32)
CREAT BLD-MCNC: 1.73 MG/DL (ref 0.55–1.02)
GLUCOSE BLD-MCNC: 115 MG/DL (ref 70–99)
GLUCOSE BLDC GLUCOMTR-MCNC: 105 MG/DL (ref 70–99)
GLUCOSE BLDC GLUCOMTR-MCNC: 157 MG/DL (ref 70–99)
OSMOLALITY SERPL CALC.SUM OF ELEC: 300 MOSM/KG (ref 275–295)
POTASSIUM SERPL-SCNC: 3.9 MMOL/L (ref 3.5–5.1)
SODIUM SERPL-SCNC: 139 MMOL/L (ref 136–145)

## 2020-07-26 PROCEDURE — 80048 BASIC METABOLIC PNL TOTAL CA: CPT | Performed by: INTERNAL MEDICINE

## 2020-07-26 PROCEDURE — 82962 GLUCOSE BLOOD TEST: CPT

## 2020-07-26 NOTE — PLAN OF CARE
Pt is A/O x 4, denies pain, denies SOB. To be discharged today back to [facility] with home health. Went over CHF discharge instructions. Explained importance of reccomended follow up appointments.  went over medications and side effects with p

## 2020-07-26 NOTE — CM/SW NOTE
MONSE confirmed with RN that pt would be able to DC back to [facility] today. MONSE confirmed that pt's portable was delivered to the pt's room. MONSE called [facility] and spoke with  who states pt could discharge back today.      MONSE call

## 2020-07-26 NOTE — PROGRESS NOTES
[facility]    Progress Note    Yadiel Schultz Patient Status:  Inpatient    1950 MRN [id number]   Location [facility] 3W/SW Attending Jessi Carcamo MD   Hosp Day # 5 PCP Angely Basilio MD       Subjective:   Holden Owens 2.430 01/04/2020     03/05/2020    DDIMER 1.23 (H) 03/05/2020    MG 2.3 03/25/2020    PHOS 4.7 03/25/2020    TROP <0.045 07/22/2020       Current Medications:  furosemide (LASIX) tab 80 mg, 80 mg, Oral, BID (Diuretic)  Meropenem (MERREM) 500 mg in s 1 tablet (50 mg total) by mouth every 12 (twelve) hours as needed.   Pantoprazole Sodium 40 MG Oral Tab EC, Take 1 tablet (40 mg total) by mouth every morning before breakfast.  carvedilol 6.25 MG Oral Tab, Take 1 tablet (6.25 mg total) by mouth 2 (two) violette Severe regurgitation. Assessment and Plan:    1.  Acute on chronic congestive heart failure - diastolic - echo in 9/6708 showed EF 55-60%, unable to evaluate diastolic fxn   - pro-BNP 8958       2.  Hyperlipidemia  Atorvastatin 80 mg      3. Dual c

## 2020-07-26 NOTE — CM/SW NOTE
Case Management/ Progression of Care    CM informed by RN , Patient will need Home Health and Oxygen on discharge. A referrals was sent to [facility]. Delivery of Oxygen is currently pending.    Home Health care orders and face to face orders

## 2020-08-21 ENCOUNTER — HOSPITAL ENCOUNTER (INPATIENT)
Facility: HOSPITAL | Age: 70
LOS: 7 days | Discharge: NURSING, REHAB OR LONG-TERM CARE | DRG: 698 | End: 2020-08-28
Attending: EMERGENCY MEDICINE | Admitting: INTERNAL MEDICINE
Payer: MEDICARE

## 2020-08-21 ENCOUNTER — APPOINTMENT (OUTPATIENT)
Dept: GENERAL RADIOLOGY | Facility: HOSPITAL | Age: 70
DRG: 698 | End: 2020-08-21
Attending: EMERGENCY MEDICINE
Payer: MEDICARE

## 2020-08-21 DIAGNOSIS — N39.0 URINARY TRACT INFECTION WITHOUT HEMATURIA, SITE UNSPECIFIED: ICD-10-CM

## 2020-08-21 DIAGNOSIS — I50.9 ACUTE ON CHRONIC CONGESTIVE HEART FAILURE, UNSPECIFIED HEART FAILURE TYPE (HCC): Primary | ICD-10-CM

## 2020-08-21 LAB
ANION GAP SERPL CALC-SCNC: 5 MMOL/L (ref 0–18)
BASOPHILS # BLD AUTO: 0.02 X10(3) UL (ref 0–0.2)
BASOPHILS NFR BLD AUTO: 0.3 %
BILIRUB UR QL: NEGATIVE
BUN BLD-MCNC: 39 MG/DL (ref 7–18)
BUN/CREAT SERPL: 20.4 (ref 10–20)
CALCIUM BLD-MCNC: 8.6 MG/DL (ref 8.5–10.1)
CHLORIDE SERPL-SCNC: 109 MMOL/L (ref 98–112)
CO2 SERPL-SCNC: 24 MMOL/L (ref 21–32)
COLOR UR: YELLOW
CREAT BLD-MCNC: 1.91 MG/DL (ref 0.55–1.02)
DEPRECATED RDW RBC AUTO: 44.7 FL (ref 35.1–46.3)
EOSINOPHIL # BLD AUTO: 0.2 X10(3) UL (ref 0–0.7)
EOSINOPHIL NFR BLD AUTO: 2.9 %
ERYTHROCYTE [DISTWIDTH] IN BLOOD BY AUTOMATED COUNT: 13.1 % (ref 11–15)
GLUCOSE BLD-MCNC: 119 MG/DL (ref 70–99)
GLUCOSE BLDC GLUCOMTR-MCNC: 100 MG/DL (ref 70–99)
GLUCOSE BLDC GLUCOMTR-MCNC: 175 MG/DL (ref 70–99)
GLUCOSE UR-MCNC: NEGATIVE MG/DL
HCT VFR BLD AUTO: 31.6 % (ref 35–48)
HGB BLD-MCNC: 10.7 G/DL (ref 12–16)
IMM GRANULOCYTES # BLD AUTO: 0.01 X10(3) UL (ref 0–1)
IMM GRANULOCYTES NFR BLD: 0.1 %
KETONES UR-MCNC: NEGATIVE MG/DL
LYMPHOCYTES # BLD AUTO: 1.45 X10(3) UL (ref 1–4)
LYMPHOCYTES NFR BLD AUTO: 21.4 %
MCH RBC QN AUTO: 31.5 PG (ref 26–34)
MCHC RBC AUTO-ENTMCNC: 33.9 G/DL (ref 31–37)
MCV RBC AUTO: 92.9 FL (ref 80–100)
MONOCYTES # BLD AUTO: 0.45 X10(3) UL (ref 0.1–1)
MONOCYTES NFR BLD AUTO: 6.6 %
NEUTROPHILS # BLD AUTO: 4.65 X10 (3) UL (ref 1.5–7.7)
NEUTROPHILS # BLD AUTO: 4.65 X10(3) UL (ref 1.5–7.7)
NEUTROPHILS NFR BLD AUTO: 68.7 %
NITRITE UR QL STRIP.AUTO: NEGATIVE
NT-PROBNP SERPL-MCNC: 4082 PG/ML (ref ?–125)
OSMOLALITY SERPL CALC.SUM OF ELEC: 297 MOSM/KG (ref 275–295)
PH UR: 5 [PH] (ref 5–8)
PLATELET # BLD AUTO: 134 10(3)UL (ref 150–450)
POTASSIUM SERPL-SCNC: 3.6 MMOL/L (ref 3.5–5.1)
PROT UR-MCNC: 100 MG/DL
RBC # BLD AUTO: 3.4 X10(6)UL (ref 3.8–5.3)
RBC #/AREA URNS AUTO: 642 /HPF
SARS-COV-2 RNA RESP QL NAA+PROBE: NOT DETECTED
SODIUM SERPL-SCNC: 138 MMOL/L (ref 136–145)
SP GR UR STRIP: 1.01 (ref 1–1.03)
TROPONIN I SERPL-MCNC: <0.045 NG/ML (ref ?–0.04)
UROBILINOGEN UR STRIP-ACNC: <2
WBC # BLD AUTO: 6.8 X10(3) UL (ref 4–11)
WBC #/AREA URNS AUTO: 2017 /HPF

## 2020-08-21 PROCEDURE — 80048 BASIC METABOLIC PNL TOTAL CA: CPT | Performed by: EMERGENCY MEDICINE

## 2020-08-21 PROCEDURE — 96375 TX/PRO/DX INJ NEW DRUG ADDON: CPT

## 2020-08-21 PROCEDURE — 85025 COMPLETE CBC W/AUTO DIFF WBC: CPT | Performed by: EMERGENCY MEDICINE

## 2020-08-21 PROCEDURE — 93010 ELECTROCARDIOGRAM REPORT: CPT | Performed by: EMERGENCY MEDICINE

## 2020-08-21 PROCEDURE — 85025 COMPLETE CBC W/AUTO DIFF WBC: CPT

## 2020-08-21 PROCEDURE — 83880 ASSAY OF NATRIURETIC PEPTIDE: CPT | Performed by: EMERGENCY MEDICINE

## 2020-08-21 PROCEDURE — 87086 URINE CULTURE/COLONY COUNT: CPT | Performed by: EMERGENCY MEDICINE

## 2020-08-21 PROCEDURE — 80048 BASIC METABOLIC PNL TOTAL CA: CPT

## 2020-08-21 PROCEDURE — 81001 URINALYSIS AUTO W/SCOPE: CPT | Performed by: EMERGENCY MEDICINE

## 2020-08-21 PROCEDURE — 96365 THER/PROPH/DIAG IV INF INIT: CPT

## 2020-08-21 PROCEDURE — 84484 ASSAY OF TROPONIN QUANT: CPT | Performed by: EMERGENCY MEDICINE

## 2020-08-21 PROCEDURE — 82962 GLUCOSE BLOOD TEST: CPT

## 2020-08-21 PROCEDURE — 93005 ELECTROCARDIOGRAM TRACING: CPT

## 2020-08-21 PROCEDURE — 71045 X-RAY EXAM CHEST 1 VIEW: CPT | Performed by: EMERGENCY MEDICINE

## 2020-08-21 PROCEDURE — 99285 EMERGENCY DEPT VISIT HI MDM: CPT

## 2020-08-21 PROCEDURE — 87186 SC STD MICRODIL/AGAR DIL: CPT | Performed by: EMERGENCY MEDICINE

## 2020-08-21 PROCEDURE — 87077 CULTURE AEROBIC IDENTIFY: CPT | Performed by: EMERGENCY MEDICINE

## 2020-08-21 RX ORDER — CARVEDILOL 6.25 MG/1
6.25 TABLET ORAL 2 TIMES DAILY WITH MEALS
Status: DISCONTINUED | OUTPATIENT
Start: 2020-08-21 | End: 2020-08-28

## 2020-08-21 RX ORDER — NITROGLYCERIN 0.4 MG/1
0.4 TABLET SUBLINGUAL EVERY 5 MIN PRN
Status: DISCONTINUED | OUTPATIENT
Start: 2020-08-21 | End: 2020-08-28

## 2020-08-21 RX ORDER — POTASSIUM CHLORIDE 750 MG/1
10 TABLET, EXTENDED RELEASE ORAL
Status: DISCONTINUED | OUTPATIENT
Start: 2020-08-21 | End: 2020-08-21

## 2020-08-21 RX ORDER — ISOSORBIDE MONONITRATE 60 MG/1
60 TABLET, EXTENDED RELEASE ORAL DAILY
Status: DISCONTINUED | OUTPATIENT
Start: 2020-08-21 | End: 2020-08-28

## 2020-08-21 RX ORDER — LEVOTHYROXINE SODIUM 0.03 MG/1
50 TABLET ORAL
Status: DISCONTINUED | OUTPATIENT
Start: 2020-08-21 | End: 2020-08-28

## 2020-08-21 RX ORDER — CARVEDILOL 6.25 MG/1
6.25 TABLET ORAL 2 TIMES DAILY WITH MEALS
Status: DISCONTINUED | OUTPATIENT
Start: 2020-08-21 | End: 2020-08-21

## 2020-08-21 RX ORDER — ASPIRIN 81 MG/1
81 TABLET, CHEWABLE ORAL DAILY
Status: DISCONTINUED | OUTPATIENT
Start: 2020-08-21 | End: 2020-08-28

## 2020-08-21 RX ORDER — ATORVASTATIN CALCIUM 40 MG/1
40 TABLET, FILM COATED ORAL NIGHTLY
Status: DISCONTINUED | OUTPATIENT
Start: 2020-08-21 | End: 2020-08-21

## 2020-08-21 RX ORDER — VANCOMYCIN HYDROCHLORIDE 125 MG/1
125 CAPSULE ORAL DAILY
Status: DISCONTINUED | OUTPATIENT
Start: 2020-08-21 | End: 2020-08-28

## 2020-08-21 RX ORDER — ECHINACEA PURPUREA EXTRACT 125 MG
2 TABLET ORAL 3 TIMES DAILY PRN
Status: DISCONTINUED | OUTPATIENT
Start: 2020-08-21 | End: 2020-08-28

## 2020-08-21 RX ORDER — ISOSORBIDE MONONITRATE 60 MG/1
60 TABLET, EXTENDED RELEASE ORAL DAILY
Status: DISCONTINUED | OUTPATIENT
Start: 2020-08-21 | End: 2020-08-21

## 2020-08-21 RX ORDER — TRAZODONE HYDROCHLORIDE 150 MG/1
75 TABLET ORAL NIGHTLY
Status: DISCONTINUED | OUTPATIENT
Start: 2020-08-21 | End: 2020-08-28

## 2020-08-21 RX ORDER — SODIUM CHLORIDE 0.9 % (FLUSH) 0.9 %
3 SYRINGE (ML) INJECTION AS NEEDED
Status: DISCONTINUED | OUTPATIENT
Start: 2020-08-21 | End: 2020-08-28

## 2020-08-21 RX ORDER — PANTOPRAZOLE SODIUM 40 MG/1
40 TABLET, DELAYED RELEASE ORAL
Status: DISCONTINUED | OUTPATIENT
Start: 2020-08-22 | End: 2020-08-28

## 2020-08-21 RX ORDER — POTASSIUM CHLORIDE 750 MG/1
10 TABLET, EXTENDED RELEASE ORAL 2 TIMES DAILY
Status: DISCONTINUED | OUTPATIENT
Start: 2020-08-22 | End: 2020-08-28

## 2020-08-21 RX ORDER — ATORVASTATIN CALCIUM 40 MG/1
80 TABLET, FILM COATED ORAL NIGHTLY
Status: DISCONTINUED | OUTPATIENT
Start: 2020-08-21 | End: 2020-08-28

## 2020-08-21 RX ORDER — HEPARIN SODIUM 5000 [USP'U]/ML
5000 INJECTION, SOLUTION INTRAVENOUS; SUBCUTANEOUS EVERY 8 HOURS SCHEDULED
Status: DISCONTINUED | OUTPATIENT
Start: 2020-08-21 | End: 2020-08-23

## 2020-08-21 RX ORDER — CLOPIDOGREL BISULFATE 75 MG/1
75 TABLET ORAL DAILY
Status: DISCONTINUED | OUTPATIENT
Start: 2020-08-21 | End: 2020-08-21

## 2020-08-21 RX ORDER — ATORVASTATIN CALCIUM 40 MG/1
80 TABLET, FILM COATED ORAL NIGHTLY
Status: DISCONTINUED | OUTPATIENT
Start: 2020-08-21 | End: 2020-08-21

## 2020-08-21 RX ORDER — TRAMADOL HYDROCHLORIDE 50 MG/1
50 TABLET ORAL EVERY 12 HOURS PRN
Status: DISCONTINUED | OUTPATIENT
Start: 2020-08-21 | End: 2020-08-28

## 2020-08-21 RX ORDER — FUROSEMIDE 10 MG/ML
80 INJECTION INTRAMUSCULAR; INTRAVENOUS
Status: DISCONTINUED | OUTPATIENT
Start: 2020-08-21 | End: 2020-08-23

## 2020-08-21 RX ORDER — ASPIRIN 81 MG/1
81 TABLET, CHEWABLE ORAL DAILY
Status: DISCONTINUED | OUTPATIENT
Start: 2020-08-21 | End: 2020-08-21

## 2020-08-21 RX ORDER — POTASSIUM CHLORIDE 1.5 G/1.77G
20 POWDER, FOR SOLUTION ORAL 2 TIMES DAILY
Status: DISCONTINUED | OUTPATIENT
Start: 2020-08-21 | End: 2020-08-21

## 2020-08-21 RX ORDER — FUROSEMIDE 10 MG/ML
40 INJECTION INTRAMUSCULAR; INTRAVENOUS ONCE
Status: COMPLETED | OUTPATIENT
Start: 2020-08-21 | End: 2020-08-21

## 2020-08-21 RX ORDER — ACETAMINOPHEN 325 MG/1
650 TABLET ORAL EVERY 6 HOURS PRN
Status: DISCONTINUED | OUTPATIENT
Start: 2020-08-21 | End: 2020-08-28

## 2020-08-21 RX ORDER — VANCOMYCIN HYDROCHLORIDE 125 MG/1
125 CAPSULE ORAL DAILY
Status: DISCONTINUED | OUTPATIENT
Start: 2020-08-21 | End: 2020-08-21

## 2020-08-21 RX ORDER — CLOPIDOGREL BISULFATE 75 MG/1
75 TABLET ORAL DAILY
Status: DISCONTINUED | OUTPATIENT
Start: 2020-08-21 | End: 2020-08-28

## 2020-08-21 RX ORDER — FLUTICASONE PROPIONATE 50 MCG
2 SPRAY, SUSPENSION (ML) NASAL 2 TIMES DAILY
Status: DISCONTINUED | OUTPATIENT
Start: 2020-08-21 | End: 2020-08-28

## 2020-08-21 NOTE — ED INITIAL ASSESSMENT (HPI)
Pt arrives by EMS for c/o CINDY. States she went on for a walk, came back and felt like she couldn't take a deep breath. Pt recently [address] home with o2. Per EMS pt 92% RA - 99% - 2L O2.

## 2020-08-21 NOTE — ED NOTES
Orders for admission, patient is aware of plan and ready to go upstairs. Any questions, please call ED KATIE Polanco  at extension [phone]. Pt from [facility] with worsening SOB. States she went for a walk and couldn't take a deep breath.  Pt also c/o dysuria a

## 2020-08-21 NOTE — CONSULTS
[facility] - [facility]    Report of Consultation    Lucinda Gamez Patient Status:  Inpatient    1950 MRN [id number]   Location [facility] 3W/SW Attending Olegario Mckeon MD   Hosp Day # 0 PCP Maryam Samayoa MD     Date of Admission:  8 Chloride ER 10 MEQ Oral Tab CR, Take 10 mEq by mouth Before Dinner. loratadine 10 MG Oral Tab, Take 10 mg by mouth daily. Fluticasone Propionate 50 MCG/ACT Nasal Suspension, 2 sprays by Each Nare route 2 (two) times a day.   Saline Nasal Spray (DEEP SEA N recorded. This shift: No intake/output data recorded.      Vent Settings:      Hemodynamic parameters (last 24 hours):      Scheduled Meds:   • vancomycin HCl  125 mg Oral Daily       Continuous Infusions:     General appearance:  alert, appears stated ag LV diastolic function. 2. Aortic valve: Mild regurgitation. Peak velocity (S): 2.5m/sec.     Mean gradient (S): 13mm Hg. Peak gradient (S): 25mm Hg. 3. Mitral valve: Moderately calcified annulus. Normal thickened,     mildly calcified leaflets.  Severe re

## 2020-08-21 NOTE — ED PROVIDER NOTES
Patient Seen in: [facility] AND [facility] Emergency Department      History   Patient presents with:  Dyspnea SIMBA SOB    Stated Complaint: simba    HPI    61-year-old female with history of hypertension, hypercholesterolemia, diabetes, thyroid disorder, coronary Review of Systems    Positive for stated complaint: simba  Other systems are as noted in HPI. Constitutional and vital signs reviewed. All other systems reviewed and negative except as noted above.     Physical Exam     ED Triage Vitals [08/21/20 Pro-Beta Natriuretic Peptide 4,082 (*)     All other components within normal limits   CBC W/ DIFFERENTIAL - Abnormal; Notable for the following components:    RBC 3.40 (*)     HGB 10.7 (*)     HCT 31.6 (*)     .0 (*)     All other components within List                       Present on Admission  Date Reviewed: 6/13/2019          ICD-10-CM Noted POA    Acute on chronic congestive heart failure, unspecified heart failure type ([facility] [address] I50.9 3/22/2020 Unknown

## 2020-08-22 PROBLEM — Z95.0 PRESENCE OF PERMANENT CARDIAC PACEMAKER: Status: ACTIVE | Noted: 2020-08-22

## 2020-08-22 PROBLEM — Z95.0 PRESENCE OF PERMANENT CARDIAC PACEMAKER: Chronic | Status: ACTIVE | Noted: 2020-08-22

## 2020-08-22 LAB
ANION GAP SERPL CALC-SCNC: 5 MMOL/L (ref 0–18)
BUN BLD-MCNC: 43 MG/DL (ref 7–18)
BUN/CREAT SERPL: 21.8 (ref 10–20)
CALCIUM BLD-MCNC: 8.3 MG/DL (ref 8.5–10.1)
CHLORIDE SERPL-SCNC: 107 MMOL/L (ref 98–112)
CO2 SERPL-SCNC: 28 MMOL/L (ref 21–32)
CREAT BLD-MCNC: 1.97 MG/DL (ref 0.55–1.02)
GLUCOSE BLD-MCNC: 89 MG/DL (ref 70–99)
GLUCOSE BLDC GLUCOMTR-MCNC: 140 MG/DL (ref 70–99)
GLUCOSE BLDC GLUCOMTR-MCNC: 141 MG/DL (ref 70–99)
GLUCOSE BLDC GLUCOMTR-MCNC: 147 MG/DL (ref 70–99)
GLUCOSE BLDC GLUCOMTR-MCNC: 91 MG/DL (ref 70–99)
HAV IGM SER QL: 2 MG/DL (ref 1.6–2.6)
OSMOLALITY SERPL CALC.SUM OF ELEC: 300 MOSM/KG (ref 275–295)
POTASSIUM SERPL-SCNC: 3.5 MMOL/L (ref 3.5–5.1)
SODIUM SERPL-SCNC: 140 MMOL/L (ref 136–145)

## 2020-08-22 PROCEDURE — 83735 ASSAY OF MAGNESIUM: CPT | Performed by: INTERNAL MEDICINE

## 2020-08-22 PROCEDURE — 80048 BASIC METABOLIC PNL TOTAL CA: CPT | Performed by: INTERNAL MEDICINE

## 2020-08-22 PROCEDURE — 82962 GLUCOSE BLOOD TEST: CPT

## 2020-08-22 RX ORDER — POTASSIUM CHLORIDE 20 MEQ/1
40 TABLET, EXTENDED RELEASE ORAL EVERY 4 HOURS
Status: COMPLETED | OUTPATIENT
Start: 2020-08-22 | End: 2020-08-22

## 2020-08-22 NOTE — PLAN OF CARE
CONTINUING IVP LASIX, PUREWICK, ISOLATION, ACCUCHECKS, OXYGEN, IV ANTIBIOTICS,    Problem: Patient Centered Care  Goal: Patient preferences are identified and integrated in the patient's plan of care  Description  Interventions:  - What would you like us t consult as needed  - Instruct patient on self management of diabetes  Outcome: Progressing     Problem: Patient/Family Goals  Goal: Patient/Family Short Term Goal  Description  Patient's Short Term Goal: Breath easier    Interventions:   - monitor vital si

## 2020-08-22 NOTE — PLAN OF CARE
Problem: Patient Centered Care  Goal: Patient preferences are identified and integrated in the patient's plan of care  Description  Interventions:  - What would you like us to know as we care for you? Patient lives in assisted living at [facility].  Patient

## 2020-08-22 NOTE — H&P
[facility]    History and Physical    Luz  Patient Status:  Inpatient    1950 MRN [id number]   Location [facility] 3W/SW Attending Catalino Forrester MD   Hosp Day # 1 PCP Marina Wiley MD     Date:  2020  Date [place] (DEEP SEA NASAL SPRAY) 0.65 % Nasal Solution, 2 sprays by Nasal route 3 (three) times daily as needed for congestion. furosemide 80 MG Oral Tab, Take 1 tablet (80 mg total) by mouth BID (Diuretic).   traMADol HCl 50 MG Oral Tab, Take 1 tablet (50 mg 69, temperature 98.3 °F (36.8 °C), temperature source Oral, resp. rate 16, height 5' 5\" (1.651 m), weight 178 lb 11.2 oz (81.1 kg), SpO2 96 %. Nursing note and vitals reviewed. Constitutional: She is oriented to person, place, and time.  She appears w 8/21/2020 at 10:29 AM          Ekg 12-lead    Result Date: 8/21/2020  ECG Report  Interpretation  -------------------------- Electronic ventricular pacemaker Pacemaker ECG, No further analysis INSUFFICIENT DATA When compared with ECG of 07/23/2020 20:25:31

## 2020-08-22 NOTE — PROGRESS NOTES
[facility]  Progress Note    Stan Medina Patient Status:  Inpatient    1950 MRN [id number]   Location [facility] 3W/SW Attending Deisy Walker MD   Hosp Day # 1 PCP Kelsie Turner MD   Seen with Cathleen Meléndez NP  Subjective:  Pa without wheezes, rales, rhonchi or dullness. Normal excursions and effort. Abdomen: Soft, non-tender. Extremities: +1 pitting edema BLE. Peripheral pulses are 2+ bilateral pedal and radial sites. Neurologic: Alert and oriented, normal affect.   Skin: 5 mg, Oral, Daily  nitroGLYCERIN (NITROSTAT) SL tab 0.4 mg, 0.4 mg, Sublingual, Q5 Min PRN  Pantoprazole Sodium (PROTONIX) EC tab 40 mg, 40 mg, Oral, QAM AC  Saline Nasal Spray (SALINE MIST) 2 spray, 2 spray, Nasal, TID PRN  traMADol HCl (ULTRAM) tab 50 mg

## 2020-08-23 LAB
GLUCOSE BLDC GLUCOMTR-MCNC: 111 MG/DL (ref 70–99)
GLUCOSE BLDC GLUCOMTR-MCNC: 143 MG/DL (ref 70–99)
GLUCOSE BLDC GLUCOMTR-MCNC: 152 MG/DL (ref 70–99)
GLUCOSE BLDC GLUCOMTR-MCNC: 212 MG/DL (ref 70–99)
POTASSIUM SERPL-SCNC: 4.1 MMOL/L (ref 3.5–5.1)

## 2020-08-23 PROCEDURE — 82962 GLUCOSE BLOOD TEST: CPT

## 2020-08-23 PROCEDURE — 84132 ASSAY OF SERUM POTASSIUM: CPT | Performed by: INTERNAL MEDICINE

## 2020-08-23 RX ORDER — FUROSEMIDE 40 MG/1
80 TABLET ORAL
Status: DISCONTINUED | OUTPATIENT
Start: 2020-08-23 | End: 2020-08-24

## 2020-08-23 RX ORDER — HEPARIN SODIUM 5000 [USP'U]/ML
5000 INJECTION, SOLUTION INTRAVENOUS; SUBCUTANEOUS EVERY 8 HOURS SCHEDULED
Status: DISCONTINUED | OUTPATIENT
Start: 2020-08-23 | End: 2020-08-28

## 2020-08-23 RX ORDER — VANCOMYCIN HYDROCHLORIDE 125 MG/1
125 CAPSULE ORAL DAILY
Status: DISCONTINUED | OUTPATIENT
Start: 2020-08-23 | End: 2020-08-23

## 2020-08-23 NOTE — PROGRESS NOTES
[facility] Note:  Renal Adjustment for meropenem (MERREM)    Boston Galarza is a 77year old patient who has been prescribed meropenem (MERREM) 500 mg every 8 hrs.   CrCl is estimated creatinine clearance is 24.6 mL/min (A) (based on SCr of 1.97 mg/dL

## 2020-08-23 NOTE — PROGRESS NOTES
[facility] AND CLINICS  Progress Note    Kamla Luke Patient Status:  Inpatient    1950 MRN [id number]   Location [facility] 3W/SW Attending Eron Diamond MD   Hosp Day # 2 PCP Cammie Correa MD     Subjective:  Patient seen in follow up. without wheezes, rales, rhonchi or dullness. Normal excursions and effort. Abdomen: Soft, non-tender. Extremities: +1 pitting edema BLE. Peripheral pulses are 2+ bilateral pedal and radial sites. Neurologic: Alert and oriented, normal affect.   Skin: Oral, Before breakfast  linagliptin (TRADJENTA) tab TABS 5 mg, 5 mg, Oral, Daily  nitroGLYCERIN (NITROSTAT) SL tab 0.4 mg, 0.4 mg, Sublingual, Q5 Min PRN  Pantoprazole Sodium (PROTONIX) EC tab 40 mg, 40 mg, Oral, QAM AC  Saline Nasal Spray (SALINE MIST) 2

## 2020-08-23 NOTE — PROGRESS NOTES
[facility]OSP - [facility]    History and Physical    Albin Posada Patient Status:  Inpatient    1950 MRN [id number]   Location [facility] 3W/SW Attending Denise Oconnor MD   Hosp Day # 2 PCP Ketan Granados MD     Date:    Date [place] (DEEP SEA NASAL SPRAY) 0.65 % Nasal Solution, 2 sprays by Nasal route 3 (three) times daily as needed for congestion. furosemide 80 MG Oral Tab, Take 1 tablet (80 mg total) by mouth BID (Diuretic).   traMADol HCl 50 MG Oral Tab, Take 1 tablet (50 mg temperature 97.9 °F (36.6 °C), temperature source Oral, resp. rate 18, height 5' 5\" (1.651 m), weight 174 lb 6.4 oz (79.1 kg), SpO2 97 %. Nursing note and vitals reviewed. Constitutional: She is oriented to person, place, and time.  She appears well-d retained  Likely source of recurring UTI's with same organisms. Attempting to arrange OP stent removal but insurance issues and COVID restrictions have stymied this so far.       Type 2 diabetes mellitus with circulatory disorder, without long-term current

## 2020-08-23 NOTE — PLAN OF CARE
CONTINUING OXYGEN 2L, PUREWITAYLOR, LASIX CHANGED FROM IVP TO PO TODAY, IV ANTIBIOTICS, ACCUCHECKS    Problem: Patient Centered Care  Goal: Patient preferences are identified and integrated in the patient's plan of care  Description  Interventions:  - What wou Monitor Blood Glucose as ordered  - Assess for signs and symptoms of hyperglycemia and hypoglycemia  - Administer ordered medications to maintain glucose within target range  - Assess barriers to adequate nutritional intake and initiate nutrition consult a

## 2020-08-23 NOTE — PLAN OF CARE
Problem: Patient Centered Care  Goal: Patient preferences are identified and integrated in the patient's plan of care  Description  Interventions:  - What would you like us to know as we care for you? Patient lives in assisted living at [facility].  Patient hypoglycemia  - Administer ordered medications to maintain glucose within target range  - Assess barriers to adequate nutritional intake and initiate nutrition consult as needed  - Instruct patient on self management of diabetes  Outcome: Progressing

## 2020-08-24 LAB
GLUCOSE BLDC GLUCOMTR-MCNC: 101 MG/DL (ref 70–99)
GLUCOSE BLDC GLUCOMTR-MCNC: 104 MG/DL (ref 70–99)
GLUCOSE BLDC GLUCOMTR-MCNC: 152 MG/DL (ref 70–99)
GLUCOSE BLDC GLUCOMTR-MCNC: 193 MG/DL (ref 70–99)

## 2020-08-24 PROCEDURE — 82962 GLUCOSE BLOOD TEST: CPT

## 2020-08-24 NOTE — PLAN OF CARE
Problem: Patient Centered Care  Goal: Patient preferences are identified and integrated in the patient's plan of care  Description  Interventions:  - What would you like us to know as we care for you? Patient lives in assisted living at [facility].  Patient initiate nutrition consult as needed  - Instruct patient on self management of diabetes  Outcome: Progressing  Note:   Carlos roper       Patient A&Ox3, c/o headache today, recv'd tylenol PO, headache is now gone.    Accidris roper, recv'd all medications

## 2020-08-24 NOTE — PLAN OF CARE
Alert and oriented. Purewick in place. Blood sugar 212. IV Abx. Tramadol for headache relief. Bed alarm and ibed on. Call light in place. Will continue to monitor.        Problem: Patient Centered Care  Goal: Patient preferences are identified and integrate Glucose maintained within prescribed range  Description  INTERVENTIONS:  - Monitor Blood Glucose as ordered  - Assess for signs and symptoms of hyperglycemia and hypoglycemia  - Administer ordered medications to maintain glucose within target range  - Asse

## 2020-08-24 NOTE — PROGRESS NOTES
[facility] AND CLINICS  Progress Note    Tereso Jenkins Patient Status:  Inpatient    1950 MRN [id number]   Location [facility] 3W/SW Attending Sabra Ureña MD   Hosp Day # 3 PCP Candido Moody MD     Subjective:  Patient seen in follow up. murmur apex  Lungs: Clear without wheezes, rales, rhonchi or dullness. Normal excursions and effort. Abdomen: Soft, non-tender. Extremities: +1 pitting edema BLE. Peripheral pulses are 2+ bilateral pedal and radial sites.   Neurologic: Alert and orient Mononitrate ER (IMDUR) 24 hr tab 60 mg, 60 mg, Oral, Daily  Levothyroxine Sodium tab 50 mcg, 50 mcg, Oral, Before breakfast  linagliptin (TRADJENTA) tab TABS 5 mg, 5 mg, Oral, Daily  nitroGLYCERIN (NITROSTAT) SL tab 0.4 mg, 0.4 mg, Sublingual, Q5 Min PRN

## 2020-08-24 NOTE — PROGRESS NOTES
[facility]    History and Physical    Nat Bruno Patient Status:  Inpatient    1950 MRN [id number]   Location [facility] 3W/SW Attending Ni Saxena MD   Hosp Day # 3 PCP Aroldo Morton MD     Date:    Date [place] (DEEP SEA NASAL SPRAY) 0.65 % Nasal Solution, 2 sprays by Nasal route 3 (three) times daily as needed for congestion. furosemide 80 MG Oral Tab, Take 1 tablet (80 mg total) by mouth BID (Diuretic).   traMADol HCl 50 MG Oral Tab, Take 1 tablet (50 mg temperature 97.4 °F (36.3 °C), temperature source Axillary, resp. rate 18, height 5' 5\" (1.651 m), weight 174 lb 6.4 oz (79.1 kg), SpO2 95 %. Nursing note and vitals reviewed. Constitutional: She is oriented to person, place, and time.  She appears we 60 mg bid due to rising creat. Ureteral stent retained  Likely source of recurring UTI's with same organisms. Attempting to arrange OP stent removal but insurance issues and COVID restrictions have stymied this so far.  On discharge will place on suppr

## 2020-08-25 LAB
ANION GAP SERPL CALC-SCNC: 8 MMOL/L (ref 0–18)
BASOPHILS # BLD AUTO: 0.01 X10(3) UL (ref 0–0.2)
BASOPHILS NFR BLD AUTO: 0.2 %
BUN BLD-MCNC: 48 MG/DL (ref 7–18)
BUN/CREAT SERPL: 23 (ref 10–20)
CALCIUM BLD-MCNC: 8.8 MG/DL (ref 8.5–10.1)
CHLORIDE SERPL-SCNC: 101 MMOL/L (ref 98–112)
CO2 SERPL-SCNC: 29 MMOL/L (ref 21–32)
CREAT BLD-MCNC: 2.09 MG/DL (ref 0.55–1.02)
DEPRECATED RDW RBC AUTO: 44.4 FL (ref 35.1–46.3)
EOSINOPHIL # BLD AUTO: 0.2 X10(3) UL (ref 0–0.7)
EOSINOPHIL NFR BLD AUTO: 3.7 %
ERYTHROCYTE [DISTWIDTH] IN BLOOD BY AUTOMATED COUNT: 12.9 % (ref 11–15)
GLUCOSE BLD-MCNC: 119 MG/DL (ref 70–99)
GLUCOSE BLDC GLUCOMTR-MCNC: 128 MG/DL (ref 70–99)
GLUCOSE BLDC GLUCOMTR-MCNC: 146 MG/DL (ref 70–99)
GLUCOSE BLDC GLUCOMTR-MCNC: 201 MG/DL (ref 70–99)
GLUCOSE BLDC GLUCOMTR-MCNC: 99 MG/DL (ref 70–99)
HCT VFR BLD AUTO: 33.8 % (ref 35–48)
HGB BLD-MCNC: 11.4 G/DL (ref 12–16)
IMM GRANULOCYTES # BLD AUTO: 0.01 X10(3) UL (ref 0–1)
IMM GRANULOCYTES NFR BLD: 0.2 %
LYMPHOCYTES # BLD AUTO: 1.51 X10(3) UL (ref 1–4)
LYMPHOCYTES NFR BLD AUTO: 27.8 %
MCH RBC QN AUTO: 31.7 PG (ref 26–34)
MCHC RBC AUTO-ENTMCNC: 33.7 G/DL (ref 31–37)
MCV RBC AUTO: 93.9 FL (ref 80–100)
MONOCYTES # BLD AUTO: 0.38 X10(3) UL (ref 0.1–1)
MONOCYTES NFR BLD AUTO: 7 %
NEUTROPHILS # BLD AUTO: 3.32 X10 (3) UL (ref 1.5–7.7)
NEUTROPHILS # BLD AUTO: 3.32 X10(3) UL (ref 1.5–7.7)
NEUTROPHILS NFR BLD AUTO: 61.1 %
OSMOLALITY SERPL CALC.SUM OF ELEC: 300 MOSM/KG (ref 275–295)
PLATELET # BLD AUTO: 132 10(3)UL (ref 150–450)
POTASSIUM SERPL-SCNC: 4.3 MMOL/L (ref 3.5–5.1)
RBC # BLD AUTO: 3.6 X10(6)UL (ref 3.8–5.3)
SODIUM SERPL-SCNC: 138 MMOL/L (ref 136–145)
WBC # BLD AUTO: 5.4 X10(3) UL (ref 4–11)

## 2020-08-25 PROCEDURE — 97530 THERAPEUTIC ACTIVITIES: CPT

## 2020-08-25 PROCEDURE — 85025 COMPLETE CBC W/AUTO DIFF WBC: CPT | Performed by: INTERNAL MEDICINE

## 2020-08-25 PROCEDURE — 82962 GLUCOSE BLOOD TEST: CPT

## 2020-08-25 PROCEDURE — 97166 OT EVAL MOD COMPLEX 45 MIN: CPT

## 2020-08-25 PROCEDURE — 80048 BASIC METABOLIC PNL TOTAL CA: CPT | Performed by: INTERNAL MEDICINE

## 2020-08-25 NOTE — PLAN OF CARE
Patient cooperative and pleasant; up in chair the majority of afternoon. Awaiting PT eval; this writer asked PT to see her tomorrow morning; patient resting comfortably in bed, locked in lowest position, call light within reach. Will continue to monitor. edema, trend weights   Outcome: Progressing     Problem: Diabetes/Glucose Control  Goal: Glucose maintained within prescribed range  Description  INTERVENTIONS:  - Monitor Blood Glucose as ordered  - Assess for signs and symptoms of hyperglycemia and hypog

## 2020-08-25 NOTE — PROGRESS NOTES
[facility]    History and Physical    Ayala Nino Patient Status:  Inpatient    1950 MRN [id number]   Location [facility] 3W/SW Attending Dhara Valentin MD   [facility] Day # 4 PCP Edison Linares MD     Date:    Date [place] (DEEP SEA NASAL SPRAY) 0.65 % Nasal Solution, 2 sprays by Nasal route 3 (three) times daily as needed for congestion. furosemide 80 MG Oral Tab, Take 1 tablet (80 mg total) by mouth BID (Diuretic).   traMADol HCl 50 MG Oral Tab, Take 1 tablet (50 mg temperature 97.7 °F (36.5 °C), temperature source Oral, resp. rate 18, height 5' 5\" (1.651 m), weight 176 lb 9.6 oz (80.1 kg), SpO2 97 %. Nursing note and vitals reviewed. Constitutional: She is oriented to person, place, and time.  She appears well-d mg bid due to rising creat. Ureteral stent retained  Likely source of recurring UTI's with same organisms. Attempting to arrange OP stent removal but insurance issues and COVID restrictions have stymied this so far.  On discharge will place on suppress

## 2020-08-25 NOTE — CM/SW NOTE
SW self referred pt for d/c planning. SW met w/ pt in her room. Pt verified her address and confirmed living at [facility] for approx 1 yr. Pt reports using a rollator for ambulation.  She receives her meals through [employer] and they help her w/ he

## 2020-08-25 NOTE — OCCUPATIONAL THERAPY NOTE
OCCUPATIONAL THERAPY EVALUATION - INPATIENT     Room Number: 322/322-A  Evaluation Date: 8/25/2020  Type of Evaluation: Initial  Presenting Problem: (sob/chf)    Physician Order: IP Consult to Occupational Therapy  Reason for Therapy: ADL/IADL Dysfunction setting     DISCHARGE RECOMMENDATIONS  OT Discharge Recommendations: Sub-acute rehabilitation; Intermittent Supervision;Home with home health PT/OT  OT Device Recommendations: TBD    PLAN  OT Treatment Plan: Energy conservation/work simplification technique toilet; Toilet riser  Shower/Tub and Equipment: Tub-shower combo; Shower chair  Other Equipment: (rollator)    Drives: No  Patient Regularly Uses: Home O2(2L)    Stairs in Home: elevator access  Use of Assistive Device(s): rollator    Prior Level of Independ Degree of Impairment: 46.65%  Standardized Score (AM-PAC Scale): 38.66  CMS Modifier (G-Code): CK    FUNCTIONAL TRANSFER ASSESSMENT  Supine to Sit : Supervision  Sit to Stand: Supervision    Bedroom Mobility: rw and cga    BALANCE ASSESSMENT  Static Sittin

## 2020-08-25 NOTE — PLAN OF CARE
Alert and oriented. Headache managed with Tramadol. Pt reported having trouble breathing. O2 sat was 97-99% while on baseline 2L. Pt described it as not being able to take deep breaths like usual. Oxygen increased for comfort. Bed alarm and ibed in place. fluid balance, assess for edema, trend weights   Outcome: Progressing     Problem: Diabetes/Glucose Control  Goal: Glucose maintained within prescribed range  Description  INTERVENTIONS:  - Monitor Blood Glucose as ordered  - Assess for signs and symptoms

## 2020-08-25 NOTE — PROGRESS NOTES
[facility]  Cardiology Progress Note    Creola Cheadle Patient Status:  Inpatient    1950 MRN [id number]   Location [facility] 3W/SW Attending Todd Pro MD   Hosp Day # 4 PCP Azeb Caldwell MD     Subjective:  Patient seen in f bruits. Cardiac: Regular rate and rhythm, 1/6 murmur apex  Lungs: Clear without wheezes, rales, rhonchi or dullness. Normal excursions and effort. Abdomen: Soft, non-tender. Extremities: +1 pitting edema BLE.   Peripheral pulses are 2+ bilateral pedal Oral, Daily  atorvastatin (LIPITOR) tab 80 mg, 80 mg, Oral, Nightly  carvedilol (COREG) tab 6.25 mg, 6.25 mg, Oral, BID with meals  Clopidogrel Bisulfate (PLAVIX) tab 75 mg, 75 mg, Oral, Daily  Fluticasone Propionate (FLONASE) 50 MCG/ACT nasal spray 2 spra me to participate in the care of your patient. please call if you have any question.      Joyce Dai MD   General Cardiology & Advanced Heart Failure, Cardiac Transplant and Assisted Devices  [facility]  Pager: [phone]  Tel: [phone]

## 2020-08-26 LAB
GLUCOSE BLDC GLUCOMTR-MCNC: 103 MG/DL (ref 70–99)
GLUCOSE BLDC GLUCOMTR-MCNC: 131 MG/DL (ref 70–99)
GLUCOSE BLDC GLUCOMTR-MCNC: 157 MG/DL (ref 70–99)
GLUCOSE BLDC GLUCOMTR-MCNC: 159 MG/DL (ref 70–99)

## 2020-08-26 PROCEDURE — 97162 PT EVAL MOD COMPLEX 30 MIN: CPT

## 2020-08-26 PROCEDURE — 97530 THERAPEUTIC ACTIVITIES: CPT

## 2020-08-26 PROCEDURE — 82962 GLUCOSE BLOOD TEST: CPT

## 2020-08-26 RX ORDER — FUROSEMIDE 40 MG/1
40 TABLET ORAL
Status: DISCONTINUED | OUTPATIENT
Start: 2020-08-26 | End: 2020-08-28

## 2020-08-26 NOTE — PROGRESS NOTES
[facility]    History and Physical    [facility] Patient Status:  Inpatient    1950 MRN [id number]   Location [facility] 3W/SW Attending Jasper Stuart MD   [facility] Day # 5 PCP Carolyn Morales MD     Date:    Date [place] (DEEP SEA NASAL SPRAY) 0.65 % Nasal Solution, 2 sprays by Nasal route 3 (three) times daily as needed for congestion. furosemide 80 MG Oral Tab, Take 1 tablet (80 mg total) by mouth BID (Diuretic).   traMADol HCl 50 MG Oral Tab, Take 1 tablet (50 mg 72, temperature 97.4 °F (36.3 °C), temperature source Oral, resp. rate 20, height 5' 5\" (1.651 m), weight 177 lb (80.3 kg), SpO2 97 %. Nursing note and vitals reviewed. Constitutional: She is oriented to person, place, and time.  She appears well-deve bid due to rising creat. Ureteral stent retained  Likely source of recurring UTI's with same organisms. Attempting to arrange OP stent removal but insurance issues and COVID restrictions have stymied this so far.  On discharge will place on suppressive Neurological: Positive for dizziness and headaches. Psychiatric/Behavioral: The patient is nervous/anxious. Nursing note and vitals reviewed. Constitutional: She is oriented to person, place, and time.  She appears well-developed and well-nou

## 2020-08-26 NOTE — PROGRESS NOTES
[facility]  Progress Note    Nat Bruno Patient Status:  Inpatient    1950 MRN [id number]   Location [facility] 3W/SW Attending Ni Saxena MD   Hosp Day # 5 PCP Aroldo Morton MD     Subjective:  Patient seen in follow up. deficits. Neck: No JVD, carotids 2+ no bruits. Cardiac: Regular rate and rhythm, 2/6 murmur apex  Lungs: Clear without wheezes, rales, rhonchi or dullness. Normal excursions and effort. Abdomen: Soft, non-tender.    Extremities: Without clubbing, cyanos Daily  Fluticasone Propionate (FLONASE) 50 MCG/ACT nasal spray 2 spray, 2 spray, Each Nare, BID  Isosorbide Mononitrate ER (IMDUR) 24 hr tab 60 mg, 60 mg, Oral, Daily  Levothyroxine Sodium tab 50 mcg, 50 mcg, Oral, Before breakfast  linagliptin (TRADJENTA)

## 2020-08-26 NOTE — PHYSICAL THERAPY NOTE
PHYSICAL THERAPY EVALUATION - INPATIENT     Room Number: 772/045-O  Evaluation Date: 8/26/2020  Type of Evaluation: Initial   Physician Order: PT Eval and Treat    Presenting Problem: acute on chronic HF /UTI / severe mitral insuffiency --pt with sign car cues.  Pt with mild SOB observed and reported during transfers O2 sats noted at 97 % HR 80. Pt able to amb with RW min assist provided for pt safety and attention to multiple lines of IV And  2L O2 tolerating 80 ft when rest needed .   Pt with decrease g for 24 hr assist and supervision at d/c with continued skilled therapy . At this time therapy would recommend SHERICE as next level of care due to current status with O2 dependence , multiple recent admissions , and need to maximize pt fxn status.     D/c fátima once better if at all, she is also not a candidate for mitral clip due to severe MAC     Plan: Decrease lasix to 40 mg BID.     Sebastian Villa DO [facility]  [address]  [place]  [phone]      Rosie marquez Hypothyroidism    Stage 3 chronic kidney disease ([facility] [address]    Anemia    Urinary tract infection without hematuria, site unspecified    Presence of permanent cardiac pacemaker      Past Medical History  Past Medical History:   Diagnosis Date   • Acute ischemic reports wants a transfer shower bench and agreeable to use of RW .      PHYSICAL THERAPY EXAMINATION     OBJECTIVE  Precautions: Cardiac(RN staff stated consistent / safe call light use )  Fall Risk: High fall risk    WEIGHT BEARING RESTRICTION  Weight Bear lying on back to sitting on the side of the bed?: A Little   How much help from another person does the patient currently need. ..   -   Moving to and from a bed to a chair (including a wheelchair)?: A Little   -   Need to walk in hospital room?: A Little

## 2020-08-26 NOTE — PLAN OF CARE
Aox4 resting in bed, bed is low and locked in place, call light is within reach. Remains on 2 L NC per baseline, wears home o2. IV abx, PO lasix. PT eval completed.    Problem: Patient Centered Care  Goal: Patient preferences are identified and integrated i maintained within prescribed range  Description  INTERVENTIONS:  - Monitor Blood Glucose as ordered  - Assess for signs and symptoms of hyperglycemia and hypoglycemia  - Administer ordered medications to maintain glucose within target range  - Assess barri

## 2020-08-26 NOTE — CM/SW NOTE
Received call from [facility] - pt is current w/ their services. SW sent pt updates via Aidin. PT eval pending when pt is appropriate to participate.     PLAN: [place] [facility] w/ [facility] vs SNF, pend pt's clinical course    SW/CM to remain availabl

## 2020-08-26 NOTE — PLAN OF CARE
Alert and oriented. Tramadol given for headache. 2L O2. Purewick in place. Bed locked and in lowest position. Bed alarm and ibed on. Call light in place. Will continue to monitor.        Problem: Patient Centered Care  Goal: Patient preferences are identifi Control  Goal: Glucose maintained within prescribed range  Description  INTERVENTIONS:  - Monitor Blood Glucose as ordered  - Assess for signs and symptoms of hyperglycemia and hypoglycemia  - Administer ordered medications to maintain glucose within targe

## 2020-08-27 LAB
ANION GAP SERPL CALC-SCNC: 4 MMOL/L (ref 0–18)
BASOPHILS # BLD AUTO: 0.01 X10(3) UL (ref 0–0.2)
BASOPHILS NFR BLD AUTO: 0.2 %
BUN BLD-MCNC: 57 MG/DL (ref 7–18)
BUN/CREAT SERPL: 30.5 (ref 10–20)
CALCIUM BLD-MCNC: 8.9 MG/DL (ref 8.5–10.1)
CHLORIDE SERPL-SCNC: 103 MMOL/L (ref 98–112)
CO2 SERPL-SCNC: 30 MMOL/L (ref 21–32)
CREAT BLD-MCNC: 1.87 MG/DL (ref 0.55–1.02)
DEPRECATED RDW RBC AUTO: 43.7 FL (ref 35.1–46.3)
EOSINOPHIL # BLD AUTO: 0.19 X10(3) UL (ref 0–0.7)
EOSINOPHIL NFR BLD AUTO: 3.6 %
ERYTHROCYTE [DISTWIDTH] IN BLOOD BY AUTOMATED COUNT: 12.8 % (ref 11–15)
GLUCOSE BLD-MCNC: 120 MG/DL (ref 70–99)
GLUCOSE BLDC GLUCOMTR-MCNC: 105 MG/DL (ref 70–99)
GLUCOSE BLDC GLUCOMTR-MCNC: 106 MG/DL (ref 70–99)
GLUCOSE BLDC GLUCOMTR-MCNC: 166 MG/DL (ref 70–99)
GLUCOSE BLDC GLUCOMTR-MCNC: 174 MG/DL (ref 70–99)
HCT VFR BLD AUTO: 31.1 % (ref 35–48)
HGB BLD-MCNC: 10.4 G/DL (ref 12–16)
IMM GRANULOCYTES # BLD AUTO: 0.01 X10(3) UL (ref 0–1)
IMM GRANULOCYTES NFR BLD: 0.2 %
LYMPHOCYTES # BLD AUTO: 1.37 X10(3) UL (ref 1–4)
LYMPHOCYTES NFR BLD AUTO: 25.8 %
MCH RBC QN AUTO: 31 PG (ref 26–34)
MCHC RBC AUTO-ENTMCNC: 33.4 G/DL (ref 31–37)
MCV RBC AUTO: 92.6 FL (ref 80–100)
MONOCYTES # BLD AUTO: 0.42 X10(3) UL (ref 0.1–1)
MONOCYTES NFR BLD AUTO: 7.9 %
NEUTROPHILS # BLD AUTO: 3.31 X10 (3) UL (ref 1.5–7.7)
NEUTROPHILS # BLD AUTO: 3.31 X10(3) UL (ref 1.5–7.7)
NEUTROPHILS NFR BLD AUTO: 62.3 %
OSMOLALITY SERPL CALC.SUM OF ELEC: 301 MOSM/KG (ref 275–295)
PLATELET # BLD AUTO: 115 10(3)UL (ref 150–450)
POTASSIUM SERPL-SCNC: 4.6 MMOL/L (ref 3.5–5.1)
RBC # BLD AUTO: 3.36 X10(6)UL (ref 3.8–5.3)
SODIUM SERPL-SCNC: 137 MMOL/L (ref 136–145)
WBC # BLD AUTO: 5.3 X10(3) UL (ref 4–11)

## 2020-08-27 PROCEDURE — 82962 GLUCOSE BLOOD TEST: CPT

## 2020-08-27 PROCEDURE — 85025 COMPLETE CBC W/AUTO DIFF WBC: CPT | Performed by: INTERNAL MEDICINE

## 2020-08-27 PROCEDURE — 80048 BASIC METABOLIC PNL TOTAL CA: CPT | Performed by: INTERNAL MEDICINE

## 2020-08-27 NOTE — PROGRESS NOTES
[facility]    Progress Note    Delia Herndon Patient Status:  Inpatient    1950 MRN [id number]   Location [facility] 3W/SW Attending Allison Harrell MD   Hosp Day # 6 PCP Veronica Arteaga MD       Subjective:   Erica Cuellar 1.7 01/04/2020    TSH 2.430 01/04/2020     03/05/2020    DDIMER 1.23 (H) 03/05/2020    MG 2.0 08/22/2020    PHOS 4.7 03/25/2020    TROP <0.045 08/21/2020       Current Medications:  furosemide (LASIX) tab 40 mg, 40 mg, Oral, BID (Diuretic)  Suleman Avery congestion. furosemide 80 MG Oral Tab, Take 1 tablet (80 mg total) by mouth BID (Diuretic). traMADol HCl 50 MG Oral Tab, Take 1 tablet (50 mg total) by mouth every 12 (twelve) hours as needed.   Pantoprazole Sodium 40 MG Oral Tab EC, Take 1 tablet (40 mg

## 2020-08-27 NOTE — PLAN OF CARE
Aox3 resting in bed, bed is low and locked in place, call light is within reach. Remains on 2 L NC, IV abx, plan is cooper of natikati tomorrow.    Problem: Patient Centered Care  Goal: Patient preferences are identified and integrated in the patient's plan of prescribed range  Description  INTERVENTIONS:  - Monitor Blood Glucose as ordered  - Assess for signs and symptoms of hyperglycemia and hypoglycemia  - Administer ordered medications to maintain glucose within target range  - Assess barriers to adequate nu

## 2020-08-27 NOTE — PLAN OF CARE
Problem: Patient Centered Care  Goal: Patient preferences are identified and integrated in the patient's plan of care  Description  Interventions:  - What would you like us to know as we care for you? Patient lives in assisted living at [facility].  Patient hypoglycemia  - Administer ordered medications to maintain glucose within target range  - Assess barriers to adequate nutritional intake and initiate nutrition consult as needed  - Instruct patient on self management of diabetes  Outcome: Progressing    Pt

## 2020-08-27 NOTE — PROGRESS NOTES
[facility]    History and Physical    Ganga Bowen Patient Status:  Inpatient    1950 MRN [id number]   Location [facility] 3W/SW Attending Kaela Mcconnell MD   [address] Day # 6 PCP Glen Womack MD     Date:  2020  Date [place] (DEEP SEA NASAL SPRAY) 0.65 % Nasal Solution, 2 sprays by Nasal route 3 (three) times daily as needed for congestion. furosemide 80 MG Oral Tab, Take 1 tablet (80 mg total) by mouth BID (Diuretic).   traMADol HCl 50 MG Oral Tab, Take 1 tablet (50 mg 127/70, pulse 69, temperature 97.6 °F (36.4 °C), temperature source Oral, resp. rate 20, height 5' 5\" (1.651 m), weight 176 lb 1.6 oz (79.9 kg), SpO2 99 %. Nursing note and vitals reviewed. Constitutional: She is oriented to person, place, and time. to oral Lasix 60 mg bid due to rising creat. 8/27: creat approaching baseline and RENE better. Ureteral stent retained  Likely source of recurring UTI's with same organisms.  Attempting to arrange OP stent removal but insurance issues and COVID restrict

## 2020-08-27 NOTE — CM/SW NOTE
MONSE obtained list of accepting SNFs and provided to pt for review. Pt is requesting to go to [address] [place] [address] at d/c.    Antony Redman received notice from pt's RN - anticipated d/c for Friday 8/28.     MON[facility] via Aidin and notified

## 2020-08-28 VITALS
HEART RATE: 65 BPM | SYSTOLIC BLOOD PRESSURE: 109 MMHG | HEIGHT: 65 IN | TEMPERATURE: 98 F | BODY MASS INDEX: 29.46 KG/M2 | RESPIRATION RATE: 18 BRPM | OXYGEN SATURATION: 98 % | WEIGHT: 176.81 LBS | DIASTOLIC BLOOD PRESSURE: 56 MMHG

## 2020-08-28 LAB
GLUCOSE BLDC GLUCOMTR-MCNC: 169 MG/DL (ref 70–99)
GLUCOSE BLDC GLUCOMTR-MCNC: 92 MG/DL (ref 70–99)

## 2020-08-28 PROCEDURE — 82962 GLUCOSE BLOOD TEST: CPT

## 2020-08-28 RX ORDER — FUROSEMIDE 40 MG/1
40 TABLET ORAL
Qty: 60 TABLET | Refills: 3 | Status: ON HOLD | OUTPATIENT
Start: 2020-08-28 | End: 2020-11-17

## 2020-08-28 RX ORDER — TRAMADOL HYDROCHLORIDE 50 MG/1
50 TABLET ORAL EVERY 12 HOURS PRN
Qty: 60 TABLET | Refills: 5 | Status: ON HOLD | OUTPATIENT
Start: 2020-08-28 | End: 2020-11-16

## 2020-08-28 NOTE — CM/SW NOTE
Received notice that pt is medically cleared for d/c today. MONSE contacted Krysta escalante/ [facility] - they are able to accept today and requested 3PM d/c time due to needing bed cleared and cleaned. MONSE updated pt's RN and D/C RN Lazarus Barkley.  MONSE also i

## 2020-08-28 NOTE — PROGRESS NOTES
[facility]    History and Physical    Ayala Nino Patient Status:  Inpatient    1950 MRN [id number]   Location [facility] 3W/SW Attending Dhara Valentin MD   [facility] Day # 7 PCP Edison Linares MD     Date:  2020  Date [place] (DEEP SEA NASAL SPRAY) 0.65 % Nasal Solution, 2 sprays by Nasal route 3 (three) times daily as needed for congestion.   Pantoprazole Sodium 40 MG Oral Tab EC, Take 1 tablet (40 mg total) by mouth every morning before breakfast.  carvedilol 6.25 MG Ora Vital Signs:  Blood pressure 109/56, pulse 65, temperature 97.8 °F (36.6 °C), temperature source Oral, resp. rate 18, height 5' 5\" (1.651 m), weight 176 lb 12.8 oz (80.2 kg), SpO2 98 %. Nursing note and vitals reviewed.    Constitutional: She is orien function permits. 8/24: switched to oral Lasix 60 mg bid due to rising creat. 8/27: creat approaching baseline and RENE better. Ureteral stent retained  Likely source of recurring UTI's with same organisms.  Attempting to arrange OP stent removal but ins

## 2020-08-28 NOTE — PLAN OF CARE
Problem: Patient Centered Care  Goal: Patient preferences are identified and integrated in the patient's plan of care  Description  Interventions:  - What would you like us to know as we care for you? Patient lives in assisted living at [facility].  Patient and symptoms of hyperglycemia and hypoglycemia  - Administer ordered medications to maintain glucose within target range  - Assess barriers to adequate nutritional intake and initiate nutrition consult as needed  - Instruct patient on self management of di

## 2020-08-28 NOTE — PROGRESS NOTES
Report given to Rachel Fajardo RN at [facility] 72 removed. Extension provided in case of questions.

## 2020-08-28 NOTE — PLAN OF CARE
Problem: Patient Centered Care  Goal: Patient preferences are identified and integrated in the patient's plan of care  Description  Interventions:  - What would you like us to know as we care for you? Patient lives in assisted living at [facility].  Patient hypoglycemia  - Administer ordered medications to maintain glucose within target range  - Assess barriers to adequate nutritional intake and initiate nutrition consult as needed  - Instruct patient on self management of diabetes  Outcome: Progressing    IV

## 2020-08-28 NOTE — PROGRESS NOTES
[facility]  Progress Note    Delia Herndon Patient Status:  Inpatient    1950 MRN [id number]   Location [facility] 3W/SW Attending Allison Harrell MD   Hosp Day # 7 PCP Veronica Arteaga MD     Subjective:  Patient seen in follow up. %.  General: Alert and oriented in no apparent distress. HEENT: No focal deficits. Neck: No JVD, carotids 2+ no bruits. Cardiac: Regular rate and rhythm, 2/6 murmur apex  Lungs: Clear without wheezes, rales, rhonchi or dullness.   Normal excursions and e with meals  Clopidogrel Bisulfate (PLAVIX) tab 75 mg, 75 mg, Oral, Daily  Fluticasone Propionate (FLONASE) 50 MCG/ACT nasal spray 2 spray, 2 spray, Each Nare, BID  Isosorbide Mononitrate ER (IMDUR) 24 hr tab 60 mg, 60 mg, Oral, Daily  Levothyroxine Sodium Failure, Cardiac Transplant and Assisted Devices  [facility]  Pager: [phone]  Tel: [phone]

## 2020-09-01 ENCOUNTER — INITIAL APN SNF VISIT (OUTPATIENT)
Dept: INTERNAL MEDICINE CLINIC | Facility: SKILLED NURSING FACILITY | Age: 70
End: 2020-09-01

## 2020-09-01 VITALS
BODY MASS INDEX: 29 KG/M2 | RESPIRATION RATE: 20 BRPM | OXYGEN SATURATION: 97 % | HEART RATE: 66 BPM | DIASTOLIC BLOOD PRESSURE: 58 MMHG | TEMPERATURE: 97 F | WEIGHT: 172.31 LBS | SYSTOLIC BLOOD PRESSURE: 123 MMHG

## 2020-09-01 DIAGNOSIS — R73.9 HYPERGLYCEMIA: ICD-10-CM

## 2020-09-01 DIAGNOSIS — N30.00 ACUTE CYSTITIS WITHOUT HEMATURIA: ICD-10-CM

## 2020-09-01 DIAGNOSIS — I50.9 ACUTE ON CHRONIC CONGESTIVE HEART FAILURE, UNSPECIFIED HEART FAILURE TYPE (HCC): ICD-10-CM

## 2020-09-01 DIAGNOSIS — R09.02 HYPOXIA: ICD-10-CM

## 2020-09-01 DIAGNOSIS — Z96.0 URETERAL STENT RETAINED: Chronic | ICD-10-CM

## 2020-09-01 PROCEDURE — 1111F DSCHRG MED/CURRENT MED MERGE: CPT | Performed by: NURSE PRACTITIONER

## 2020-09-01 PROCEDURE — 99310 SBSQ NF CARE HIGH MDM 45: CPT | Performed by: NURSE PRACTITIONER

## 2020-09-01 NOTE — PROGRESS NOTES
Lucinda Gamez  : 1950  Age 77year old  female patient is admitted to [place] [address] 20     Chief complaint: Dyspnea CINDY SOB     [facility] Admission : 20 to 20     HPI  76 y/o female with pmh severe MR, HFpEF, CAD post multiple stents, Stroke [facility])    • UTI (urinary tract infection)      Past Surgical History:   Procedure Laterality Date   • CHOLECYSTECTOMY     • SINUS SURGERY         Family History   Problem Relation Age of Onset   • Other (ULCERS) Father    • Cancer Mother         UTER • atorvastatin 40 MG Oral Tab Take 80 mg by mouth nightly. • acetaminophen 325 MG Oral Tab Take 650 mg by mouth every 6 (six) hours as needed for Pain.      • Levothyroxine Sodium 50 MCG Oral Tab Take 50 mcg by mouth before breakfast.     • nitroGLY auscultation  CARDIOVASCULAR: S1, S2 normal, RRR; no S3, no S4;   ABDOMEN:  normal active BS+, soft, nondistended; no organomegaly, no masses; no bruits; nontender, no guarding, no rebound tenderness.   :no suprapubic distension  LYMPHATIC:no lymphedema permanent cardiac pacemaker  -Dual chamber device placed for high grade AV block. -CONT MEDS AS ABOVE   11. HTN  -carvedilol 6.25 po bid   -cont isosorbide mononitrate ER 60mg po daily   -VS q shift   12.  Depressant   -cont trazodone 50mg po day   -does n

## 2020-09-02 ENCOUNTER — SNF VISIT (OUTPATIENT)
Dept: INTERNAL MEDICINE CLINIC | Facility: SKILLED NURSING FACILITY | Age: 70
End: 2020-09-02

## 2020-09-02 VITALS
RESPIRATION RATE: 20 BRPM | WEIGHT: 172 LBS | BODY MASS INDEX: 29 KG/M2 | TEMPERATURE: 98 F | HEART RATE: 68 BPM | DIASTOLIC BLOOD PRESSURE: 52 MMHG | OXYGEN SATURATION: 97 % | SYSTOLIC BLOOD PRESSURE: 107 MMHG

## 2020-09-02 DIAGNOSIS — R73.9 HYPERGLYCEMIA: ICD-10-CM

## 2020-09-02 DIAGNOSIS — N18.30 STAGE 3 CHRONIC KIDNEY DISEASE (HCC): Chronic | ICD-10-CM

## 2020-09-02 DIAGNOSIS — Z95.0 PRESENCE OF PERMANENT CARDIAC PACEMAKER: Chronic | ICD-10-CM

## 2020-09-02 DIAGNOSIS — R09.02 HYPOXIA: ICD-10-CM

## 2020-09-02 DIAGNOSIS — I50.9 ACUTE ON CHRONIC CONGESTIVE HEART FAILURE, UNSPECIFIED HEART FAILURE TYPE (HCC): ICD-10-CM

## 2020-09-02 DIAGNOSIS — Z96.0 URETERAL STENT RETAINED: Chronic | ICD-10-CM

## 2020-09-02 PROCEDURE — 99309 SBSQ NF CARE MODERATE MDM 30: CPT | Performed by: NURSE PRACTITIONER

## 2020-09-02 NOTE — DISCHARGE SUMMARY
[facility]    PATIENT'S NAME: Sarahi Sanders   ATTENDING PHYSICIAN: Balaji Ly MD   PATIENT ACCOUNT#:   [id number]    LOCATION:  [address] [place] [address] RECORD #:   [id number]       YOB: 1950  ADMISSION DATE:       07/21/2 cardiomegaly with mild central vascular congestion. HOSPITAL COURSE:  The patient was seen by Cardiology and IV Lasix diuresis was initiated with close monitoring of weights, intake and output, and renal function.   IV Zosyn, which was later switched to

## 2020-09-07 ENCOUNTER — HOSPITAL ENCOUNTER (EMERGENCY)
Facility: HOSPITAL | Age: 70
Discharge: HOME | End: 2020-09-07
Attending: EMERGENCY MEDICINE
Payer: MEDICARE

## 2020-09-07 ENCOUNTER — APPOINTMENT (OUTPATIENT)
Dept: GENERAL RADIOLOGY | Facility: HOSPITAL | Age: 70
End: 2020-09-07
Attending: EMERGENCY MEDICINE
Payer: MEDICARE

## 2020-09-07 VITALS
OXYGEN SATURATION: 100 % | HEART RATE: 66 BPM | SYSTOLIC BLOOD PRESSURE: 119 MMHG | BODY MASS INDEX: 28.32 KG/M2 | DIASTOLIC BLOOD PRESSURE: 65 MMHG | WEIGHT: 170 LBS | TEMPERATURE: 98 F | HEIGHT: 65 IN | RESPIRATION RATE: 19 BRPM

## 2020-09-07 DIAGNOSIS — R07.89 CHEST PAIN, ATYPICAL: Primary | ICD-10-CM

## 2020-09-07 LAB
ANION GAP SERPL CALC-SCNC: 9 MMOL/L (ref 0–18)
BASOPHILS # BLD AUTO: 0.01 X10(3) UL (ref 0–0.2)
BASOPHILS NFR BLD AUTO: 0.2 %
BUN BLD-MCNC: 50 MG/DL (ref 7–18)
BUN/CREAT SERPL: 22.7 (ref 10–20)
CALCIUM BLD-MCNC: 8.7 MG/DL (ref 8.5–10.1)
CHLORIDE SERPL-SCNC: 105 MMOL/L (ref 98–112)
CO2 SERPL-SCNC: 26 MMOL/L (ref 21–32)
CREAT BLD-MCNC: 2.2 MG/DL (ref 0.55–1.02)
DEPRECATED RDW RBC AUTO: 41.5 FL (ref 35.1–46.3)
EOSINOPHIL # BLD AUTO: 0.17 X10(3) UL (ref 0–0.7)
EOSINOPHIL NFR BLD AUTO: 3.1 %
ERYTHROCYTE [DISTWIDTH] IN BLOOD BY AUTOMATED COUNT: 12.6 % (ref 11–15)
GLUCOSE BLD-MCNC: 98 MG/DL (ref 70–99)
HCT VFR BLD AUTO: 30.4 % (ref 35–48)
HGB BLD-MCNC: 10.5 G/DL (ref 12–16)
IMM GRANULOCYTES # BLD AUTO: 0.01 X10(3) UL (ref 0–1)
IMM GRANULOCYTES NFR BLD: 0.2 %
LYMPHOCYTES # BLD AUTO: 1.75 X10(3) UL (ref 1–4)
LYMPHOCYTES NFR BLD AUTO: 31.6 %
MCH RBC QN AUTO: 31.6 PG (ref 26–34)
MCHC RBC AUTO-ENTMCNC: 34.5 G/DL (ref 31–37)
MCV RBC AUTO: 91.6 FL (ref 80–100)
MONOCYTES # BLD AUTO: 0.4 X10(3) UL (ref 0.1–1)
MONOCYTES NFR BLD AUTO: 7.2 %
NEUTROPHILS # BLD AUTO: 3.19 X10 (3) UL (ref 1.5–7.7)
NEUTROPHILS # BLD AUTO: 3.19 X10(3) UL (ref 1.5–7.7)
NEUTROPHILS NFR BLD AUTO: 57.7 %
NT-PROBNP SERPL-MCNC: 6022 PG/ML (ref ?–125)
OSMOLALITY SERPL CALC.SUM OF ELEC: 303 MOSM/KG (ref 275–295)
PLATELET # BLD AUTO: 144 10(3)UL (ref 150–450)
POTASSIUM SERPL-SCNC: 3.5 MMOL/L (ref 3.5–5.1)
RBC # BLD AUTO: 3.32 X10(6)UL (ref 3.8–5.3)
SODIUM SERPL-SCNC: 140 MMOL/L (ref 136–145)
TROPONIN I SERPL-MCNC: <0.045 NG/ML (ref ?–0.04)
WBC # BLD AUTO: 5.5 X10(3) UL (ref 4–11)

## 2020-09-07 PROCEDURE — 80048 BASIC METABOLIC PNL TOTAL CA: CPT | Performed by: EMERGENCY MEDICINE

## 2020-09-07 PROCEDURE — 83880 ASSAY OF NATRIURETIC PEPTIDE: CPT | Performed by: EMERGENCY MEDICINE

## 2020-09-07 PROCEDURE — 71045 X-RAY EXAM CHEST 1 VIEW: CPT | Performed by: EMERGENCY MEDICINE

## 2020-09-07 PROCEDURE — 99285 EMERGENCY DEPT VISIT HI MDM: CPT

## 2020-09-07 PROCEDURE — 94640 AIRWAY INHALATION TREATMENT: CPT

## 2020-09-07 PROCEDURE — 96375 TX/PRO/DX INJ NEW DRUG ADDON: CPT

## 2020-09-07 PROCEDURE — 93010 ELECTROCARDIOGRAM REPORT: CPT | Performed by: EMERGENCY MEDICINE

## 2020-09-07 PROCEDURE — 85025 COMPLETE CBC W/AUTO DIFF WBC: CPT | Performed by: EMERGENCY MEDICINE

## 2020-09-07 PROCEDURE — 96374 THER/PROPH/DIAG INJ IV PUSH: CPT

## 2020-09-07 PROCEDURE — 84484 ASSAY OF TROPONIN QUANT: CPT | Performed by: EMERGENCY MEDICINE

## 2020-09-07 PROCEDURE — 93005 ELECTROCARDIOGRAM TRACING: CPT

## 2020-09-07 RX ORDER — MORPHINE SULFATE 4 MG/ML
4 INJECTION, SOLUTION INTRAMUSCULAR; INTRAVENOUS ONCE
Status: COMPLETED | OUTPATIENT
Start: 2020-09-07 | End: 2020-09-07

## 2020-09-07 RX ORDER — IPRATROPIUM BROMIDE AND ALBUTEROL SULFATE 2.5; .5 MG/3ML; MG/3ML
3 SOLUTION RESPIRATORY (INHALATION) ONCE
Status: COMPLETED | OUTPATIENT
Start: 2020-09-07 | End: 2020-09-07

## 2020-09-07 RX ORDER — FUROSEMIDE 10 MG/ML
40 INJECTION INTRAMUSCULAR; INTRAVENOUS ONCE
Status: COMPLETED | OUTPATIENT
Start: 2020-09-07 | End: 2020-09-07

## 2020-09-07 NOTE — ED INITIAL ASSESSMENT (HPI)
Care assumed from EMS. Pt c/o constant left sided chest pain since around 1600 today. 2 Nitro given by Nursing home staff with relief from the first nitro, per pt. Pt also c/o sob, and is visible slightly labored with her respirations.  Skin color is approp

## 2020-09-07 NOTE — ED PROVIDER NOTES
Patient Seen in: [facility] Emergency Department      History   Patient presents with:  Chest Pain Angina    Stated Complaint:     HPI    21-year-old female with past medical history significant for CAD, CHF, diabetes, GERD, high blood pressure, C Triage Vitals [09/07/20 3876]   /90   Pulse 70   Resp 20   Temp 97.9 °F (36.6 °C)   Temp src Temporal   SpO2 100 %   O2 Device None (Room air)       Current:/61   Pulse 64   Temp 97.9 °F (36.6 °C) (Temporal)   Resp 20   Ht 165.1 cm (5' 5\")   W created for panel order CBC WITH DIFFERENTIAL WITH PLATELET.   Procedure                               Abnormality         Status                     ---------                               -----------         ------                     CBC W/ DIFFERENTIAL[

## 2020-09-08 NOTE — ED NOTES
Pt resting comfortably. Work of breathing is improved, but pt reports that her WOB does not feel improved. Speaking in full sentences, skin color is appropriate. 99% on home o2. Will continue to monitor.

## 2020-09-15 ENCOUNTER — SNF VISIT (OUTPATIENT)
Dept: INTERNAL MEDICINE CLINIC | Facility: SKILLED NURSING FACILITY | Age: 70
End: 2020-09-15

## 2020-09-15 VITALS
SYSTOLIC BLOOD PRESSURE: 133 MMHG | BODY MASS INDEX: 28 KG/M2 | HEART RATE: 76 BPM | WEIGHT: 170.81 LBS | RESPIRATION RATE: 20 BRPM | DIASTOLIC BLOOD PRESSURE: 80 MMHG | TEMPERATURE: 97 F | OXYGEN SATURATION: 98 %

## 2020-09-15 DIAGNOSIS — R73.9 HYPERGLYCEMIA: ICD-10-CM

## 2020-09-15 DIAGNOSIS — N18.30 STAGE 3 CHRONIC KIDNEY DISEASE (HCC): Chronic | ICD-10-CM

## 2020-09-15 DIAGNOSIS — I50.9 ACUTE ON CHRONIC CONGESTIVE HEART FAILURE, UNSPECIFIED HEART FAILURE TYPE (HCC): ICD-10-CM

## 2020-09-15 DIAGNOSIS — Z96.0 URETERAL STENT RETAINED: Chronic | ICD-10-CM

## 2020-09-15 DIAGNOSIS — N30.01 ACUTE CYSTITIS WITH HEMATURIA: ICD-10-CM

## 2020-09-15 PROCEDURE — 99309 SBSQ NF CARE MODERATE MDM 30: CPT | Performed by: NURSE PRACTITIONER

## 2020-09-15 NOTE — PROGRESS NOTES
Creola Cheadle  : 1950  Age 77year old  female patient is admitted to [facility] 20 for rehab and strengthening      Chief complaint: Dyspnea CINDY SOB     [facility] Admission : 20 to 20      HPI  68 y/o female with pmh severe MR, HFp Outpatient Medications   Medication Sig Dispense Refill   • furosemide 40 MG Oral Tab Take 1 tablet (40 mg total) by mouth BID (Diuretic). 60 tablet 3   • traMADol HCl 50 MG Oral Tab Take 1 tablet (50 mg total) by mouth every 12 (twelve) hours as needed.  6 well otherwise, just overall weak and not comfortable   SKIN: denies any unusual skin lesions or rashes  WOUNDS: NONE   EYES:no visual complaints or deficits  HENT: denies nasal congestion, sinus pain or sore throat;  RESPIRATORY: SOB on exertion    CARDIO renal function permits.  -cbc and bmp weekly in rehab, due in am    -cont Lasix 40mg po bid  -cont KCL 10meq po q day    -on chronic O2 at 2 liters   -Pulm and RT following   -scheduled duonebs po bid and every 6 hrs prn   -vs q shift  -monitor    2.  Uret minute visit and greater than 50% of the time was spent counseling the patient and/or coordinating care.     DONTE Hutchins  09/15/20   2:39 PM

## 2020-09-16 ENCOUNTER — SNF VISIT (OUTPATIENT)
Dept: INTERNAL MEDICINE CLINIC | Facility: SKILLED NURSING FACILITY | Age: 70
End: 2020-09-16

## 2020-09-16 VITALS
SYSTOLIC BLOOD PRESSURE: 136 MMHG | OXYGEN SATURATION: 98 % | RESPIRATION RATE: 20 BRPM | WEIGHT: 170 LBS | HEART RATE: 83 BPM | TEMPERATURE: 97 F | DIASTOLIC BLOOD PRESSURE: 71 MMHG | BODY MASS INDEX: 28 KG/M2

## 2020-09-16 DIAGNOSIS — N30.00 ACUTE CYSTITIS WITHOUT HEMATURIA: ICD-10-CM

## 2020-09-16 DIAGNOSIS — Z96.0 URETERAL STENT RETAINED: Chronic | ICD-10-CM

## 2020-09-16 DIAGNOSIS — R73.9 HYPERGLYCEMIA: ICD-10-CM

## 2020-09-16 DIAGNOSIS — R09.02 HYPOXIA: ICD-10-CM

## 2020-09-16 DIAGNOSIS — I50.9 ACUTE ON CHRONIC CONGESTIVE HEART FAILURE, UNSPECIFIED HEART FAILURE TYPE (HCC): ICD-10-CM

## 2020-09-16 PROCEDURE — 99308 SBSQ NF CARE LOW MDM 20: CPT | Performed by: NURSE PRACTITIONER

## 2020-09-16 NOTE — PROGRESS NOTES
77year old  female patient is admitted to [facility] 8/28/20 for rehab and strengthening      Chief complaint: Dyspnea CINDY SOB     [facility] Admission : 8/21/20 to 8/28/20      HPI  70 y/o female with pmh severe MR, HFpEF, CAD post multiple stents, nephrolit Current Outpatient Medications   Medication Sig Dispense Refill   • furosemide 40 MG Oral Tab Take 1 tablet (40 mg total) by mouth BID (Diuretic).  60 tablet 3   • traMADol HCl 50 MG Oral Tab Take 1 tablet (50 mg total) by mouth every 12 (twelve) hours HEALTH:feels well otherwise, just overall weak and not comfortable   SKIN: denies any unusual skin lesions or rashes  WOUNDS: NONE   EYES:no visual complaints or deficits  HENT: denies nasal congestion, sinus pain or sore throat;  RESPIRATORY: SOB on exert - Diuresis as renal function permits.  -cbc and bmp weekly in rehab, due in am    -cont Lasix 40mg po bid  -cont KCL 10meq po q day    -on chronic O2 at 2 liters   -Pulm and RT following   -scheduled duonebs po bid and every 6 hrs prn   -vs q shift  -mon Vianca        This is a 15 minute visit and greater than 50% of the time was spent counseling the patient and/or coordinating care.     DONTE Abarca  09/16/20   3:17 PM

## 2020-09-22 ENCOUNTER — SNF VISIT (OUTPATIENT)
Dept: INTERNAL MEDICINE CLINIC | Facility: SKILLED NURSING FACILITY | Age: 70
End: 2020-09-22

## 2020-09-22 VITALS
TEMPERATURE: 98 F | WEIGHT: 170 LBS | HEART RATE: 69 BPM | SYSTOLIC BLOOD PRESSURE: 106 MMHG | BODY MASS INDEX: 28 KG/M2 | RESPIRATION RATE: 20 BRPM | OXYGEN SATURATION: 98 % | DIASTOLIC BLOOD PRESSURE: 68 MMHG

## 2020-09-22 DIAGNOSIS — N30.00 ACUTE CYSTITIS WITHOUT HEMATURIA: ICD-10-CM

## 2020-09-22 DIAGNOSIS — R06.02 SOB (SHORTNESS OF BREATH) ON EXERTION: ICD-10-CM

## 2020-09-22 DIAGNOSIS — E11.9 TYPE 2 DIABETES MELLITUS WITHOUT COMPLICATION, WITHOUT LONG-TERM CURRENT USE OF INSULIN (HCC): ICD-10-CM

## 2020-09-22 DIAGNOSIS — I50.9 ACUTE ON CHRONIC CONGESTIVE HEART FAILURE, UNSPECIFIED HEART FAILURE TYPE (HCC): ICD-10-CM

## 2020-09-22 PROCEDURE — 99309 SBSQ NF CARE MODERATE MDM 30: CPT | Performed by: NURSE PRACTITIONER

## 2020-09-23 ENCOUNTER — SNF DISCHARGE (OUTPATIENT)
Dept: INTERNAL MEDICINE CLINIC | Facility: SKILLED NURSING FACILITY | Age: 70
End: 2020-09-23

## 2020-09-23 VITALS
TEMPERATURE: 98 F | DIASTOLIC BLOOD PRESSURE: 79 MMHG | HEART RATE: 81 BPM | BODY MASS INDEX: 28 KG/M2 | SYSTOLIC BLOOD PRESSURE: 140 MMHG | RESPIRATION RATE: 20 BRPM | WEIGHT: 170.81 LBS | OXYGEN SATURATION: 98 %

## 2020-09-23 DIAGNOSIS — I50.9 ACUTE ON CHRONIC CONGESTIVE HEART FAILURE, UNSPECIFIED HEART FAILURE TYPE (HCC): ICD-10-CM

## 2020-09-23 DIAGNOSIS — N30.01 ACUTE CYSTITIS WITH HEMATURIA: ICD-10-CM

## 2020-09-23 DIAGNOSIS — N20.0 NEPHROLITHIASIS: ICD-10-CM

## 2020-09-23 DIAGNOSIS — R06.00 DYSPNEA, UNSPECIFIED TYPE: ICD-10-CM

## 2020-09-23 PROCEDURE — 99310 SBSQ NF CARE HIGH MDM 45: CPT | Performed by: NURSE PRACTITIONER

## 2020-09-23 NOTE — PROGRESS NOTES
Luz Martínez, 69/1950, 77year old, female is being discharged from [place] [address]   9/23/20 back to [facility] supportive care     [address]    Date of Admission [facility]:8/21/20 to 8/28/20     Date of Discharge [facility] SHERICE:   8/28/20 to well  in therapies with walker  and ready to go home .   Patient returning back to [facility] today  9/23/20 with their  HH/RN/PT/OT. Will discuss with  if any equipment needed upon discharge.  Will f/u with Dr. Nicole Lazo her PCP next week .        REVIEW OF S extremity  EXTREMITIES/VASCULAR:no cyanosis, clubbing or edema  NEUROLOGIC: follows commands, rocking motion with sitting  PSYCHIATRIC: alert and oriented x 3; affect appropriate      SEE PLAN BELOW  1. Acute on chronic diastolic congestive heart failure permanent cardiac pacemaker  -Dual chamber device placed for high grade AV block.  -Cont meds as above   11.  HTN- controlled   -carvedilol 6.25 po bid   -cont isosorbide mononitrate ER 60mg po daily   -VS q shift   12. Depressant- mood improved    -cont tr

## 2020-09-26 ENCOUNTER — APPOINTMENT (OUTPATIENT)
Dept: GENERAL RADIOLOGY | Facility: HOSPITAL | Age: 70
DRG: 660 | End: 2020-09-26
Attending: UROLOGY
Payer: MEDICARE

## 2020-09-26 ENCOUNTER — ANESTHESIA (OUTPATIENT)
Dept: SURGERY | Facility: HOSPITAL | Age: 70
DRG: 660 | End: 2020-09-26
Payer: MEDICARE

## 2020-09-26 ENCOUNTER — APPOINTMENT (OUTPATIENT)
Dept: GENERAL RADIOLOGY | Facility: HOSPITAL | Age: 70
DRG: 660 | End: 2020-09-26
Attending: EMERGENCY MEDICINE
Payer: MEDICARE

## 2020-09-26 ENCOUNTER — HOSPITAL ENCOUNTER (INPATIENT)
Facility: HOSPITAL | Age: 70
LOS: 6 days | Discharge: NURSING, REHAB OR LONG-TERM CARE | DRG: 660 | End: 2020-10-02
Attending: EMERGENCY MEDICINE | Admitting: INTERNAL MEDICINE
Payer: MEDICARE

## 2020-09-26 ENCOUNTER — ANESTHESIA EVENT (OUTPATIENT)
Dept: SURGERY | Facility: HOSPITAL | Age: 70
DRG: 660 | End: 2020-09-26
Payer: MEDICARE

## 2020-09-26 ENCOUNTER — APPOINTMENT (OUTPATIENT)
Dept: CT IMAGING | Facility: HOSPITAL | Age: 70
DRG: 660 | End: 2020-09-26
Attending: EMERGENCY MEDICINE
Payer: MEDICARE

## 2020-09-26 DIAGNOSIS — N13.30 HYDRONEPHROSIS, UNSPECIFIED HYDRONEPHROSIS TYPE: Primary | ICD-10-CM

## 2020-09-26 PROCEDURE — 52332 CYSTOSCOPY AND TREATMENT: CPT | Performed by: UROLOGY

## 2020-09-26 PROCEDURE — 99223 1ST HOSP IP/OBS HIGH 75: CPT | Performed by: UROLOGY

## 2020-09-26 PROCEDURE — 74176 CT ABD & PELVIS W/O CONTRAST: CPT | Performed by: EMERGENCY MEDICINE

## 2020-09-26 PROCEDURE — 0TP98DZ REMOVAL OF INTRALUMINAL DEVICE FROM URETER, VIA NATURAL OR ARTIFICIAL OPENING ENDOSCOPIC: ICD-10-PCS | Performed by: UROLOGY

## 2020-09-26 PROCEDURE — 0T778DZ DILATION OF LEFT URETER WITH INTRALUMINAL DEVICE, VIA NATURAL OR ARTIFICIAL OPENING ENDOSCOPIC: ICD-10-PCS | Performed by: UROLOGY

## 2020-09-26 PROCEDURE — 74021 RADEX ABDOMEN 3+ VIEWS: CPT | Performed by: EMERGENCY MEDICINE

## 2020-09-26 DEVICE — URETERAL STENT
Type: IMPLANTABLE DEVICE | Site: URETER | Status: FUNCTIONAL
Brand: ASCERTA™

## 2020-09-26 RX ORDER — MORPHINE SULFATE 4 MG/ML
2 INJECTION, SOLUTION INTRAMUSCULAR; INTRAVENOUS EVERY 10 MIN PRN
Status: DISCONTINUED | OUTPATIENT
Start: 2020-09-26 | End: 2020-09-26 | Stop reason: HOSPADM

## 2020-09-26 RX ORDER — DEXAMETHASONE SODIUM PHOSPHATE 4 MG/ML
VIAL (ML) INJECTION AS NEEDED
Status: DISCONTINUED | OUTPATIENT
Start: 2020-09-26 | End: 2020-09-26 | Stop reason: SURG

## 2020-09-26 RX ORDER — LIDOCAINE HYDROCHLORIDE 10 MG/ML
INJECTION, SOLUTION EPIDURAL; INFILTRATION; INTRACAUDAL; PERINEURAL AS NEEDED
Status: DISCONTINUED | OUTPATIENT
Start: 2020-09-26 | End: 2020-09-26 | Stop reason: SURG

## 2020-09-26 RX ORDER — HYDROMORPHONE HYDROCHLORIDE 1 MG/ML
0.4 INJECTION, SOLUTION INTRAMUSCULAR; INTRAVENOUS; SUBCUTANEOUS EVERY 5 MIN PRN
Status: DISCONTINUED | OUTPATIENT
Start: 2020-09-26 | End: 2020-09-26 | Stop reason: HOSPADM

## 2020-09-26 RX ORDER — NALOXONE HYDROCHLORIDE 0.4 MG/ML
80 INJECTION, SOLUTION INTRAMUSCULAR; INTRAVENOUS; SUBCUTANEOUS AS NEEDED
Status: DISCONTINUED | OUTPATIENT
Start: 2020-09-26 | End: 2020-09-26 | Stop reason: HOSPADM

## 2020-09-26 RX ORDER — MORPHINE SULFATE 4 MG/ML
2 INJECTION, SOLUTION INTRAMUSCULAR; INTRAVENOUS ONCE
Status: COMPLETED | OUTPATIENT
Start: 2020-09-26 | End: 2020-09-26

## 2020-09-26 RX ORDER — MORPHINE SULFATE 4 MG/ML
4 INJECTION, SOLUTION INTRAMUSCULAR; INTRAVENOUS EVERY 10 MIN PRN
Status: DISCONTINUED | OUTPATIENT
Start: 2020-09-26 | End: 2020-09-26 | Stop reason: HOSPADM

## 2020-09-26 RX ORDER — ONDANSETRON 2 MG/ML
INJECTION INTRAMUSCULAR; INTRAVENOUS AS NEEDED
Status: DISCONTINUED | OUTPATIENT
Start: 2020-09-26 | End: 2020-09-26 | Stop reason: SURG

## 2020-09-26 RX ORDER — METOPROLOL TARTRATE 5 MG/5ML
2.5 INJECTION INTRAVENOUS ONCE
Status: DISCONTINUED | OUTPATIENT
Start: 2020-09-26 | End: 2020-09-26 | Stop reason: HOSPADM

## 2020-09-26 RX ORDER — GENTAMICIN SULFATE 40 MG/ML
INJECTION, SOLUTION INTRAMUSCULAR; INTRAVENOUS AS NEEDED
Status: DISCONTINUED | OUTPATIENT
Start: 2020-09-26 | End: 2020-09-26 | Stop reason: HOSPADM

## 2020-09-26 RX ORDER — LIDOCAINE HYDROCHLORIDE 20 MG/ML
JELLY TOPICAL AS NEEDED
Status: DISCONTINUED | OUTPATIENT
Start: 2020-09-26 | End: 2020-09-26 | Stop reason: HOSPADM

## 2020-09-26 RX ORDER — MORPHINE SULFATE 4 MG/ML
4 INJECTION, SOLUTION INTRAMUSCULAR; INTRAVENOUS ONCE
Status: COMPLETED | OUTPATIENT
Start: 2020-09-26 | End: 2020-09-26

## 2020-09-26 RX ORDER — MORPHINE SULFATE 10 MG/ML
6 INJECTION, SOLUTION INTRAMUSCULAR; INTRAVENOUS EVERY 10 MIN PRN
Status: DISCONTINUED | OUTPATIENT
Start: 2020-09-26 | End: 2020-09-26 | Stop reason: HOSPADM

## 2020-09-26 RX ORDER — DEXTROSE MONOHYDRATE 25 G/50ML
50 INJECTION, SOLUTION INTRAVENOUS
Status: DISCONTINUED | OUTPATIENT
Start: 2020-09-26 | End: 2020-10-02

## 2020-09-26 RX ORDER — PROCHLORPERAZINE EDISYLATE 5 MG/ML
5 INJECTION INTRAMUSCULAR; INTRAVENOUS ONCE AS NEEDED
Status: DISCONTINUED | OUTPATIENT
Start: 2020-09-26 | End: 2020-09-26 | Stop reason: HOSPADM

## 2020-09-26 RX ORDER — SODIUM CHLORIDE, SODIUM LACTATE, POTASSIUM CHLORIDE, CALCIUM CHLORIDE 600; 310; 30; 20 MG/100ML; MG/100ML; MG/100ML; MG/100ML
INJECTION, SOLUTION INTRAVENOUS CONTINUOUS
Status: DISCONTINUED | OUTPATIENT
Start: 2020-09-26 | End: 2020-09-26 | Stop reason: HOSPADM

## 2020-09-26 RX ORDER — HALOPERIDOL 5 MG/ML
0.25 INJECTION INTRAMUSCULAR ONCE AS NEEDED
Status: DISCONTINUED | OUTPATIENT
Start: 2020-09-26 | End: 2020-09-26 | Stop reason: HOSPADM

## 2020-09-26 RX ORDER — PHENYLEPHRINE HCL 10 MG/ML
VIAL (ML) INJECTION AS NEEDED
Status: DISCONTINUED | OUTPATIENT
Start: 2020-09-26 | End: 2020-09-26 | Stop reason: SURG

## 2020-09-26 RX ORDER — HYDROMORPHONE HYDROCHLORIDE 1 MG/ML
0.2 INJECTION, SOLUTION INTRAMUSCULAR; INTRAVENOUS; SUBCUTANEOUS EVERY 5 MIN PRN
Status: DISCONTINUED | OUTPATIENT
Start: 2020-09-26 | End: 2020-09-26 | Stop reason: HOSPADM

## 2020-09-26 RX ORDER — ONDANSETRON 2 MG/ML
4 INJECTION INTRAMUSCULAR; INTRAVENOUS ONCE AS NEEDED
Status: DISCONTINUED | OUTPATIENT
Start: 2020-09-26 | End: 2020-09-26 | Stop reason: HOSPADM

## 2020-09-26 RX ORDER — SODIUM CHLORIDE 9 MG/ML
INJECTION, SOLUTION INTRAVENOUS CONTINUOUS
Status: DISCONTINUED | OUTPATIENT
Start: 2020-09-26 | End: 2020-09-27

## 2020-09-26 RX ORDER — HYDROCODONE BITARTRATE AND ACETAMINOPHEN 5; 325 MG/1; MG/1
2 TABLET ORAL AS NEEDED
Status: DISCONTINUED | OUTPATIENT
Start: 2020-09-26 | End: 2020-09-26 | Stop reason: HOSPADM

## 2020-09-26 RX ORDER — HYDROMORPHONE HYDROCHLORIDE 1 MG/ML
0.6 INJECTION, SOLUTION INTRAMUSCULAR; INTRAVENOUS; SUBCUTANEOUS EVERY 5 MIN PRN
Status: DISCONTINUED | OUTPATIENT
Start: 2020-09-26 | End: 2020-09-26 | Stop reason: HOSPADM

## 2020-09-26 RX ORDER — HYDROCODONE BITARTRATE AND ACETAMINOPHEN 5; 325 MG/1; MG/1
1 TABLET ORAL AS NEEDED
Status: DISCONTINUED | OUTPATIENT
Start: 2020-09-26 | End: 2020-09-26 | Stop reason: HOSPADM

## 2020-09-26 RX ORDER — DEXTROSE MONOHYDRATE 25 G/50ML
50 INJECTION, SOLUTION INTRAVENOUS
Status: DISCONTINUED | OUTPATIENT
Start: 2020-09-26 | End: 2020-09-26 | Stop reason: HOSPADM

## 2020-09-26 RX ORDER — ACETAMINOPHEN 325 MG/1
650 TABLET ORAL EVERY 6 HOURS PRN
Status: DISCONTINUED | OUTPATIENT
Start: 2020-09-26 | End: 2020-10-02

## 2020-09-26 RX ADMIN — DEXAMETHASONE SODIUM PHOSPHATE 4 MG: 4 MG/ML VIAL (ML) INJECTION at 12:30:00

## 2020-09-26 RX ADMIN — ONDANSETRON 4 MG: 2 INJECTION INTRAMUSCULAR; INTRAVENOUS at 13:03:00

## 2020-09-26 RX ADMIN — LIDOCAINE HYDROCHLORIDE 50 MG: 10 INJECTION, SOLUTION EPIDURAL; INFILTRATION; INTRACAUDAL; PERINEURAL at 12:24:00

## 2020-09-26 RX ADMIN — PHENYLEPHRINE HCL 100 MCG: 10 MG/ML VIAL (ML) INJECTION at 12:55:00

## 2020-09-26 RX ADMIN — PHENYLEPHRINE HCL 100 MCG: 10 MG/ML VIAL (ML) INJECTION at 12:39:00

## 2020-09-26 RX ADMIN — PHENYLEPHRINE HCL 100 MCG: 10 MG/ML VIAL (ML) INJECTION at 12:47:00

## 2020-09-26 NOTE — PLAN OF CARE
Pt AOX4, VS stable, permanent pacemaker implanted, c/o hematuria and incontinent of bladder, 0.9 NS running at 50 ml/h, tolerated surgery, denies pain, sleeping in bed, advance diet as tolerated, wet to dry dressing over right leg wound,  will continue to Provide assistive devices as appropriate  - Consider OT/PT consult to assist with strengthening/mobility  - Encourage toileting schedule  9/26/2020 1510 by Paul Snyder RN  Outcome: Progressing  9/26/2020 1509 by Paul Snyder, RN  Outcome: Progr Progressing  9/26/2020 1509 by Dilcia Keene, RN  Outcome: Progressing     Problem: Patient/Family Goals  Goal: Patient/Family Long Term Goal  Description: Patient's Long Term Goal: \"to go home\"    Interventions:  - take medication  - follow doctor's

## 2020-09-26 NOTE — CONSULTS
[facility] ID CONSULT NOTE    Tonny Winters Patient Status:  Emergency    1950 MRN [id number]   Location [address] Attending Raciel Fofana MD   Hosp Day # 0 PCP Colton Holloway MD Relation Age of Onset   • Other (ULCERS) Father    • Cancer Mother         UTERINE/COLON      reports that she has quit smoking. She has never used smokeless tobacco. She reports previous alcohol use. She reports that she does not use drugs.     Allergies: BUN 41*   CREATSERUM 1.84*   GFRAA 32*   GFRNAA 28*   CA 9.1      K 3.7      CO2 25.0       Microbiology: Reviewed in EMR    Radiology: Reviewed    ASSESSMENT:    Antibiotics: IV meropenem, OVP     Patient is a 60-year-old female with a histo

## 2020-09-26 NOTE — ED NOTES
Hourly rounding completed at this time:  VS: /72   Pulse 93   Temp 98.6 °F (37 °C) (Oral)   Resp 18   Ht 165.1 cm (5' 5\")   Wt 77.1 kg   SpO2 92%   BMI 28.29 kg/m²   Needs identified: lights dimmed  Interventions: updated status of care, comfort abi

## 2020-09-26 NOTE — ANESTHESIA PROCEDURE NOTES
Airway  Urgency: Elective      General Information and Staff    Patient location during procedure: OR  Anesthesiologist: Logan Walsh MD  Performed: anesthesiologist     Indications and Patient Condition  Indications for airway management: anesthesia  S

## 2020-09-26 NOTE — ED PROVIDER NOTES
Patient Seen in: [facility] Emergency Department      History   Patient presents with:  Urinary Symptoms: PT arrived via EMS d/t \"blood in her urine\"; onset this evening.  PT with cc of lower abdominal/back pain    Stated Complaint:     HPI    77 Gastrointestinal: Positive for abdominal pain. Genitourinary: Positive for hematuria. Negative for dysuria. Musculoskeletal: Positive for back pain. Skin: Negative for rash. Neurological: Negative for dizziness.    Psychiatric/Behavioral: Negative BUN 41 (H) 7 - 18 mg/dL    Creatinine 1.84 (H) 0.55 - 1.02 mg/dL    BUN/CREA Ratio 22.3 (H) 10.0 - 20.0    Calcium, Total 9.1 8.5 - 10.1 mg/dL    Calculated Osmolality 300 (H) 275 - 295 mOsm/kg    GFR, Non- 28 (L) >=60    GFR, -Ameri 1. 59 1.00 - 4.00 x10(3) uL    Monocyte Absolute 0.45 0.10 - 1.00 x10(3) uL    Eosinophil Absolute 0.30 0.00 - 0.70 x10(3) uL    Basophil Absolute 0.03 0.00 - 0.20 x10(3) uL    Immature Granulocyte Absolute 0.03 0.00 - 1.00 x10(3) uL    Neutrophil % 78.1 % discussed with  Dr. Jack Venegas in the ER  at 05:50 AM ET on 09/26/2020. Farzana Sharma M.D. This report has been electronically signed and verified by the Radiologist whose name is printed above.     DD:  09/26/2020/DT:  09/26/2020      EMERGENCY DEPARTMENT CO hydronephrosis type  (primary encounter diagnosis)    Disposition:  Admit  9/26/2020  5:11 am    Follow-up:  No follow-up provider specified.   We recommend that you schedule follow up care with a primary care provider within the next three months to obtain

## 2020-09-26 NOTE — ED INITIAL ASSESSMENT (HPI)
PT arrived via EMS d/t \"blood in her urine\"; onset this evening.  PT with cc of lower abdominal/back pain

## 2020-09-26 NOTE — PROGRESS NOTES
[facility] Dosing Service  Antibiotic Dosing    Rivera Palomares is a 77year old for whom pharmacy is dosing meropenem for treatment of possible bacteremia and UTI. Allergies: is allergic to metformin and tetracycline.     Vitals: /58 (BP Loca

## 2020-09-26 NOTE — ANESTHESIA POSTPROCEDURE EVALUATION
Patient: Delia Herndon    Procedure Summary     Date: 09/26/20 Room / Location: [address] [place] [address] MAIN OR 14 / [address] [place] [address] MAIN OR    Anesthesia Start: 9060 Anesthesia Stop: 5214    Procedure: CYSTOSCOPY RETROGRADE (Left ) Diagnosis:       Gross hematuria      Hydronephrosi

## 2020-09-26 NOTE — ED NOTES
Orders for admission, patient is aware of plan and ready to go upstairs. Any questions, please call ED RN Reece Wallis at extension [phone].

## 2020-09-26 NOTE — ED NOTES
Hourly rounding completed at this time:  VS: 116/88, HR 89, RR 18, 94% RA  Needs identified: denies  Interventions: updated on plan, assisted patient to position of comfort in bed  Waiting for: BMP, CT  Plan: dispo after results of labs and CT

## 2020-09-26 NOTE — ANESTHESIA PREPROCEDURE EVALUATION
Anesthesia PreOp Note    HPI:     Grisel Carrillo is a 77year old female who presents for preoperative consultation requested by: Pati Castro MD    Date of Surgery: 9/26/2020    Procedure(s):  CYSTOSCOPY RETROGRADE  LASER HOLMIUM LITHOTRIPSY  Ind insufficiency         Date Noted: 07/02/2019      Mitral valve insufficiency, unspecified etiology         Date Noted: 07/02/2019      Acute chest pain         Date Noted: 07/02/2019      Chronic diastolic HF (heart failure) ([facility] [address]         Date Noted: 06/10 , 9/25/2020 at Unknown time    •  loratadine 10 MG Oral Tab, Take 10 mg by mouth daily. , Disp: , Rfl: , 9/25/2020 at Unknown time    •  Fluticasone Propionate 50 MCG/ACT Nasal Suspension, 2 sprays by Each Nare route 2 (two) times a day., Disp: , Rfl: , 9/2 by mouth every 12 (twelve) hours as needed. , Disp: 60 tablet, Rfl: 5, 9/25/2020 at 2200    •  ipratropium-albuterol 0.5-2.5 (3) MG/3ML Inhalation Solution, Take 3 mL by nebulization every 4 (four) hours as needed. , Disp: , Rfl: , Unknown at Unknown time activity        Days per week: Not on file        Minutes per session: Not on file      Stress: Not on file    Relationships      Social connections        Talks on phone: Not on file        Gets together: Not on file        Attends Congregation service: Not Mallampati: III  TM distance: >3 FB  Neck ROM: limited  Dental      Comment: Edentulous      Pulmonary    (+) shortness of breath, rales,   (-) COPD, asthma, sleep apnea    ROS comment: +SOB 2/2 chronic diastolic CHF  Cardiovascular - normal exam  Exerci

## 2020-09-26 NOTE — PROGRESS NOTES
[facility] Pharmacy Note: Antimicrobial Weight Based Dose Adjustment for: meropenem (MERREM)    Eve Mejia is a 77year old patient who has been prescribed meropenem (MERREM) 1gm every x 1 dose er.     Estimated Creatinine Clearance: 26.3 mL/min (A) (based

## 2020-09-26 NOTE — CONSULTS
[facility]    Report of Consultation    Leonardo Juarez Patient Status:  Inpatient    1950 MRN [id number]   Location [facility] 5SW/SE Attending Amy Ivy MD   Hosp Day # 0 PCP Dixie Patel MD     Date of Admission: stones and patient recalls having a fever at that time; she recalls that a left ureteral stent was placed and then patient could not [place] [facility] showed no true hydronephrosis but only mild pelvocaliectasis.   At that time stone was 1.4 cm Smoker      Smokeless tobacco: Never Used    Alcohol use: Not Currently    Drug use: Never       Medications (Active prior to today's visit):    •  0.9% NaCl infusion, , Intravenous, Continuous    •  glucose (DEX4) oral liquid 15 g, 15 g, Oral, Q15 Min PRN place, person with normal affect  Exam appropriate for age; patellar reflexes--diminished bilaterally  Head/Face: normocephalic  Eyes/Vision: no scleral icterus  Neck/Thyroid: neck is supple without adenopathy  Lymphatic: no abnormal cervical, supraclavicu Yellow Yellow Yellow Red*   CLARITY Hazy* Hazy* Cloudy* Hazy* Clear Cloudy* Cloudy* Cloudy*   SPECGRAVITY 1.015 1.014 1.018 1.010 1.011 1.012 1.013 1.020   PHURINE 5.0 5.0 5.0 5.0 5.0 5.0 5.0 6.0   PROUR 100 * 100 * 100 * 30 * Negative 100 * 100 * 300 * severe perinephric stranding and air throughout the urinary collecting system on the left suggesting pyelonephritis, potentially emphysematous pyelitis.   The increase in hydronephrosis also suggests that the stent may not be functioning to decompress the c calcified annulus. Normal thickened,     mildly calcified leaflets. Severe regurgitation.     Review of previous records:   Patient recalls being in [facility] December 2018 when evaluated for left kidney stones and patient [place] [facility] [place] did.     I spent 100 minutes with patient in the course of taking her history, examining her, reviewing labs, reviewing imaging studies and personally reviewing the images; discussing imaging results with radiologist Dr. Juana Ramírez, and that the hydronephrosis be relieved in the presence of a serious left kidney infection; that because she took aspirin and Plavix yesterday, likely insertion of percutaneous nephrostomy tube not possible; that with her anticoagulated state, severe hemor Thank you for sending this patient to his service. I will do my best to keep you informed of  all significant urological findings and developments.          9/26/2020  Yi Matthews MD

## 2020-09-26 NOTE — BRIEF OP NOTE
[facility] POST ANESTHESIA CARE UNIT  Brief Op Note       Patients Name: Luz Martínez  Attending Physician: Phillis Closs, MD  Operating Physician: Chaitanya Nieves MD  CSN: [id number]     Location:  OR  MRN: [id number]    Date of Birth: [birth year]

## 2020-09-27 PROBLEM — N20.1 URETERAL CALCULUS: Status: ACTIVE | Noted: 2019-06-08

## 2020-09-27 PROCEDURE — 99233 SBSQ HOSP IP/OBS HIGH 50: CPT | Performed by: UROLOGY

## 2020-09-27 RX ORDER — ATORVASTATIN CALCIUM 40 MG/1
80 TABLET, FILM COATED ORAL NIGHTLY
Status: DISCONTINUED | OUTPATIENT
Start: 2020-09-27 | End: 2020-10-02

## 2020-09-27 RX ORDER — CETIRIZINE HYDROCHLORIDE 10 MG/1
10 TABLET ORAL DAILY
Status: DISCONTINUED | OUTPATIENT
Start: 2020-09-27 | End: 2020-09-30

## 2020-09-27 RX ORDER — TRAZODONE HYDROCHLORIDE 50 MG/1
75 TABLET ORAL NIGHTLY
Status: DISCONTINUED | OUTPATIENT
Start: 2020-09-27 | End: 2020-10-02

## 2020-09-27 RX ORDER — FUROSEMIDE 40 MG/1
40 TABLET ORAL
Status: DISCONTINUED | OUTPATIENT
Start: 2020-09-27 | End: 2020-09-28

## 2020-09-27 RX ORDER — IPRATROPIUM BROMIDE AND ALBUTEROL SULFATE 2.5; .5 MG/3ML; MG/3ML
3 SOLUTION RESPIRATORY (INHALATION) EVERY 4 HOURS PRN
Status: DISCONTINUED | OUTPATIENT
Start: 2020-09-27 | End: 2020-10-02

## 2020-09-27 RX ORDER — ISOSORBIDE MONONITRATE 60 MG/1
60 TABLET, EXTENDED RELEASE ORAL DAILY
Status: DISCONTINUED | OUTPATIENT
Start: 2020-09-27 | End: 2020-10-02

## 2020-09-27 RX ORDER — TRAMADOL HYDROCHLORIDE 50 MG/1
50 TABLET ORAL EVERY 12 HOURS PRN
Status: DISCONTINUED | OUTPATIENT
Start: 2020-09-27 | End: 2020-10-02

## 2020-09-27 RX ORDER — POTASSIUM CHLORIDE 750 MG/1
10 TABLET, EXTENDED RELEASE ORAL
Status: DISCONTINUED | OUTPATIENT
Start: 2020-09-27 | End: 2020-10-02

## 2020-09-27 RX ORDER — LEVOTHYROXINE SODIUM 0.05 MG/1
50 TABLET ORAL
Status: DISCONTINUED | OUTPATIENT
Start: 2020-09-27 | End: 2020-10-02

## 2020-09-27 RX ORDER — VANCOMYCIN HYDROCHLORIDE 125 MG/1
125 CAPSULE ORAL DAILY
Status: DISCONTINUED | OUTPATIENT
Start: 2020-09-27 | End: 2020-10-02

## 2020-09-27 RX ORDER — ASPIRIN 81 MG/1
81 TABLET, CHEWABLE ORAL DAILY
Status: DISCONTINUED | OUTPATIENT
Start: 2020-09-27 | End: 2020-10-02

## 2020-09-27 RX ORDER — FUROSEMIDE 20 MG/1
20 TABLET ORAL ONCE
Status: COMPLETED | OUTPATIENT
Start: 2020-09-27 | End: 2020-09-27

## 2020-09-27 RX ORDER — CARVEDILOL 6.25 MG/1
6.25 TABLET ORAL 2 TIMES DAILY WITH MEALS
Status: DISCONTINUED | OUTPATIENT
Start: 2020-09-27 | End: 2020-10-02

## 2020-09-27 RX ORDER — POTASSIUM CHLORIDE 20 MEQ/1
40 TABLET, EXTENDED RELEASE ORAL ONCE
Status: COMPLETED | OUTPATIENT
Start: 2020-09-27 | End: 2020-09-27

## 2020-09-27 RX ORDER — ECHINACEA PURPUREA EXTRACT 125 MG
2 TABLET ORAL 3 TIMES DAILY PRN
Status: DISCONTINUED | OUTPATIENT
Start: 2020-09-27 | End: 2020-10-02

## 2020-09-27 RX ORDER — PANTOPRAZOLE SODIUM 40 MG/1
40 TABLET, DELAYED RELEASE ORAL
Status: DISCONTINUED | OUTPATIENT
Start: 2020-09-27 | End: 2020-10-02

## 2020-09-27 RX ORDER — FLUTICASONE PROPIONATE 50 MCG
2 SPRAY, SUSPENSION (ML) NASAL 2 TIMES DAILY
Status: DISCONTINUED | OUTPATIENT
Start: 2020-09-27 | End: 2020-10-02

## 2020-09-27 NOTE — OPERATIVE REPORT
[facility]    PATIENT'S NAME: Cathy Kilgoremb   ATTENDING PHYSICIAN: Janessa Maradiaga Cera, MD   OPERATING PHYSICIAN: Kt Landin MD   PATIENT ACCOUNT#:   [id number]    LOCATION:  [address] [place] [address] #:   [id number]       DATE OF hematuria; last took aspirin and Plavix on 09/25/2020.   CT shows not only left pyelonephritis, but also air in the collecting system, consistent with emphysematous pyelonephritis with worsening hydronephrosis and same retained stent and a large 1.6 cm obst aspirin and Plavix for such a lengthy period of time. The patient has restrictions on healthcare plan once she leaves the hospital if she is not covered for elective urological procedures.     OPERATIVE TECHNIQUE:  The patient was taken to the operating ro

## 2020-09-27 NOTE — PROGRESS NOTES
Grace White is a 77year old female. HPI:   Patient presents with:  Urinary Symptoms: PT arrived via EMS d/t \"blood in her urine\"; onset this evening.  PT with cc of lower abdominal/back pain      History provided by patient and chart review    P patient [place] [facility] showed no true hydronephrosis but only mild pelvocaliectasis. At that time stone was 1.4 cm. Urine culture came back negative. There was no need for any emergency urological condition.   Patient had severe :    Current Facility-Administered Medications:   •  vancomycin HCl (VANCOCIN) cap 125 mg, 125 mg, Oral, Daily  •  atorvastatin (LIPITOR) tab 80 mg, 80 mg, Oral, Nightly  •  carvedilol (COREG) tab 6.25 mg, 6.25 mg, Oral, BID with meals  •  Fluticasone Prop diarrhea    PHYSICAL EXAM:     09/27/20 1400 97.9 °F (36.6 °C) 70 18 97/48 96 % — Nasal cannula 1 L/min   Flanks--there is mild tenderness of the left flank to palpation; right flank nontender to palpation.       LABORATORY DATA:      Renal Labs (last three >100,000 CFU/ML Escherichia coli* >100,000 CFU/ML Citrobacter freundii* No Growth at 18-24 hrs. >100,000 CFU/ML Escherichia coli  ESBL Pos*  >100,000 CFU/ML Klebsiella pneumoniae pneumoniae* >100,000 CFU/ML Escherichia coli  ESBL Pos*  >100,000 CFU/ML Kleb x-ray  CONCLUSION: Cardiomegaly. Mild bilateral interstitial opacity which may reflect mild interstitial edema.         Finalized by (CST): Kayleigh Jean MD on 9/07/2020 at 7:17 PM                 Result Date: 9/26/2020 PLAIN X-RAY OF THE ABDOMEN WITH kidney as well as an additional stone on the left side. 5/20/2019 urine culture greater than 100,000 E. coli resistant to nitrofurantoin but sensitive to other antimicrobials; patient treated with Keflex 500 mg 3 times daily x7 days [facility] ER.   Patient admit and coordinating care with cardiologist Dr. Gianna Campos, with infectious disease specialist Dr. Sofi Barton, answering patient's questions about treatment and coordinating care with operating room, scheduling definitive surgery for this coming Tuesday         ASSES from a cardiac perspective and could undergo urological surgery; he asked that low-dose aspirin restarted and I agree and I explained that I feel that neph ureteroscopic laser surgery could be performed while on low-dose aspirin.   I fully explained to lindsay

## 2020-09-27 NOTE — PROGRESS NOTES
[facility]    Progress Note    Mavis Le Patient Status:  Inpatient    1950 MRN [id number]   Location [facility] 5SW/SE Attending Karen Adames MD   Hosp Day # 0 PCP Ajay Hinton MD     Subjective:     Constitutio demineralization. 4. Dependent atelectasis/air trapping in the lung bases. Ground-glass on the left is slightly more than right and early infectious process is difficult to completely exclude. 5. Lesser incidental findings as above.   A preliminary repor

## 2020-09-27 NOTE — H&P
[facility]    PATIENT'S NAME: Marcel Nicole   ATTENDING PHYSICIAN: Janessa Hong MD   PATIENT ACCOUNT#:   [id number]    LOCATION:  [facility] [address] #:   [id number]       YOB: 1950  ADMISSION DATE:       09/2 and she is afebrile. HEENT:  Unremarkable. NECK:  Supple without any thyroid enlargement or lymph node enlargement. LUNGS:  Clear bilaterally without any wheezing. HEART:  Normal first and second heart sounds without any cardiac murmur.   ABDOMEN:  Sof

## 2020-09-27 NOTE — PLAN OF CARE
C/O SOB today - IV fluids stopped - possibly faint crackles in bilat lung bases. Dr. Nicole Tang notified, meds adjusted, resp treatment requested. She stated in the afternoon that her breathing was better.  Urine tea colored - Dr. Nicole Tang notified of this and  strengthening/mobility  - Encourage toileting schedule  Outcome: Progressing     Problem: Altered Communication/Language Barrier  Goal: Patient/Family is able to understand and participate in their care  Description: Interventions:  - Assess communication discharge planning  - Arrange for needed discharge resources and transportation as appropriate  - Identify discharge learning needs (meds, wound care, etc)  - Arrange for interpreters to assist at discharge as needed  - Consider post-discharge preferences

## 2020-09-27 NOTE — CONSULTS
[facility]    Report of Consultation    Maulik Rodrigez Patient Status:  Inpatient    1950 MRN [id number]   Location [facility] 5SW/SE Attending Kena Gallagher MD   Hosp Day # 1 PCP Nona Orozco MD     Date of Admi Oral, Daily    •  atorvastatin (LIPITOR) tab 80 mg, 80 mg, Oral, Nightly    •  carvedilol (COREG) tab 6.25 mg, 6.25 mg, Oral, BID with meals    •  Fluticasone Propionate (FLONASE) 50 MCG/ACT nasal spray 2 spray, 2 spray, Each Nare, BID    •  furosemide ([place] (DEEP SEA NASAL SPRAY) 0.65 % Nasal Solution, 2 sprays by Nasal route 3 (three) times daily as needed for congestion.     •  Pantoprazole Sodium 40 MG Oral Tab EC, Take 1 tablet (40 mg total) by mouth every morning before breakfast.    •  carvedilol 6 • vancomycin HCl  125 mg Oral Daily   • atorvastatin  80 mg Oral Nightly   • carvedilol  6.25 mg Oral BID with meals   • Fluticasone Propionate  2 spray Each Nare BID   • furosemide  40 mg Oral BID (Diuretic)   • Isosorbide Mononitrate ER  60 mg Oral Helen Grant near the ureteropelvic junction and ureteral stent in place. There is new severe perinephric stranding and air throughout the urinary collecting system on the left suggesting pyelonephritis, potentially emphysematous pyelitis.   The increase in hydronephro left anterior descending artery stents. 2.       Jailed marginal branch behind previously placed stent, but vessel size is small. 3.       Apical right coronary artery disease. Vessel size is too small for stenting.     EKG 9/7/20  AP  rhythm    Assess

## 2020-09-27 NOTE — PROGRESS NOTES
[facility]    CDI Prediction Tool Protocol (Vancomycin Initiated)    OVP (oral vancomycin prophylaxis) 125 mg PO Daily is being started in this patient based on a score of 23.1.       Score Breakdown:  History of CDI within 1 year AND high risk a

## 2020-09-27 NOTE — OCCUPATIONAL THERAPY NOTE
OT orders received, chart reviewed, dylon attempted this AM however per RN Greg Seals, pt reports SOB and will be receiving Lasix. Requested therapy to check back in PM.  Will re-attempt as schedule allows.

## 2020-09-27 NOTE — PROGRESS NOTES
INFECTIOUS DISEASE PROGRESS NOTE    Stan Medina Patient Status:  Inpatient    1950 MRN [id number]   Location [facility] 5SW/SE Attending Milena Moser MD   Hosp Day # 1 PCP Kelsie Turner MD     Subjective:  ROS done. Afebrile. Hyperlipidemia     Hypothyroidism     Stage 3 chronic kidney disease (HCC)     Urinary tract infection with hematuria     Acute on chronic congestive heart failure, unspecified heart failure type ([facility] [address]     Hypoxia     Pneumonia of both lower lobes due to i available old records  -  Discussed with patient, RN      Chema Hoang MD  [facility]  [phone]

## 2020-09-27 NOTE — PROGRESS NOTES
[facility] - [facility]    Progress Note    Hadley Silveira Patient Status:  Inpatient    1950 MRN [id number]   Location [facility] 5SW/SE Attending Alem Varela MD   Hosp Day # 1 PCP Angelica Mims MD     Subjective:     Jodi diverticulitis. 3. Diffuse bone demineralization. 4. Dependent atelectasis/air trapping in the lung bases. Ground-glass on the left is slightly more than right and early infectious process is difficult to completely exclude.   5. Lesser incidental findin

## 2020-09-27 NOTE — PLAN OF CARE
Problem: PAIN - ADULT  Goal: Verbalizes/displays adequate comfort level or patient's stated pain goal  Description: INTERVENTIONS:  - Encourage pt to monitor pain and request assistance  - Assess pain using appropriate pain scale  - Administer analgesics appropriate  - Identify discharge learning needs (meds, wound care, etc)  - Arrange for interpreters to assist at discharge as needed  - Consider post-discharge preferences of patient/family/discharge partner  - Complete POLST form as appropriate  - Assess patient's plan of care  Description: Interventions:  - What would you like us to know as we care for you?  I prefer to be called \"Dre\"  - Provide timely, complete, and accurate information to patient/family  - Incorporate patient and family knowledge, bozena

## 2020-09-28 ENCOUNTER — APPOINTMENT (OUTPATIENT)
Dept: GENERAL RADIOLOGY | Facility: HOSPITAL | Age: 70
DRG: 660 | End: 2020-09-28
Attending: UROLOGY
Payer: MEDICARE

## 2020-09-28 PROCEDURE — 99233 SBSQ HOSP IP/OBS HIGH 50: CPT | Performed by: UROLOGY

## 2020-09-28 PROCEDURE — 74021 RADEX ABDOMEN 3+ VIEWS: CPT | Performed by: UROLOGY

## 2020-09-28 RX ORDER — FUROSEMIDE 40 MG/1
40 TABLET ORAL DAILY
Status: DISCONTINUED | OUTPATIENT
Start: 2020-09-29 | End: 2020-10-02

## 2020-09-28 NOTE — WOUND PROGRESS NOTE
Pt seen for nursing consult for \"right lower leg wound\". Pt was sitting up in bed, agreeable to assessment. Pt had a dressing to the RLE which was removed. There is a skin tear with moist pink/red, clean wound base.  Wound was dressed with xeroform and a

## 2020-09-28 NOTE — OCCUPATIONAL THERAPY NOTE
OCCUPATIONAL THERAPY EVALUATION - INPATIENT     Room Number: 559/559-A  Evaluation Date: 9/28/2020  Type of Evaluation: Initial  Presenting Problem: hydronephrosis    Physician Order: IP Consult to Occupational Therapy  Reason for Therapy: ADL/IADL Dysfunc skilled inpatient OT to address the above deficits, maximizing patient's ability to return to prior level of function.     The patient's Approx Degree of Impairment: 56.46% has been calculated based on documentation in the [facility] '6 clicks' Inpatient Daily A Home: Assisted living facility([place])  Home Layout: One level  Lives With: Alone     Toilet and Equipment: Comfort height toilet;Grab bar  Shower/Tub and Equipment: Walk-in shower; Shower chair;Grab bar  Other Equipment: None    Occupation/Status: retired clothing?: A Little  -   Taking care of personal grooming such as brushing teeth?: A Little  -   Eating meals?: A Little    AM-PAC Score:  Score: 15  Approx Degree of Impairment: 56.46%  Standardized Score (AM-PAC Scale): 34.69  CMS Modifier (G-Code): CK

## 2020-09-28 NOTE — PROGRESS NOTES
INFECTIOUS DISEASE PROGRESS NOTE    Nat Bruno Patient Status:  Inpatient    1950 MRN [id number]   Location [facility] 5SW/SE Attending Portia Libman, MD   Hosp Day # 2 PCP Aroldo Morton MD     Subjective:  ROS done. Tmax 99. 2 failure (HCC)     Congestive heart failure, unspecified HF chronicity, unspecified heart failure type ([facility] [address]     Hyperlipidemia     Hypothyroidism     Stage 3 chronic kidney disease     Urinary tract infection with hematuria     Acute on chronic congestive week.  -  Follow fever curve, wbc. -  Reviewed labs, micro, imaging reports, available old records. -  Discussed with patient, urology, RN.       Mel Mar PA-C  [facility]  [phone]

## 2020-09-28 NOTE — PROGRESS NOTES
Cardiology progress note    24 h events patient doing well, no CP or SOB.     Current Medications:    •  vancomycin HCl (VANCOCIN) cap 125 mg, 125 mg, Oral, Daily    •  atorvastatin (LIPITOR) tab 80 mg, 80 mg, Oral, Nightly    •  carvedilol (COREG) tab 6.25 mouth Before Dinner. •  loratadine 10 MG Oral Tab, Take 10 mg by mouth daily. •  Fluticasone Propionate 50 MCG/ACT Nasal Suspension, 2 sprays by Each Nare route 2 (two) times a day.     •  Saline Nasal Spray (DEEP SEA NASAL SPRAY) 0.65 % Nasal Solutio cooperative  Head: Normocephalic, without obvious abnormality, atraumatic  Pulmonary: clear to auscultation bilaterally  Cardiovascular: S1, S2 normal, no murmur, click, rub or gallop, regular rate and rhythm  Abdominal: soft, non-tender; bowel sounds norm thickened,     mildly calcified leaflets. Severe regurgitation. Cath 7/19  1. A 40% to 50% in-stent restenosis of the proximal and mid left anterior descending artery stents.   2.       Jailed marginal branch behind previously placed stent, but ves

## 2020-09-28 NOTE — PROGRESS NOTES
Goyo Mercado is a 77year old female. HPI:   Patient presents with:  Urinary Symptoms: PT arrived via EMS d/t \"blood in her urine\"; onset this evening.  PT with cc of lower abdominal/back pain      History provided by patient and chart review    P then patient could not follow-up at [place]. CT showed no true hydronephrosis but only mild pelvocaliectasis. At that time stone was 1.4 cm. Urine culture came back negative. There was no need for any emergency urological condition.   Patient had s Medications :    Current Facility-Administered Medications:   •  vancomycin HCl (VANCOCIN) cap 125 mg, 125 mg, Oral, Daily  •  atorvastatin (LIPITOR) tab 80 mg, 80 mg, Oral, Nightly  •  carvedilol (COREG) tab 6.25 mg, 6.25 mg, Oral, BID with meals  • or vomiting;  diarrhea    PHYSICAL EXAM:     09/28/20 0831 98.7 °F (37.1 °C) 95 19 131/74 90 % — Nasal cannula 1 L/min     Flanks--there is minimal tenderness of the left flank to palpation; right flank nontender to palpation.       LABORATORIES    9/20/202 01/04/20  2207 02/11/20  1458 03/25/20  1127 07/21/20  1518 08/21/20  0947 09/26/20  0234   URINECUL >100,000 CFU/ML Escherichia coli* >100,000 CFU/ML Mixed gram positive mayra >100,000 CFU/ML Escherichia coli* >100,000 CFU/ML Citrobacter freundii* No Grow Ground-glass on the left is slightly more than right and early infectious process is difficult to completely exclude. 5. Lesser incidental findings as above. A preliminary report was issued by the [address] [place] [address] Radiology teleradiology service.  There are no jaron 2018 [facility] left ureteral stent placed. Rice Reason states that she was to stop Plavix and aspirin and [place] 1 [place] [facility] [place] patient could not follow through because of various illnesses.  4/1/2019 CT removal of retained left ureteral stent; insertion of new left ureteral stent; cystoscopy revealed cystitis; very large 1.6 cm proximal most left ureteral calculus still present on fluoroscopy and plain x-ray         I spent 35 minutes with patient, and gr culture while on IV meropenem negative. Dr. Palacios Edu feels that I could proceed on 9/29/2020 Tuesday with definitive cystoscopy with left nephro ureteroscopy and laser lithotripsy of stone, left stent replacement    10.   Constipation  Moderate constipatio

## 2020-09-28 NOTE — PHYSICAL THERAPY NOTE
PHYSICAL THERAPY EVALUATION - INPATIENT     Room Number: 559/559-A  Evaluation Date: 9/28/2020  Type of Evaluation: Initial   Physician Order: PT Eval and Treat    Presenting Problem: hydronephrosis, hematuria, cystosopic replacement of L ureteral stent o Plan: Bed mobility;Transfer training;Gait training;Family education;Patient education; Energy conservation; Endurance; Body mechanics; Coordination;Balance training;Strengthening  Rehab Potential : Good  Frequency (Obs): 3-5x/week       PHYSICAL THERAPY MEDICA (urinary tract infection)        Past Surgical History  Past Surgical History:   Procedure Laterality Date   • CHOLECYSTECTOMY     • CYSTOSCOPY RETROGRADE Left 9/26/2020    Performed by Elaina Blake MD at [address]   • [address] [place] [address] sitting on the side of the bed?: A Little   How much help from another person does the patient currently need. ..   -   Moving to and from a bed to a chair (including a wheelchair)?: A Little   -   Need to walk in hospital room?: [address] 3-5

## 2020-09-28 NOTE — CM/SW NOTE
SW self referral for d/c planning and SNF placement. Pt admitted  9/26/2020 for hydronephrosis, hematuria, cystosopic replacement of L ureteral stent on 9/26/20; cystoscopt planned for 9/29.    Per chart review and multidisciplinary rounds, pt is from [facility]

## 2020-09-28 NOTE — CM/SW NOTE
09/28/20 0900   Readmission Assessment   Factors that patient feels contributed to this readmission Other (only choose if nothing else applies)   Pt's level of independence at discharge? Some assist (mod)   Pt's living situation prior to admission?  Sub-

## 2020-09-29 ENCOUNTER — ANESTHESIA (OUTPATIENT)
Dept: SURGERY | Facility: HOSPITAL | Age: 70
DRG: 660 | End: 2020-09-29
Payer: MEDICARE

## 2020-09-29 ENCOUNTER — APPOINTMENT (OUTPATIENT)
Dept: GENERAL RADIOLOGY | Facility: HOSPITAL | Age: 70
DRG: 660 | End: 2020-09-29
Attending: UROLOGY
Payer: MEDICARE

## 2020-09-29 ENCOUNTER — ANESTHESIA EVENT (OUTPATIENT)
Dept: SURGERY | Facility: HOSPITAL | Age: 70
DRG: 660 | End: 2020-09-29
Payer: MEDICARE

## 2020-09-29 PROCEDURE — 0TP98DZ REMOVAL OF INTRALUMINAL DEVICE FROM URETER, VIA NATURAL OR ARTIFICIAL OPENING ENDOSCOPIC: ICD-10-PCS | Performed by: UROLOGY

## 2020-09-29 PROCEDURE — 0T778DZ DILATION OF LEFT URETER WITH INTRALUMINAL DEVICE, VIA NATURAL OR ARTIFICIAL OPENING ENDOSCOPIC: ICD-10-PCS | Performed by: UROLOGY

## 2020-09-29 PROCEDURE — 0TC18ZZ EXTIRPATION OF MATTER FROM LEFT KIDNEY, VIA NATURAL OR ARTIFICIAL OPENING ENDOSCOPIC: ICD-10-PCS | Performed by: UROLOGY

## 2020-09-29 DEVICE — URETERAL STENT
Type: IMPLANTABLE DEVICE | Site: URETER | Status: FUNCTIONAL
Brand: ASCERTA™

## 2020-09-29 RX ORDER — PROCHLORPERAZINE EDISYLATE 5 MG/ML
5 INJECTION INTRAMUSCULAR; INTRAVENOUS ONCE AS NEEDED
Status: DISCONTINUED | OUTPATIENT
Start: 2020-09-29 | End: 2020-09-29 | Stop reason: HOSPADM

## 2020-09-29 RX ORDER — ROCURONIUM BROMIDE 10 MG/ML
INJECTION, SOLUTION INTRAVENOUS AS NEEDED
Status: DISCONTINUED | OUTPATIENT
Start: 2020-09-29 | End: 2020-09-29 | Stop reason: SURG

## 2020-09-29 RX ORDER — LIDOCAINE HYDROCHLORIDE 20 MG/ML
JELLY TOPICAL AS NEEDED
Status: DISCONTINUED | OUTPATIENT
Start: 2020-09-29 | End: 2020-09-29 | Stop reason: HOSPADM

## 2020-09-29 RX ORDER — HYDROMORPHONE HYDROCHLORIDE 1 MG/ML
0.2 INJECTION, SOLUTION INTRAMUSCULAR; INTRAVENOUS; SUBCUTANEOUS EVERY 5 MIN PRN
Status: DISCONTINUED | OUTPATIENT
Start: 2020-09-29 | End: 2020-09-29 | Stop reason: HOSPADM

## 2020-09-29 RX ORDER — GLYCOPYRROLATE 0.2 MG/ML
INJECTION, SOLUTION INTRAMUSCULAR; INTRAVENOUS AS NEEDED
Status: DISCONTINUED | OUTPATIENT
Start: 2020-09-29 | End: 2020-09-29 | Stop reason: SURG

## 2020-09-29 RX ORDER — NALOXONE HYDROCHLORIDE 0.4 MG/ML
80 INJECTION, SOLUTION INTRAMUSCULAR; INTRAVENOUS; SUBCUTANEOUS AS NEEDED
Status: DISCONTINUED | OUTPATIENT
Start: 2020-09-29 | End: 2020-09-29 | Stop reason: HOSPADM

## 2020-09-29 RX ORDER — SODIUM CHLORIDE 9 MG/ML
INJECTION, SOLUTION INTRAVENOUS CONTINUOUS
Status: DISCONTINUED | OUTPATIENT
Start: 2020-09-29 | End: 2020-10-02

## 2020-09-29 RX ORDER — ONDANSETRON 2 MG/ML
4 INJECTION INTRAMUSCULAR; INTRAVENOUS ONCE AS NEEDED
Status: DISCONTINUED | OUTPATIENT
Start: 2020-09-29 | End: 2020-09-29 | Stop reason: HOSPADM

## 2020-09-29 RX ORDER — MORPHINE SULFATE 4 MG/ML
2 INJECTION, SOLUTION INTRAMUSCULAR; INTRAVENOUS EVERY 10 MIN PRN
Status: DISCONTINUED | OUTPATIENT
Start: 2020-09-29 | End: 2020-09-29 | Stop reason: HOSPADM

## 2020-09-29 RX ORDER — NEOSTIGMINE METHYLSULFATE 1 MG/ML
INJECTION INTRAVENOUS AS NEEDED
Status: DISCONTINUED | OUTPATIENT
Start: 2020-09-29 | End: 2020-09-29 | Stop reason: SURG

## 2020-09-29 RX ORDER — HYDROCODONE BITARTRATE AND ACETAMINOPHEN 5; 325 MG/1; MG/1
1 TABLET ORAL AS NEEDED
Status: DISCONTINUED | OUTPATIENT
Start: 2020-09-29 | End: 2020-09-29 | Stop reason: HOSPADM

## 2020-09-29 RX ORDER — DEXTROSE AND SODIUM CHLORIDE 5; .45 G/100ML; G/100ML
INJECTION, SOLUTION INTRAVENOUS CONTINUOUS
Status: DISCONTINUED | OUTPATIENT
Start: 2020-09-29 | End: 2020-09-29

## 2020-09-29 RX ORDER — DEXTROSE MONOHYDRATE 25 G/50ML
50 INJECTION, SOLUTION INTRAVENOUS
Status: DISCONTINUED | OUTPATIENT
Start: 2020-09-29 | End: 2020-09-29 | Stop reason: HOSPADM

## 2020-09-29 RX ORDER — SODIUM CHLORIDE, SODIUM LACTATE, POTASSIUM CHLORIDE, CALCIUM CHLORIDE 600; 310; 30; 20 MG/100ML; MG/100ML; MG/100ML; MG/100ML
INJECTION, SOLUTION INTRAVENOUS CONTINUOUS
Status: DISCONTINUED | OUTPATIENT
Start: 2020-09-29 | End: 2020-09-29 | Stop reason: HOSPADM

## 2020-09-29 RX ORDER — HYDROCODONE BITARTRATE AND ACETAMINOPHEN 5; 325 MG/1; MG/1
2 TABLET ORAL AS NEEDED
Status: DISCONTINUED | OUTPATIENT
Start: 2020-09-29 | End: 2020-09-29 | Stop reason: HOSPADM

## 2020-09-29 RX ORDER — ONDANSETRON 2 MG/ML
4 INJECTION INTRAMUSCULAR; INTRAVENOUS EVERY 6 HOURS PRN
Status: DISCONTINUED | OUTPATIENT
Start: 2020-09-29 | End: 2020-10-02

## 2020-09-29 RX ORDER — LIDOCAINE HYDROCHLORIDE 40 MG/ML
SOLUTION TOPICAL AS NEEDED
Status: DISCONTINUED | OUTPATIENT
Start: 2020-09-29 | End: 2020-09-29 | Stop reason: SURG

## 2020-09-29 RX ORDER — HYDROMORPHONE HYDROCHLORIDE 1 MG/ML
0.6 INJECTION, SOLUTION INTRAMUSCULAR; INTRAVENOUS; SUBCUTANEOUS EVERY 5 MIN PRN
Status: DISCONTINUED | OUTPATIENT
Start: 2020-09-29 | End: 2020-09-29 | Stop reason: HOSPADM

## 2020-09-29 RX ORDER — MORPHINE SULFATE 2 MG/ML
1 INJECTION, SOLUTION INTRAMUSCULAR; INTRAVENOUS
Status: DISCONTINUED | OUTPATIENT
Start: 2020-09-29 | End: 2020-10-02

## 2020-09-29 RX ORDER — HYDROMORPHONE HYDROCHLORIDE 1 MG/ML
0.4 INJECTION, SOLUTION INTRAMUSCULAR; INTRAVENOUS; SUBCUTANEOUS EVERY 5 MIN PRN
Status: DISCONTINUED | OUTPATIENT
Start: 2020-09-29 | End: 2020-09-29 | Stop reason: HOSPADM

## 2020-09-29 RX ORDER — MORPHINE SULFATE 10 MG/ML
6 INJECTION, SOLUTION INTRAMUSCULAR; INTRAVENOUS EVERY 10 MIN PRN
Status: DISCONTINUED | OUTPATIENT
Start: 2020-09-29 | End: 2020-09-29 | Stop reason: HOSPADM

## 2020-09-29 RX ORDER — MORPHINE SULFATE 4 MG/ML
4 INJECTION, SOLUTION INTRAMUSCULAR; INTRAVENOUS EVERY 10 MIN PRN
Status: DISCONTINUED | OUTPATIENT
Start: 2020-09-29 | End: 2020-09-29 | Stop reason: HOSPADM

## 2020-09-29 RX ADMIN — ROCURONIUM BROMIDE 10 MG: 10 INJECTION, SOLUTION INTRAVENOUS at 11:38:00

## 2020-09-29 RX ADMIN — DEXTROSE AND SODIUM CHLORIDE: 5; .45 INJECTION, SOLUTION INTRAVENOUS at 10:45:00

## 2020-09-29 RX ADMIN — NEOSTIGMINE METHYLSULFATE 2 MG: 1 INJECTION INTRAVENOUS at 12:32:00

## 2020-09-29 RX ADMIN — LIDOCAINE HYDROCHLORIDE 4 ML: 40 SOLUTION TOPICAL at 11:22:00

## 2020-09-29 RX ADMIN — GLYCOPYRROLATE 0.4 MG: 0.2 INJECTION, SOLUTION INTRAMUSCULAR; INTRAVENOUS at 12:32:00

## 2020-09-29 NOTE — DISCHARGE SUMMARY
[facility]    PATIENT'S NAME: Javier Jacki   ATTENDING PHYSICIAN: Guillermina Hayes.  Angy Quintanilla MD   PATIENT ACCOUNT#:   [id number]    LOCATION:  [address] [place] [address] RECORD #:   [id number]       YOB: 1950  ADMISSION DATE:       08/21/2 Creatinine was 1.97, BUN was 43. Her BNP was in excess of 4000, but is relatively stable at her baseline. Urinalysis showed pyuria. Cultures ultimately grew greater than 100,000 E coli and Klebsiella pneumoniae each.   The Klebsiella pneumoniae was ESBL

## 2020-09-29 NOTE — BRIEF OP NOTE
[facility] POST ANESTHESIA CARE UNIT  Brief Op Note       Patients Name: Radha Carlson  Attending Physician: Dalton Pugh MD  Operating Physician: Marisel Mojica MD  CSN: [id number]     Location:  OR  MRN: [id number]    Date of Birth

## 2020-09-29 NOTE — PROGRESS NOTES
INFECTIOUS DISEASE PROGRESS NOTE    Ganga Bowen Patient Status:  Inpatient    1950 MRN [id number]   Location [facility] 5SW/SE Attending Jaky Rich MD   Hosp Day # 3 PCP Glen Womack MD     Subjective:  ROS done.  Having le Chest pain, unspecified type     Congestive heart failure (HCC)     Congestive heart failure, unspecified HF chronicity, unspecified heart failure type ([facility] [address]     Hyperlipidemia     Hypothyroidism     Stage 3 chronic kidney disease     Urinary tract infecti well as OVP. Awaiting stone removal later this AM.  -  Follow fever curve, wbc. -  Reviewed labs, micro, imaging reports, available old records. -  Discussed with patient.       Niles Meckel, PA-C  [facility]  [phone]

## 2020-09-29 NOTE — PLAN OF CARE
Problem: PAIN - ADULT  Goal: Verbalizes/displays adequate comfort level or patient's stated pain goal  Description: INTERVENTIONS:  - Encourage pt to monitor pain and request assistance  - Assess pain using appropriate pain scale  - Administer analgesics appropriate  - Identify discharge learning needs (meds, wound care, etc)  - Arrange for interpreters to assist at discharge as needed  - Consider post-discharge preferences of patient/family/discharge partner  - Complete POLST form as appropriate  - Assess patient's plan of care  Description: Interventions:  - What would you like us to know as we care for you?  I prefer to be called \"Dre\"  - Provide timely, complete, and accurate information to patient/family  - Incorporate patient and family knowledge, bozena Progressing     Problem: SKIN/TISSUE INTEGRITY - ADULT  Goal: Skin integrity remains intact  Description: INTERVENTIONS  - Assess and document risk factors for pressure ulcer development  - Assess and document skin integrity  - Monitor for areas of redness sacrum. placed call light within reach

## 2020-09-29 NOTE — PLAN OF CARE
C/o cramping pain in right lower abdomen, given tylenol and tramadol with relief. Up in chair today for meals. No bm. Rec'd orders for tap water enema, handoff given to oncoming rn.     Problem: PAIN - ADULT  Goal: Verbalizes/displays adequate comfort l Progressing     Problem: Patient Centered Care  Goal: Patient preferences are identified and integrated in the patient's plan of care  Description: Interventions:  - What would you like us to know as we care for you?  I prefer to be called \"Dre\"  - Provid

## 2020-09-29 NOTE — CM/SW NOTE
Reservation made to [facility]. Plan:   DC to [facility] pending medical clearance.     KARYN Menezes, [facility]   ADR:#[id number]

## 2020-09-29 NOTE — ANESTHESIA PROCEDURE NOTES
Arterial Line  Performed by: Venkat Dickens MD  Authorized by: Venkat Dickens MD     General Information and Staff    Procedure Start:  9/29/2020 10:53 AM  Procedure End:  9/29/2020 11:00 AM  Anesthesiologist:  Venkat Dickens MD  Patient Locatio

## 2020-09-29 NOTE — ANESTHESIA PROCEDURE NOTES
Airway  Date/Time: 9/29/2020 11:31 AM  Urgency: Elective    Airway not difficult    General Information and Staff    Patient location during procedure: OR  Anesthesiologist: Chu Hernandez MD    Indications and Patient Condition  Indications for airway

## 2020-09-29 NOTE — PLAN OF CARE
Pt. A/ox4, had cysto with lithotripsy and stent replaced today. No complaints of pain in abdomen, given tylenol for headache. IV fluids continued, blood sugar monitoring, tolerating clear liquid diet, advaced to full per order.  Not voided since surgery, bl toileting schedule  Outcome: Progressing     Problem: DISCHARGE PLANNING  Goal: Discharge to home or other facility with appropriate resources  Description: INTERVENTIONS:  - Identify barriers to discharge w/pt and caregiver  - Include patient/family/disch perform bladder scan as needed  - Follow urinary retention protocol/standard of care  - Consider collaborating with pharmacy to review patient's medication profile  - Implement strategies to promote bladder emptying  Outcome: Progressing

## 2020-09-29 NOTE — PROGRESS NOTES
[facility]    Progress Note    Diego Brambila Patient Status:  Inpatient    1950 MRN [id number]   Location [facility] 5SW/SE Attending Trvais Reyes MD   Hosp Day # 3 PCP Leesa Lerner MD       Subjective:   Bridget Wilson Oral, Daily  •  Levothyroxine Sodium (SYNTHROID) tab 50 mcg, 50 mcg, Oral, Before breakfast  •  cetirizine (ZYRTEC) tab 10 mg, 10 mg, Oral, Daily  •  Pantoprazole Sodium (PROTONIX) EC tab 40 mg, 40 mg, Oral, QAM AC  •  Potassium Chloride ER (K-DUR) CR tab 138 09/28/2020    K 4.5 09/28/2020     09/28/2020    CO2 25.0 09/28/2020    CREATSERUM 1.70 (H) 09/28/2020    BUN 48 (H) 09/28/2020    GLU 83 09/28/2020    CA 9.0 09/28/2020    ALB 2.7 (L) 09/28/2020    ALKPHO 196 (H) 09/28/2020    BILT 0.6 09/28/202 Paul Hall MD on 9/28/2020 at 12:40 PM                        Kait Hendrickson MD  9/29/2020

## 2020-09-29 NOTE — ANESTHESIA POSTPROCEDURE EVALUATION
Patient: Nat Bruno    Procedure Summary     Date: 09/29/20 Room / Location: [address] [place] [address] MAIN OR 14 / [address] [place] [address] MAIN OR    Anesthesia Start: 6162 Anesthesia Stop: 1252    Procedures:       CYSTOSCOPY URETEROSCOPY (Left Bladder)      LASER HOLMIUM LITHOTRIPSY (Lef

## 2020-09-29 NOTE — PROGRESS NOTES
Cardiology progress note    24 h events patient doing well, no CP or SOB.     Current Medications:    •  dextrose 5 %-0.45 % NaCl infusion, , Intravenous, Continuous    •  furosemide (LASIX) tab 40 mg, 40 mg, Oral, Daily    •  vancomycin HCl (VANCOCIN) cap Potassium Chloride ER 10 MEQ Oral Tab CR, Take 10 mEq by mouth Before Dinner. •  loratadine 10 MG Oral Tab, Take 10 mg by mouth daily. •  Fluticasone Propionate 50 MCG/ACT Nasal Suspension, 2 sprays by Each Nare route 2 (two) times a day.     •  Salin General appearance:  alert, appears stated age and cooperative  Head: Normocephalic, without obvious abnormality, atraumatic  Pulmonary: clear to auscultation bilaterally  Cardiovascular: S1, S2 normal, no murmur, click, rub or gallop, regular rate a sufficient to allow     evaluation of LV diastolic function. 2. Aortic valve: Mild regurgitation. Peak velocity (S): 2.5m/sec.     Mean gradient (S): 13mm Hg. Peak gradient (S): 25mm Hg. 3. Mitral valve: Moderately calcified annulus.  Normal thickened,    you for allowing me to participate in the care of your patient.     Marta Champagne  9/27/2020

## 2020-09-29 NOTE — PROGRESS NOTES
[facility]    Progress Note    Luz  Patient Status:  Inpatient    1950 MRN [id number]   Location [facility] 5SW/SE Attending Phillis Closs, MD   Hosp Day # 3 PCP Marina Wiley MD     Subjective:     Constitutio Please see surgical note for specific details.     Dictated by (CST): Gosia Joseph MD on 9/29/2020 at 2:06 PM     Finalized by (CST): Gosia Joseph MD on 9/29/2020 at 2:07 PM                Assessment and Plan:     Left hydronephrosis/pyelonephrit

## 2020-09-29 NOTE — ANESTHESIA PREPROCEDURE EVALUATION
Anesthesia PreOp Note    HPI:     Ursula Ornelas is a 77year old female who presents for preoperative consultation requested by: Geoff Nuñez MD    Date of Surgery: 9/26/2020 - 9/29/2020    Procedure(s):  CYSTOSCOPY URETEROSCOPY  LASER HOLMIUM LI 02/10/2020      Hyperlipidemia      Hypothyroidism      Heart block AV second degree         Date Noted: 01/13/2020      Chest pain, unspecified type         Date Noted: 01/13/2020      Heart block, AV         Date Noted: 01/03/2020      Mitral valve insuf RETROGRADE Left 9/26/2020    Performed by Ese Mcknight MD at [facility] MAIN OR   • SINUS SURGERY             •  furosemide 40 MG Oral Tab, Take 1 tablet (40 mg total) by mouth BID (Diuretic). , Disp: 60 tablet, Rfl: 3, 9/25/2020 at Unknown time    •  Roger Unknown time    •  nitroGLYCERIN 0.4 MG Sublingual SL Tab, Place 0.4 mg under the tongue every 5 (five) minutes as needed for Chest pain., Disp: , Rfl: , Past Month at Unknown time    •  linagliptin 5 mg Oral Tab, Take 5 mg by mouth daily. , Disp: , Rfl: , MD Janessa, 40 mg at 09/29/20 0605    •  [MAR Hold] Potassium Chloride ER (K-DUR) CR tab 10 mEq, 10 mEq, Oral, Before dinner, Kayden Cortez MD, 10 mEq at 09/28/20 1949    •  [MAR Hold] Saline Nasal Spray (SALINE MIST) 2 spray, 2 spray, Nasal, TID PRN,       Spouse name: Not on file      Number of children: Not on file      Years of education: Not on file      Highest education level: Not on file    Occupational History      Not on file    Social Needs      Financial resource strain: Not on file mass index is 28.51 kg/m². height is 1.651 m (5' 5\") and weight is 77.7 kg (171 lb 4.8 oz). Her oral temperature is 98.5 °F (36.9 °C). Her blood pressure is 140/69 and her pulse is 79. Her respiration is 16 and oxygen saturation is 95%.     09/28/20  194

## 2020-09-30 PROCEDURE — 99231 SBSQ HOSP IP/OBS SF/LOW 25: CPT | Performed by: NURSE PRACTITIONER

## 2020-09-30 RX ORDER — CETIRIZINE HYDROCHLORIDE 5 MG/1
5 TABLET ORAL DAILY
Status: DISCONTINUED | OUTPATIENT
Start: 2020-10-01 | End: 2020-10-02

## 2020-09-30 NOTE — PLAN OF CARE
Problem: PAIN - ADULT  Goal: Verbalizes/displays adequate comfort level or patient's stated pain goal  Description: INTERVENTIONS:  - Encourage pt to monitor pain and request assistance  - Assess pain using appropriate pain scale  - Administer analgesics appropriate  - Identify discharge learning needs (meds, wound care, etc)  - Arrange for interpreters to assist at discharge as needed  - Consider post-discharge preferences of patient/family/discharge partner  - Complete POLST form as appropriate  - Assess patient's plan of care  Description: Interventions:  - What would you like us to know as we care for you?  I prefer to be called \"Dre\"  - Provide timely, complete, and accurate information to patient/family  - Incorporate patient and family knowledge, bozena Progressing     Problem: SKIN/TISSUE INTEGRITY - ADULT  Goal: Skin integrity remains intact  Description: INTERVENTIONS  - Assess and document risk factors for pressure ulcer development  - Assess and document skin integrity  - Monitor for areas of redness achieve adequate hemostasis  - Assess for signs and symptoms of internal bleeding  - Monitor lab trends  - Patient is to report abnormal signs of bleeding to staff  - Avoid use of toothpicks and dental floss  - Use electric shaver for shaving  - Use soft b

## 2020-09-30 NOTE — OPERATIVE REPORT
[facility]    PATIENT'S NAME: Janet Silveira   ATTENDING PHYSICIAN: Adriano Rivero MD   OPERATING PHYSICIAN: Earle Camarena MD   PATIENT ACCOUNT#:   [id number]    LOCATION:  [address] [place] [address] #:   [id number]       DATE analysis. At the end of the case, we could see no remaining stones on magnified fluoroscopy and I inspected all the calices, could not see any significant remaining stones, and a 24 cm, 6-Malay [employer] stent was placed.       RECOMMENDATIONS:  T was a good choice for this patient's ureter and her anatomy. I then inspected the left renal collecting system with the flexible digital Storz ureteroscope and I performed nephroureteroscopy. The stone was identified.   A 200 holmium laser micron fiber wa requested that she continue it and I agreed with it. At patient's request, I will be calling her spouse, Renato Pablo.     Dictated By Mauri Chen MD  d: 09/29/2020 12:45:29  t: 09/29/2020 16:20:44  Job [id number]  PVK/    cc: Bakari Greene

## 2020-09-30 NOTE — PROGRESS NOTES
[facility] Pharmacy Note:  Renal Dose Adjustment for Cetirizine (ZYRTEC)    Brand Lis has been prescribed Cetirizine (Zyrtec) 10 mg orally daily. Estimated Creatinine Clearance: 27.5 mL/min (A) (based on SCr of 1.76 mg/dL (H)).     The dose has been [place]

## 2020-09-30 NOTE — PROGRESS NOTES
INFECTIOUS DISEASE PROGRESS NOTE    Hadley Silveira Patient Status:  Inpatient    1950 MRN [id number]   Location [facility] 5SW/SE Attending Claudell Flash, MD   Hosp Day # 4 PCP Angelica Mims MD     Subjective:  ROS done. Tmax 99. 6 insufficiency, unspecified etiology     Acute chest pain     Heart block, AV     Heart block AV second degree     Chest pain, unspecified type     Congestive heart failure (HCC)     Congestive heart failure, unspecified HF chronicity, unspecified heart feliciano for cysto with laser lithotripsy, stent exchange  # E. Coli bacteremia  # Acute sepsis  # h/o ESBL Lima Au in urine  # DM, HTN, HLD     PLAN:  -  Continue on meropenem, day 5 as well as OVP. Will plan for ten day post-op course (EOT 10/9/20).    -  Follow

## 2020-09-30 NOTE — PROGRESS NOTES
[facility]  Progress Note    Leonardo Juarez Patient Status:  Inpatient    1950 MRN [id number]   Location [facility] 5SW/SE Attending Amy Ivy MD   [facility] Day # 4 PCP MD Lavon Richard baseline. Reports chronic incontinence. ID following. Currently on meropenem. Appreciate their recommendations. Cre slightly increased today, likely related to recent surgery. Appears overall stable.   WBC remains normal.      Recommendations:

## 2020-09-30 NOTE — PROGRESS NOTES
[facility]    Progress Note    Malen Mates Patient Status:  Inpatient    1950 MRN [id number]   Location [facility] 5SW/SE Attending Alfonso Hammond, [address] [facility] Day # 4 PCP Sol Dowell MD       Subjective:   Daniel Bautista 03/05/2020    MG 2.0 08/22/2020    PHOS 4.7 03/25/2020    TROP <0.045 09/07/2020       Current Medications:    •  morphINE sulfate (PF) 2 MG/ML injection 1 mg, 1 mg, Intravenous, Q3H PRN    •  ondansetron HCl (ZOFRAN) injection 4 mg, 4 mg, Intravenous, Q6H acetaminophen (TYLENOL) tab 650 mg, 650 mg, Oral, Q6H PRN        •  furosemide 40 MG Oral Tab, Take 1 tablet (40 mg total) by mouth BID (Diuretic). •  Potassium Chloride ER 10 MEQ Oral Tab CR, Take 10 mEq by mouth Before Dinner.     •  loratadine 10 MG O Finalized by (CST): Nilesh Salguero MD on 9/29/2020 at 2:07 PM             Xr Abdomen 3 Or More Views (cpt=74021)     Result Date: 9/28/2020  CONCLUSION:  1. Left UPJ calculus measuring 1.6 x 0.8 cm redemonstrated.   Position unchanged from prior exam. 2. Hyperlipidemia  Atorvastatin 40 mg will be increased to 80 .      3. Dual chamber PPM recently   - S/P DDD PPM 1/16/20 [employer] serial number [id number]      4. Stage 3 chronic kidney disease (HCC)  Creat 1.76         5. HTN  On coreg 6.25 po BID, Imdur 60

## 2020-09-30 NOTE — CM/SW NOTE
CM notified by Roseann Covarrubias RN that urology is requesting the stent be removed in 5-7days. CM contacted liaison Ramiro Turcios to confirm APN at [facility] will be able to do this. Ramiro Turcios will ask and contact with reply.     [address]  Per Ramiro Turcios they are ok to remove the stent if p

## 2020-10-01 ENCOUNTER — APPOINTMENT (OUTPATIENT)
Dept: PICC SERVICES | Facility: HOSPITAL | Age: 70
DRG: 660 | End: 2020-10-01
Attending: PHYSICIAN ASSISTANT
Payer: MEDICARE

## 2020-10-01 PROCEDURE — 99231 SBSQ HOSP IP/OBS SF/LOW 25: CPT | Performed by: NURSE PRACTITIONER

## 2020-10-01 NOTE — PROGRESS NOTES
[facility]  Progress Note    Goyo Filter Patient Status:  Inpatient    1950 MRN [id number]   Location [facility] 5SW/SE Attending Benji Leblanc MD   [facility] Day # 5 PCP MD Lisette Kothari recommendations. Anticipate 10 day course of meropenem ( EOT 10/9/20)        Recommendations:  -Antibiotics per ID, appreciate their recommendations  -Leave stent until Tuesday 10/6/20 as long as patient is tolerating stent .   At that time stent can be re

## 2020-10-01 NOTE — PROGRESS NOTES
Vascular Access Note  Inserted by [facility] RN  Vascular Access Screening:   Allergies to Lidocaine: no  Allergies to Latex: no  Presence of Pacemaker/Defibrillator: No  Mastectomy with Lymph Node Dissection: No  AV Fistula / AV Graft: No  Dialysis

## 2020-10-01 NOTE — PROGRESS NOTES
[facility]    Progress Note    Albin Posada Patient Status:  Inpatient    1950 MRN [id number]   Location [facility] 5SW/SE Attending Rickey Quintana, [address] [facility] Day # 5 PCP Ketan Granados MD       Subjective:   Eyad Serrano 03/05/2020    DDIMER 1.23 (H) 03/05/2020    MG 2.0 08/22/2020    PHOS 4.7 03/25/2020    TROP <0.045 09/07/2020       Current Medications:    •  Cetirizine HCl (ZYRTEC) 5 MG tab 5 mg, 5 mg, Oral, Daily    •  morphINE sulfate (PF) 2 MG/ML injection 1 mg, 1 m mg, Intravenous, Q12H    •  acetaminophen (TYLENOL) tab 650 mg, 650 mg, Oral, Q6H PRN        •  furosemide 40 MG Oral Tab, Take 1 tablet (40 mg total) by mouth BID (Diuretic).     •  Potassium Chloride ER 10 MEQ Oral Tab CR, Take 10 mEq by mouth Before Dinn 9/29/2020 at 2:06 PM     Finalized by (CST): Carmen Celaya MD on 9/29/2020 at 2:07 PM             CONCLUSION:  1. Left UPJ calculus measuring 1.6 x 0.8 cm redemonstrated.  Position unchanged from prior exam. 2.  Left-sided ureteral stent remains in [address] .      3. Dual chamber PPM recently   - S/P DDD PPM 1/16/20 [employer] serial number [id number]      4. Stage 3 chronic kidney disease (HCC)  Creat 1.76         5. HTN  controlled  On coreg 6.25 po BID, Imdur 60 mg daily      6.  Hx of chest pain with ge

## 2020-10-01 NOTE — PHYSICAL THERAPY NOTE
PHYSICAL THERAPY TREATMENT NOTE - INPATIENT     Room Number: 884/704-N       Presenting Problem: hydronephrosis, hematuria, cystosopic replacement of L ureteral stent on 9/26/20; cystoscopt planned for 9/29    Problem List  Principal Problem:    Hydronephr Inpatient Basic Mobility Short Form. Research supports that patients with this level of impairment may benefit from rehab stay. Pt with improved activity tolerance today.  Continues to demonstrate generalized weakness, decreased activity tolerance, balance walk in hospital room?: A Little   -   Climbing 3-5 steps with a railing?: A Lot     AM-PAC Score:  Raw Score: 17   Approx Degree of Impairment: 50.57%   Standardized Score (AM-PAC Scale): 42.13   CMS Modifier (G-Code): CK    FUNCTIONAL ABILITY STATUS  Amanda Mcclain

## 2020-10-01 NOTE — OCCUPATIONAL THERAPY NOTE
OCCUPATIONAL THERAPY TREATMENT NOTE - INPATIENT        Room Number: 732/307-C           Presenting Problem: hydronephrosis    Problem List  Principal Problem:    Hydronephrosis, unspecified hydronephrosis type  Active Problems:    Ureteral calculus    Hydr ASSESSMENT  Ratin  Location: right side of abdomen  Management Techniques: Activity promotion; Body mechanics     ACTIVITY TOLERANCE                         O2 SATURATIONS                ACTIVITIES OF DAILY LIVING ASSESSMENT  AM-PAC ‘6-Clicks’ Inpatient prep for adls with SBA   Comment:3 min with CGA, RW for support    Patient will complete item retrieval with SBA  Comment:min assist            Goals  on: 10/6/2020  Frequency: 3-5x/week

## 2020-10-01 NOTE — PROGRESS NOTES
[facility]    Progress Note    Leni Johnson Patient Status:  Inpatient    1950 MRN [id number]   Location [facility] 5SW/SE Attending Michael Van MD   Hosp Day # 4 PCP Chiara Guzman MD     Subjective:     Constitutio hematuria. C=card. Uro. Renal.  S/p lithotrepsy        Pam Garcia MD, MD  9/26/2020                  Vitals reviewed. Constitutional: She is oriented to person, place, and time and obese. HENT:   Head: Normocephalic. Neck: Neck supple.    Cardio

## 2020-10-02 VITALS
WEIGHT: 171.31 LBS | RESPIRATION RATE: 18 BRPM | HEIGHT: 65 IN | OXYGEN SATURATION: 92 % | DIASTOLIC BLOOD PRESSURE: 64 MMHG | BODY MASS INDEX: 28.54 KG/M2 | HEART RATE: 77 BPM | SYSTOLIC BLOOD PRESSURE: 153 MMHG | TEMPERATURE: 99 F

## 2020-10-02 RX ORDER — CARVEDILOL 12.5 MG/1
12.5 TABLET ORAL 2 TIMES DAILY WITH MEALS
Status: DISCONTINUED | OUTPATIENT
Start: 2020-10-02 | End: 2020-10-02

## 2020-10-02 RX ORDER — CARVEDILOL 12.5 MG/1
12.5 TABLET ORAL 2 TIMES DAILY WITH MEALS
Qty: 60 TABLET | Refills: 0 | Status: SHIPPED | OUTPATIENT
Start: 2020-10-02

## 2020-10-02 RX ORDER — ONDANSETRON 2 MG/ML
4 INJECTION INTRAMUSCULAR; INTRAVENOUS EVERY 6 HOURS PRN
Qty: 1 VIAL | Refills: 0 | Status: ON HOLD | OUTPATIENT
Start: 2020-10-02 | End: 2020-11-03

## 2020-10-02 RX ORDER — VANCOMYCIN HYDROCHLORIDE 125 MG/1
125 CAPSULE ORAL DAILY
Qty: 13 CAPSULE | Refills: 0 | Status: SHIPPED | OUTPATIENT
Start: 2020-10-03 | End: 2020-10-16

## 2020-10-02 NOTE — CM/SW NOTE
09: 20AM  Received message from pt's RN, MD inquiring about [employer]. SW spoke w/ pt and pt is agreeable to [employer] if be is available.     SW contacted [employer] and inquired about Hector - she will confirm a

## 2020-10-02 NOTE — PROGRESS NOTES
INFECTIOUS DISEASE PROGRESS NOTE    Haley Smart Patient Status:  Inpatient    1950 MRN [id number]   Location [facility] 5SW/SE Attending Joni Mcknight MD   Hosp Day # 6 PCP Leah Pressley MD     Subjective:  ROS done. Afebrile. block, AV     Heart block AV second degree     Chest pain, unspecified type     Congestive heart failure (HCC)     Congestive heart failure, unspecified HF chronicity, unspecified heart failure type ([facility] [address]     Hyperlipidemia     Hypothyroidism     Stage 3 c bacteremia  # Acute sepsis  # h/o ESBL E. Coli in in urine  # DM  # HTN  # HLD     PLAN:  -  Continue on meropenem for ten day post-op course (EOT 10/9/20). Continue on OVP until 10/16. Plans for rehab. -  Follow fever curve, wbc.   -  Reviewed labs, micro

## 2020-10-02 NOTE — PROGRESS NOTES
Pt discharged, report given to nurse at [facility]. Discharge paperwork and prescriptions reviewed, all questions and concerns addressed. Mid line remains in place, pt will continue with IV antibiotics at rehab.   Pt sent with all belonging

## 2020-10-02 NOTE — DIETARY NOTE
Nutrition Re-screen    Pt seen by RD d/t LOS. Pt not at nutrition risk. Pt with good appetite and PO intake >65%. No n/v reported per pt/chart review. Skin intact. +edema, +lasix. Will follow up per protocol and monitor as needed.      Wt Readings from Last

## 2020-10-02 NOTE — PROGRESS NOTES
[facility]    Progress Note    Haley Smart Patient Status:  Inpatient    1950 MRN [id number]   Location [facility] 5SW/SE Attending Julian Og, [address] [facility] Day # 6 PCP Leah Pressley MD       Subjective:   Brenden Barfield 03/05/2020    MG 2.0 08/22/2020    PHOS 4.7 03/25/2020    TROP <0.045 09/07/2020       Current Medications:    •  Cetirizine HCl (ZYRTEC) 5 MG tab 5 mg, 5 mg, Oral, Daily    •  morphINE sulfate (PF) 2 MG/ML injection 1 mg, 1 mg, Intravenous, Q3H PRN    • acetaminophen (TYLENOL) tab 650 mg, 650 mg, Oral, Q6H PRN        •  furosemide 40 MG Oral Tab, Take 1 tablet (40 mg total) by mouth BID (Diuretic). •  Potassium Chloride ER 10 MEQ Oral Tab CR, Take 10 mEq by mouth Before Dinner.     •  loratadine 10 MG O Finalized by (CST): Justin Carpio MD on 9/29/2020 at 2:07 PM              CONCLUSION:  1. Left UPJ calculus measuring 1.6 x 0.8 cm redemonstrated.  Position unchanged from prior exam. 2.  Left-sided ureteral stent remains in place.  3. Moderate stool t recently   - S/P DDD PPM 1/16/20 [employer] serial number [id number]      4. Stage 3 chronic kidney disease (HCC)  Creat 1.69         5. HTN  140-150s  On coreg 6.25 po BID, Imdur 60 mg daily      6.  Hx of chest pain with patent circumflex stent and a jailed

## 2020-10-02 NOTE — PROGRESS NOTES
[facility]    Progress Note    Tereso Jenkins Patient Status:  Inpatient    1950 MRN [id number]   Location [facility] 5SW/SE Attending Lilo Brand MD   Hosp Day # 6 PCP Candido Moody MD     Subjective:     Constitutio Neck supple. Cardiovascular: Regular rhythm. No murmur heard. Abdominal: Soft. There is no abdominal tenderness. Neurological: She is oriented to person, place, and time.

## 2020-10-06 ENCOUNTER — TELEPHONE (OUTPATIENT)
Dept: SURGERY | Facility: CLINIC | Age: 70
End: 2020-10-06

## 2020-10-08 ENCOUNTER — OFFICE VISIT (OUTPATIENT)
Dept: SURGERY | Facility: CLINIC | Age: 70
End: 2020-10-08
Payer: MEDICARE

## 2020-10-08 VITALS
WEIGHT: 170 LBS | HEART RATE: 76 BPM | DIASTOLIC BLOOD PRESSURE: 69 MMHG | BODY MASS INDEX: 28.32 KG/M2 | SYSTOLIC BLOOD PRESSURE: 93 MMHG | HEIGHT: 65 IN

## 2020-10-08 DIAGNOSIS — Z96.0 RETAINED URETERAL STENT: Primary | ICD-10-CM

## 2020-10-08 DIAGNOSIS — N20.0 KIDNEY STONES: ICD-10-CM

## 2020-10-08 DIAGNOSIS — Z46.6 ENCOUNTER FOR REMOVAL OF URETERAL STENT: ICD-10-CM

## 2020-10-08 PROCEDURE — 1111F DSCHRG MED/CURRENT MED MERGE: CPT | Performed by: NURSE PRACTITIONER

## 2020-10-08 PROCEDURE — 99213 OFFICE O/P EST LOW 20 MIN: CPT | Performed by: NURSE PRACTITIONER

## 2020-10-08 PROCEDURE — G0463 HOSPITAL OUTPT CLINIC VISIT: HCPCS | Performed by: NURSE PRACTITIONER

## 2020-10-08 NOTE — PROGRESS NOTES
HPI:    Patient ID: Haley Smart is a 77year old female. HPI      Patient is a 77year old female who presents to the clinic for a follow up. Patient was hospitalized 9/26/20-9/30/20  With gross hematuria and dysuria.   CT showed chronic retaine days. 1 Bag 0   • vancomycin HCl 125 MG Oral Cap Take 1 capsule (125 mg total) by mouth daily for 13 days. 13 capsule 0   • furosemide 40 MG Oral Tab Take 1 tablet (40 mg total) by mouth BID (Diuretic).  60 tablet 3   • traMADol HCl 50 MG Oral Tab Take 1 ta Coronary atherosclerosis    • Diabetes ([facility] [address]    • Dysphagia    • Esophageal reflux    • Essential hypertension    • Heart attack [facility])    • Hemiplegia and hemiparesis following cerebral infarction affecting unspecified side (HCC)    • High blood pressure patient tolerated very well with no complaints of discomfort. Tip intact. Follow up prn. I recommended patient find a urologist within her network for urological management going forward. She was agreeable.           No orders of the defined types w

## 2020-10-12 NOTE — DISCHARGE SUMMARY
[facility]    PATIENT'S NAME: Lex Nunez   ATTENDING PHYSICIAN: Janessa Rolle MD   PATIENT ACCOUNT#:   [id number]    LOCATION:  [address] [place] [address] #:   [id number]       YOB: 1950  ADMISSION DATE:       09/2 Patient is going to be seen by her primary care physician and also all the consultants.       DISCHARGE MEDICATIONS:  As per the discharge reconciliation list.      During the hospitalization, I had a long discussion with Dr. Prachi Hercules regarding patient's t

## 2020-10-13 ENCOUNTER — TELEPHONE (OUTPATIENT)
Dept: SURGERY | Facility: CLINIC | Age: 70
End: 2020-10-13

## 2020-10-13 NOTE — TELEPHONE ENCOUNTER
----- Message from DONTE Meléndez sent at 10/5/2020  4:29 PM CDT -----  Please let patient know her kidney stone was composed of primarily calcium oxalate and a small amount of calcium phosphate. This is a common form of stone.   She should drin

## 2020-10-14 NOTE — OPERATIVE REPORT
[facility]    PATIENT'S NAME: Tamera Hernandez   ATTENDING PHYSICIAN: Janessa Flower MD   OPERATING PHYSICIAN: Aline Luciano MD   PATIENT ACCOUNT#:   [id number]    LOCATION:  [address] [place] [address] #:   [id number]       DATE OF fragments are removed under nephroureteroscopic control with basket and these are sent to Pathology for chemical analysis.   At the end of the case, we could see no remaining stones on magnified fluoroscopy and I inspected all the calices, could not see any 6/12-Tamazight Dariusz catheter, then I placed a 12/14 [employer] 28 cm ureteral access sheath and that length was a good choice for this patient's ureter and her anatomy.   I then inspected the left renal collecting system with the flexible digital while patient was on aspirin 81 mg daily and that was not stopped. It was not stop because cardiologist, Dr. Franco Harris, requested that she continue it and I agreed with it. At patient's request, I will be calling her spouse, Jenifer Byers.     Dictated By Yoshi Ott

## 2020-10-26 NOTE — TELEPHONE ENCOUNTER
LMB with Laura/[phone] re: APN instructions as stated below. When Valentina Edwards calls back, can you please transfer call to [address] [place] [address].

## 2020-11-02 ENCOUNTER — HOSPITAL ENCOUNTER (INPATIENT)
Facility: HOSPITAL | Age: 70
LOS: 4 days | Discharge: NURSING, REHAB OR LONG-TERM CARE | DRG: 689 | End: 2020-11-07
Attending: EMERGENCY MEDICINE | Admitting: INTERNAL MEDICINE
Payer: MEDICARE

## 2020-11-02 ENCOUNTER — APPOINTMENT (OUTPATIENT)
Dept: CT IMAGING | Facility: HOSPITAL | Age: 70
DRG: 689 | End: 2020-11-02
Attending: EMERGENCY MEDICINE
Payer: MEDICARE

## 2020-11-02 ENCOUNTER — APPOINTMENT (OUTPATIENT)
Dept: GENERAL RADIOLOGY | Facility: HOSPITAL | Age: 70
DRG: 689 | End: 2020-11-02
Payer: MEDICARE

## 2020-11-02 DIAGNOSIS — G92.8 TOXIC METABOLIC ENCEPHALOPATHY: ICD-10-CM

## 2020-11-02 DIAGNOSIS — Z16.12 EXTENDED SPECTRUM BETA LACTAMASE (ESBL) RESISTANCE: Primary | ICD-10-CM

## 2020-11-02 DIAGNOSIS — N30.01 ACUTE CYSTITIS WITH HEMATURIA: ICD-10-CM

## 2020-11-02 PROCEDURE — 74176 CT ABD & PELVIS W/O CONTRAST: CPT | Performed by: EMERGENCY MEDICINE

## 2020-11-02 PROCEDURE — 71045 X-RAY EXAM CHEST 1 VIEW: CPT

## 2020-11-02 PROCEDURE — 70450 CT HEAD/BRAIN W/O DYE: CPT | Performed by: EMERGENCY MEDICINE

## 2020-11-02 RX ORDER — VANCOMYCIN HYDROCHLORIDE 125 MG/1
125 CAPSULE ORAL DAILY
Status: DISCONTINUED | OUTPATIENT
Start: 2020-11-02 | End: 2020-11-07

## 2020-11-03 PROBLEM — Z16.12 EXTENDED SPECTRUM BETA LACTAMASE (ESBL) RESISTANCE: Status: ACTIVE | Noted: 2020-11-03

## 2020-11-03 PROBLEM — G92.8 TOXIC METABOLIC ENCEPHALOPATHY: Status: ACTIVE | Noted: 2020-11-03

## 2020-11-03 RX ORDER — FUROSEMIDE 40 MG/1
40 TABLET ORAL
Status: DISCONTINUED | OUTPATIENT
Start: 2020-11-03 | End: 2020-11-07

## 2020-11-03 RX ORDER — ASPIRIN 81 MG/1
81 TABLET, CHEWABLE ORAL DAILY
Status: DISCONTINUED | OUTPATIENT
Start: 2020-11-03 | End: 2020-11-07

## 2020-11-03 RX ORDER — ALPRAZOLAM 0.25 MG/1
0.25 TABLET ORAL 2 TIMES DAILY PRN
Status: DISCONTINUED | OUTPATIENT
Start: 2020-11-03 | End: 2020-11-07

## 2020-11-03 RX ORDER — SPIRONOLACTONE 25 MG/1
25 TABLET ORAL DAILY
Status: ON HOLD | COMMUNITY
End: 2020-11-07

## 2020-11-03 RX ORDER — CARVEDILOL 6.25 MG/1
12.5 TABLET ORAL 2 TIMES DAILY WITH MEALS
Status: DISCONTINUED | OUTPATIENT
Start: 2020-11-03 | End: 2020-11-07

## 2020-11-03 RX ORDER — PANTOPRAZOLE SODIUM 40 MG/1
40 TABLET, DELAYED RELEASE ORAL
Status: DISCONTINUED | OUTPATIENT
Start: 2020-11-04 | End: 2020-11-07

## 2020-11-03 RX ORDER — ALPRAZOLAM 0.25 MG/1
0.25 TABLET ORAL 2 TIMES DAILY PRN
Status: ON HOLD | COMMUNITY
End: 2020-11-16

## 2020-11-03 RX ORDER — NITROGLYCERIN 0.4 MG/1
0.4 TABLET SUBLINGUAL EVERY 5 MIN PRN
Status: DISCONTINUED | OUTPATIENT
Start: 2020-11-03 | End: 2020-11-07

## 2020-11-03 RX ORDER — CETIRIZINE HYDROCHLORIDE 10 MG/1
10 TABLET ORAL DAILY
Status: DISCONTINUED | OUTPATIENT
Start: 2020-11-03 | End: 2020-11-04

## 2020-11-03 RX ORDER — CLOPIDOGREL BISULFATE 75 MG/1
75 TABLET ORAL DAILY
Status: DISCONTINUED | OUTPATIENT
Start: 2020-11-03 | End: 2020-11-07

## 2020-11-03 RX ORDER — SODIUM CHLORIDE 450 MG/100ML
INJECTION, SOLUTION INTRAVENOUS CONTINUOUS
Status: DISCONTINUED | OUTPATIENT
Start: 2020-11-03 | End: 2020-11-06

## 2020-11-03 RX ORDER — ATORVASTATIN CALCIUM 40 MG/1
80 TABLET, FILM COATED ORAL NIGHTLY
Status: DISCONTINUED | OUTPATIENT
Start: 2020-11-03 | End: 2020-11-07

## 2020-11-03 RX ORDER — LISINOPRIL 20 MG/1
20 TABLET ORAL DAILY
Status: ON HOLD | COMMUNITY
End: 2020-11-17

## 2020-11-03 RX ORDER — LISINOPRIL 20 MG/1
20 TABLET ORAL DAILY
Status: DISCONTINUED | OUTPATIENT
Start: 2020-11-03 | End: 2020-11-07

## 2020-11-03 RX ORDER — MECLIZINE HYDROCHLORIDE 25 MG/1
25 TABLET ORAL 3 TIMES DAILY PRN
COMMUNITY

## 2020-11-03 RX ORDER — MAGNESIUM HYDROXIDE/ALUMINUM HYDROXICE/SIMETHICONE 120; 1200; 1200 MG/30ML; MG/30ML; MG/30ML
30 SUSPENSION ORAL 4 TIMES DAILY PRN
COMMUNITY

## 2020-11-03 RX ORDER — HYDROCODONE BITARTRATE AND ACETAMINOPHEN 5; 325 MG/1; MG/1
1 TABLET ORAL EVERY 4 HOURS PRN
Status: DISCONTINUED | OUTPATIENT
Start: 2020-11-03 | End: 2020-11-07

## 2020-11-03 RX ORDER — MELATONIN
325
COMMUNITY

## 2020-11-03 RX ORDER — LORAZEPAM 2 MG/ML
1 INJECTION INTRAMUSCULAR EVERY 4 HOURS PRN
Status: DISCONTINUED | OUTPATIENT
Start: 2020-11-03 | End: 2020-11-07

## 2020-11-03 RX ORDER — METOCLOPRAMIDE 5 MG/1
5 TABLET ORAL EVERY 6 HOURS PRN
Status: ON HOLD | COMMUNITY
End: 2020-11-07

## 2020-11-03 RX ORDER — TRAMADOL HYDROCHLORIDE 50 MG/1
50 TABLET ORAL EVERY 6 HOURS PRN
Status: DISCONTINUED | OUTPATIENT
Start: 2020-11-03 | End: 2020-11-04

## 2020-11-03 RX ORDER — FOLIC ACID/VIT B COMPLEX AND C 0.8 MG
0.8 TABLET ORAL DAILY
COMMUNITY

## 2020-11-03 RX ORDER — BACLOFEN 10 MG/1
5 TABLET ORAL 2 TIMES DAILY
COMMUNITY

## 2020-11-03 RX ORDER — MAGNESIUM HYDROXIDE/ALUMINUM HYDROXICE/SIMETHICONE 120; 1200; 1200 MG/30ML; MG/30ML; MG/30ML
30 SUSPENSION ORAL 4 TIMES DAILY PRN
Status: DISCONTINUED | OUTPATIENT
Start: 2020-11-03 | End: 2020-11-07

## 2020-11-03 RX ORDER — ISOSORBIDE MONONITRATE 60 MG/1
60 TABLET, EXTENDED RELEASE ORAL DAILY
Status: DISCONTINUED | OUTPATIENT
Start: 2020-11-03 | End: 2020-11-07

## 2020-11-03 NOTE — ED NOTES
Care taker at [facility] 7 spoken with by this RN. Caretaker reports that patient has been nonverbal and not following commands since \"early this morning\".  Care taker states when she last took care of her on Friday she was verbal and \

## 2020-11-03 NOTE — ED NOTES
Orders for admission, patient is aware of plan and ready to go upstairs. Any questions, please call ED KATIE Saldana  at extension [phone].    Type of COVID test sent:rapid  COVID Suspicion level: Low    Titratable drug(s) infusing:noneRate:    LOC at time of tr

## 2020-11-03 NOTE — PROGRESS NOTES
Therapeutic interchange from loratadine 10 mg to cetirizine 10 mg per P&T approved protocol.      Thank you,  Carol Warren, PharmD

## 2020-11-03 NOTE — H&P
[facility]    History and Physical    Hadley Silveira Patient Status:  Observation    1950 MRN [id number]   Location [facility] 1W Attending Nicanor Leroy MD   Hosp Day # 0 PCP Angelica Mims MD     Date:  11/3/2020  Date History   Problem Relation Age of Onset   • Other (ULCERS) Father    • Cancer Mother         UTERINE/COLON     Social History:  Social History    Tobacco Use      Smoking status: Former Smoker      Smokeless tobacco: Never Used    Alcohol use: Not Currentl total) by mouth every 12 (twelve) hours as needed. •  Potassium Chloride ER 10 MEQ Oral Tab CR, Take 20 mEq by mouth Before Dinner.       •  Pantoprazole Sodium 40 MG Oral Tab EC, Take 1 tablet (40 mg total) by mouth every morning before breakfast.    • or motor deficit. Alert but not responding/moving extremities   Skin: Skin is warm.      Cervical Papanicolaou contraindicated    Results:     Lab Results   Component Value Date    WBC 8.0 11/02/2020    HGB 10.8 (L) 11/02/2020    HCT 32.2 (L) 11/02/2020 bilateral renal atrophy. 2. No acute bowel inflammatory process. Uncomplicated appearing diverticulosis of the descending and sigmoid colon. Appendix not well visualized. Retro positioned uterus. 3. Mild splenomegaly unchanged.   Gallbladder not definite on the clinical documentation in H+P. Based on patients current state of illness, I anticipate that, after discharge, patient will require TBD.         Opal Carmona MD  11/3/2020

## 2020-11-03 NOTE — CM/SW NOTE
11/4 851am: MONSE spoke with the pt's dtr. Heaven Atkinson and confirmed the plan for the pt. To return to [facility] when medically stable.  ------------------  11/3 230pm: The pt. Was admitted from [facility] where she was for short term rehab.

## 2020-11-03 NOTE — ED NOTES
Report received from [facility]. Lab called, lab reports they did not receive blood work from this patient. Straight draw performed for blood work, blood work sent to lab by this RN.

## 2020-11-03 NOTE — ED INITIAL ASSESSMENT (HPI)
Altered mental status for unknown amount of time. EMS states that NH called 911 because she was not speaking. Usually oriented x2 per EMS.

## 2020-11-03 NOTE — ED PROVIDER NOTES
Patient Seen in: [facility] Emergency Department    History   Patient presents with:  Altered Mental Status    Stated Complaint: AMS    HPI    Patient brought by EMS for altered mental status started in the morning.   Patient currently non verbal, 6 (six) hours as needed. carvedilol 12.5 MG Oral Tab,  Take 1 tablet (12.5 mg total) by mouth 2 (two) times daily with meals. furosemide 40 MG Oral Tab,  Take 1 tablet (40 mg total) by mouth BID (Diuretic).    traMADol HCl 50 MG Oral Tab,  Take 1 tablet stated complaint: AMS  Other systems are as noted in HPI. Constitutional and vital signs reviewed. All other systems reviewed and negative except as noted above. PSFH elements reviewed from today and agreed except as otherwise stated in HPI.     Ph components:    Free T4 2.4 (*)     All other components within normal limits   TROPONIN I - Abnormal; Notable for the following components:    Troponin 0.111 (*)     All other components within normal limits   CBC W/ DIFFERENTIAL - Abnormal; Notable for th List                         Disposition and Plan     Clinical Impression:  Extended spectrum beta lactamase (ESBL) resistance  (primary encounter diagnosis)  Acute cystitis with hematuria  Toxic metabolic encephalopathy    Disposition:  There is no dispos

## 2020-11-04 ENCOUNTER — APPOINTMENT (OUTPATIENT)
Dept: GENERAL RADIOLOGY | Facility: HOSPITAL | Age: 70
DRG: 689 | End: 2020-11-04
Attending: INTERNAL MEDICINE
Payer: MEDICARE

## 2020-11-04 PROCEDURE — 71045 X-RAY EXAM CHEST 1 VIEW: CPT | Performed by: INTERNAL MEDICINE

## 2020-11-04 RX ORDER — CETIRIZINE HYDROCHLORIDE 5 MG/1
5 TABLET ORAL DAILY
Status: DISCONTINUED | OUTPATIENT
Start: 2020-11-05 | End: 2020-11-07

## 2020-11-04 RX ORDER — TRAMADOL HYDROCHLORIDE 50 MG/1
50 TABLET ORAL EVERY 12 HOURS PRN
Status: DISCONTINUED | OUTPATIENT
Start: 2020-11-04 | End: 2020-11-07

## 2020-11-04 NOTE — PROGRESS NOTES
[facility]    Progress Note    Nat Bruno Patient Status:  Observation    1950 MRN [id number]   Location [facility] 1W Attending Shane Leija MD   Hosp Day # 0 PCP Aroldo Morton MD        Subjective:     Constitut 11/03/2020    ALB 2.9 (L) 11/03/2020    ALKPHO 201 (H) 11/03/2020    BILT 0.7 11/03/2020    TP 6.7 11/03/2020    AST 37 11/03/2020    ALT 24 11/03/2020    PTT 30.1 07/21/2020    INR 1.24 (H) 07/21/2020    T4F 2.4 (H) 11/02/2020    TSH 0.066 (L) 11/02/2020 [facility] with a similar interpretation given.     Dictated by (CST): Lou Lopez MD on 11/03/2020 at 8:12 AM     Finalized by (CST): Lou Lopez MD on 11/03/2020 at 8:26 AM          Xr Chest Ap Portable  (cpt=71045)    Result Date: 6

## 2020-11-04 NOTE — PROGRESS NOTES
[facility] Pharm[facility] Note:  Renal Dose Adjustment for Tramadol Ruth Rojo    Hadley Silveira has been prescribed Tramadol (ULTRAM) 50 mg orally every 6 hours as needed for pain. Estimated Creatinine Clearance: 28.8 mL/min (A) (based on SCr of 1.68 mg/dL (H)).

## 2020-11-05 RX ORDER — DEXTROSE MONOHYDRATE 25 G/50ML
50 INJECTION, SOLUTION INTRAVENOUS
Status: DISCONTINUED | OUTPATIENT
Start: 2020-11-05 | End: 2020-11-07

## 2020-11-05 NOTE — PLAN OF CARE
Patient alert. Reports no pain. Gross tremors noted. Pt does not attempt to ambulate, but demonstrates good bed mobility. VSS. Call light within easy reach. No acute distress noted.     Problem: PAIN - ADULT  Goal: Verbalizes/displays adequate comfort

## 2020-11-05 NOTE — PLAN OF CARE
Problem: Patient Centered Care  Goal: Patient preferences are identified and integrated in the patient's plan of care  Description: Interventions:  - What would you like us to know as we care for you?  I was in rehab at [facility]  - Provide timely, comple skin breakdown  - Initiate interventions, skin care algorithm/standards of care as needed  Outcome: Progressing      Problem: DISCHARGE PLANNING  Goal: Discharge to home or other facility with appropriate resources  Description: INTERVENTIONS:  - Identify

## 2020-11-05 NOTE — PROGRESS NOTES
[place] [facility]    Progress Note    Betymalik Ansaris Patient Status:  Observation    1950 MRN [id number]   Location [facility] 1W Attending Rafael Strange, [address] [facility] Day # 2 PCP Nona Orozco MD        Subjective:     Constitut 11/05/2020    HCT 29.2 (L) 11/05/2020    .0 (L) 11/05/2020    CREATSERUM 2.08 (H) 11/05/2020    BUN 45 (H) 11/05/2020     11/05/2020    K 3.9 11/05/2020     11/05/2020    CO2 23.0 11/05/2020     (H) 11/05/2020    CA 8.4 (L) 11/05/

## 2020-11-05 NOTE — BH PROGRESS NOTE
Behavioral Health Note:  REASON FOR ADMISSION:   AMS, UTI    REASON FOR CONSULT:  Psychiatry Consultation requested for evaluation and advice d/t reported agitation and blunted affect throughout admission     OBJECTIVE:  Nathaniel Spears is a 77year old female wi grunting and only responding to painful stimuli. Per chart review her caretaker at rehab reported she was a/o x2 at baseline but RN reported she's a/o x3-4 at baseline.      Brina Ann denies SI/HI/SIB but reports hx SI at age 22 and denies AVH, however, resp hallucinations reported but observed response to internal stimuli   Insight: limited d/t medical condition   Judgment: impaired d/t medical condition     SUICIDAL RISK ASSESSMENT  Acesusan Katya denies SI/HI/SIB, reports hx SI attempt via overdose at age 22 with

## 2020-11-06 PROBLEM — F33.1 MAJOR DEPRESSIVE DISORDER, RECURRENT EPISODE, MODERATE (HCC): Status: ACTIVE | Noted: 2020-11-06

## 2020-11-06 PROBLEM — G24.9 NEUROLOGICAL MOVEMENT DISORDER: Status: ACTIVE | Noted: 2020-11-06

## 2020-11-06 PROBLEM — F05 DELIRIUM DUE TO ANOTHER MEDICAL CONDITION: Status: ACTIVE | Noted: 2020-11-06

## 2020-11-06 PROCEDURE — 90792 PSYCH DIAG EVAL W/MED SRVCS: CPT | Performed by: OTHER

## 2020-11-06 RX ORDER — MIRTAZAPINE 15 MG/1
7.5 TABLET, FILM COATED ORAL NIGHTLY
Status: DISCONTINUED | OUTPATIENT
Start: 2020-11-06 | End: 2020-11-07

## 2020-11-06 RX ORDER — BUSPIRONE HYDROCHLORIDE 5 MG/1
5 TABLET ORAL 2 TIMES DAILY
Status: DISCONTINUED | OUTPATIENT
Start: 2020-11-06 | End: 2020-11-06

## 2020-11-06 RX ORDER — QUETIAPINE 25 MG/1
25 TABLET, FILM COATED ORAL NIGHTLY
Status: DISCONTINUED | OUTPATIENT
Start: 2020-11-06 | End: 2020-11-07

## 2020-11-06 NOTE — PROGRESS NOTES
[facility]    Progress Note    Ganga Bowen Patient Status:  Observation    1950 MRN [id number]   Location [facility] 1W Attending Dany Ha MD   Hosp Day # 3 PCP Glen Womack MD        Subjective:     Constitut HGB 9.7 (L) 11/05/2020    HCT 29.2 (L) 11/05/2020    .0 (L) 11/05/2020    CREATSERUM 2.08 (H) 11/05/2020    BUN 45 (H) 11/05/2020     11/05/2020    K 3.9 11/05/2020     11/05/2020    CO2 23.0 11/05/2020     (H) 11/05/2020    CA

## 2020-11-06 NOTE — PLAN OF CARE
Pt alert. Reports some headache pain. Prn norco given. Call light within easy reach and bed alarm active. Pt calls appropriately for assistance; does not attempt to get out of bed. Pt turns herself frequently. VSS. No acute distress noted.     Proble algorithm/standards of care as needed  Outcome: Progressing

## 2020-11-07 VITALS
SYSTOLIC BLOOD PRESSURE: 99 MMHG | OXYGEN SATURATION: 98 % | WEIGHT: 157 LBS | RESPIRATION RATE: 18 BRPM | DIASTOLIC BLOOD PRESSURE: 40 MMHG | HEART RATE: 64 BPM | TEMPERATURE: 98 F | BODY MASS INDEX: 26 KG/M2

## 2020-11-07 PROCEDURE — 99239 HOSP IP/OBS DSCHRG MGMT >30: CPT | Performed by: INTERNAL MEDICINE

## 2020-11-07 PROCEDURE — 99233 SBSQ HOSP IP/OBS HIGH 50: CPT | Performed by: OTHER

## 2020-11-07 RX ORDER — ROPINIROLE 0.25 MG/1
0.25 TABLET, FILM COATED ORAL 2 TIMES DAILY
Qty: 60 TABLET | Refills: 1 | Status: SHIPPED | OUTPATIENT
Start: 2020-11-07

## 2020-11-07 RX ORDER — ROPINIROLE 0.25 MG/1
0.25 TABLET, FILM COATED ORAL 2 TIMES DAILY
Status: DISCONTINUED | OUTPATIENT
Start: 2020-11-07 | End: 2020-11-07

## 2020-11-07 RX ORDER — QUETIAPINE 25 MG/1
25 TABLET, FILM COATED ORAL NIGHTLY
Qty: 30 TABLET | Refills: 0 | Status: ON HOLD | OUTPATIENT
Start: 2020-11-07 | End: 2020-11-16

## 2020-11-07 RX ORDER — VANCOMYCIN HYDROCHLORIDE 125 MG/1
125 CAPSULE ORAL DAILY
Qty: 10 CAPSULE | Refills: 0 | Status: SHIPPED | OUTPATIENT
Start: 2020-11-08 | End: 2020-12-10 | Stop reason: ALTCHOICE

## 2020-11-07 NOTE — CONSULTS
[facility]    Report of Consultation    Tonny Winters Patient Status:  Inpatient    1950 MRN [id number]   Location [facility] 1W Attending Ruben Garcia MD   Hosp Day # 3 PCP Colton Holloway MD     Date of Admission:  1 medical condition and she has been feeling the need for her to have her PCP, Dr. Juma Fam to care for her. The patient today reports feeling like her \"brain isn't the same\" and has been struggling with her memory.   Patient reporting that she had previo • Congestive heart disease (HCC)    • Coronary atherosclerosis    • Diabetes [facility])    • Dysphagia    • Esophageal reflux    • Essential hypertension    • Heart attack (HCC)    • Hemiplegia and hemiparesis following cerebral infarction affecting unspecifi TID CC    •  Cetirizine HCl (ZYRTEC) 5 MG tab 5 mg, 5 mg, Oral, Daily    •  traMADol HCl (ULTRAM) tab 50 mg, 50 mg, Oral, Q12H PRN    •  LORazepam (ATIVAN) injection 1 mg, 1 mg, Intravenous, Q4H PRN    •  HYDROcodone-acetaminophen (NORCO) 5-325 MG per tab (six) hours as needed. •  spironolactone 25 MG Oral Tab, Take 25 mg by mouth daily. •  [] vancomycin 50 mg/ml Oral Solution, Take 125 mg by mouth every 6 (six) hours.     •  vancomycin 50 mg/ml Oral Solution, Take 125 mg by mouth 2 (two) times Take 10 mg by mouth daily. •  Isosorbide Mononitrate ER 60 MG Oral Tablet 24 Hr, Take 60 mg by mouth daily. •  nitroGLYCERIN 0.4 MG Sublingual SL Tab, Place 0.4 mg under the tongue every 5 (five) minutes as needed for Chest pain.     •  linagliptin 5 Speech:  Very talkative this morning with regular rate and rhythm speech. Mood: \"Feeling much better\". Affect:  Restricted affect otherwise noticeablely anxious.   Conscious/Orientation: alert and oriented x2-3 with fair attention and fair/poor memor Differential With Platelet      TSH W Reflex To Free T4      Urinalysis with Culture Reflex Once      T4, FREE (S)      Troponin I      Ammonia, Plasma      Lipid Panel      CBC With Differential With Platelet      Comp Metabolic Panel (14)      Pro Beta N

## 2020-11-07 NOTE — PLAN OF CARE
Report called to Nolan Allred at [facility], endorsed administration of 1700 dose of  Lasix when pt arrives to [facility]. Jose Lane is aware that pt has not yet eaten dinner prior to transfer .   Per pt, her daughter Aliya Sherman is aware that pt is going back to [facility]

## 2020-11-07 NOTE — OCCUPATIONAL THERAPY NOTE
OCCUPATIONAL THERAPY EVALUATION - INPATIENT     Room Number: 103/103-A  Evaluation Date: 11/7/2020  Type of Evaluation: Initial       Physician Order: IP Consult to Occupational Therapy  Reason for Therapy: ADL/IADL Dysfunction and Discharge Planning    OC level of impairment may benefit from SHERICE. DISCHARGE RECOMMENDATIONS  OT Discharge Recommendations: Sub-acute rehabilitation  OT Device Recommendations: TBD    PLAN  OT Treatment Plan: Balance activities; Energy conservation/work simplification technique SURGERY           HOME SITUATION  Type of Home: Assisted living facility  Home Layout: One level(elevator building)  Lives With: Staff 24 hours     Toilet and Equipment: Standard height toilet  Shower/Tub and Equipment: Walk-in shower             Drives: [place]  -   Taking care of personal grooming such as brushing teeth?: A Little  -   Eating meals?: None    AM-PAC Score:  Score: 19  Approx Degree of Impairment: 42.8%  Standardized Score (AM-PAC Scale): 40.22  CMS Modifier (G-Code): CK    FUNCTIONAL TRANS

## 2020-11-07 NOTE — CM/SW NOTE
RN informed SW of pt's discharge and will need ambulance transport, 2 assist/complete. [facility] ambulance arranged for 5pm  to transport pt to [address] [place] [address] notified pt's daughter, Kashif Velarde of discharge time.  Mimi from [facility]

## 2020-11-07 NOTE — DISCHARGE SUMMARY
[place] [facility] - [facility]    Discharge Summary    Kareen Band Patient Status:  Inpatient    1950 MRN [id number]   Location [facility] 1W Attending Johan Iglesias MD   [facility] Day # 4 PCP Shannon Chen MD     Date of Admission: 2020 rhythm. Abdomen: Soft, nontender, nondistended. Positive bowel sounds. No rebound tenderness  Neurologic: No focal neurological deficits. Musculoskeletal: Motor strength 5/5  Psychiatric: Appropriate mood and affect.     Acute encephalopathy-likely from U Sleep or Anxiety. Nephro-Ramses 0.8 MG Oral Tab  Take 0.8 mg by mouth daily. carvedilol 12.5 MG Oral Tab  Take 1 tablet (12.5 mg total) by mouth 2 (two) times daily with meals.     furosemide 40 MG Oral Tab  Take 1 tablet (40 mg total) by mouth BID (Diu

## 2020-11-08 NOTE — PROGRESS NOTES
Kamla Luke is a 77year old   female with history of CAD, diabetes mellitus, HTN and previous history of UTI who was in rehab and brought to our facility for altered mental status and patient upon admission was nonverbal response to Congestive heart disease (HCC)    • Coronary atherosclerosis    • Diabetes [facility])    • Dysphagia    • Esophageal reflux    • Essential hypertension    • Heart attack ([facility] [address]    • Hemiplegia and hemiparesis following cerebral infarction affecting unspecified s PTT 30.1 07/21/2020    INR 1.24 (H) 07/21/2020    PTP 15.4 (H) 07/21/2020    T4F 2.4 (H) 11/02/2020    TSH 0.066 (L) 11/02/2020     03/05/2020    DDIMER 1.23 (H) 03/05/2020    MG 2.0 08/22/2020    PHOS 4.7 03/25/2020    TROP 0.111 (HH) 11/02/2020 on education and support. 2.  Therapy focus on insight orientation. 3.  Continue Remeron 7.5 mg nightly to improve mood and help with the sleep. 4.  Continue Seroquel 25 mg nightly to stabilize the mood and help with the sleep.   5.  Start Requip 0.25 mg

## 2020-11-09 ENCOUNTER — INITIAL APN SNF VISIT (OUTPATIENT)
Dept: INTERNAL MEDICINE CLINIC | Facility: SKILLED NURSING FACILITY | Age: 70
End: 2020-11-09

## 2020-11-09 VITALS
BODY MASS INDEX: 36 KG/M2 | HEART RATE: 82 BPM | TEMPERATURE: 98 F | DIASTOLIC BLOOD PRESSURE: 59 MMHG | OXYGEN SATURATION: 98 % | SYSTOLIC BLOOD PRESSURE: 111 MMHG | WEIGHT: 218 LBS | RESPIRATION RATE: 20 BRPM

## 2020-11-09 DIAGNOSIS — N10 ACUTE PYELONEPHRITIS: ICD-10-CM

## 2020-11-09 DIAGNOSIS — I50.9 ACUTE ON CHRONIC CONGESTIVE HEART FAILURE, UNSPECIFIED HEART FAILURE TYPE (HCC): ICD-10-CM

## 2020-11-09 DIAGNOSIS — F32.A DEPRESSION, UNSPECIFIED DEPRESSION TYPE: ICD-10-CM

## 2020-11-09 DIAGNOSIS — I10 ESSENTIAL HYPERTENSION: ICD-10-CM

## 2020-11-09 PROCEDURE — 1123F ACP DISCUSS/DSCN MKR DOCD: CPT | Performed by: NURSE PRACTITIONER

## 2020-11-09 PROCEDURE — 99310 SBSQ NF CARE HIGH MDM 45: CPT | Performed by: NURSE PRACTITIONER

## 2020-11-09 NOTE — PROGRESS NOTES
Yadiel Schultz  : 1950  Age 72year old  female patient is admitted to [place] [address] for 10/29/20 for rehab and strengthening and went back to hospital 20     Chief complaint:Altered Mental Status      [facility] READMISSION : 20 to 20 • UTI (urinary tract infection)      Past Surgical History:   Procedure Laterality Date   • CHOLECYSTECTOMY     • CYSTOSCOPY RETROGRADE Left 9/26/2020    Performed by Izabel Rivera MD at [facility] OR   • CYSTOSCOPY URETEROSCOPY Left 9/29/2020    Per Take 2 capsules by mouth nightly as needed. • ALPRAZolam 0.25 MG Oral Tab Take 0.25 mg by mouth 2 (two) times daily as needed for Sleep or Anxiety. • Nephro-Ramses 0.8 MG Oral Tab Take 0.8 mg by mouth daily.      • Insulin Aspart Pen 100 UNIT/ML Subcu lesions or rashes  WOUNDS: none  EYES:no visual complaints or deficits  HENT: denies nasal congestion, sinus pain or sore throat;  RESPIRATORY: denies shortness of breath, wheezing or cough   CARDIOVASCULAR:denies chest pain, no palpitations   GI: denies n 25mg po daily  -cont alprazolam 0.25mg po bid prn  -PT/OT/Speech       2. uti-hx ESBL  - Iv antibiotics zosyn IV tid x 3 days   -weekly cbc and bmp in rehab    - cont vanco 125mg po daily proph   -ID consulted in rehab , to eval and follow      3.  Hx Chf/C

## 2020-11-10 ENCOUNTER — APPOINTMENT (OUTPATIENT)
Dept: CT IMAGING | Facility: HOSPITAL | Age: 70
DRG: 690 | End: 2020-11-10
Attending: EMERGENCY MEDICINE
Payer: MEDICARE

## 2020-11-10 ENCOUNTER — APPOINTMENT (OUTPATIENT)
Dept: GENERAL RADIOLOGY | Facility: HOSPITAL | Age: 70
DRG: 690 | End: 2020-11-10
Attending: EMERGENCY MEDICINE
Payer: MEDICARE

## 2020-11-10 ENCOUNTER — HOSPITAL ENCOUNTER (INPATIENT)
Facility: HOSPITAL | Age: 70
LOS: 6 days | Discharge: NURSING, REHAB OR LONG-TERM CARE | DRG: 690 | End: 2020-11-17
Attending: EMERGENCY MEDICINE | Admitting: INTERNAL MEDICINE
Payer: MEDICARE

## 2020-11-10 DIAGNOSIS — N17.9 AKI (ACUTE KIDNEY INJURY) (HCC): ICD-10-CM

## 2020-11-10 DIAGNOSIS — R41.82 ALTERED MENTAL STATUS, UNSPECIFIED ALTERED MENTAL STATUS TYPE: Primary | ICD-10-CM

## 2020-11-10 DIAGNOSIS — I95.9 TRANSIENT HYPOTENSION: ICD-10-CM

## 2020-11-10 PROCEDURE — 70450 CT HEAD/BRAIN W/O DYE: CPT | Performed by: EMERGENCY MEDICINE

## 2020-11-10 PROCEDURE — 71045 X-RAY EXAM CHEST 1 VIEW: CPT | Performed by: EMERGENCY MEDICINE

## 2020-11-10 RX ORDER — ONDANSETRON 2 MG/ML
4 INJECTION INTRAMUSCULAR; INTRAVENOUS ONCE
Status: COMPLETED | OUTPATIENT
Start: 2020-11-10 | End: 2020-11-10

## 2020-11-11 PROBLEM — R41.82 ALTERED MENTAL STATUS, UNSPECIFIED ALTERED MENTAL STATUS TYPE: Status: ACTIVE | Noted: 2020-11-11

## 2020-11-11 PROBLEM — N17.9 AKI (ACUTE KIDNEY INJURY) (HCC): Status: ACTIVE | Noted: 2020-11-11

## 2020-11-11 PROBLEM — I95.9 TRANSIENT HYPOTENSION: Status: ACTIVE | Noted: 2020-11-11

## 2020-11-11 PROBLEM — F06.1 CATATONIA: Status: ACTIVE | Noted: 2020-11-11

## 2020-11-11 PROCEDURE — 90792 PSYCH DIAG EVAL W/MED SRVCS: CPT | Performed by: OTHER

## 2020-11-11 RX ORDER — CLOPIDOGREL BISULFATE 75 MG/1
75 TABLET ORAL DAILY
Status: DISCONTINUED | OUTPATIENT
Start: 2020-11-11 | End: 2020-11-17

## 2020-11-11 RX ORDER — TRAMADOL HYDROCHLORIDE 50 MG/1
50 TABLET ORAL EVERY 12 HOURS PRN
Status: DISCONTINUED | OUTPATIENT
Start: 2020-11-11 | End: 2020-11-17

## 2020-11-11 RX ORDER — HEPARIN SODIUM 5000 [USP'U]/ML
5000 INJECTION, SOLUTION INTRAVENOUS; SUBCUTANEOUS EVERY 12 HOURS SCHEDULED
Status: DISCONTINUED | OUTPATIENT
Start: 2020-11-11 | End: 2020-11-17

## 2020-11-11 RX ORDER — MELATONIN
325
Status: DISCONTINUED | OUTPATIENT
Start: 2020-11-11 | End: 2020-11-17

## 2020-11-11 RX ORDER — MIRTAZAPINE 15 MG/1
15 TABLET, FILM COATED ORAL NIGHTLY
Status: DISCONTINUED | OUTPATIENT
Start: 2020-11-11 | End: 2020-11-12

## 2020-11-11 RX ORDER — PANTOPRAZOLE SODIUM 40 MG/1
40 TABLET, DELAYED RELEASE ORAL
Status: DISCONTINUED | OUTPATIENT
Start: 2020-11-11 | End: 2020-11-17

## 2020-11-11 RX ORDER — LORAZEPAM 2 MG/ML
0.5 INJECTION INTRAMUSCULAR EVERY 4 HOURS PRN
Status: DISCONTINUED | OUTPATIENT
Start: 2020-11-11 | End: 2020-11-17

## 2020-11-11 RX ORDER — CETIRIZINE HYDROCHLORIDE 5 MG/1
5 TABLET ORAL DAILY
Status: DISCONTINUED | OUTPATIENT
Start: 2020-11-11 | End: 2020-11-17

## 2020-11-11 RX ORDER — ALPRAZOLAM 0.25 MG/1
0.25 TABLET ORAL 2 TIMES DAILY PRN
Status: DISCONTINUED | OUTPATIENT
Start: 2020-11-11 | End: 2020-11-17

## 2020-11-11 RX ORDER — ONDANSETRON 2 MG/ML
4 INJECTION INTRAMUSCULAR; INTRAVENOUS EVERY 6 HOURS PRN
Status: DISCONTINUED | OUTPATIENT
Start: 2020-11-11 | End: 2020-11-17

## 2020-11-11 RX ORDER — ASPIRIN 81 MG/1
81 TABLET, CHEWABLE ORAL DAILY
Status: DISCONTINUED | OUTPATIENT
Start: 2020-11-11 | End: 2020-11-17

## 2020-11-11 RX ORDER — ATORVASTATIN CALCIUM 40 MG/1
80 TABLET, FILM COATED ORAL NIGHTLY
Status: DISCONTINUED | OUTPATIENT
Start: 2020-11-11 | End: 2020-11-17

## 2020-11-11 RX ORDER — SODIUM CHLORIDE 9 MG/ML
INJECTION, SOLUTION INTRAVENOUS CONTINUOUS
Status: ACTIVE | OUTPATIENT
Start: 2020-11-11 | End: 2020-11-11

## 2020-11-11 RX ORDER — ACETAMINOPHEN 325 MG/1
650 TABLET ORAL EVERY 6 HOURS PRN
Status: DISCONTINUED | OUTPATIENT
Start: 2020-11-11 | End: 2020-11-17

## 2020-11-11 RX ORDER — VANCOMYCIN HYDROCHLORIDE 125 MG/1
125 CAPSULE ORAL DAILY
Status: DISCONTINUED | OUTPATIENT
Start: 2020-11-11 | End: 2020-11-17

## 2020-11-11 RX ORDER — CARVEDILOL 12.5 MG/1
12.5 TABLET ORAL 2 TIMES DAILY WITH MEALS
Status: DISCONTINUED | OUTPATIENT
Start: 2020-11-11 | End: 2020-11-17

## 2020-11-11 RX ORDER — ASCORBIC ACID, THIAMINE, RIBOFLAVIN, NIACINAMIDE, PYRIDOXINE, FOLIC ACID, COBALAMIN, BIOTIN, PANTOTHENIC ACID 100; 1.5; 1.7; 20; 10; 1; 6; 300; 1 MG/1; MG/1; MG/1; MG/1; MG/1; MG/1; UG/1; UG/1; MG/1
1 TABLET, COATED ORAL DAILY
Status: DISCONTINUED | OUTPATIENT
Start: 2020-11-11 | End: 2020-11-17

## 2020-11-11 RX ORDER — SODIUM CHLORIDE 9 MG/ML
INJECTION, SOLUTION INTRAVENOUS CONTINUOUS
Status: DISCONTINUED | OUTPATIENT
Start: 2020-11-11 | End: 2020-11-13

## 2020-11-11 RX ORDER — ROPINIROLE 0.5 MG/1
0.25 TABLET, FILM COATED ORAL 2 TIMES DAILY
Status: DISCONTINUED | OUTPATIENT
Start: 2020-11-11 | End: 2020-11-12

## 2020-11-11 RX ORDER — NITROGLYCERIN 0.4 MG/1
0.4 TABLET SUBLINGUAL EVERY 5 MIN PRN
Status: DISCONTINUED | OUTPATIENT
Start: 2020-11-11 | End: 2020-11-17

## 2020-11-11 RX ORDER — LISINOPRIL 20 MG/1
20 TABLET ORAL DAILY
Status: DISCONTINUED | OUTPATIENT
Start: 2020-11-11 | End: 2020-11-16

## 2020-11-11 RX ORDER — FUROSEMIDE 40 MG/1
40 TABLET ORAL
Status: DISCONTINUED | OUTPATIENT
Start: 2020-11-11 | End: 2020-11-16

## 2020-11-11 RX ORDER — QUETIAPINE 25 MG/1
25 TABLET, FILM COATED ORAL NIGHTLY
Status: DISCONTINUED | OUTPATIENT
Start: 2020-11-11 | End: 2020-11-11

## 2020-11-11 RX ORDER — LEVOTHYROXINE SODIUM 0.05 MG/1
50 TABLET ORAL
Status: DISCONTINUED | OUTPATIENT
Start: 2020-11-11 | End: 2020-11-12

## 2020-11-11 RX ORDER — MAGNESIUM HYDROXIDE/ALUMINUM HYDROXICE/SIMETHICONE 120; 1200; 1200 MG/30ML; MG/30ML; MG/30ML
30 SUSPENSION ORAL 4 TIMES DAILY PRN
Status: DISCONTINUED | OUTPATIENT
Start: 2020-11-11 | End: 2020-11-17

## 2020-11-11 RX ORDER — ISOSORBIDE MONONITRATE 60 MG/1
60 TABLET, EXTENDED RELEASE ORAL DAILY
Status: DISCONTINUED | OUTPATIENT
Start: 2020-11-11 | End: 2020-11-16

## 2020-11-11 NOTE — ED INITIAL ASSESSMENT (HPI)
Pt arrive in ER per [facility] ambulance from [employer] with c/o altered mental status, as per NH staff pt had just recovered from UTI and has been declining since, pts initial BP 90/50 and was given a bolus of IVF NS that improves her BP to 124/58

## 2020-11-11 NOTE — PLAN OF CARE
Pt admitted this morning with AMS. Low BP in ED and given bolus. Pt disoriented and agitated. On C. Diff protocol for lose stool. ESBL precautions. Pt O2 at 89%-92% on room air and refusing oxygen. No complaints of shortness of breath.  Excoriated bottom wi activity, document and report duration and description of seizure to MD/LIP  - If seizure occurs, turn patient to side and suction secretions as needed  - Reorient patient post seizure  - Seizure pads on all 4 side rails  - Instruct patient/family to notif

## 2020-11-11 NOTE — H&P
[facility]    History and Physical    Kamla Luke Patient Status:  Inpatient    1950 MRN [id number]   Location [facility] 3W/SW Attending Diapk Lerma MD   Hosp Day # 0 PCP Cammie Correa MD     Date:  2020  Vicki Tesfaye UTERINE/COLON     Social History:  Social History    Tobacco Use      Smoking status: Former Smoker      Smokeless tobacco: Never Used    Alcohol use: Not Currently    Drug use: Never    Allergies/Medications:    Allergies:   Tetracyclines & Rel*    LILA tablet (40 mg total) by mouth every morning before breakfast.    •  Isosorbide Mononitrate ER 60 MG Oral Tablet 24 Hr, Take 60 mg by mouth daily. •  Clopidogrel Bisulfate 75 MG Oral Tab, Take 75 mg by mouth daily.     •  ipratropium-albuterol 0.5-2.5 (3) CA 8.9 11/10/2020    ALB 2.8 (L) 11/10/2020    ALKPHO 190 (H) 11/10/2020    BILT 0.3 11/10/2020    TP 7.5 11/10/2020    AST 22 11/10/2020    ALT 29 11/10/2020    PTT 30.1 07/21/2020    INR 1.24 (H) 07/21/2020    T4F 2.0 (H) 11/10/2020    TSH 0.039 (L) 11/1 Inpatient Hospitalization - Initial Certification    Patient will require inpatient services that will reasonably be expected to span two midnight's based on the clinical documentation in H+P.    Based on patients current state of illness, I anticipate that

## 2020-11-11 NOTE — ED NOTES
Orders for admission, patient is aware of plan and ready to go upstairs.  Any questions, please call ED RN David Bernabe  at extension [phone]  Type of COVID test sent:Rapid  COVID Suspicion level: Low    Titratable drug(s) infusing:  Rate:    LOC at time of sanchez

## 2020-11-11 NOTE — PROGRESS NOTES
[facility] Pharmacy Note:  Renal Adjustment for piperacillin/tazobactam (Shaina Goodness)    Iris Ford is a 72year old patient who has been prescribed piperacillin/tazobactam (ZOSYN) 3.375g every 8 hrs.   CrCl is estimated creatinine clearance is 18.3 mL/min (A) (b

## 2020-11-11 NOTE — PROGRESS NOTES
Loratidine a non-formulary medication and will be therapeutically interchanged to Cetirizine per P&T protocol

## 2020-11-11 NOTE — BH PROGRESS NOTE
Behavioral Health Note:  REASON FOR ADMISSION:   AMS/UTI     REASON FOR CONSULT:  Psychiatry consultation requested for evaluation and advice d/t agitation     OBJECTIVE:  Mp Bates is a 72year old female with PMH significant for CAD, DM, HTN, HL and hypot HISTORY:  Brina Ann is a  72year old female who has lived at [facility] for the past year and is currently at [facility] for rehab. She has no hx EtOH, cannabis, or illicit substance use. She quit smoking cigarettes >30 years ago.      MENTAL STATUS EXAM:

## 2020-11-12 PROCEDURE — 99232 SBSQ HOSP IP/OBS MODERATE 35: CPT | Performed by: OTHER

## 2020-11-12 RX ORDER — LEVOTHYROXINE SODIUM 0.03 MG/1
25 TABLET ORAL
Status: DISCONTINUED | OUTPATIENT
Start: 2020-11-12 | End: 2020-11-17

## 2020-11-12 RX ORDER — HALOPERIDOL 5 MG/ML
0.5 INJECTION INTRAMUSCULAR EVERY 6 HOURS PRN
Status: DISCONTINUED | OUTPATIENT
Start: 2020-11-12 | End: 2020-11-17

## 2020-11-12 RX ORDER — LORAZEPAM 2 MG/ML
0.5 INJECTION INTRAMUSCULAR EVERY 6 HOURS PRN
Status: DISCONTINUED | OUTPATIENT
Start: 2020-11-12 | End: 2020-11-17

## 2020-11-12 RX ORDER — POTASSIUM CHLORIDE 20 MEQ/1
40 TABLET, EXTENDED RELEASE ORAL EVERY 4 HOURS
Status: COMPLETED | OUTPATIENT
Start: 2020-11-12 | End: 2020-11-12

## 2020-11-12 RX ORDER — RISPERIDONE 1 MG/ML
0.25 SOLUTION ORAL 2 TIMES DAILY
Status: DISCONTINUED | OUTPATIENT
Start: 2020-11-12 | End: 2020-11-16

## 2020-11-12 NOTE — SLP NOTE
ADULT SWALLOWING EVALUATION    ASSESSMENT    ASSESSMENT/OVERALL IMPRESSION:      PPE REQUIRED. THIS SLP WORE GLOVES AND DROPLET MASK. HANDS SANITIZED/WASHED UPON ENTRANCE/EXIT. This BSE was ordered d/t Pt admitted with AMS.  Pt resides at [facility] of care. RN to place diet orders for pureed/thin liquids (pinched straw/feed Pt). BSE results/recommendations discussed with Pt; fair understanding/reinforcement needed. Swallowing precautions written on white board in Pt room.             PLAN:    To f/u x2-3 disease [facility])    • Pneumonia due to organism    • Renal disorder    • Stroke [facility])    • UTI (urinary tract infection)        Prior Living Situation: Skilled nursing facility  Diet Prior to Admission: (D/C from [facility] on ground/thin March 2020)  Precautions:

## 2020-11-12 NOTE — PLAN OF CARE
Patient remains mostly non-verbal other than occasional words like no and f*#K. She is in motion a lot of the time, bobbing her head to the left a lot and her legs are restless (refused medications last night). Again refused this mornings PO medications.  B respiratory effort  - Oxygen supplementation based on oxygen saturation or ABGs  - Provide Smoking Cessation handout, if applicable  - Encourage broncho-pulmonary hygiene including cough, deep breathe, Incentive Spirometry  - Assess the need for suctioning

## 2020-11-12 NOTE — PROGRESS NOTES
[facility]    Progress Note    Jenene Cabot Patient Status:  Inpatient    1950 MRN [id number]   Location [facility] 3W/SW Attending Giuseppe Mantilla MD   Hosp Day # 1 PCP Christine Murguia MD        Subjective:     Unable t 11/12/2020     (H) 11/12/2020    CO2 25.0 11/12/2020     (H) 11/12/2020    CA 9.2 11/12/2020    ALB 2.8 (L) 11/10/2020    ALKPHO 190 (H) 11/10/2020    BILT 0.3 11/10/2020    TP 7.5 11/10/2020    AST 22 11/10/2020    ALT 29 11/10/2020    PTT 30

## 2020-11-12 NOTE — CONSULTS
[facility]    Report of Consultation    Meg Castellanos Patient Status:  Inpatient    1950 MRN [id number]   Location [facility] 3W/SW Attending Kam Pablo MD   Hosp Day # 0 PCP Carolyn Morales MD     Date of Admission: communicate verbally in limited level indicate the catatonic response.     PAST PSYCHIATRIC HISTORY:  Past diagnosis: MDD, suicide attempt (at age 22)  Past inpatient: at age 22   Past outpatient: at age 22 and medication management with PCP  Medications: w Kole Cloud MD at [facility] MAIN OR   • SINUS SURGERY           Family History  Family History   Problem Relation Age of Onset   • Other (ULCERS) Father    • Cancer Mother         UTERINE/COLON       Social History  Social History    Tobacco Use      Smok mg, 0.4 mg, Sublingual, Q5 Min PRN    •  Pantoprazole Sodium (PROTONIX) EC tab 40 mg, 40 mg, Oral, QAM AC    •  Heparin Sodium (Porcine) 5000 UNIT/ML injection 5,000 Units, 5,000 Units, Subcutaneous, Q12H KYLEE    •  0.9% NaCl infusion, , Intravenous, Contin EC, Take 1 tablet (40 mg total) by mouth every morning before breakfast.    •  Isosorbide Mononitrate ER 60 MG Oral Tablet 24 Hr, Take 60 mg by mouth daily. •  Clopidogrel Bisulfate 75 MG Oral Tab, Take 75 mg by mouth daily.     •  ipratropium-albuterol technique. 2. There may be a chronic infarct of the right occipital lobe in the vascular territory of the right posterior cerebral artery.   3. Senescent changes of parenchymal volume loss with sequela of chronic microvascular ischemic disease, including l speech  Perceptions:  Patient demonstrates response to internal stimuli  Memory: Patient demonstrate impairment in memory due to her delirium. Intellect: Limited due to the patient confusion and delirium.   Judgment and insight are impaired today as a resu Clostridium difficile(toxigenic)PCR Once      Meds This Visit:  Requested Prescriptions      No prescriptions requested or ordered in this encounter           Gomez Goncalves MD  11/11/2020

## 2020-11-12 NOTE — PLAN OF CARE
Problem: Diabetes/Glucose Control  Goal: Glucose maintained within prescribed range  Description: INTERVENTIONS:  - Monitor Blood Glucose as ordered  - Assess for signs and symptoms of hyperglycemia and hypoglycemia  - Administer ordered medications to [facility] devices as appropriate  - Consider OT/PT consult to assist with strengthening/mobility  - Encourage toileting schedule  Outcome: Progressing     Problem: DISCHARGE PLANNING  Goal: Discharge to home or other facility with appropriate resources  Description: status  - Initiate measures to prevent increased intracranial pressure  - Maintain blood pressure and fluid volume within ordered parameters to optimize cerebral perfusion and minimize risk of hemorrhage  - Monitor temperature, glucose, and sodium.  Initiat

## 2020-11-13 PROCEDURE — 99232 SBSQ HOSP IP/OBS MODERATE 35: CPT | Performed by: OTHER

## 2020-11-13 RX ORDER — TEMAZEPAM 15 MG/1
15 CAPSULE ORAL NIGHTLY
Status: DISCONTINUED | OUTPATIENT
Start: 2020-11-13 | End: 2020-11-13

## 2020-11-13 RX ORDER — DEXTROSE MONOHYDRATE 50 MG/ML
INJECTION, SOLUTION INTRAVENOUS CONTINUOUS
Status: DISCONTINUED | OUTPATIENT
Start: 2020-11-13 | End: 2020-11-14

## 2020-11-13 RX ORDER — MIRTAZAPINE 7.5 MG/1
7.5 TABLET, FILM COATED ORAL NIGHTLY
Status: DISCONTINUED | OUTPATIENT
Start: 2020-11-14 | End: 2020-11-17

## 2020-11-13 NOTE — PROGRESS NOTES
Mavis Le is a 72year old female   female with history of CAD, diabetes mellitus, HTN and previous history of UTI who was in rehab and brought to our facility for altered mental status and patient upon admission was nonverbal resp CYSTOSCOPY URETEROSCOPY Left 9/29/2020    Performed by Kathy Newell MD at [address] [place] [address] MAIN OR   • LASER HOLMIUM LITHOTRIPSY Left 9/29/2020    Performed by Kathy Newell MD at [address] [place] [address] OR   • SINUS SURGERY          Family History   Problem Relation Age o 5 mg, Oral, Daily    •  multivitamin (DIALYVITE - RENAL) tab 1 tablet, 1 tablet, Oral, Daily    •  traMADol HCl (ULTRAM) tab 50 mg, 50 mg, Oral, Q12H PRN    •  vancomycin HCl (VANCOCIN) cap 125 mg, 125 mg, Oral, Daily    •  nitroGLYCERIN (NITROSTAT) SL tab thalamus. 4. There is large vessel atherosclerosis of the anterior and posterior intracranial circulations. 5. Lesser incidental findings as above. A preliminary report was issued by the [address] [place] [address] Radiology teleradiology service.  There are no major disc with hematuria        The patient with frequent acute cystitis and delirious response with multiple admission who has been presenting with mutism resulted from her catatonic response to her delirium.     The patient has been talking today with limited verba

## 2020-11-13 NOTE — PLAN OF CARE
Mentation greatly improved over previous night, still remains slightly confused and calls out for people forgetting she is in the hospital but overall improvement. Taking PO medications in applesauce. IV ABX & IVF continue. Monitoring.       Problem: Diabet appropriate  - Consider OT/PT consult to assist with strengthening/mobility  - Encourage toileting schedule  Outcome: Progressing     Problem: DISCHARGE PLANNING  Goal: Discharge to home or other facility with appropriate resources  Description: INTERVENTI Initiate measures to prevent increased intracranial pressure  - Maintain blood pressure and fluid volume within ordered parameters to optimize cerebral perfusion and minimize risk of hemorrhage  - Monitor temperature, glucose, and sodium.  Initiate appropri

## 2020-11-13 NOTE — PLAN OF CARE
VS stable. Fall prec in place. Seen by PT/OT/ST. Discharge planning.     Problem: Patient/Family Goals  Goal: Patient/Family Long Term Goal  Description: Patient's Long Term Goal:     Interventions:  -Go home  - See additional Care Plan goals for specific i Progressing     Problem: SAFETY ADULT - FALL  Goal: Free from fall injury  Description: INTERVENTIONS:  - Assess pt frequently for physical needs  - Identify cognitive and physical deficits and behaviors that affect risk of falls.   - [place] fall precaut broncho-pulmonary hygiene including cough, deep breathe, Incentive Spirometry  - Assess the need for suctioning and perform as needed  - Assess and instruct to report SOB or any respiratory difficulty  - Respiratory Therapy support as indicated  - Manage/a assistive/communication devices  Outcome: Progressing

## 2020-11-13 NOTE — SLP NOTE
SPEECH DAILY NOTE - INPATIENT    ASSESSMENT & PLAN   ASSESSMENT  PPE: DROPLET MASK AND GLOVES. HAND SANITIZED UPON ENTRANCE/EXIT. SLP f/u for ongoing meal assessment per recommendations of 11/12/2020 BSE.  RN reports pt tolerates diet and medication wel patient will tolerate pureed consistency and thin liquids without overt signs or symptoms of aspiration with 100 % accuracy over 2-3 session(s).   Pt tolerates pureed solids and thin liquids via pinched straw with 1:1 feed assist with no overt CSA for 100%

## 2020-11-13 NOTE — PROGRESS NOTES
[facility]    Progress Note    Ursula Ornelas Patient Status:  Inpatient    1950 MRN [id number]   Location [facility] 3W/SW Attending Annalisa Lawler MD   Hosp Day # 2 PCP Romain Fernandez MD        Subjective:   Subjective [place]    *Altered mental status/Delirium/Catatonia   unclear etiology: suspect 2/2 UTI and dehydration, multiple admissions 11/3 and 11/0  for AMS, CTH neg  CT head: neg for acute process, chronic bilateral basal ganglia infarct   CXR neg, blood cx neg,

## 2020-11-13 NOTE — PHYSICAL THERAPY NOTE
PHYSICAL THERAPY EVALUATION - INPATIENT     Room Number: 342/342-A  Evaluation Date: 11/13/2020  Type of Evaluation: Initial   Physician Order: PT Eval and Treat    Presenting Problem: AMS due to UTI, C-Diff   Reason for Therapy: Mobility Dysfunction and HISTORY     History related to current admission: AMS due to UTI     Problem List  Principal Problem:    Altered mental status, unspecified altered mental status type  Active Problems:    Major depressive disorder, recurrent episode, moderate ([address]    Deli Bearing Restriction: None                PAIN ASSESSMENT  Rating: Unable to rate          COGNITION  · Overall Cognitive Status:  Impaired  · Arousal/Alertness:  delayed responses to stimuli  · Orientation Level:  oriented to place and oriented to time  · worker    CURRENT GOALS    Goals to be met by: 11/27/2020  Patient Goal Patient's self-stated goal is: get out of bed   Goal #1 Patient is able to demonstrate supine - sit EOB @ level: supervision     Goal #1   Current Status Min A   Goal #2 Patient is abl

## 2020-11-13 NOTE — PLAN OF CARE
Problem: Diabetes/Glucose Control  Goal: Glucose maintained within prescribed range  Description: INTERVENTIONS:  - Monitor Blood Glucose as ordered  - Assess for signs and symptoms of hyperglycemia and hypoglycemia  - Administer ordered medications to [facility] devices as appropriate  - Consider OT/PT consult to assist with strengthening/mobility  - Encourage toileting schedule  Outcome: Progressing     Problem: DISCHARGE PLANNING  Goal: Discharge to home or other facility with appropriate resources  Description: status  - Initiate measures to prevent increased intracranial pressure  - Maintain blood pressure and fluid volume within ordered parameters to optimize cerebral perfusion and minimize risk of hemorrhage  - Monitor temperature, glucose, and sodium.  Initiat

## 2020-11-14 PROCEDURE — 99233 SBSQ HOSP IP/OBS HIGH 50: CPT | Performed by: INTERNAL MEDICINE

## 2020-11-14 RX ORDER — SODIUM CHLORIDE 9 MG/ML
INJECTION, SOLUTION INTRAVENOUS CONTINUOUS
Status: DISCONTINUED | OUTPATIENT
Start: 2020-11-14 | End: 2020-11-17

## 2020-11-14 NOTE — PLAN OF CARE
On vanco PO, IV ABX stopped, will monitor w/o at this time. Dr Luis Enrique Tse added mirtazepine to start tomorrow night. Was up to chair w/pivot assist back to bed w/lift on previous shift.  Patient slowly improving mentation from admission, still remains with con changes in respiratory status  - Assess for changes in mentation and behavior  - Position to facilitate oxygenation and minimize respiratory effort  - Oxygen supplementation based on oxygen saturation or ABGs  - Provide Smoking Cessation handout, if applic Progressing

## 2020-11-14 NOTE — PROGRESS NOTES
[facility]    Progress Note    Ayala Nino Patient Status:  Inpatient    1950 MRN [id number]   Location [facility] 3W/SW Attending Sharlene Mcintyre MD   Hosp Day # 3 PCP Edison Linares MD        Subjective:     Constitu a/p: improvement/removal of stent/resolution of hydronephrosis     Hypernatremia  Improved     Change fluids mis 0.9 ns at 75 cc hr      Hypotension: resolved, resumed bp meds      PHILIP- cr improving,   Baseline of 2.0  Cont IVF      Hypothyroidism  Low TSH

## 2020-11-14 NOTE — OCCUPATIONAL THERAPY NOTE
OCCUPATIONAL THERAPY EVALUATION - INPATIENT     Room Number: 342/342-A  Evaluation Date: 11/14/2020  Type of Evaluation: Initial       Physician Order: IP Consult to Occupational Therapy  Reason for Therapy: ADL/IADL Dysfunction and Discharge Planning    O from Ofelia Brantley. DISCHARGE RECOMMENDATIONS  OT Discharge Recommendations: Sub-acute rehabilitation(return to [facility] prior to d/c to AL setting)  OT Device Recommendations: TBD    PLAN  OT Treatment Plan: Balance activities; Energy conservation/work simplification • SINUS SURGERY           HOME SITUATION  Type of Home: Assisted living facility  Home Layout: One level  Lives With: Staff 24 hours                      Drives: No       Stairs in Home: one level, elevator bldg  Use of Assistive Device(s): unclear, pt u Hilda    AM-PAC Score:  Score: 14  Approx Degree of Impairment: 59.67%  Standardized Score (AM-PAC Scale): 33.39  CMS Modifier (G-Code): CK    FUNCTIONAL TRANSFER ASSESSMENT  Supine to Sit : Minimum assistance  Sit to Stand:  Moderate assistance  Toilet Tr

## 2020-11-15 PROCEDURE — 99232 SBSQ HOSP IP/OBS MODERATE 35: CPT | Performed by: INTERNAL MEDICINE

## 2020-11-15 NOTE — PLAN OF CARE
Problem: Patient/Family Goals  Goal: Patient/Family Long Term Goal  Description: Patient's Long Term Goal:     Interventions:  - See additional Care Plan goals for specific interventions  Outcome: Progressing  Goal: Patient/Family Short Term Goal  Descri INTERVENTIONS:  - Assess pt frequently for physical needs  - Identify cognitive and physical deficits and behaviors that affect risk of falls.   - [place] fall precautions as indicated by assessment.  - Educate pt/family on patient safety including physic for suctioning and perform as needed  - Assess and instruct to report SOB or any respiratory difficulty  - Respiratory Therapy support as indicated  - Manage/alleviate anxiety  - Monitor for signs/symptoms of CO2 retention  Outcome: Progressing     Problem changed to 0.9 NaCl @ 75 ml/hr. Pt worked with PT and sat up in the chair for a few hours. Up with max x2 assist and a walker. Call light/belongings within reach, bed locked and in lowest position, non skid socks on, frequent nursing rounds, bed alarm on.

## 2020-11-15 NOTE — PLAN OF CARE
Patient very agitated this shift.  Gave xanax at [facility] but patient continued to be irritable and agitated, trying to crawl OOB, so gave IVP Lorazepam. Sleeping at this time, several incontinent episodes thus far, shady area and buttocks skin improved since admi deficits and behaviors that affect risk of falls.   - [place] fall precautions as indicated by assessment.  - Educate pt/family on patient safety including physical limitations  - Instruct pt to call for assistance with activity based on assessment  - Mod

## 2020-11-15 NOTE — CM/SW NOTE
Pt returns from [facility]. Recently dc'd 11/7. CM sent updates via [employer]. Plan is for pt to return once medically stable. Fabiana Malone.  Brenda Denise RN, BSN  Nurse   [phone]

## 2020-11-15 NOTE — PROGRESS NOTES
[place] [facility]    Progress Note    Homaley Mcburney Patient Status:  Inpatient    1950 MRN [id number]   Location [facility] 3W/SW Attending Gen Stiles MD   Hosp Day # 4 PCP Elif Terry MD        Subjective:     Constitu antibx     Hx L kidney stone with pyelonephritis 9/26/20  CT a/p: improvement/removal of stent/resolution of hydronephrosis     Hypernatremia  Improved      Change fluids mis 0.9 ns at 75 cc hr      Hypotension: resolved, resumed bp meds      PHILIP- cr impro

## 2020-11-16 PROCEDURE — 99232 SBSQ HOSP IP/OBS MODERATE 35: CPT | Performed by: OTHER

## 2020-11-16 RX ORDER — MIRTAZAPINE 7.5 MG/1
7.5 TABLET, FILM COATED ORAL NIGHTLY
Qty: 30 TABLET | Refills: 0 | Status: SHIPPED | OUTPATIENT
Start: 2020-11-16

## 2020-11-16 RX ORDER — ISOSORBIDE MONONITRATE 30 MG/1
30 TABLET, EXTENDED RELEASE ORAL DAILY
Status: DISCONTINUED | OUTPATIENT
Start: 2020-11-17 | End: 2020-11-17

## 2020-11-16 RX ORDER — RISPERIDONE 0.25 MG/1
0.25 TABLET, FILM COATED ORAL 2 TIMES DAILY
Qty: 60 TABLET | Refills: 2 | Status: SHIPPED | OUTPATIENT
Start: 2020-11-16 | End: 2020-11-17

## 2020-11-16 RX ORDER — OLANZAPINE 2.5 MG/1
2.5 TABLET ORAL NIGHTLY
Status: DISCONTINUED | OUTPATIENT
Start: 2020-11-16 | End: 2020-11-17

## 2020-11-16 RX ORDER — LEVOTHYROXINE SODIUM 0.03 MG/1
25 TABLET ORAL
Qty: 30 TABLET | Refills: 2 | Status: SHIPPED | OUTPATIENT
Start: 2020-11-17

## 2020-11-16 RX ORDER — ALPRAZOLAM 0.25 MG/1
0.25 TABLET ORAL 2 TIMES DAILY PRN
Qty: 60 TABLET | Refills: 0 | Status: ON HOLD | OUTPATIENT
Start: 2020-11-16 | End: 2021-03-02

## 2020-11-16 RX ORDER — LISINOPRIL 10 MG/1
10 TABLET ORAL DAILY
Status: DISCONTINUED | OUTPATIENT
Start: 2020-11-17 | End: 2020-11-17

## 2020-11-16 RX ORDER — TRAMADOL HYDROCHLORIDE 50 MG/1
50 TABLET ORAL EVERY 12 HOURS PRN
Qty: 30 TABLET | Refills: 0 | Status: ON HOLD | OUTPATIENT
Start: 2020-11-16 | End: 2021-03-02

## 2020-11-16 NOTE — PLAN OF CARE
VSS. Pt awake all day, able to feed herself. Tylenol given x1 for HA. IVF infusing as per orders. Bed low, locked, non skid socks on, call light within reach, alarm activated. Pt calls for assistance. Will continue to monitor.     Problem: Patient/Family Go environment to reduce risk of injury  - Provide assistive devices as appropriate  - Consider OT/PT consult to assist with strengthening/mobility  - Encourage toileting schedule  Outcome: Progressing     Problem: NEUROLOGICAL - ADULT  Goal: Achieves stable

## 2020-11-16 NOTE — SLP NOTE
SPEECH DAILY NOTE - INPATIENT    ASSESSMENT & PLAN   ASSESSMENT    Patient seen for dysphagia f/u per plan of care, received alert and upright in bed, afebrile, O2 saturation 97% on RA, vocal quality clear/dry, in NAD.  RN reports patient tolerates diet and assist with no overt CSA for 100% trials.    Goal met   Goal #2 The patient/family/caregiver will demonstrate understanding and implementation of aspiration precautions and swallow strategies (slow rate, upright position, small bites, pinched straw) over 2-

## 2020-11-16 NOTE — CM/SW NOTE
Received notice from Bedside, Nurse, Patient not stable for transfer to  [facility].   Due to low blood pressure. Patient daughter Paty Todd notified/ Joshua of [address] notified.      SW/CM to remain available for support and/or discharge plan

## 2020-11-16 NOTE — PROGRESS NOTES
[place] [facility]    Progress Note    Kareen Band Patient Status:  Inpatient    1950 MRN [id number]   Location [facility] 3W/SW Attending Johan Iglesias MD   Hosp Day # 5 PCP Shannon Chen MD        Subjective:     Constitu elevated  Decreased levothyroxine to 25mcg daily     CAD/PPM/diastolic CHF/MR  EF 86% 4/5777  Cont ASA, coreg, Plavix, Imdur, statin, lisinopril  Lasix bid     Hx CVA  Gait imbalance  PT/OT     DM2  A1c 5.1  tradjenta     Anemia  Hgb stable at baseline

## 2020-11-16 NOTE — CM/SW NOTE
11/16/20 1347   Discharge disposition   Expected discharge disposition Skilled Nurs   Name of [facility]   Discharge transportation [facility]   MDO received for discharge, patient discharging back to [facility]

## 2020-11-16 NOTE — PLAN OF CARE
Maintain IVF. Continued confusion and restlessness. Sleeping well with Xanax. Tylenol given for fever 100.9. BS stable. No tests ordered for today.      Problem: Patient/Family Goals  Goal: Patient/Family Short Term Goal  Description: Patient's Short Term G fall injury  Description: INTERVENTIONS:  - Assess pt frequently for physical needs  - Identify cognitive and physical deficits and behaviors that affect risk of falls.   - [place] fall precautions as indicated by assessment.  - Educate pt/family on patie fatigue and changes in neurological status  - Encourage and assist patient to increase activity and self care with guidance from PT/OT  - Encourage visually impaired, hearing impaired and aphasic patients to use assistive/communication devices  Outcome: No

## 2020-11-17 VITALS
HEART RATE: 65 BPM | RESPIRATION RATE: 16 BRPM | WEIGHT: 153.63 LBS | HEIGHT: 66 IN | DIASTOLIC BLOOD PRESSURE: 87 MMHG | TEMPERATURE: 98 F | OXYGEN SATURATION: 98 % | BODY MASS INDEX: 24.69 KG/M2 | SYSTOLIC BLOOD PRESSURE: 126 MMHG

## 2020-11-17 RX ORDER — ISOSORBIDE MONONITRATE 30 MG/1
30 TABLET, EXTENDED RELEASE ORAL DAILY
Qty: 30 TABLET | Refills: 2 | Status: ON HOLD | OUTPATIENT
Start: 2020-11-18 | End: 2020-12-24

## 2020-11-17 RX ORDER — OLANZAPINE 2.5 MG/1
2.5 TABLET ORAL NIGHTLY
Qty: 30 TABLET | Refills: 2 | Status: SHIPPED | OUTPATIENT
Start: 2020-11-17

## 2020-11-17 RX ORDER — LISINOPRIL 10 MG/1
10 TABLET ORAL DAILY
Qty: 30 TABLET | Refills: 2 | Status: SHIPPED | OUTPATIENT
Start: 2020-11-18

## 2020-11-17 NOTE — PLAN OF CARE
AOx4. Fall precautions in place. Blood glucose monitored per order. Discharged to [facility], report given to Coby Hewitt. Discharged in stable condition with [facility] by Juan Manuel Cochran. Discharge paperwork given to [facility] transport.      Probl Adequate for Discharge     Problem: SAFETY ADULT - FALL  Goal: Free from fall injury  Description: INTERVENTIONS:  - Assess pt frequently for physical needs  - Identify cognitive and physical deficits and behaviors that affect risk of falls.   - [place] f applicable  - Encourage broncho-pulmonary hygiene including cough, deep breathe, Incentive Spirometry  - Assess the need for suctioning and perform as needed  - Assess and instruct to report SOB or any respiratory difficulty  - Respiratory Therapy support visually impaired, hearing impaired and aphasic patients to use assistive/communication devices  Outcome: Adequate for Discharge

## 2020-11-17 NOTE — DISCHARGE PLANNING
I called patient's daughter Joshua Epstein [phone] to inform her that patient left the hospital and is en route to [facility] [employer] [facility] [employer] Patient asked for a copy of her discharge instructions before discharge and a copy was given to her.     Cady Quinonez RN,

## 2020-11-17 NOTE — PLAN OF CARE
Expected d/c today - held due to low BP. PRN tylenol given for pain in leg. IV removed - leaking. Fall precautions in place, patient calls appropriately. Blood glucose monitored per order. Expect d/c tomorrow to [id number] [facility] on will call.     Problem RISK FOR INFECTION - ADULT  Goal: Absence of fever/infection during anticipated neutropenic period  Description: INTERVENTIONS  - Monitor WBC  - Administer growth factors as ordered  - Implement neutropenic guidelines  Outcome: Progressing     Problem: SAF

## 2020-11-17 NOTE — CM/SW NOTE
11/17/20 1100   Discharge disposition   Expected discharge disposition Skilled Nurs   Name of [facility]   Discharge transportation [facility]   MDO received for discharge, patient discharging back to [facility]

## 2020-11-17 NOTE — DISCHARGE SUMMARY
[facility]    Discharge Summary    Mavis Le Patient Status:  Inpatient    1950 MRN [id number]   Location [facility] 3W/SW Attending No att. providers found   Hosp Day # 6 PCP Ajay Hinton MD     Date of Admission: 1 status/Delirium/Catatonia  Continue with current care   improving slowly    unclear etiology: suspect 2/2 UTI and dehydration, multiple admissions 11/3 and 11/0  for AMS, CTH neg  CT head: neg for acute process, chronic bilateral basal ganglia infarct   CX Effort normal and breath sounds normal. She has no wheezes. She has no rales. Abdominal: Soft. She exhibits no mass. There is no abdominal tenderness. Neurological: She is alert and oriented to person, place, and time.  No sensory deficit or motor defic total) by mouth 2 (two) times daily as needed for Sleep or Anxiety.    Quantity: 60 tablet  Refills: 0     Alumina-Magnesia-Simethicone [phone] MG/5ML Susp  Generic drug: Alum & Mag Hydroxide-Simeth      Take 30 mL by mouth 4 (four) times daily as needed 0     Pantoprazole Sodium 40 MG Tbec  Commonly known as: PROTONIX      Take 1 tablet (40 mg total) by mouth every morning before breakfast.   Quantity: 30 tablet  Refills: 2     rOPINIRole HCl 0.25 MG Tabs  Commonly known as: REQUIP      Take 1 tablet (0.2

## 2020-11-17 NOTE — PLAN OF CARE
Pt. slept throughout most of shift. BP still low but pt. is asymptomatic. Will continue to monitor. Plan to d/c today back to [facility].      Problem: Patient/Family Goals  Goal: Patient/Family Long Term Goal  Description: Patient's Long Term Goal:     Inte INTERVENTIONS  - Monitor WBC  - Administer growth factors as ordered  - Implement neutropenic guidelines  Outcome: Progressing     Problem: SAFETY ADULT - FALL  Goal: Free from fall injury  Description: INTERVENTIONS:  - Assess pt frequently for physical n supplementation based on oxygen saturation or ABGs  - Provide Smoking Cessation handout, if applicable  - Encourage broncho-pulmonary hygiene including cough, deep breathe, Incentive Spirometry  - Assess the need for suctioning and perform as needed  - Ass care with guidance from PT/OT  - Encourage visually impaired, hearing impaired and aphasic patients to use assistive/communication devices  Outcome: Progressing

## 2020-11-17 NOTE — PROGRESS NOTES
Albin Posada is a 72year old female   female with history of CAD, diabetes mellitus, HTN and previous history of UTI who was in rehab and brought to our facility for altered mental status and patient upon admission was nonverbal resp • Incontinence    • Muscle weakness    • NSTEMI (non-ST elevated myocardial infarction) [facility])    • Peripheral vascular disease (HCC)    • Pneumonia due to organism    • Renal disorder    • Stroke [facility])    • UTI (urinary tract infection)       Past Surgic Daily    •  atorvastatin (LIPITOR) tab 80 mg, 80 mg, Oral, Nightly    •  carvedilol (COREG) tab 12.5 mg, 12.5 mg, Oral, BID with meals    •  Clopidogrel Bisulfate (PLAVIX) tab 75 mg, 75 mg, Oral, Daily    •  ferrous sulfate EC tab 325 mg, 325 mg, Oral, Sharlet Mock PHYSICAL EXAM:     Orientation: Alert and oriented to self , place, date but not to condition. Appearance: Elderly female sitting in her bed with noticeable cervical tic movement.   Behavior/Attitude: Effort to be cooperative  Attention and concentra Differential With Platelet      Basic Metabolic Panel (8)      Lactic Acid, Plasma      Ammonia, Plasma      Hepatic Function Panel (7)      TSH W Reflex To Free T4      Procalcitonin      CK (Creatine Kinase) (Not Creatinine)      Ethyl Alcohol      T4, F

## 2020-11-19 ENCOUNTER — INITIAL APN SNF VISIT (OUTPATIENT)
Dept: INTERNAL MEDICINE CLINIC | Facility: SKILLED NURSING FACILITY | Age: 70
End: 2020-11-19

## 2020-11-19 VITALS
DIASTOLIC BLOOD PRESSURE: 64 MMHG | SYSTOLIC BLOOD PRESSURE: 144 MMHG | BODY MASS INDEX: 26 KG/M2 | TEMPERATURE: 97 F | WEIGHT: 161 LBS | OXYGEN SATURATION: 98 % | HEART RATE: 80 BPM | RESPIRATION RATE: 20 BRPM

## 2020-11-19 DIAGNOSIS — N17.9 AKI (ACUTE KIDNEY INJURY) (HCC): ICD-10-CM

## 2020-11-19 DIAGNOSIS — N30.00 ACUTE CYSTITIS WITHOUT HEMATURIA: ICD-10-CM

## 2020-11-19 DIAGNOSIS — R41.82 ALTERED MENTAL STATUS, UNSPECIFIED ALTERED MENTAL STATUS TYPE: ICD-10-CM

## 2020-11-19 PROCEDURE — 99310 SBSQ NF CARE HIGH MDM 45: CPT | Performed by: NURSE PRACTITIONER

## 2020-11-19 PROCEDURE — 1111F DSCHRG MED/CURRENT MED MERGE: CPT | Performed by: NURSE PRACTITIONER

## 2020-11-19 NOTE — PROGRESS NOTES
Leni Johnson  : 1950  Age 72year old  female patient is admitted to [place] [address] for rehab and strengthening     Chief complaint: PHILIP Transient hypotension Altered mental status     [facility] [place] Admission : 11/10/20 to 20    HPI  70 • Renal disorder    • Stroke [facility])    • UTI (urinary tract infection)      Past Surgical History:   Procedure Laterality Date   • CHOLECYSTECTOMY     • CYSTOSCOPY RETROGRADE Left 9/26/2020    Performed by Uriel Nieto MD at [facility] OR   • [address] [place] [address] (0.25 mg total) by mouth 2 (two) times a day. 60 tablet 1   • Alum & Mag Hydroxide-Simeth (ALUMINA-MAGNESIA-SIMETHICONE) 200-200-20 MG/5ML Oral Suspension Take 30 mL by mouth 4 (four) times daily as needed for Indigestion.      • baclofen 10 MG Oral Tab [facility] complaints or deficits  HENT: denies nasal congestion, sinus pain or sore throat;  RESPIRATORY: denies shortness of breath, wheezing or cough   CARDIOVASCULAR:denies chest pain, no palpitations   GI: denies nausea, vomiting, constipation, diarrhea; no rect etiology: suspect 2/2 UTI and dehydration, multiple admissions 11/3 and 11/0  for AMS, CTH neg  CT head: neg for acute process, chronic bilateral basal ganglia infarct   CXR neg, blood cx neg, Rapid covid neg  Psych following: appreciate recommendations  R

## 2020-11-23 ENCOUNTER — SNF VISIT (OUTPATIENT)
Dept: INTERNAL MEDICINE CLINIC | Facility: SKILLED NURSING FACILITY | Age: 70
End: 2020-11-23

## 2020-11-23 VITALS
WEIGHT: 163.81 LBS | BODY MASS INDEX: 26 KG/M2 | HEART RATE: 65 BPM | OXYGEN SATURATION: 98 % | TEMPERATURE: 98 F | RESPIRATION RATE: 20 BRPM | DIASTOLIC BLOOD PRESSURE: 56 MMHG | SYSTOLIC BLOOD PRESSURE: 101 MMHG

## 2020-11-23 DIAGNOSIS — F41.9 ANXIETY: ICD-10-CM

## 2020-11-23 DIAGNOSIS — R41.82 ALTERED MENTAL STATUS, UNSPECIFIED ALTERED MENTAL STATUS TYPE: ICD-10-CM

## 2020-11-23 DIAGNOSIS — N30.01 ACUTE CYSTITIS WITH HEMATURIA: ICD-10-CM

## 2020-11-23 DIAGNOSIS — N10 ACUTE PYELONEPHRITIS: ICD-10-CM

## 2020-11-23 PROCEDURE — 99308 SBSQ NF CARE LOW MDM 20: CPT | Performed by: NURSE PRACTITIONER

## 2020-11-23 NOTE — PROGRESS NOTES
Shirley Mcburney  : 1950  Age 72year old  female patient is admitted to [place] [address] 20 for rehab and strengthening      Chief complaint: PHILIP Transient hypotension Altered mental status      [facility] Re Admission : 11/10/20 to 20     HPI tablet (2.5 mg total) by mouth nightly.  30 tablet 2   • Levothyroxine Sodium 25 MCG Oral Tab Take 1 tablet (25 mcg total) by mouth before breakfast. 30 tablet 2   • ALPRAZolam 0.25 MG Oral Tab Take 1 tablet (0.25 mg total) by mouth 2 (two) times daily as n total) by mouth daily. 30 tablet 0   • atorvastatin 40 MG Oral Tab Take 80 mg by mouth nightly. • acetaminophen 325 MG Oral Tab Take 650 mg by mouth every 6 (six) hours as needed for Pain.      • nitroGLYCERIN 0.4 MG Sublingual SL Tab Place 0.4 mg und auscultation  CARDIOVASCULAR: S1, S2 normal, RRR; no S3, no S4;   ABDOMEN:  normal active BS+, soft, nondistended; no organomegaly, no masses; no bruits; nontender, no guarding, no rebound tenderness.   :no suprapubic distension  LYMPHatics:  left leg jerry

## 2020-11-24 ENCOUNTER — SNF VISIT (OUTPATIENT)
Dept: INTERNAL MEDICINE CLINIC | Facility: SKILLED NURSING FACILITY | Age: 70
End: 2020-11-24

## 2020-11-24 VITALS
SYSTOLIC BLOOD PRESSURE: 141 MMHG | WEIGHT: 164.38 LBS | RESPIRATION RATE: 20 BRPM | HEART RATE: 69 BPM | TEMPERATURE: 98 F | BODY MASS INDEX: 27 KG/M2 | OXYGEN SATURATION: 98 % | DIASTOLIC BLOOD PRESSURE: 74 MMHG

## 2020-11-24 DIAGNOSIS — N17.9 AKI (ACUTE KIDNEY INJURY) (HCC): ICD-10-CM

## 2020-11-24 DIAGNOSIS — N30.01 ACUTE CYSTITIS WITH HEMATURIA: ICD-10-CM

## 2020-11-24 DIAGNOSIS — N30.00 ACUTE CYSTITIS WITHOUT HEMATURIA: ICD-10-CM

## 2020-11-24 DIAGNOSIS — R07.9 ACUTE CHEST PAIN: ICD-10-CM

## 2020-11-24 DIAGNOSIS — I50.9 ACUTE ON CHRONIC CONGESTIVE HEART FAILURE, UNSPECIFIED HEART FAILURE TYPE (HCC): ICD-10-CM

## 2020-11-24 PROCEDURE — 99308 SBSQ NF CARE LOW MDM 20: CPT | Performed by: NURSE PRACTITIONER

## 2020-11-24 NOTE — PROGRESS NOTES
Kamla Ispriscila  : 1950  Age 72year old  female patient is admitted to [place] [address] for rehab and strengthening      Chief complaint: PHILIP Transient hypotension Altered mental status     [facility] Re Admission : 11/10/20 to 20     HPI total) by mouth before breakfast. 30 tablet 2   • ALPRAZolam 0.25 MG Oral Tab Take 1 tablet (0.25 mg total) by mouth 2 (two) times daily as needed for Sleep or Anxiety.  60 tablet 0   • traMADol HCl 50 MG Oral Tab Take 1 tablet (50 mg total) by mouth every by mouth daily with breakfast.     • Insulin Aspart Pen 100 UNIT/ML Subcutaneous Solution Pen-injector Inject 1-5 Units into the skin 3 (three) times daily with meals. 1 pen 0   • Clopidogrel Bisulfate 75 MG Oral Tab Take 75 mg by mouth daily.      • ipratr normal, RRR; no S3, no S4;   ABDOMEN:  normal active BS+, soft, nondistended; no organomegaly, no masses; no bruits; nontender, no guarding, no rebound tenderness.   :no suprapubic distension  LYMPHatics:  left leg edematous larger than right leg   MUSCUL minute visit and greater than 50% of the time was spent counseling the patient and/or coordinating care.     DONTE Salomon  11/24/20   1:58 PM

## 2020-12-02 ENCOUNTER — SNF DISCHARGE (OUTPATIENT)
Dept: INTERNAL MEDICINE CLINIC | Facility: SKILLED NURSING FACILITY | Age: 70
End: 2020-12-02

## 2020-12-02 VITALS
TEMPERATURE: 98 F | OXYGEN SATURATION: 98 % | RESPIRATION RATE: 20 BRPM | BODY MASS INDEX: 27 KG/M2 | DIASTOLIC BLOOD PRESSURE: 70 MMHG | WEIGHT: 170 LBS | HEART RATE: 70 BPM | SYSTOLIC BLOOD PRESSURE: 126 MMHG

## 2020-12-02 DIAGNOSIS — Z02.9 PROBLEM RELATED TO DISCHARGE PLANNING: ICD-10-CM

## 2020-12-02 DIAGNOSIS — R41.82 ALTERED MENTAL STATUS, UNSPECIFIED ALTERED MENTAL STATUS TYPE: ICD-10-CM

## 2020-12-02 DIAGNOSIS — I50.9 ACUTE ON CHRONIC CONGESTIVE HEART FAILURE, UNSPECIFIED HEART FAILURE TYPE (HCC): ICD-10-CM

## 2020-12-02 PROCEDURE — 99310 SBSQ NF CARE HIGH MDM 45: CPT | Performed by: NURSE PRACTITIONER

## 2020-12-02 RX ORDER — FUROSEMIDE 20 MG/1
20 TABLET ORAL 2 TIMES DAILY
COMMUNITY
End: 2020-12-10

## 2020-12-02 NOTE — PROGRESS NOTES
Tonny Winters, 69/1950, 72year old, female is being discharged from [facility] 6   12/3/20 back to 59 [facility]    Date of Admission em[facility] :11/10/20 TO 1/17/20     Date of Discharge Marc Chinyere SHERICE : 11/17/20 to 12/3/20 otherwise  SKIN: denies any unusual skin lesions or rashes  WOUNDS: none   EYES:no visual complaints or deficits  HENT: denies nasal congestion, sinus pain or sore throat;  RESPIRATORY: denies shortness of breath, wheezing or cough   CARDIOVASCULAR:denies current care   improving well    unclear etiology: suspect 2/2 UTI and dehydration, multiple admissions 11/3 and 11/0  for AMS, CTH neg  CT head: neg for acute process, chronic bilateral basal ganglia infarct   CXR neg, blood cx neg, Rapid covid neg  Psych

## 2020-12-10 ENCOUNTER — OFFICE VISIT (OUTPATIENT)
Dept: CARDIOLOGY CLINIC | Facility: HOSPITAL | Age: 70
End: 2020-12-10
Attending: NURSE PRACTITIONER
Payer: MEDICARE

## 2020-12-10 VITALS
WEIGHT: 181 LBS | OXYGEN SATURATION: 95 % | SYSTOLIC BLOOD PRESSURE: 107 MMHG | HEART RATE: 69 BPM | BODY MASS INDEX: 29 KG/M2 | DIASTOLIC BLOOD PRESSURE: 54 MMHG

## 2020-12-10 DIAGNOSIS — I50.9 HEART FAILURE, UNSPECIFIED (HCC): Primary | ICD-10-CM

## 2020-12-10 DIAGNOSIS — I50.9 ACUTE ON CHRONIC CONGESTIVE HEART FAILURE, UNSPECIFIED HEART FAILURE TYPE (HCC): ICD-10-CM

## 2020-12-10 DIAGNOSIS — N18.32 STAGE 3B CHRONIC KIDNEY DISEASE (HCC): Chronic | ICD-10-CM

## 2020-12-10 DIAGNOSIS — I50.33 ACUTE ON CHRONIC HEART FAILURE WITH PRESERVED EJECTION FRACTION (HFPEF) (HCC): Chronic | ICD-10-CM

## 2020-12-10 DIAGNOSIS — I34.0 NON-RHEUMATIC MITRAL REGURGITATION: ICD-10-CM

## 2020-12-10 DIAGNOSIS — I50.33 ACUTE ON CHRONIC DIASTOLIC HEART FAILURE (HCC): ICD-10-CM

## 2020-12-10 PROBLEM — I44.1 HEART BLOCK AV SECOND DEGREE: Status: RESOLVED | Noted: 2020-01-13 | Resolved: 2020-12-10

## 2020-12-10 PROCEDURE — 83880 ASSAY OF NATRIURETIC PEPTIDE: CPT | Performed by: NURSE PRACTITIONER

## 2020-12-10 PROCEDURE — 99214 OFFICE O/P EST MOD 30 MIN: CPT | Performed by: NURSE PRACTITIONER

## 2020-12-10 PROCEDURE — 99212 OFFICE O/P EST SF 10 MIN: CPT | Performed by: NURSE PRACTITIONER

## 2020-12-10 PROCEDURE — 80048 BASIC METABOLIC PNL TOTAL CA: CPT | Performed by: NURSE PRACTITIONER

## 2020-12-10 PROCEDURE — 36415 COLL VENOUS BLD VENIPUNCTURE: CPT | Performed by: NURSE PRACTITIONER

## 2020-12-10 RX ORDER — FUROSEMIDE 20 MG/1
20 TABLET ORAL 3 TIMES DAILY
Refills: 0 | COMMUNITY
Start: 2020-12-10 | End: 2020-12-10

## 2020-12-10 RX ORDER — FUROSEMIDE 20 MG/1
20 TABLET ORAL 3 TIMES DAILY
Qty: 90 TABLET | Refills: 0 | Status: ON HOLD | OUTPATIENT
Start: 2020-12-10 | End: 2020-12-24

## 2020-12-10 NOTE — PATIENT INSTRUCTIONS
Increase furosemide to 40 mg twice daily for 3 days then take furosemide 40 mg in am and 20 mg in afternoon    Continue all your same medications    Call if having any dizziness, lightheadedness, heart racing, palpitations, chest pain, shortness of breath,

## 2020-12-10 NOTE — PROGRESS NOTES
7 [facility] Patient Status:  No patient class for patient encounter    1950 MRN [id number]   Location [facility] MD Dr. Cata Olivas is a 72 year abdominal pain, diarrhea, melena, nausea and vomiting  Hematologic/lymphatic: negative  Musculoskeletal: negative for myalgias    Objective:  Lab Results   Component Value Date/Time    WBC 6.3 11/15/2020 06:04 AM    HGB 11.0 (L) 11/15/2020 06:04 AM    HCT with dried scab, skin closed, no redness or drainage, L lateral mid tibial with stage 1 skin ulcer  5 mm, skin pink with xeroform dressing, no drainage  Pulses: 2+ and symmetric  Neurologic: Grossly normal    Diagnostics:  EC2020 a paced, BiV paced 20 mg in afternoon  -continue coreg, lisinopril, hydralazine, imdur , spironolactone stopped on previous hospitalization due to PHILIP and hyperkalemia  - check bmp on 12/14 at [facility] with RN  - follow up with Dr. Yuri Ortiz in 2 weeks and follow up with PCP i pre-packaged rice or potatoes. Please remember to read nutrition labels for sodium content.     www.healthyeating. nhlbi.nih.gov      Exercise daily as tolerated, with goal of doing moderate aerobic exercise like walking for about 30 minutes 5 days per week mouth daily. , Disp: , Rfl:   •  carvedilol 12.5 MG Oral Tab, Take 1 tablet (12.5 mg total) by mouth 2 (two) times daily with meals. , Disp: 60 tablet, Rfl: 0  •  loratadine 10 MG Oral Tab, Take 10 mg by mouth daily. , Disp: , Rfl:   •  Pantoprazole Sodium 40

## 2020-12-21 ENCOUNTER — HOSPITAL ENCOUNTER (INPATIENT)
Facility: HOSPITAL | Age: 70
LOS: 3 days | Discharge: HOME | DRG: 291 | End: 2020-12-24
Attending: EMERGENCY MEDICINE | Admitting: INTERNAL MEDICINE
Payer: MEDICARE

## 2020-12-21 ENCOUNTER — APPOINTMENT (OUTPATIENT)
Dept: GENERAL RADIOLOGY | Facility: HOSPITAL | Age: 70
DRG: 291 | End: 2020-12-21
Attending: EMERGENCY MEDICINE
Payer: MEDICARE

## 2020-12-21 DIAGNOSIS — I50.33 ACUTE ON CHRONIC HEART FAILURE WITH PRESERVED EJECTION FRACTION (HFPEF) (HCC): Primary | Chronic | ICD-10-CM

## 2020-12-21 DIAGNOSIS — R07.9 ACUTE CHEST PAIN: ICD-10-CM

## 2020-12-21 PROCEDURE — 84484 ASSAY OF TROPONIN QUANT: CPT | Performed by: EMERGENCY MEDICINE

## 2020-12-21 PROCEDURE — 93010 ELECTROCARDIOGRAM REPORT: CPT | Performed by: EMERGENCY MEDICINE

## 2020-12-21 PROCEDURE — 82962 GLUCOSE BLOOD TEST: CPT

## 2020-12-21 PROCEDURE — 83880 ASSAY OF NATRIURETIC PEPTIDE: CPT | Performed by: EMERGENCY MEDICINE

## 2020-12-21 PROCEDURE — 99285 EMERGENCY DEPT VISIT HI MDM: CPT

## 2020-12-21 PROCEDURE — 96374 THER/PROPH/DIAG INJ IV PUSH: CPT

## 2020-12-21 PROCEDURE — 84484 ASSAY OF TROPONIN QUANT: CPT | Performed by: HOSPITALIST

## 2020-12-21 PROCEDURE — 93005 ELECTROCARDIOGRAM TRACING: CPT

## 2020-12-21 PROCEDURE — 85025 COMPLETE CBC W/AUTO DIFF WBC: CPT | Performed by: EMERGENCY MEDICINE

## 2020-12-21 PROCEDURE — 71045 X-RAY EXAM CHEST 1 VIEW: CPT | Performed by: EMERGENCY MEDICINE

## 2020-12-21 PROCEDURE — 80048 BASIC METABOLIC PNL TOTAL CA: CPT | Performed by: EMERGENCY MEDICINE

## 2020-12-21 RX ORDER — MAGNESIUM OXIDE 400 MG (241.3 MG MAGNESIUM) TABLET
6 TABLET NIGHTLY PRN
Status: DISCONTINUED | OUTPATIENT
Start: 2020-12-21 | End: 2020-12-24

## 2020-12-21 RX ORDER — ISOSORBIDE MONONITRATE 30 MG/1
30 TABLET, EXTENDED RELEASE ORAL DAILY
Status: DISCONTINUED | OUTPATIENT
Start: 2020-12-21 | End: 2020-12-21

## 2020-12-21 RX ORDER — PANTOPRAZOLE SODIUM 40 MG/1
40 TABLET, DELAYED RELEASE ORAL
Status: DISCONTINUED | OUTPATIENT
Start: 2020-12-22 | End: 2020-12-24

## 2020-12-21 RX ORDER — MELATONIN
325
Status: DISCONTINUED | OUTPATIENT
Start: 2020-12-22 | End: 2020-12-24

## 2020-12-21 RX ORDER — FUROSEMIDE 10 MG/ML
40 INJECTION INTRAMUSCULAR; INTRAVENOUS ONCE
Status: COMPLETED | OUTPATIENT
Start: 2020-12-21 | End: 2020-12-21

## 2020-12-21 RX ORDER — CLOPIDOGREL BISULFATE 75 MG/1
75 TABLET ORAL DAILY
Status: DISCONTINUED | OUTPATIENT
Start: 2020-12-21 | End: 2020-12-21

## 2020-12-21 RX ORDER — TRAMADOL HYDROCHLORIDE 50 MG/1
50 TABLET ORAL EVERY 12 HOURS PRN
Status: DISCONTINUED | OUTPATIENT
Start: 2020-12-21 | End: 2020-12-24

## 2020-12-21 RX ORDER — ROPINIROLE 0.5 MG/1
0.25 TABLET, FILM COATED ORAL 2 TIMES DAILY
Status: DISCONTINUED | OUTPATIENT
Start: 2020-12-21 | End: 2020-12-24

## 2020-12-21 RX ORDER — DOCUSATE SODIUM 100 MG/1
100 CAPSULE, LIQUID FILLED ORAL 2 TIMES DAILY
Status: DISCONTINUED | OUTPATIENT
Start: 2020-12-21 | End: 2020-12-24

## 2020-12-21 RX ORDER — ATORVASTATIN CALCIUM 80 MG/1
80 TABLET, FILM COATED ORAL NIGHTLY
Status: DISCONTINUED | OUTPATIENT
Start: 2020-12-21 | End: 2020-12-24

## 2020-12-21 RX ORDER — ASPIRIN 81 MG/1
81 TABLET, CHEWABLE ORAL DAILY
Status: DISCONTINUED | OUTPATIENT
Start: 2020-12-21 | End: 2020-12-24

## 2020-12-21 RX ORDER — FUROSEMIDE 10 MG/ML
60 INJECTION INTRAMUSCULAR; INTRAVENOUS 3 TIMES DAILY
Status: DISCONTINUED | OUTPATIENT
Start: 2020-12-21 | End: 2020-12-24

## 2020-12-21 RX ORDER — OLANZAPINE 2.5 MG/1
2.5 TABLET ORAL NIGHTLY
Status: DISCONTINUED | OUTPATIENT
Start: 2020-12-21 | End: 2020-12-24

## 2020-12-21 RX ORDER — MAGNESIUM HYDROXIDE/ALUMINUM HYDROXICE/SIMETHICONE 120; 1200; 1200 MG/30ML; MG/30ML; MG/30ML
30 SUSPENSION ORAL 4 TIMES DAILY PRN
Status: DISCONTINUED | OUTPATIENT
Start: 2020-12-21 | End: 2020-12-24

## 2020-12-21 RX ORDER — MECLIZINE HCL 12.5 MG/1
25 TABLET ORAL 3 TIMES DAILY PRN
Status: DISCONTINUED | OUTPATIENT
Start: 2020-12-21 | End: 2020-12-24

## 2020-12-21 RX ORDER — POTASSIUM CHLORIDE 1.5 G/1.77G
20 POWDER, FOR SOLUTION ORAL 2 TIMES DAILY
Status: DISCONTINUED | OUTPATIENT
Start: 2020-12-21 | End: 2020-12-24

## 2020-12-21 RX ORDER — LEVOTHYROXINE SODIUM 0.03 MG/1
25 TABLET ORAL
Status: DISCONTINUED | OUTPATIENT
Start: 2020-12-21 | End: 2020-12-24

## 2020-12-21 RX ORDER — FUROSEMIDE 40 MG/1
20 TABLET ORAL DAILY
Status: ON HOLD | COMMUNITY
End: 2021-03-30

## 2020-12-21 RX ORDER — ASCORBIC ACID, THIAMINE, RIBOFLAVIN, NIACINAMIDE, PYRIDOXINE, FOLIC ACID, COBALAMIN, BIOTIN, PANTOTHENIC ACID 100; 1.5; 1.7; 20; 10; 1; 6; 300; 1 MG/1; MG/1; MG/1; MG/1; MG/1; MG/1; UG/1; UG/1; MG/1
1 TABLET, COATED ORAL DAILY
Status: DISCONTINUED | OUTPATIENT
Start: 2020-12-21 | End: 2020-12-24

## 2020-12-21 RX ORDER — DOCUSATE SODIUM 100 MG/1
100 CAPSULE, LIQUID FILLED ORAL 2 TIMES DAILY
COMMUNITY

## 2020-12-21 RX ORDER — ALPRAZOLAM 0.25 MG/1
0.25 TABLET ORAL 2 TIMES DAILY PRN
Status: DISCONTINUED | OUTPATIENT
Start: 2020-12-21 | End: 2020-12-24

## 2020-12-21 RX ORDER — LISINOPRIL 10 MG/1
10 TABLET ORAL DAILY
Status: DISCONTINUED | OUTPATIENT
Start: 2020-12-21 | End: 2020-12-24

## 2020-12-21 RX ORDER — CARVEDILOL 12.5 MG/1
12.5 TABLET ORAL 2 TIMES DAILY WITH MEALS
Status: DISCONTINUED | OUTPATIENT
Start: 2020-12-21 | End: 2020-12-24

## 2020-12-21 RX ORDER — ONDANSETRON 4 MG/1
4 TABLET, ORALLY DISINTEGRATING ORAL EVERY 6 HOURS PRN
Status: DISCONTINUED | OUTPATIENT
Start: 2020-12-21 | End: 2020-12-24

## 2020-12-21 RX ORDER — CETIRIZINE HYDROCHLORIDE 10 MG/1
10 TABLET ORAL DAILY
Status: DISCONTINUED | OUTPATIENT
Start: 2020-12-21 | End: 2020-12-22

## 2020-12-21 RX ORDER — ONDANSETRON 4 MG/1
4 TABLET, ORALLY DISINTEGRATING ORAL EVERY 6 HOURS PRN
COMMUNITY

## 2020-12-21 RX ORDER — BACLOFEN 10 MG/1
10 TABLET ORAL 2 TIMES DAILY
Status: DISCONTINUED | OUTPATIENT
Start: 2020-12-21 | End: 2020-12-24

## 2020-12-21 RX ORDER — ACETAMINOPHEN 325 MG/1
650 TABLET ORAL EVERY 6 HOURS PRN
Status: DISCONTINUED | OUTPATIENT
Start: 2020-12-21 | End: 2020-12-24

## 2020-12-21 RX ORDER — ISOSORBIDE MONONITRATE 60 MG/1
60 TABLET, EXTENDED RELEASE ORAL DAILY
Status: DISCONTINUED | OUTPATIENT
Start: 2020-12-22 | End: 2020-12-24

## 2020-12-21 RX ORDER — MIRTAZAPINE 15 MG/1
7.5 TABLET, FILM COATED ORAL NIGHTLY
Status: DISCONTINUED | OUTPATIENT
Start: 2020-12-21 | End: 2020-12-24

## 2020-12-21 NOTE — ED NOTES
Pt presents for sudden onset of chest pain and SOB for the past hour. Pt speaking 2-3 word phrases. +BLE edema noted, redness and scabbed over wound noted to left lower leg. Pt denies recent cough or fever. Pt lives at [place].

## 2020-12-21 NOTE — CONSULTS
[facility]    Report of Consultation    Leni Johnson Patient Status:  Inpatient    1950 MRN [id number]   Location [facility] 3W/SW Attending Madaline Seip, MD   Hosp Day # 0 PCP Rosemary Carlisle MD     Date of Admission: UTERINE/COLON       Social History  Social History    Tobacco Use      Smoking status: Former Smoker      Smokeless tobacco: Never Used    Alcohol use: Not Currently    Drug use: Never           Current Medications:    •  acetaminophen (TYLENOL) tab 650 mg daily.    •  furosemide 20 MG Oral Tab, Take 1 tablet (20 mg total) by mouth 3 (three) times daily. Take 40 mg twice daily for 3 days then  take furosemide 40 mg in am and 20 mg in afternoon (Patient taking differently: Take 20 mg by mouth every evening.  Alem Munoz Oral Tab, Take 650 mg by mouth every 6 (six) hours as needed for Pain. •  linagliptin 5 mg Oral Tab, Take 5 mg by mouth daily. •  ondansetron 4 MG Oral Tablet Dispersible, Take 4 mg by mouth every 6 (six) hours as needed for Nausea.     •  ALPRAZolam OLANZapine  2.5 mg Oral Nightly   • [START ON 12/22/2020] Pantoprazole Sodium  40 mg Oral QAM AC   • rOPINIRole HCl  0.25 mg Oral BID       Continuous Infusions:     General appearance:  alert, appears stated age and cooperative  Head: Normocephalic, witho to allow      evaluation of LV diastolic function. 2. Aortic valve: Mild regurgitation. Peak velocity (S): 2.5m/sec.      Mean gradient (S): 13mm Hg. Peak gradient (S): 25mm Hg. 3. Mitral valve: Moderately calcified annulus.  Normal thickened,      mild

## 2020-12-21 NOTE — ED PROVIDER NOTES
Patient Seen in: [facility] AND [facility] Emergency Department      History   Patient presents with:  Chest Pain Angina  Difficulty Breathing    Stated Complaint: cp sob    HPI    Patient is a 73-year-old female who presents with sudden onset sharp left-sided n Former Smoker      Smokeless tobacco: Never Used    Alcohol use: Not Currently    Drug use: Never             Review of Systems    Positive for stated complaint: cp sob  Other systems are as noted in HPI. Constitutional and vital signs reviewed.       All for panel order CBC WITH DIFFERENTIAL WITH PLATELET.   Procedure                               Abnormality         Status                     ---------                               -----------         ------                     CBC W/ DIFFERENTIAL[[id number] Lasix. Discussed with Dr. Summer Wang who agrees with admission plan. Discussed with Dr Delroy Bryant, agrees to plan. Repeat EKG unchanged, paced rhythm.    Admission disposition: 12/21/2020  9:28 AM                              Disposition and Plan     Clinical I

## 2020-12-21 NOTE — ED NOTES
Orders for admission, patient is aware of plan and ready to go upstairs. Any questions, please call ED RN Raman Munoz  at extension [phone].    Type of COVID test sent:Rapid  COVID Suspicion level: Low    Titratable drug(s) infusing: SL  Rate:    LOC at time of tra

## 2020-12-21 NOTE — ED INITIAL ASSESSMENT (HPI)
Via [facility] ambulance for sudden onset of chest pains and shortness of breath x 1 hour.    4 baby ASA given by medics.  + BLE edema

## 2020-12-22 ENCOUNTER — APPOINTMENT (OUTPATIENT)
Dept: CV DIAGNOSTICS | Facility: HOSPITAL | Age: 70
DRG: 291 | End: 2020-12-22
Attending: HOSPITALIST
Payer: MEDICARE

## 2020-12-22 PROCEDURE — 93306 TTE W/DOPPLER COMPLETE: CPT | Performed by: HOSPITALIST

## 2020-12-22 PROCEDURE — 87493 C DIFF AMPLIFIED PROBE: CPT | Performed by: INTERNAL MEDICINE

## 2020-12-22 PROCEDURE — 82962 GLUCOSE BLOOD TEST: CPT

## 2020-12-22 PROCEDURE — 80048 BASIC METABOLIC PNL TOTAL CA: CPT | Performed by: HOSPITALIST

## 2020-12-22 RX ORDER — CETIRIZINE HYDROCHLORIDE 5 MG/1
5 TABLET ORAL DAILY
Status: DISCONTINUED | OUTPATIENT
Start: 2020-12-23 | End: 2020-12-24

## 2020-12-22 NOTE — PROGRESS NOTES
[place] [facility] - [facility]    Progress Note    Leni Elizabeth Patient Status:  Inpatient    1950 MRN [id number]   Location [facility] 3W/SW Attending Madaline Seip, [address] [facility] Day # 1 PCP Rosemary Carlisle MD        Subjective:     Respir pacemaker patient including central pulmonary venous congestion.     Dictated by (CST): Codi Calloway MD on 12/21/2020 at 7:55 AM     Finalized by (CST): Codi Calloway MD on 12/21/2020 at 7:57 AM          Ekg 12-lead    Result Date: 12/21/2020  EC agitation  -Monitor behavior       GI prophylaxis: ppi  DVT prophylaxis scd  Code status: FULL         Manny Gibson, APRN  12/22/2020  [phone]

## 2020-12-22 NOTE — PLAN OF CARE
Patient admitted today with CHF, started on IV lasix, purewick, cardiology consult, 2D echo ordered,     Problem: Patient Centered Care  Goal: Patient preferences are identified and integrated in the patient's plan of care  Description: Interventions:  - W from fall injury  Description: INTERVENTIONS:  - Assess pt frequently for physical needs  - Identify cognitive and physical deficits and behaviors that affect risk of falls.   - [place] fall precautions as indicated by assessment.  - Educate pt/family on level of function  Description: INTERVENTIONS:  - Assess patient stability and activity tolerance for standing, transferring and ambulating w/ or w/o assistive devices  - Assist with transfers and ambulation using safe patient handling equipment as needed

## 2020-12-22 NOTE — PROGRESS NOTES
[facility] Note:  Renal Dose Adjustment for Cetirizine (ZYRTEC)    Shirley Mcburney has been prescribed Cetirizine (Zyrtec) 10 mg orally daily. Estimated Creatinine Clearance: 25 mL/min (A) (based on SCr of 1.91 mg/dL (H)).     The dose has been neil

## 2020-12-22 NOTE — H&P
[facility]    History and Physical    Harry Juan Patient Status:  Inpatient    1950 MRN [id number]   Location [facility] 3W/SW Attending Allison German MD   Hosp Day # 1 PCP Leon Disla MD     Date:  2020  D at [facility] MAIN OR   • LASER HOLMIUM LITHOTRIPSY Left 9/29/2020    Performed by Bobbi Jerez MD at [facility] OR   • SINUS SURGERY         Family History   Problem Relation Age of Onset   • Other (ULCERS) Father    • Cancer Mother         UTERINE/COLON MG Oral Tab, Take 0.8 mg by mouth daily. •  carvedilol 12.5 MG Oral Tab, Take 1 tablet (12.5 mg total) by mouth 2 (two) times daily with meals. •  loratadine 10 MG Oral Tab, Take 10 mg by mouth daily.     •  Pantoprazole Sodium 40 MG Oral Tab EC, Take pain.   Skin: Negative for rash and wound. Neurological: Positive for dizziness. Negative for seizures, syncope, weakness and headaches. Psychiatric/Behavioral: Negative for hallucinations, behavioral problems, confusion and agitation.        Physical E 07/21/2020    INR 1.24 (H) 07/21/2020    T4F 2.0 (H) 11/10/2020    TSH 0.039 (L) 11/10/2020     03/05/2020    DDIMER 1.23 (H) 03/05/2020    MG 2.0 08/22/2020    PHOS 4.7 03/25/2020    TROP <0.045 12/21/2020    CK 73 11/10/2020    ETOH 3 (H) 11/10/20 FeSO4  -monitor     DM  -A1c 5.1%  -ADA diet, carb controlled  -Hypoglycemia protocol  -BS stable  -Cont Tradjenta  -Add Insulin SS     Hypothyroidism  -Cont same dose Levothyroxin     MDD  -No SI, no AMS, no hallucinations, no agitation  -Cont Remeron, Zy

## 2020-12-22 NOTE — PROGRESS NOTES
Cardiology progress note    14 h events: patient doing well, no CP or SOB      Current Medications:    •  acetaminophen (TYLENOL) tab 650 mg, 650 mg, Oral, Q6H PRN    •  ALPRAZolam (XANAX) tab 0.25 mg, 0.25 mg, Oral, BID PRN    •  Alum & Mag Hydroxide-Si (two) times daily. •  furosemide 20 MG Oral Tab, Take 1 tablet (20 mg total) by mouth 3 (three) times daily.  Take 40 mg twice daily for 3 days then  take furosemide 40 mg in am and 20 mg in afternoon (Patient taking differently: Take 20 mg by mouth ever acetaminophen 325 MG Oral Tab, Take 650 mg by mouth every 6 (six) hours as needed for Pain. •  linagliptin 5 mg Oral Tab, Take 5 mg by mouth daily.     •  ondansetron 4 MG Oral Tablet Dispersible, Take 4 mg by mouth every 6 (six) hours as needed for Naus TP 7.5 11/10/2020    AST 22 11/10/2020    ALT 29 11/10/2020    PTT 30.1 07/21/2020    INR 1.24 (H) 07/21/2020    PTP 15.4 (H) 07/21/2020    T4F 2.0 (H) 11/10/2020    TSH 0.039 (L) 11/10/2020     03/05/2020    DDIMER 1.23 (H) 03/05/2020    MG 2.0 RCA CAD and vessel being too small for stenting, 40-50% in-stent restenosis of the proximal mid LAD stents by cardiac cath 7/2019 on statin, plavix and ASA 81, we can stop plavix for now     7. Severe mitral regurgitation  - has been following with [facility] for

## 2020-12-22 NOTE — PLAN OF CARE
Problem: Diabetes/Glucose Control  Goal: Glucose maintained within prescribed range  Description: INTERVENTIONS:  - Monitor Blood Glucose as ordered  - Assess for signs and symptoms of hyperglycemia and hypoglycemia  - Administer ordered medications to m range  Description: INTERVENTIONS:  - Monitor Blood Glucose as ordered  - Assess for signs and symptoms of hyperglycemia and hypoglycemia  - Administer ordered medications to maintain glucose within target range  - Assess barriers to adequate nutritional i balance, assess for edema, trend weights  Outcome: Not Progressing    Still diuresing patient,on IV lasix.  For ECHO in am.

## 2020-12-23 PROCEDURE — 82962 GLUCOSE BLOOD TEST: CPT

## 2020-12-23 NOTE — PLAN OF CARE
CONTINUING IVP LASIX, PUREWICK, 2D ECHO TODAY, C. DIFF +     Problem: Patient Centered Care  Goal: Patient preferences are identified and integrated in the patient's plan of care  Description: Interventions:  - What would you like us to know as we care for opioid side effects  - Notify MD/LIP if interventions unsuccessful or patient reports new pain  - Anticipate increased pain with activity and pre-medicate as appropriate  Outcome: Progressing     Problem: SAFETY ADULT - FALL  Goal: Free from fall injury  D skin integrity  - Monitor for areas of redness and/or skin breakdown  - Initiate interventions, skin care algorithm/standards of care as needed  Outcome: Progressing     Problem: MUSCULOSKELETAL - ADULT  Goal: Return mobility to safest level of function  D

## 2020-12-23 NOTE — PROGRESS NOTES
Patient seen in follow up. Patient denies any chest pain or sob.     12/23/20  1604   BP: 120/63   Pulse: 77   Resp: 18   Temp: 96.9 °F (36.1 °C)       Intake/Output Summary (Last 24 hours) at 12/23/2020 1718  Last data filed at 12/23/2020 1600  Gross •  Pantoprazole Sodium (PROTONIX) EC tab 40 mg, 40 mg, Oral, QAM AC    •  rOPINIRole HCl (REQUIP) tab 0.25 mg, 0.25 mg, Oral, BID    •  traMADol HCl (ULTRAM) tab 50 mg, 50 mg, Oral, Q12H PRN    •  melatonin tab TABS 6 mg, 6 mg, Oral, Nightly PRN    •  furo •  Nephro-Ramses 0.8 MG Oral Tab, Take 0.8 mg by mouth daily. •  carvedilol 12.5 MG Oral Tab, Take 1 tablet (12.5 mg total) by mouth 2 (two) times daily with meals. •  loratadine 10 MG Oral Tab, Take 10 mg by mouth daily.     •  Pantoprazole Sodium 40 M controlled  On coreg 12.5 po BID, Imdur 30 mg daily and ACEI     6.  Hx of chest pain with patent circumflex stent and a jailed marginal branch, vessel size being small also with apical RCA CAD and vessel being too small for stenting, 40-50% in-stent resten

## 2020-12-23 NOTE — PLAN OF CARE
Problem: CARDIOVASCULAR - ADULT  Goal: Maintains optimal cardiac output and hemodynamic stability  Description: INTERVENTIONS:  - Monitor vital signs, rhythm, and trends  - Monitor for bleeding, hypotension and signs of decreased cardiac output  - Evalua for physical needs  - Identify cognitive and physical deficits and behaviors that affect risk of falls.   - [place] fall precautions as indicated by assessment.  - Educate pt/family on patient safety including physical limitations  - Instruct pt to call f and activity tolerance for standing, transferring and ambulating w/ or w/o assistive devices  - Assist with transfers and ambulation using safe patient handling equipment as needed  - Ensure adequate protection for wounds/incisions during mobilization  - O

## 2020-12-23 NOTE — PROGRESS NOTES
[place] [facility] - [facility]    Progress Note    Brian Chino Patient Status:  Inpatient    1950 MRN [id number]   Location [facility] 3W/SW Attending Tiago Samaniego, [address] [facility] Se Day # 2 PCP Dre Go MD        Subjective:     Respir -------------------------- Sinus Rhythm ventricular paced ABNORMAL When compared with ECG of 12/21/2020 07:24:58 No significant changes have occurred Electronically signed on 12/21/2020 at 10:38 by Aleksey Drew MD        Assessment and Plan:    Acute on

## 2020-12-24 VITALS
WEIGHT: 160.69 LBS | OXYGEN SATURATION: 95 % | SYSTOLIC BLOOD PRESSURE: 108 MMHG | RESPIRATION RATE: 18 BRPM | BODY MASS INDEX: 26.77 KG/M2 | HEIGHT: 65 IN | HEART RATE: 71 BPM | TEMPERATURE: 98 F | DIASTOLIC BLOOD PRESSURE: 62 MMHG

## 2020-12-24 PROCEDURE — 82962 GLUCOSE BLOOD TEST: CPT

## 2020-12-24 RX ORDER — ISOSORBIDE MONONITRATE 60 MG/1
60 TABLET, EXTENDED RELEASE ORAL DAILY
Qty: 30 TABLET | Refills: 0 | Status: SHIPPED | COMMUNITY
Start: 2020-12-25 | End: 2021-10-04

## 2020-12-24 NOTE — PROGRESS NOTES
Seen with Yolanda HARRISON. Patient seen in follow up. Patient denies any chest pain or sob.     12/24/20  0830   BP: 108/62   Pulse: 71   Resp: 18   Temp: 97.9 °F (36.6 °C)       Intake/Output Summary (Last 24 hours) at 12/24/2020 9554  Last data filed •  Pantoprazole Sodium (PROTONIX) EC tab 40 mg, 40 mg, Oral, QAM AC    •  rOPINIRole HCl (REQUIP) tab 0.25 mg, 0.25 mg, Oral, BID    •  traMADol HCl (ULTRAM) tab 50 mg, 50 mg, Oral, Q12H PRN    •  melatonin tab TABS 6 mg, 6 mg, Oral, Nightly PRN    •  furo •  ipratropium-albuterol 0.5-2.5 (3) MG/3ML Inhalation Solution, Take 3 mL by nebulization every 4 (four) hours as needed. •  aspirin 81 MG Oral Chew Tab, Chew 1 tablet (81 mg total) by mouth daily.     •  atorvastatin 40 MG Oral Tab, Take 80 mg by mouth  3. Dual chamber PPM recently   - S/P DDD PPM 1/16/20 [employer] serial number [id number]      4. Stage 3 chronic kidney disease (HCC)  Creat 2         5. HTN  controlled  On coreg 12.5 po BID, Imdur 30 mg daily and ACEI     6.  Hx of chest pain with patent ci

## 2020-12-24 NOTE — PLAN OF CARE
Pt. Slept throughout most of the shift. Pt. was confused and got out of bed once during the night. Reoriented to the hospital. IV lasix and PO vanco continued. Fluid restriction maintained. Will continue to monitor.      Problem: Patient Centered Care  Goal Monitor for bleeding, hypotension and signs of decreased cardiac output  - Evaluate effectiveness of vasoactive medications to optimize hemodynamic stability  - Monitor arterial and/or venous puncture sites for bleeding and/or hematoma  - Assess quality of INTERVENTIONS:  - Monitor Blood Glucose as ordered  - Assess for signs and symptoms of hyperglycemia and hypoglycemia  - Administer ordered medications to maintain glucose within target range  - Assess barriers to adequate nutritional intake and initiate n

## 2020-12-24 NOTE — DISCHARGE SUMMARY
[facility]    Discharge Summary    Lucinda Gamez Patient Status:  Inpatient    1950 MRN [id number]   Location [facility] 3W/SW Attending Charbel Tafoya MD   Hosp Day # 3 PCP Titus Owens MD     Date of Admission:   Acute on chronic heart failure with preserved ejection fraction (HFpEF)   - 2D echo 12/22 EF 14%, grade 3 diastolic dysfunction, increased septal thickness, mild AR/AS, LA severely dilated, RA mild dilation   -Cards following, appreciate rec's  -proBNP Consultants     Provider Role Specialty    Sudarshan Hyatt MD Consulting Physician  Cardiovascular Diseases    Mark Diop, [address] [facility] Physician  Cardiovascular Diseases     Annalisa Lawler MD Consulting Physician  Internal Medicine    Dilia Legih mg by mouth nightly. Refills: 0     baclofen 10 MG Tabs  Commonly known as: LIORESAL      Take 10 mg by mouth 2 (two) times daily.    Refills: 0     carvedilol 12.5 MG Tabs  Commonly known as: COREG      Take 1 tablet (12.5 mg total) by mouth 2 (two) time tablet  Refills: 2     ondansetron 4 MG Tbdp  Commonly known as: ZOFRAN-ODT      Take 4 mg by mouth every 6 (six) hours as needed for Nausea.    Refills: 0     Pantoprazole Sodium 40 MG Tbec  Commonly known as: PROTONIX      Take 1 tablet (40 mg total) by m

## 2020-12-24 NOTE — CM/SW NOTE
01: 00PM  Received notice from pt's RN/Sarina, that pt is cleared for d/c today - pending official d/c order placed. Pt will need [facility] transport at time of d/c.    MONSE contacted [facility] at [phone] and notified of d/c today.     [facility] set for 3:30PM

## 2020-12-24 NOTE — PLAN OF CARE
Problem: Patient Centered Care  Goal: Patient preferences are identified and integrated in the patient's plan of care  Description: Interventions:  - What would you like us to know as we care for you?  I live at [facility]  - Provide timely, complete, and ac and/or hematoma  - Assess quality of pulses, skin color and temperature  - Assess for signs of decreased coronary artery perfusion - ex.  Angina  - Evaluate fluid balance, assess for edema, trend weights  Outcome: Progressing     Problem: PAIN - ADULT  Goal nutritional intake and initiate nutrition consult as needed  - Instruct patient on self management of diabetes  Outcome: Progressing  Goal: Electrolytes maintained within normal limits  Description: INTERVENTIONS:  - Monitor labs and rhythm and assess lindsay patient/family to participate in care and decision-making at the level they choose  - Honor patient and family perspectives and choices  Outcome: Progressing     Problem: Diabetes/Glucose Control  Goal: Glucose maintained within prescribed range  Descripti appropriate pain scale  - Administer analgesics based on type and severity of pain and evaluate response  - Implement non-pharmacological measures as appropriate and evaluate response  - Consider cultural and social influences on pain and pain management results as appropriate  - Fluid restriction as ordered  - Instruct patient on fluid and nutrition restrictions as appropriate  Outcome: Progressing     Problem: SKIN/TISSUE INTEGRITY - ADULT  Goal: Skin integrity remains intact  Description: INTERVENTIONS

## 2020-12-29 ENCOUNTER — APPOINTMENT (OUTPATIENT)
Dept: GENERAL RADIOLOGY | Facility: HOSPITAL | Age: 70
End: 2020-12-29
Attending: EMERGENCY MEDICINE
Payer: MEDICARE

## 2020-12-29 ENCOUNTER — HOSPITAL ENCOUNTER (EMERGENCY)
Facility: HOSPITAL | Age: 70
Discharge: HOME | End: 2020-12-30
Attending: EMERGENCY MEDICINE
Payer: MEDICARE

## 2020-12-29 DIAGNOSIS — R53.1 WEAKNESS GENERALIZED: ICD-10-CM

## 2020-12-29 DIAGNOSIS — R53.83 FATIGUE, UNSPECIFIED TYPE: Primary | ICD-10-CM

## 2020-12-29 PROCEDURE — 36415 COLL VENOUS BLD VENIPUNCTURE: CPT

## 2020-12-29 PROCEDURE — 81001 URINALYSIS AUTO W/SCOPE: CPT | Performed by: EMERGENCY MEDICINE

## 2020-12-29 PROCEDURE — 87077 CULTURE AEROBIC IDENTIFY: CPT | Performed by: EMERGENCY MEDICINE

## 2020-12-29 PROCEDURE — 71045 X-RAY EXAM CHEST 1 VIEW: CPT | Performed by: EMERGENCY MEDICINE

## 2020-12-29 PROCEDURE — 82962 GLUCOSE BLOOD TEST: CPT

## 2020-12-29 PROCEDURE — 93005 ELECTROCARDIOGRAM TRACING: CPT

## 2020-12-29 PROCEDURE — 87086 URINE CULTURE/COLONY COUNT: CPT | Performed by: EMERGENCY MEDICINE

## 2020-12-29 PROCEDURE — 85025 COMPLETE CBC W/AUTO DIFF WBC: CPT | Performed by: EMERGENCY MEDICINE

## 2020-12-29 PROCEDURE — 87186 SC STD MICRODIL/AGAR DIL: CPT | Performed by: EMERGENCY MEDICINE

## 2020-12-29 PROCEDURE — 84484 ASSAY OF TROPONIN QUANT: CPT | Performed by: EMERGENCY MEDICINE

## 2020-12-29 PROCEDURE — 80048 BASIC METABOLIC PNL TOTAL CA: CPT | Performed by: EMERGENCY MEDICINE

## 2020-12-29 PROCEDURE — 83880 ASSAY OF NATRIURETIC PEPTIDE: CPT | Performed by: EMERGENCY MEDICINE

## 2020-12-29 PROCEDURE — 93010 ELECTROCARDIOGRAM REPORT: CPT | Performed by: EMERGENCY MEDICINE

## 2020-12-29 PROCEDURE — 99285 EMERGENCY DEPT VISIT HI MDM: CPT

## 2020-12-30 VITALS
SYSTOLIC BLOOD PRESSURE: 138 MMHG | RESPIRATION RATE: 18 BRPM | HEART RATE: 70 BPM | HEIGHT: 65 IN | OXYGEN SATURATION: 98 % | WEIGHT: 157.63 LBS | DIASTOLIC BLOOD PRESSURE: 78 MMHG | BODY MASS INDEX: 26.26 KG/M2 | TEMPERATURE: 98 F

## 2020-12-30 NOTE — ED INITIAL ASSESSMENT (HPI)
Pt arrived via ambulance from NH with complaints of weakness. Pt denies chest pain shortness of breathing nausea or vomiting. Per pt this started this AM. She hasnt eaten today but states that its just because she doesn't feel like eating.  Blood sugar chec

## 2020-12-30 NOTE — ED PROVIDER NOTES
Patient Seen in: [facility] AND [facility] Emergency Department      History   No chief complaint on file. Stated Complaint: weakness    HPI    23-year-old female presents for evaluation of weakness.   Patient reports weakness since today, also with some fatig systems are as noted in HPI. Constitutional and vital signs reviewed. All other systems reviewed and negative except as noted above.     Physical Exam     ED Triage Vitals [12/29/20 2030]   /66   Pulse 71   Resp 25   Temp 97.7 °F (36.5 °C)   Tem Urine Small (*)     WBC Urine 29 (*)     Bacteria Urine Moderate (*)     All other components within normal limits   PRO BETA NATRIURETIC PEPTIDE - Abnormal; Notable for the following components:    Pro-Beta Natriuretic Peptide 4,377 (*)     All other comp aware of time frame for results. Discussed with the patient the need for self isolating until these results are available. Discussed return precautions. Patient verbalizes understanding and agreement with plan.         Disposition and Plan     Clinical I

## 2020-12-30 NOTE — ED NOTES
Discharge instructions reviewed with pt. Pt denies any further questions at this time. Superior at bedside.

## 2021-01-21 ENCOUNTER — APPOINTMENT (OUTPATIENT)
Dept: GENERAL RADIOLOGY | Facility: HOSPITAL | Age: 71
End: 2021-01-21
Attending: EMERGENCY MEDICINE
Payer: MEDICARE

## 2021-01-21 ENCOUNTER — HOSPITAL ENCOUNTER (EMERGENCY)
Facility: HOSPITAL | Age: 71
Discharge: HOME | End: 2021-01-21
Attending: EMERGENCY MEDICINE
Payer: MEDICARE

## 2021-01-21 VITALS
RESPIRATION RATE: 22 BRPM | HEART RATE: 69 BPM | SYSTOLIC BLOOD PRESSURE: 125 MMHG | OXYGEN SATURATION: 93 % | DIASTOLIC BLOOD PRESSURE: 68 MMHG | HEIGHT: 65 IN | TEMPERATURE: 98 F | WEIGHT: 185 LBS | BODY MASS INDEX: 30.82 KG/M2

## 2021-01-21 DIAGNOSIS — R07.9 CHEST PAIN OF UNCERTAIN ETIOLOGY: Primary | ICD-10-CM

## 2021-01-21 DIAGNOSIS — I50.9 ACUTE ON CHRONIC CONGESTIVE HEART FAILURE, UNSPECIFIED HEART FAILURE TYPE (HCC): ICD-10-CM

## 2021-01-21 LAB
ANION GAP SERPL CALC-SCNC: 7 MMOL/L (ref 0–18)
BASOPHILS # BLD AUTO: 0.02 X10(3) UL (ref 0–0.2)
BASOPHILS NFR BLD AUTO: 0.3 %
BUN BLD-MCNC: 65 MG/DL (ref 7–18)
BUN/CREAT SERPL: 30.5 (ref 10–20)
CALCIUM BLD-MCNC: 8.7 MG/DL (ref 8.5–10.1)
CHLORIDE SERPL-SCNC: 115 MMOL/L (ref 98–112)
CO2 SERPL-SCNC: 17 MMOL/L (ref 21–32)
CREAT BLD-MCNC: 2.13 MG/DL
DEPRECATED RDW RBC AUTO: 54.4 FL (ref 35.1–46.3)
EOSINOPHIL # BLD AUTO: 0.27 X10(3) UL (ref 0–0.7)
EOSINOPHIL NFR BLD AUTO: 3.5 %
ERYTHROCYTE [DISTWIDTH] IN BLOOD BY AUTOMATED COUNT: 14.7 % (ref 11–15)
GLUCOSE BLD-MCNC: 121 MG/DL (ref 70–99)
HCT VFR BLD AUTO: 35.1 %
HGB BLD-MCNC: 11.2 G/DL
IMM GRANULOCYTES # BLD AUTO: 0.02 X10(3) UL (ref 0–1)
IMM GRANULOCYTES NFR BLD: 0.3 %
LYMPHOCYTES # BLD AUTO: 1.36 X10(3) UL (ref 1–4)
LYMPHOCYTES NFR BLD AUTO: 17.4 %
MCH RBC QN AUTO: 32.1 PG (ref 26–34)
MCHC RBC AUTO-ENTMCNC: 31.9 G/DL (ref 31–37)
MCV RBC AUTO: 100.6 FL
MONOCYTES # BLD AUTO: 0.4 X10(3) UL (ref 0.1–1)
MONOCYTES NFR BLD AUTO: 5.1 %
NEUTROPHILS # BLD AUTO: 5.73 X10 (3) UL (ref 1.5–7.7)
NEUTROPHILS # BLD AUTO: 5.73 X10(3) UL (ref 1.5–7.7)
NEUTROPHILS NFR BLD AUTO: 73.4 %
NT-PROBNP SERPL-MCNC: 5000 PG/ML (ref ?–125)
OSMOLALITY SERPL CALC.SUM OF ELEC: 308 MOSM/KG (ref 275–295)
PLATELET # BLD AUTO: 147 10(3)UL (ref 150–450)
POTASSIUM SERPL-SCNC: 4.6 MMOL/L (ref 3.5–5.1)
RBC # BLD AUTO: 3.49 X10(6)UL
SODIUM SERPL-SCNC: 139 MMOL/L (ref 136–145)
TROPONIN I SERPL-MCNC: <0.045 NG/ML (ref ?–0.04)
TROPONIN I SERPL-MCNC: <0.045 NG/ML (ref ?–0.04)
WBC # BLD AUTO: 7.8 X10(3) UL (ref 4–11)

## 2021-01-21 PROCEDURE — 93005 ELECTROCARDIOGRAM TRACING: CPT

## 2021-01-21 PROCEDURE — 99285 EMERGENCY DEPT VISIT HI MDM: CPT

## 2021-01-21 PROCEDURE — 83880 ASSAY OF NATRIURETIC PEPTIDE: CPT | Performed by: EMERGENCY MEDICINE

## 2021-01-21 PROCEDURE — 84484 ASSAY OF TROPONIN QUANT: CPT | Performed by: EMERGENCY MEDICINE

## 2021-01-21 PROCEDURE — 85025 COMPLETE CBC W/AUTO DIFF WBC: CPT | Performed by: EMERGENCY MEDICINE

## 2021-01-21 PROCEDURE — 80048 BASIC METABOLIC PNL TOTAL CA: CPT | Performed by: EMERGENCY MEDICINE

## 2021-01-21 PROCEDURE — 93010 ELECTROCARDIOGRAM REPORT: CPT | Performed by: EMERGENCY MEDICINE

## 2021-01-21 PROCEDURE — 96374 THER/PROPH/DIAG INJ IV PUSH: CPT

## 2021-01-21 PROCEDURE — 71045 X-RAY EXAM CHEST 1 VIEW: CPT | Performed by: EMERGENCY MEDICINE

## 2021-01-21 RX ORDER — FUROSEMIDE 10 MG/ML
40 INJECTION INTRAMUSCULAR; INTRAVENOUS ONCE
Status: COMPLETED | OUTPATIENT
Start: 2021-01-21 | End: 2021-01-21

## 2021-01-21 RX ORDER — FUROSEMIDE 20 MG/1
TABLET ORAL
Qty: 9 TABLET | Refills: 0 | Status: SHIPPED | OUTPATIENT
Start: 2021-01-21 | End: 2021-03-05

## 2021-01-21 NOTE — ED PROVIDER NOTES
Patient Seen in: [facility] AND [facility] Emergency Department      History   Patient presents with:  Chest Pain Angina  Fever    Stated Complaint: chest pain    HPI/Subjective:   HPI    73-year-old female with history of CAD, hypertension, hyperlipidemia, inso use: Never             Review of Systems   Constitutional: Negative for fever. HENT: Negative for congestion. Eyes: Negative for visual disturbance. Respiratory: Positive for shortness of breath. Cardiovascular: Positive for chest pain.    Natasha Blake on the cardiac monitor and a rhythm strip obtained with the indication of chest pain.   Monitor shows paced rhythm at a rate of 77 bpm.     My interpretation is   normal for rate   Abnormal for rhythm    Pulse Oximeter:  Pulse oximetry on room air is 93%, i 1.50 - 7.70 x10 (3) uL    Neutrophil Absolute 5.73 1.50 - 7.70 x10(3) uL    Lymphocyte Absolute 1.36 1.00 - 4.00 x10(3) uL    Monocyte Absolute 0.40 0.10 - 1.00 x10(3) uL    Eosinophil Absolute 0.27 0.00 - 0.70 x10(3) uL    Basophil Absolute 0.02 0.00 - 0. contribute to the complexity of his ED evaluation.     - pt  comfortable with d/c at this time, will d/c pt home now with Rx for lasix, pt to f/u with Dr. Eduardo Guaman in 2 days or return to ED sooner if symptoms worsen including fevers, chills, vomiting, pt exp

## 2021-01-21 NOTE — ED NOTES
Pt removed from nasal canula as she states she does not normally use 02    02 saturation remaining at 91-92%

## 2021-01-21 NOTE — CONSULTS
[facility]    PATIENT'S NAME: Dianna Mclaughlin   ATTENDING PHYSICIAN: Eden Morton MD   CONSULTING PHYSICIAN: Sebastian Santos DO   PATIENT ACCOUNT#:   [de-identified]    LOCATION:  [place] [address]  MEDICAL RECORD #:   [id number]       DATE OF BIRTH Soft, nontender. Positive bowel sounds. EXTREMITIES:  Edema 1+. NEUROLOGIC:  Alert, answering questions. PSYCHIATRIC:  Patient has a calm affect. Chest x-ray image personally reviewed demonstrated mild congestive change. Creatinine is 2.13.   GFR i

## 2021-01-25 DIAGNOSIS — Z23 NEED FOR VACCINATION: ICD-10-CM

## 2021-02-15 ENCOUNTER — HOSPITAL ENCOUNTER (EMERGENCY)
Facility: HOSPITAL | Age: 71
Discharge: HOME | End: 2021-02-15
Attending: EMERGENCY MEDICINE
Payer: MEDICARE

## 2021-02-15 VITALS
OXYGEN SATURATION: 96 % | RESPIRATION RATE: 19 BRPM | HEIGHT: 65 IN | WEIGHT: 182 LBS | SYSTOLIC BLOOD PRESSURE: 110 MMHG | BODY MASS INDEX: 30.32 KG/M2 | TEMPERATURE: 98 F | DIASTOLIC BLOOD PRESSURE: 65 MMHG | HEART RATE: 72 BPM

## 2021-02-15 DIAGNOSIS — L03.116 CELLULITIS OF LEFT LOWER EXTREMITY: Primary | ICD-10-CM

## 2021-02-15 LAB — GLUCOSE BLDC GLUCOMTR-MCNC: 145 MG/DL (ref 70–99)

## 2021-02-15 PROCEDURE — 99283 EMERGENCY DEPT VISIT LOW MDM: CPT

## 2021-02-15 PROCEDURE — 82962 GLUCOSE BLOOD TEST: CPT

## 2021-02-15 RX ORDER — SULFAMETHOXAZOLE AND TRIMETHOPRIM 800; 160 MG/1; MG/1
1 TABLET ORAL ONCE
Status: COMPLETED | OUTPATIENT
Start: 2021-02-15 | End: 2021-02-15

## 2021-02-15 RX ORDER — SULFAMETHOXAZOLE AND TRIMETHOPRIM 800; 160 MG/1; MG/1
1 TABLET ORAL ONCE
Status: DISCONTINUED | OUTPATIENT
Start: 2021-02-15 | End: 2021-02-15

## 2021-02-15 RX ORDER — SULFAMETHOXAZOLE AND TRIMETHOPRIM 400; 80 MG/1; MG/1
1 TABLET ORAL 2 TIMES DAILY
Qty: 14 TABLET | Refills: 0 | Status: SHIPPED | OUTPATIENT
Start: 2021-02-15 | End: 2021-02-22

## 2021-02-15 RX ORDER — VANCOMYCIN HYDROCHLORIDE 125 MG/1
125 CAPSULE ORAL DAILY
Qty: 10 CAPSULE | Refills: 0 | Status: ON HOLD | OUTPATIENT
Start: 2021-02-15 | End: 2021-03-02

## 2021-02-16 NOTE — ED NOTES
Report given to nurse at [facility]. Pt verbalizes understanding of discharge instructions. Pt AAOx4. No IV in place. Pt wheelchair to exit.

## 2021-02-16 NOTE — ED PROVIDER NOTES
Patient Seen in: [facility] AND [facility] Emergency Department    History   Patient presents with:  Rash Skin Problem      HPI    Patient presents to the ED from [address] [place] [address] for evaluation of possible cellulitis to her left lower leg.   Patient states t admission)       Family History   Problem Relation Age of Onset   • Other (ULCERS) Father    • Cancer Mother         UTERINE/COLON       Smoking Status: Social History    Socioeconomic History      Marital status:       Spouse name: Not on file Musculoskeletal:         General: No tenderness or deformity. Neurological: She is alert and oriented to person, place, and time. Skin: Skin is warm and dry. There is erythema. 3 cm circular scab to the left anterior shin.   Several centimeters of s instructions and return precautions were discussed with the patient and/or caregiver.       Condition upon leaving the department: Stable    Disposition and Plan     Clinical Impression:  Cellulitis of left lower extremity  (primary encounter diagnosis)

## 2021-02-16 NOTE — PROGRESS NOTES
[facility] Pharmacy Note:  Renal Adjustment for sulfamethoxazole-trimethoprim (BACTRIM)    Ursula Ornelas is a 72year old patient who has been prescribed sulfamethoxazole-trimethoprim (BACTRIM) 400/80 mg x 1 dose.   The CrCl cannot be calculated (Patient's mos

## 2021-02-16 NOTE — ED INITIAL ASSESSMENT (HPI)
Patient is from [facility] for cellulitis days. Patient is on Plavix. Patient is not antibiotics. Denied fevers.

## 2021-02-23 ENCOUNTER — APPOINTMENT (OUTPATIENT)
Dept: GENERAL RADIOLOGY | Facility: HOSPITAL | Age: 71
DRG: 291 | End: 2021-02-23
Attending: EMERGENCY MEDICINE
Payer: MEDICARE

## 2021-02-23 ENCOUNTER — HOSPITAL ENCOUNTER (INPATIENT)
Facility: HOSPITAL | Age: 71
LOS: 7 days | Discharge: NURSING, REHAB OR LONG-TERM CARE | DRG: 291 | End: 2021-03-02
Attending: EMERGENCY MEDICINE | Admitting: INTERNAL MEDICINE
Payer: MEDICARE

## 2021-02-23 DIAGNOSIS — I50.9 ACUTE ON CHRONIC CONGESTIVE HEART FAILURE, UNSPECIFIED HEART FAILURE TYPE (HCC): Primary | ICD-10-CM

## 2021-02-23 DIAGNOSIS — R07.9 CHEST PAIN, UNSPECIFIED TYPE: ICD-10-CM

## 2021-02-23 LAB
ANION GAP SERPL CALC-SCNC: 9 MMOL/L (ref 0–18)
BASOPHILS # BLD AUTO: 0.01 X10(3) UL (ref 0–0.2)
BASOPHILS NFR BLD AUTO: 0.1 %
BUN BLD-MCNC: 80 MG/DL (ref 7–18)
BUN/CREAT SERPL: 30.3 (ref 10–20)
CALCIUM BLD-MCNC: 8.7 MG/DL (ref 8.5–10.1)
CHLORIDE SERPL-SCNC: 113 MMOL/L (ref 98–112)
CO2 SERPL-SCNC: 20 MMOL/L (ref 21–32)
CREAT BLD-MCNC: 2.64 MG/DL
DEPRECATED RDW RBC AUTO: 57.7 FL (ref 35.1–46.3)
EOSINOPHIL # BLD AUTO: 0.17 X10(3) UL (ref 0–0.7)
EOSINOPHIL NFR BLD AUTO: 2.5 %
ERYTHROCYTE [DISTWIDTH] IN BLOOD BY AUTOMATED COUNT: 15.4 % (ref 11–15)
EST. AVERAGE GLUCOSE BLD GHB EST-MCNC: 103 MG/DL (ref 68–126)
GLUCOSE BLD-MCNC: 103 MG/DL (ref 70–99)
GLUCOSE BLDC GLUCOMTR-MCNC: 135 MG/DL (ref 70–99)
GLUCOSE BLDC GLUCOMTR-MCNC: 87 MG/DL (ref 70–99)
HBA1C MFR BLD HPLC: 5.2 % (ref ?–5.7)
HCT VFR BLD AUTO: 28.1 %
HGB BLD-MCNC: 8.9 G/DL
IMM GRANULOCYTES # BLD AUTO: 0.02 X10(3) UL (ref 0–1)
IMM GRANULOCYTES NFR BLD: 0.3 %
LYMPHOCYTES # BLD AUTO: 1.39 X10(3) UL (ref 1–4)
LYMPHOCYTES NFR BLD AUTO: 20.8 %
MCH RBC QN AUTO: 32.5 PG (ref 26–34)
MCHC RBC AUTO-ENTMCNC: 31.7 G/DL (ref 31–37)
MCV RBC AUTO: 102.6 FL
MONOCYTES # BLD AUTO: 0.31 X10(3) UL (ref 0.1–1)
MONOCYTES NFR BLD AUTO: 4.6 %
NEUTROPHILS # BLD AUTO: 4.78 X10 (3) UL (ref 1.5–7.7)
NEUTROPHILS # BLD AUTO: 4.78 X10(3) UL (ref 1.5–7.7)
NEUTROPHILS NFR BLD AUTO: 71.7 %
NT-PROBNP SERPL-MCNC: 4140 PG/ML (ref ?–125)
OSMOLALITY SERPL CALC.SUM OF ELEC: 318 MOSM/KG (ref 275–295)
PLATELET # BLD AUTO: 140 10(3)UL (ref 150–450)
POTASSIUM SERPL-SCNC: 4.6 MMOL/L (ref 3.5–5.1)
RBC # BLD AUTO: 2.74 X10(6)UL
SARS-COV-2 RNA RESP QL NAA+PROBE: NOT DETECTED
SODIUM SERPL-SCNC: 142 MMOL/L (ref 136–145)
TROPONIN I SERPL-MCNC: <0.045 NG/ML (ref ?–0.04)
TROPONIN I SERPL-MCNC: <0.045 NG/ML (ref ?–0.04)
WBC # BLD AUTO: 6.7 X10(3) UL (ref 4–11)

## 2021-02-23 PROCEDURE — 93010 ELECTROCARDIOGRAM REPORT: CPT | Performed by: EMERGENCY MEDICINE

## 2021-02-23 PROCEDURE — 96374 THER/PROPH/DIAG INJ IV PUSH: CPT

## 2021-02-23 PROCEDURE — 83036 HEMOGLOBIN GLYCOSYLATED A1C: CPT | Performed by: INTERNAL MEDICINE

## 2021-02-23 PROCEDURE — 99285 EMERGENCY DEPT VISIT HI MDM: CPT

## 2021-02-23 PROCEDURE — 85025 COMPLETE CBC W/AUTO DIFF WBC: CPT | Performed by: EMERGENCY MEDICINE

## 2021-02-23 PROCEDURE — 96375 TX/PRO/DX INJ NEW DRUG ADDON: CPT

## 2021-02-23 PROCEDURE — 83880 ASSAY OF NATRIURETIC PEPTIDE: CPT | Performed by: EMERGENCY MEDICINE

## 2021-02-23 PROCEDURE — 80048 BASIC METABOLIC PNL TOTAL CA: CPT | Performed by: EMERGENCY MEDICINE

## 2021-02-23 PROCEDURE — 71045 X-RAY EXAM CHEST 1 VIEW: CPT | Performed by: EMERGENCY MEDICINE

## 2021-02-23 PROCEDURE — 84484 ASSAY OF TROPONIN QUANT: CPT | Performed by: EMERGENCY MEDICINE

## 2021-02-23 PROCEDURE — 82962 GLUCOSE BLOOD TEST: CPT

## 2021-02-23 PROCEDURE — 93005 ELECTROCARDIOGRAM TRACING: CPT

## 2021-02-23 RX ORDER — ATORVASTATIN CALCIUM 40 MG/1
80 TABLET, FILM COATED ORAL NIGHTLY
Status: DISCONTINUED | OUTPATIENT
Start: 2021-02-23 | End: 2021-03-02

## 2021-02-23 RX ORDER — MORPHINE SULFATE 4 MG/ML
INJECTION, SOLUTION INTRAMUSCULAR; INTRAVENOUS
Status: COMPLETED
Start: 2021-02-23 | End: 2021-02-23

## 2021-02-23 RX ORDER — CARVEDILOL 12.5 MG/1
12.5 TABLET ORAL 2 TIMES DAILY WITH MEALS
Status: DISCONTINUED | OUTPATIENT
Start: 2021-02-23 | End: 2021-03-02

## 2021-02-23 RX ORDER — RANOLAZINE 500 MG/1
500 TABLET, EXTENDED RELEASE ORAL 2 TIMES DAILY
Status: DISCONTINUED | OUTPATIENT
Start: 2021-02-23 | End: 2021-03-02

## 2021-02-23 RX ORDER — MORPHINE SULFATE 4 MG/ML
2 INJECTION, SOLUTION INTRAMUSCULAR; INTRAVENOUS ONCE
Status: COMPLETED | OUTPATIENT
Start: 2021-02-23 | End: 2021-02-23

## 2021-02-23 RX ORDER — DEXTROSE MONOHYDRATE 25 G/50ML
50 INJECTION, SOLUTION INTRAVENOUS
Status: DISCONTINUED | OUTPATIENT
Start: 2021-02-23 | End: 2021-03-02

## 2021-02-23 RX ORDER — NITROGLYCERIN 0.4 MG/1
0.4 TABLET SUBLINGUAL ONCE
Status: COMPLETED | OUTPATIENT
Start: 2021-02-23 | End: 2021-02-23

## 2021-02-23 RX ORDER — CLOPIDOGREL BISULFATE 75 MG/1
75 TABLET ORAL DAILY
Status: DISCONTINUED | OUTPATIENT
Start: 2021-02-23 | End: 2021-03-02

## 2021-02-23 RX ORDER — FUROSEMIDE 10 MG/ML
60 INJECTION INTRAMUSCULAR; INTRAVENOUS 3 TIMES DAILY
Status: DISCONTINUED | OUTPATIENT
Start: 2021-02-23 | End: 2021-02-26

## 2021-02-23 RX ORDER — ASPIRIN 81 MG/1
81 TABLET, CHEWABLE ORAL DAILY
Status: DISCONTINUED | OUTPATIENT
Start: 2021-02-23 | End: 2021-03-02

## 2021-02-23 RX ORDER — FUROSEMIDE 10 MG/ML
40 INJECTION INTRAMUSCULAR; INTRAVENOUS ONCE
Status: COMPLETED | OUTPATIENT
Start: 2021-02-23 | End: 2021-02-23

## 2021-02-23 RX ORDER — ISOSORBIDE MONONITRATE 60 MG/1
60 TABLET, EXTENDED RELEASE ORAL DAILY
Status: DISCONTINUED | OUTPATIENT
Start: 2021-02-23 | End: 2021-03-02

## 2021-02-23 NOTE — ED NOTES
Orders for admission, patient is aware of plan and ready to go upstairs. Any questions, please call ED RN Jon Arana  at [phone].    Type of COVID test sent: rapid  COVID Suspicion level: Low    Titratable drug(s) infusing:  Rate:    LOC at time of sanchez

## 2021-02-23 NOTE — ED PROVIDER NOTES
Patient Seen in: [facility] AND [facility] Emergency Department      History   Patient presents with:  Chest Pain Angina    Stated Complaint:     HPI/Subjective:   HPI    76-year-old female with history of hypertension, diabetes, hyperlipidemia, coronary artery Performed by Abram Oden MD at [facility] OR   • CYSTOSCOPY URETEROSCOPY Left 9/29/2020    Performed by Abram Oden MD at [facility] OR   • LASER HOLMIUM LITHOTRIPSY Left 9/29/2020    Performed by Abram Oden MD at [facility] OR   • SINUS SAUCEDA Osmolality 318 (*)     GFR, Non- 18 (*)     GFR, -American 21 (*)     All other components within normal limits   PRO BETA NATRIURETIC PEPTIDE - Abnormal; Notable for the following components:    Pro-Beta Natriuretic Peptide 4,140 Argenis Hicks pain, unspecified type    Disposition:  Admit  2/23/2021  4:23 pm    Follow-up:  No follow-up provider specified.   We recommend that you schedule follow up care with a primary care provider within the next three months to obtain basic health screening incl

## 2021-02-23 NOTE — ED INITIAL ASSESSMENT (HPI)
[facility] Pt c/o chest pain x approx 1 hour. Pt AAOx4, speaking in full sentences.

## 2021-02-24 LAB
ANION GAP SERPL CALC-SCNC: 7 MMOL/L (ref 0–18)
BUN BLD-MCNC: 79 MG/DL (ref 7–18)
BUN/CREAT SERPL: 30 (ref 10–20)
CALCIUM BLD-MCNC: 8.6 MG/DL (ref 8.5–10.1)
CHLORIDE SERPL-SCNC: 114 MMOL/L (ref 98–112)
CO2 SERPL-SCNC: 22 MMOL/L (ref 21–32)
CREAT BLD-MCNC: 2.63 MG/DL
GLUCOSE BLD-MCNC: 83 MG/DL (ref 70–99)
GLUCOSE BLDC GLUCOMTR-MCNC: 143 MG/DL (ref 70–99)
GLUCOSE BLDC GLUCOMTR-MCNC: 147 MG/DL (ref 70–99)
GLUCOSE BLDC GLUCOMTR-MCNC: 183 MG/DL (ref 70–99)
GLUCOSE BLDC GLUCOMTR-MCNC: 88 MG/DL (ref 70–99)
OSMOLALITY SERPL CALC.SUM OF ELEC: 319 MOSM/KG (ref 275–295)
POTASSIUM SERPL-SCNC: 4.5 MMOL/L (ref 3.5–5.1)
SODIUM SERPL-SCNC: 143 MMOL/L (ref 136–145)

## 2021-02-24 PROCEDURE — 80048 BASIC METABOLIC PNL TOTAL CA: CPT | Performed by: STUDENT IN AN ORGANIZED HEALTH CARE EDUCATION/TRAINING PROGRAM

## 2021-02-24 PROCEDURE — 82962 GLUCOSE BLOOD TEST: CPT

## 2021-02-24 RX ORDER — HEPARIN SODIUM 5000 [USP'U]/ML
5000 INJECTION, SOLUTION INTRAVENOUS; SUBCUTANEOUS EVERY 12 HOURS SCHEDULED
Status: DISCONTINUED | OUTPATIENT
Start: 2021-02-24 | End: 2021-03-02

## 2021-02-24 NOTE — PLAN OF CARE
Pt received awake and alert. Denies chest pain at the moment. Denies SOB. On IV lasix. Heart paced. Fall percautions in place.  Pt updated on plan of care  Problem: Patient Centered Care  Goal: Patient preferences are identified and integrated in the patien

## 2021-02-24 NOTE — PLAN OF CARE
Pt A&Ox4, delayed responses on RA. Blanchable redness noted on buttocks and excoriation in groin region. Pt got 40mg IV lasix. Purewick attached to suction. Call light within reach.     Problem: Patient Centered Care  Goal: Patient preferences are identifie

## 2021-02-24 NOTE — PROGRESS NOTES
Patient seen in follow up. Patient states chest pain has improved from yesterday. She denies  dizziness and palpations. Patient only reports SOB at exertion. She also reports that the swelling in her BLE has been the same.     02/24/21  0449   BP: 110 •  furosemide 20 MG Oral Tab, For the next three days, Take two tablets by mouth in the AM, then take 1 tablets by mouth in PM. On day 4, resume your usual 40mg daily.     •  Isosorbide Mononitrate ER 60 MG Oral Tablet 24 Hr, Take 1 tablet (60 mg total) by •  ALPRAZolam 0.25 MG Oral Tab, Take 1 tablet (0.25 mg total) by mouth 2 (two) times daily as needed for Sleep or Anxiety.  (Patient not taking: Reported on 12/10/2020 )    •  traMADol HCl 50 MG Oral Tab, Take 1 tablet (50 mg total) by mouth every 12 (twelv    apicalinferoseptal myocardium. Doppler parameters are consistent      with a reversible restrictive pattern, indicative of decreased      left ventricular diastolic compliance and/or increased left      atrial pressure (grade 3 diastolic dysfunction). On coreg 12.5 po BID, Imdur 30 mg daily and ACEI, holding ACEI due to Crea > 2.5     6.  CAD   -Hx of chest pain with patent circumflex stent and a jailed marginal branch, vessel size being small also with apical RCA CAD and vessel being too small for stent

## 2021-02-24 NOTE — H&P
[facility] HOSP - [facility]    History and Physical    Nat Bruno Patient Status:  Inpatient    1950 MRN [id number]   Location [facility] Attending Lashay Pierson MD   Hosp Day # 1 PCP Antonia Villasenor MD     Date:  2021  Date History    Tobacco Use      Smoking status: Former Smoker      Smokeless tobacco: Never Used    Alcohol use: Not Currently    Drug use: Never    Allergies/Medications:    Allergies:   Tetracyclines & Rel*    RASH  Metformin               DIARRHEA  Tetracycl daily.    •  atorvastatin 40 MG Oral Tab, Take 80 mg by mouth nightly. •  linagliptin 5 mg Oral Tab, Take 5 mg by mouth daily. •  ondansetron 4 MG Oral Tablet Dispersible, Take 4 mg by mouth every 6 (six) hours as needed for Nausea.     •  ALPRAZola breath sounds normal.   Abdominal: Soft. Bowel sounds are normal.   Neurological: She is alert. Skin: Skin is warm. Psychiatric: She has a normal mood and affect.  Her behavior is normal. Judgment and thought content normal.     Cervical Papanicolaou co significant changes have occurred Electronically signed on 02/23/2021 at 16:30 by Yady Albright MD      Assessment/Plan:     Acute on chronic heart failure with preserved ejection fraction (HFpEF)   -recent 2DECHO reviewed  -Cards following, appreciate recs

## 2021-02-24 NOTE — CONSULTS
[facility] HOSP - [facility]    Report of Consultation    Leni Johnson Patient Status:  Inpatient    1950 MRN [id number]   Location [facility]W Attending Savita Baig MD   Hosp Day # 0 PCP Rosemary Carlisle MD     Date of Admission: URETEROSCOPY Left 9/29/2020    Performed by Izabel Rivera MD at [facility] OR   • LASER HOLMIUM LITHOTRIPSY Left 9/29/2020    Performed by Izabel Rivera MD at [facility] OR   • SINUS SURGERY           Family History  Family History   Problem Relation daily with meals. •  loratadine 10 MG Oral Tab, Take 10 mg by mouth daily.     •  Pantoprazole Sodium 40 MG Oral Tab EC, Take 1 tablet (40 mg total) by mouth every morning before breakfast.    •  Clopidogrel Bisulfate 75 MG Oral Tab, Take 75 mg by mouth Hemodynamic parameters (last 24 hours):      Scheduled Meds:     Continuous Infusions:     General appearance:  alert, appears stated age and cooperative  Head: Normocephalic, without obvious abnormality, atraumatic  Pulmonary: crackles bibasilar  Card Hypokinesis of the      apicalinferoseptal myocardium.  Doppler parameters are consistent      with a reversible restrictive pattern, indicative of decreased      left ventricular diastolic compliance and/or increased left      atrial pressure (grade 3 matta now     7. Severe mitral regurgitation  - has been following with [facility] for surgery as outpatient and is seeing a surgeon there as well she is high surgical risk which is why to be done at tertiary center once better if at all, she is also not a candidate fo

## 2021-02-25 LAB
ANION GAP SERPL CALC-SCNC: 6 MMOL/L (ref 0–18)
BUN BLD-MCNC: 86 MG/DL (ref 7–18)
BUN/CREAT SERPL: 31.3 (ref 10–20)
CALCIUM BLD-MCNC: 8.6 MG/DL (ref 8.5–10.1)
CHLORIDE SERPL-SCNC: 110 MMOL/L (ref 98–112)
CO2 SERPL-SCNC: 24 MMOL/L (ref 21–32)
CREAT BLD-MCNC: 2.75 MG/DL
GLUCOSE BLD-MCNC: 101 MG/DL (ref 70–99)
GLUCOSE BLDC GLUCOMTR-MCNC: 139 MG/DL (ref 70–99)
GLUCOSE BLDC GLUCOMTR-MCNC: 142 MG/DL (ref 70–99)
GLUCOSE BLDC GLUCOMTR-MCNC: 155 MG/DL (ref 70–99)
GLUCOSE BLDC GLUCOMTR-MCNC: 214 MG/DL (ref 70–99)
OSMOLALITY SERPL CALC.SUM OF ELEC: 316 MOSM/KG (ref 275–295)
POTASSIUM SERPL-SCNC: 4.7 MMOL/L (ref 3.5–5.1)
SODIUM SERPL-SCNC: 140 MMOL/L (ref 136–145)

## 2021-02-25 PROCEDURE — 82962 GLUCOSE BLOOD TEST: CPT

## 2021-02-25 PROCEDURE — 80048 BASIC METABOLIC PNL TOTAL CA: CPT | Performed by: NURSE PRACTITIONER

## 2021-02-25 NOTE — PROGRESS NOTES
Patient seen in follow up. Patient states chest pain has improved from yesterday. Chart reviewed.  D/w RN.     02/25/21  0500   BP: 113/57   Pulse:    Resp: 18   Temp: 98.2 °F (36.8 °C)       Intake/Output Summary (Last 24 hours) at 2/25/2021 0918  La •  furosemide 20 MG Oral Tab, For the next three days, Take two tablets by mouth in the AM, then take 1 tablets by mouth in PM. On day 4, resume your usual 40mg daily.     •  Isosorbide Mononitrate ER 60 MG Oral Tablet 24 Hr, Take 1 tablet (60 mg total) by •  ALPRAZolam 0.25 MG Oral Tab, Take 1 tablet (0.25 mg total) by mouth 2 (two) times daily as needed for Sleep or Anxiety.  (Patient not taking: Reported on 12/10/2020 )    •  traMADol HCl 50 MG Oral Tab, Take 1 tablet (50 mg total) by mouth every 12 (twelv 3. Aortic valve: Mildly calcified annulus. Trileaflet; mildly      thickened, moderately calcified leaflets. Cusp separation was      reduced. There was mild to moderate stenosis. Mild      regurgitation. Peak velocity (S): 2.75m/sec.  Mean gradient (S): -On plavix 75 mg daily, aspirin, lipitor 80 mg daily, coreg 12.5 mg BID, Imdur 60 mg daily      7. Severe mitral regurgitation  - has been following with [facility] for surgery as outpatient and is seeing a surgeon there as well she is high surgical risk which is

## 2021-02-25 NOTE — PLAN OF CARE
Pt received awake and alert. Purwick in place. On IV lasix. Denies chest pain. Reports SOB with activity. Fall precautions in place.    Problem: Patient Centered Care  Goal: Patient preferences are identified and integrated in the patient's plan of care  Robbie Jennings

## 2021-02-25 NOTE — PROGRESS NOTES
[facility]    Progress Note    Auburn Katrina Patient Status:  Inpatient    1950 MRN [id number]   Location [facility]5W Attending Jaime Marie MD   Hosp Day # 2 PCP Laura Simon MD        Subjective:   Subjective TSH 0.039 (L) 11/10/2020     03/05/2020    DDIMER 1.23 (H) 03/05/2020    MG 2.0 08/22/2020    PHOS 4.7 03/25/2020    TROP <0.045 02/23/2021    CK 73 11/10/2020    ETOH 3 (H) 11/10/2020       Xr Chest Ap Portable  (cpt=71045)    Result Date: 2/23/ 2.6 from baseline of 1.9  -avoid nephrotoxins  -hold lisinopril       *Anemia, microcytic   -Hgb 8.9.  Baseline ~11  -no s/s bleeding  -Cont FeSO4      DM2  -A1c 5.2%  -ADA diet, carb controlled  -Hypoglycemia protocol  -BS stable  -Novolog Insulin SS

## 2021-02-25 NOTE — SPIRITUAL CARE NOTE
Pt was contacted by phone. During the contact Pt expressed a desire for prayer for her swollen leg and shortness of breath.   prayed for the Pt,for on-going healing

## 2021-02-25 NOTE — CM/SW NOTE
SW self referral for discharge planning. Pt admitted 2/23/21 for chest pain. Per chart review and multidisciplinary rounds,  Pt is a resident @ [facility].    Pt reports having aides check in 2-3x/day and 1-2x/night.    Pt stated she uses a rollator for

## 2021-02-26 ENCOUNTER — APPOINTMENT (OUTPATIENT)
Dept: ULTRASOUND IMAGING | Facility: HOSPITAL | Age: 71
DRG: 291 | End: 2021-02-26
Attending: INTERNAL MEDICINE
Payer: MEDICARE

## 2021-02-26 LAB
ANION GAP SERPL CALC-SCNC: 7 MMOL/L (ref 0–18)
BILIRUB UR QL: NEGATIVE
BUN BLD-MCNC: 82 MG/DL (ref 7–18)
BUN/CREAT SERPL: 32.9 (ref 10–20)
CALCIUM BLD-MCNC: 8.5 MG/DL (ref 8.5–10.1)
CHLORIDE SERPL-SCNC: 111 MMOL/L (ref 98–112)
CLARITY UR: CLEAR
CO2 SERPL-SCNC: 22 MMOL/L (ref 21–32)
COLOR UR: YELLOW
CREAT BLD-MCNC: 2.49 MG/DL
DEPRECATED RDW RBC AUTO: 56.5 FL (ref 35.1–46.3)
ERYTHROCYTE [DISTWIDTH] IN BLOOD BY AUTOMATED COUNT: 15.1 % (ref 11–15)
GLUCOSE BLD-MCNC: 100 MG/DL (ref 70–99)
GLUCOSE BLDC GLUCOMTR-MCNC: 100 MG/DL (ref 70–99)
GLUCOSE BLDC GLUCOMTR-MCNC: 125 MG/DL (ref 70–99)
GLUCOSE BLDC GLUCOMTR-MCNC: 154 MG/DL (ref 70–99)
GLUCOSE BLDC GLUCOMTR-MCNC: 94 MG/DL (ref 70–99)
GLUCOSE UR-MCNC: NEGATIVE MG/DL
HCT VFR BLD AUTO: 30.9 %
HGB BLD-MCNC: 9.9 G/DL
HGB UR QL STRIP.AUTO: NEGATIVE
KETONES UR-MCNC: NEGATIVE MG/DL
MCH RBC QN AUTO: 32.6 PG (ref 26–34)
MCHC RBC AUTO-ENTMCNC: 32 G/DL (ref 31–37)
MCV RBC AUTO: 101.6 FL
NITRITE UR QL STRIP.AUTO: NEGATIVE
OSMOLALITY SERPL CALC.SUM OF ELEC: 315 MOSM/KG (ref 275–295)
PH UR: 5 [PH] (ref 5–8)
PLATELET # BLD AUTO: 146 10(3)UL (ref 150–450)
POTASSIUM SERPL-SCNC: 5.2 MMOL/L (ref 3.5–5.1)
PROT UR-MCNC: NEGATIVE MG/DL
RBC # BLD AUTO: 3.04 X10(6)UL
SODIUM SERPL-SCNC: 140 MMOL/L (ref 136–145)
SP GR UR STRIP: 1.01 (ref 1–1.03)
UROBILINOGEN UR STRIP-ACNC: <2
WBC # BLD AUTO: 5.5 X10(3) UL (ref 4–11)

## 2021-02-26 PROCEDURE — 85027 COMPLETE CBC AUTOMATED: CPT | Performed by: NURSE PRACTITIONER

## 2021-02-26 PROCEDURE — 82962 GLUCOSE BLOOD TEST: CPT

## 2021-02-26 PROCEDURE — 97162 PT EVAL MOD COMPLEX 30 MIN: CPT

## 2021-02-26 PROCEDURE — 76770 US EXAM ABDO BACK WALL COMP: CPT | Performed by: INTERNAL MEDICINE

## 2021-02-26 PROCEDURE — 97166 OT EVAL MOD COMPLEX 45 MIN: CPT

## 2021-02-26 PROCEDURE — 97530 THERAPEUTIC ACTIVITIES: CPT

## 2021-02-26 PROCEDURE — 80048 BASIC METABOLIC PNL TOTAL CA: CPT | Performed by: NURSE PRACTITIONER

## 2021-02-26 PROCEDURE — 81001 URINALYSIS AUTO W/SCOPE: CPT | Performed by: INTERNAL MEDICINE

## 2021-02-26 RX ORDER — FUROSEMIDE 40 MG/1
80 TABLET ORAL
Status: DISCONTINUED | OUTPATIENT
Start: 2021-02-26 | End: 2021-02-28

## 2021-02-26 NOTE — PHYSICAL THERAPY NOTE
PHYSICAL THERAPY EVALUATION - INPATIENT     Room Number: 494/682-E  Evaluation Date: 2/26/2021  Type of Evaluation: Initial   Physician Order: PT Eval and Treat    Presenting Problem: acute on chronic CHF  Reason for Therapy: Mobility Dysfunction and Disch from sub-acute rehab to optimize functional outcomes. Patient will benefit from continued IP PT services to address these deficits in preparation for discharge.     DISCHARGE RECOMMENDATIONS  PT Discharge Recommendations: Sub-acute rehabilitation;24 hour Willam Shore MD at [facility] MAIN OR   • LASER HOLMIUM LITHOTRIPSY Left 9/29/2020    Performed by Willam Shore MD at [facility] MAIN OR   • [facility] SITUATION  Type of Home: Assisted living facility([facility])   Home Layout: One level and from a bed to a chair (including a wheelchair)?: A Lot   -   Need to walk in hospital room?: A Lot   -   Climbing 3-5 steps with a railing?: A Lot     AM-PAC Score:  Raw Score: 15   Approx Degree of Impairment: 57.7%   Standardized Score (AM-PAC Scale)

## 2021-02-26 NOTE — OCCUPATIONAL THERAPY NOTE
OCCUPATIONAL THERAPY EVALUATION - INPATIENT     Room Number: 285/191-U  Evaluation Date: 2/26/2021  Type of Evaluation: Initial  Presenting Problem: CP, recent fall    Physician Order: IP Consult to Occupational Therapy  Reason for Therapy: ADL/IADL Dysfun clicks' Inpatient Daily Activity Short Form. Research supports that patients with this level of impairment may benefit from SHERICE. Pt does not demonstrate the physical skills required to safely return home.  Recommend SHERICE to maximize participation, indepe Performed by Edgar Mcintyre MD at [facility] OR   • CYSTOSCOPY URETEROSCOPY Left 9/29/2020    Performed by Edgar Mcintyre MD at [facility] OR   • LASER HOLMIUM LITHOTRIPSY Left 9/29/2020    Performed by Edgar Mcintyre MD at [facility] OR   • SINUS SAUCEDA Sitting: fair  Static Standing: poor+  Dynamic Standing: NT    FUNCTIONAL ADL ASSESSMENT  Grooming: set-up in sitting   Feeding: set-up in sitting   Bathing: NT  Toileting: NT  Upper Extremity Dressing: NT  Lower Extremity Dressing: total A     Education P

## 2021-02-26 NOTE — PLAN OF CARE
Patient cooperative and pleasant; resting comfortably in bed; continues to have difficulty standing to chair; definitely 2 person assist from bed to chair and vice versa; call light within reach; non-skid socks in place; will continue to monitor.        Pro

## 2021-02-26 NOTE — CM/SW NOTE
BPCI-Advanced Medicare Program Note:  Plan of care reviewed for care coordination and discharge planning. Noted pt falls under  BPCI/Medicare program, with  for congestive heart failure.     Agree with NSOC recommendations for SNF based on clinical c

## 2021-02-26 NOTE — CONSULTS
[facility] HOSP - [facility]    Report of Consultation    Creola Cheadle Patient Status:  Inpatient    1950 MRN [id number]   Location [facility]5W Attending Hiram Germain MD   Hosp Day # 3 PCP Kane Blake MD     Date of Admission: Surgical History:   Procedure Laterality Date   • CHOLECYSTECTOMY     • CYSTOSCOPY RETROGRADE Left 9/26/2020    Performed by Jeanine Miller MD at [facility] OR   • CYSTOSCOPY URETEROSCOPY Left 9/29/2020    Performed by Jeanine Miller MD at [place] [address] Oral Tab, For the next three days, Take two tablets by mouth in the AM, then take 1 tablets by mouth in PM. On day 4, resume your usual 40mg daily. •  Isosorbide Mononitrate ER 60 MG Oral Tablet 24 Hr, Take 1 tablet (60 mg total) by mouth daily.     •  f Reported on 12/10/2020 )    •  traMADol HCl 50 MG Oral Tab, Take 1 tablet (50 mg total) by mouth every 12 (twelve) hours as needed.     •  Alum & Mag Hydroxide-Simeth (ALUMINA-MAGNESIA-SIMETHICONE) [phone] MG/5ML Oral Suspension, Take 30 mL by mouth 4 (f 31.7 32.0   RDW 15.4* 15.1*   NEPRELIM 4.78  --    WBC 6.7 5.5   .0* 146.0*         Recent Labs   Lab 02/24/21  0858 02/25/21  0922 02/26/21  0535   GLU 83 101* 100*   BUN 79* 86* 82*   CREATSERUM 2.63* 2.75* 2.49*   GFRAA 21* 20* 22*   GFRNAA 18* 1

## 2021-02-26 NOTE — PROGRESS NOTES
[facility]    Progress Note    Tereso Jenkins Patient Status:  Inpatient    1950 MRN [id number]   Location [facility]5W Attending Herni Dangelo MD   Hosp Day # 3 PCP Dell Barfield MD        Subjective:   Subjective PHOS 4.7 03/25/2020    TROP <0.045 02/23/2021    CK 73 11/10/2020    ETOH 3 (H) 11/10/2020       No results found. Assessment and Plan:    71 y/o female from [facility] admitted to [address] [place] [address] 2/23 with Chest Pain.     *Acute on chronic heart failure wi

## 2021-02-26 NOTE — CM/SW NOTE
SW uploaded PT/OT documentation via [facility]. SW attempted to meet with pt to provide [facility] accepted facility listing and request choice. Listing left at bedside, pt RN informed. Pt is out of room having ultrasound.        SW/KAYLI to remain available for suppor

## 2021-02-26 NOTE — PROGRESS NOTES
Patient seen in follow up. Patient states chest pain has improved, LE edema also improved. Chart reviewed.  D/w RN.     02/26/21  1134   BP:    Pulse: 79   Resp:    Temp:        Intake/Output Summary (Last 24 hours) at 2/26/2021 1420  Last data filed •  furosemide 20 MG Oral Tab, For the next three days, Take two tablets by mouth in the AM, then take 1 tablets by mouth in PM. On day 4, resume your usual 40mg daily.     •  Isosorbide Mononitrate ER 60 MG Oral Tablet 24 Hr, Take 1 tablet (60 mg total) by •  ALPRAZolam 0.25 MG Oral Tab, Take 1 tablet (0.25 mg total) by mouth 2 (two) times daily as needed for Sleep or Anxiety.  (Patient not taking: Reported on 12/10/2020 )    •  traMADol HCl 50 MG Oral Tab, Take 1 tablet (50 mg total) by mouth every 12 (twelv    reduced. There was mild to moderate stenosis. Mild      regurgitation. Peak velocity (S): 2.75m/sec. Mean gradient (S):      16mm Hg. Peak gradient (S): 30mm Hg. Valve area (VTI): 1.09cm^2.      Valve area (Vmax): 1.11cm^2.   4. Mitral valve:  Moderately - has been following with [facility] for surgery as outpatient and is seeing a surgeon there as well she is high surgical risk which is why to be done at tertiary center once better if at all, she is also not a candidate for mitral clip due to severe MAC   -evalu

## 2021-02-27 LAB
ALBUMIN SERPL-MCNC: 2.7 G/DL (ref 3.4–5)
ANION GAP SERPL CALC-SCNC: 5 MMOL/L (ref 0–18)
BASOPHILS # BLD AUTO: 0.02 X10(3) UL (ref 0–0.2)
BASOPHILS NFR BLD AUTO: 0.3 %
BUN BLD-MCNC: 89 MG/DL (ref 7–18)
BUN/CREAT SERPL: 35.2 (ref 10–20)
CALCIUM BLD-MCNC: 8.2 MG/DL (ref 8.5–10.1)
CHLORIDE SERPL-SCNC: 108 MMOL/L (ref 98–112)
CO2 SERPL-SCNC: 26 MMOL/L (ref 21–32)
CREAT BLD-MCNC: 2.53 MG/DL
DEPRECATED RDW RBC AUTO: 54.4 FL (ref 35.1–46.3)
EOSINOPHIL # BLD AUTO: 0.29 X10(3) UL (ref 0–0.7)
EOSINOPHIL NFR BLD AUTO: 4.6 %
ERYTHROCYTE [DISTWIDTH] IN BLOOD BY AUTOMATED COUNT: 14.7 % (ref 11–15)
GLUCOSE BLD-MCNC: 93 MG/DL (ref 70–99)
GLUCOSE BLDC GLUCOMTR-MCNC: 137 MG/DL (ref 70–99)
GLUCOSE BLDC GLUCOMTR-MCNC: 164 MG/DL (ref 70–99)
GLUCOSE BLDC GLUCOMTR-MCNC: 170 MG/DL (ref 70–99)
GLUCOSE BLDC GLUCOMTR-MCNC: 97 MG/DL (ref 70–99)
HCT VFR BLD AUTO: 30.2 %
HGB BLD-MCNC: 9.9 G/DL
IMM GRANULOCYTES # BLD AUTO: 0.03 X10(3) UL (ref 0–1)
IMM GRANULOCYTES NFR BLD: 0.5 %
LYMPHOCYTES # BLD AUTO: 1.52 X10(3) UL (ref 1–4)
LYMPHOCYTES NFR BLD AUTO: 24 %
MCH RBC QN AUTO: 32.8 PG (ref 26–34)
MCHC RBC AUTO-ENTMCNC: 32.8 G/DL (ref 31–37)
MCV RBC AUTO: 100 FL
MONOCYTES # BLD AUTO: 0.43 X10(3) UL (ref 0.1–1)
MONOCYTES NFR BLD AUTO: 6.8 %
NEUTROPHILS # BLD AUTO: 4.05 X10 (3) UL (ref 1.5–7.7)
NEUTROPHILS # BLD AUTO: 4.05 X10(3) UL (ref 1.5–7.7)
NEUTROPHILS NFR BLD AUTO: 63.8 %
OSMOLALITY SERPL CALC.SUM OF ELEC: 315 MOSM/KG (ref 275–295)
PHOSPHATE SERPL-MCNC: 5.2 MG/DL (ref 2.5–4.9)
PLATELET # BLD AUTO: 150 10(3)UL (ref 150–450)
POTASSIUM SERPL-SCNC: 4.8 MMOL/L (ref 3.5–5.1)
RBC # BLD AUTO: 3.02 X10(6)UL
SODIUM SERPL-SCNC: 139 MMOL/L (ref 136–145)
WBC # BLD AUTO: 6.3 X10(3) UL (ref 4–11)

## 2021-02-27 PROCEDURE — 85025 COMPLETE CBC W/AUTO DIFF WBC: CPT | Performed by: INTERNAL MEDICINE

## 2021-02-27 PROCEDURE — 82962 GLUCOSE BLOOD TEST: CPT

## 2021-02-27 PROCEDURE — 80069 RENAL FUNCTION PANEL: CPT | Performed by: INTERNAL MEDICINE

## 2021-02-27 NOTE — PROGRESS NOTES
[facility]    Progress Note    Ganga Bowen Patient Status:  Inpatient    1950 MRN [id number]   Location [facility]5W Attending Goeffrey Luke MD   Hosp Day # 4 PCP Diallo Strange MD        Subjective:   Subjective 03/05/2020    DDIMER 1.23 (H) 03/05/2020    MG 2.0 08/22/2020    PHOS 5.2 (H) 02/27/2021    TROP <0.045 02/23/2021    CK 73 11/10/2020    ETOH 3 (H) 11/10/2020       No results found.           Assessment and Plan:    73 [facility]

## 2021-02-27 NOTE — PLAN OF CARE
Problem: Patient Centered Care  Goal: Patient preferences are identified and integrated in the patient's plan of care  Description: Interventions:  - What would you like us to know as we care for you?  I live at home alone (assisted living)  - Provide violette

## 2021-02-27 NOTE — PROGRESS NOTES
[facility]    Progress Note    Titus Cos Patient Status:  Inpatient    1950 MRN [id number]   Location [facility]5W Attending Samual Pallas, MD   Hosp Day # 4 PCP Luciano Whitman MD       Subjective:   Rodríguez Roldan No results found.         Nicolás Roberts MD  2/27/2021

## 2021-02-27 NOTE — PROGRESS NOTES
Patient seen in follow up. Patient states chest pain has improved, LE edema also improved. Chart reviewed. D/w RN.  Plan to dc to rehab.     02/27/21  0834   BP: 119/66   Pulse: 74   Resp: 18   Temp: 97.9 °F (36.6 °C)       Intake/Output Summary (Last •  furosemide 20 MG Oral Tab, For the next three days, Take two tablets by mouth in the AM, then take 1 tablets by mouth in PM. On day 4, resume your usual 40mg daily.     •  Isosorbide Mononitrate ER 60 MG Oral Tablet 24 Hr, Take 1 tablet (60 mg total) by •  ALPRAZolam 0.25 MG Oral Tab, Take 1 tablet (0.25 mg total) by mouth 2 (two) times daily as needed for Sleep or Anxiety.  (Patient not taking: Reported on 12/10/2020 )    •  traMADol HCl 50 MG Oral Tab, Take 1 tablet (50 mg total) by mouth every 12 (twelv    reduced. There was mild to moderate stenosis. Mild      regurgitation. Peak velocity (S): 2.75m/sec. Mean gradient (S):      16mm Hg. Peak gradient (S): 30mm Hg. Valve area (VTI): 1.09cm^2.      Valve area (Vmax): 1.11cm^2.   4. Mitral valve:  Moderately - has been following with [facility] for surgery as outpatient and is seeing a surgeon there as well she is high surgical risk which is why to be done at tertiary center once better if at all, she is also not a candidate for mitral clip due to severe MAC   -evalu

## 2021-02-27 NOTE — PLAN OF CARE
Patient is aox2-3, forgetful at times, resting in chair, up w/ walker. Chair alarm on, fall prec in place. Patient to go to rehab on dc, [facility] on [facility], SW updated. . L. Mepilex on sacrum for protection, wound care performed left shin.      Problem: P

## 2021-02-28 LAB
ALBUMIN SERPL-MCNC: 2.5 G/DL (ref 3.4–5)
ANION GAP SERPL CALC-SCNC: 5 MMOL/L (ref 0–18)
BASOPHILS # BLD AUTO: 0.03 X10(3) UL (ref 0–0.2)
BASOPHILS NFR BLD AUTO: 0.5 %
BUN BLD-MCNC: 91 MG/DL (ref 7–18)
BUN/CREAT SERPL: 32 (ref 10–20)
CALCIUM BLD-MCNC: 8.4 MG/DL (ref 8.5–10.1)
CHLORIDE SERPL-SCNC: 106 MMOL/L (ref 98–112)
CO2 SERPL-SCNC: 27 MMOL/L (ref 21–32)
CREAT BLD-MCNC: 2.84 MG/DL
DEPRECATED RDW RBC AUTO: 54.3 FL (ref 35.1–46.3)
EOSINOPHIL # BLD AUTO: 0.25 X10(3) UL (ref 0–0.7)
EOSINOPHIL NFR BLD AUTO: 4.2 %
ERYTHROCYTE [DISTWIDTH] IN BLOOD BY AUTOMATED COUNT: 14.6 % (ref 11–15)
GLUCOSE BLD-MCNC: 108 MG/DL (ref 70–99)
GLUCOSE BLDC GLUCOMTR-MCNC: 112 MG/DL (ref 70–99)
GLUCOSE BLDC GLUCOMTR-MCNC: 130 MG/DL (ref 70–99)
GLUCOSE BLDC GLUCOMTR-MCNC: 139 MG/DL (ref 70–99)
GLUCOSE BLDC GLUCOMTR-MCNC: 96 MG/DL (ref 70–99)
HCT VFR BLD AUTO: 32.1 %
HGB BLD-MCNC: 10.4 G/DL
IMM GRANULOCYTES # BLD AUTO: 0.01 X10(3) UL (ref 0–1)
IMM GRANULOCYTES NFR BLD: 0.2 %
LYMPHOCYTES # BLD AUTO: 1.47 X10(3) UL (ref 1–4)
LYMPHOCYTES NFR BLD AUTO: 25 %
MCH RBC QN AUTO: 32.8 PG (ref 26–34)
MCHC RBC AUTO-ENTMCNC: 32.4 G/DL (ref 31–37)
MCV RBC AUTO: 101.3 FL
MONOCYTES # BLD AUTO: 0.34 X10(3) UL (ref 0.1–1)
MONOCYTES NFR BLD AUTO: 5.8 %
NEUTROPHILS # BLD AUTO: 3.79 X10 (3) UL (ref 1.5–7.7)
NEUTROPHILS # BLD AUTO: 3.79 X10(3) UL (ref 1.5–7.7)
NEUTROPHILS NFR BLD AUTO: 64.3 %
OSMOLALITY SERPL CALC.SUM OF ELEC: 315 MOSM/KG (ref 275–295)
PHOSPHATE SERPL-MCNC: 5.8 MG/DL (ref 2.5–4.9)
PLATELET # BLD AUTO: 138 10(3)UL (ref 150–450)
POTASSIUM SERPL-SCNC: 5.2 MMOL/L (ref 3.5–5.1)
RBC # BLD AUTO: 3.17 X10(6)UL
SODIUM SERPL-SCNC: 138 MMOL/L (ref 136–145)
WBC # BLD AUTO: 5.9 X10(3) UL (ref 4–11)

## 2021-02-28 PROCEDURE — 85025 COMPLETE CBC W/AUTO DIFF WBC: CPT | Performed by: INTERNAL MEDICINE

## 2021-02-28 PROCEDURE — 80069 RENAL FUNCTION PANEL: CPT | Performed by: INTERNAL MEDICINE

## 2021-02-28 PROCEDURE — 82962 GLUCOSE BLOOD TEST: CPT

## 2021-02-28 RX ORDER — ONDANSETRON 2 MG/ML
4 INJECTION INTRAMUSCULAR; INTRAVENOUS EVERY 6 HOURS PRN
Status: DISCONTINUED | OUTPATIENT
Start: 2021-02-28 | End: 2021-03-02

## 2021-02-28 NOTE — PROGRESS NOTES
[facility] HOSP - [facility]    Progress Note    Leonardo Juarez Patient Status:  Inpatient    1950 MRN [id number]   Location [facility]5W Attending Gm Che MD   Hosp Day # 5 PCP Lazaro Spencer MD       Subjective:   Kalani Ross CA 8.5 8.2* 8.4*    139 138   K 5.2* 4.8 5.2*    108 106   CO2 22.0 26.0 27.0          No results found.         Patrick Simmons MD  2/28/2021

## 2021-02-28 NOTE — PLAN OF CARE
Pt axox4. AV paced on tele. RA. X1 assist w/ walker. Purewick in place. No complaints overnight. Fall precautions maintained.       Problem: Patient Centered Care  Goal: Patient preferences are identified and integrated in the patient's plan of care  Ab

## 2021-02-28 NOTE — PROGRESS NOTES
Patient seen in follow up. Patient states chest pain has improved, LE edema also improved. Chart reviewed. D/w RN.  Plan to dc to rehab.     02/28/21  0852   BP: 141/46   Pulse: 79   Resp: 20   Temp: 98.1 °F (36.7 °C)       Intake/Output Summary (Last •  furosemide 20 MG Oral Tab, For the next three days, Take two tablets by mouth in the AM, then take 1 tablets by mouth in PM. On day 4, resume your usual 40mg daily.     •  Isosorbide Mononitrate ER 60 MG Oral Tablet 24 Hr, Take 1 tablet (60 mg total) by •  ALPRAZolam 0.25 MG Oral Tab, Take 1 tablet (0.25 mg total) by mouth 2 (two) times daily as needed for Sleep or Anxiety.  (Patient not taking: Reported on 12/10/2020 )    •  traMADol HCl 50 MG Oral Tab, Take 1 tablet (50 mg total) by mouth every 12 (twelv    leaflets. The findings are consistent with mild stenosis.    Moderate to severe regurgitation. Valve area by pressure      half-time: 2.34cm^2. Valve area by continuity equation (using      LVOT flow): 1.3cm^2. 5.  Left atrium: The atrium was severel  8. CP, chronic, does not look secondary to ischemia   - ranexa 500 BID was added yesterday  -Trop x 2 negative      PLAN:     - Creatinine improved Lasix switched to 80 po BID  - Ok to dc from cardiac standpoint.         Thank you for allowing me to partic

## 2021-02-28 NOTE — PLAN OF CARE
Vitals stable. Denies shortness of breath or pain. Dressing changed on calf this evening. Lasix stopped this afternoon. [facility]    Problem: Patient Centered Care  Goal: Patient preferences are identified and integr

## 2021-02-28 NOTE — PROGRESS NOTES
[facility]    Progress Note    Harry Juan Patient Status:  Inpatient    1950 MRN [id number]   Location [facility]5W Attending Cyndi Malave MD   Hosp Day # 5 PCP Leon Disla MD        Subjective:   Subjective 11/10/2020     03/05/2020    DDIMER 1.23 (H) 03/05/2020    MG 2.0 08/22/2020    PHOS 5.2 (H) 02/27/2021    TROP <0.045 02/23/2021    CK 73 11/10/2020    ETOH 3 (H) 11/10/2020       No results found.           Assessment and Plan:    73 y/o female fro

## 2021-03-01 LAB
ALBUMIN SERPL-MCNC: 2.6 G/DL (ref 3.4–5)
ANION GAP SERPL CALC-SCNC: 5 MMOL/L (ref 0–18)
BASOPHILS # BLD AUTO: 0.02 X10(3) UL (ref 0–0.2)
BASOPHILS NFR BLD AUTO: 0.4 %
BUN BLD-MCNC: 96 MG/DL (ref 7–18)
BUN/CREAT SERPL: 32 (ref 10–20)
CALCIUM BLD-MCNC: 8.5 MG/DL (ref 8.5–10.1)
CHLORIDE SERPL-SCNC: 107 MMOL/L (ref 98–112)
CO2 SERPL-SCNC: 28 MMOL/L (ref 21–32)
CREAT BLD-MCNC: 3 MG/DL
DEPRECATED RDW RBC AUTO: 52.8 FL (ref 35.1–46.3)
EOSINOPHIL # BLD AUTO: 0.28 X10(3) UL (ref 0–0.7)
EOSINOPHIL NFR BLD AUTO: 5.8 %
ERYTHROCYTE [DISTWIDTH] IN BLOOD BY AUTOMATED COUNT: 14.4 % (ref 11–15)
GLUCOSE BLD-MCNC: 82 MG/DL (ref 70–99)
GLUCOSE BLDC GLUCOMTR-MCNC: 108 MG/DL (ref 70–99)
GLUCOSE BLDC GLUCOMTR-MCNC: 115 MG/DL (ref 70–99)
GLUCOSE BLDC GLUCOMTR-MCNC: 122 MG/DL (ref 70–99)
GLUCOSE BLDC GLUCOMTR-MCNC: 84 MG/DL (ref 70–99)
HCT VFR BLD AUTO: 31.4 %
HGB BLD-MCNC: 10.2 G/DL
IMM GRANULOCYTES # BLD AUTO: 0 X10(3) UL (ref 0–1)
IMM GRANULOCYTES NFR BLD: 0 %
LYMPHOCYTES # BLD AUTO: 1.47 X10(3) UL (ref 1–4)
LYMPHOCYTES NFR BLD AUTO: 30.6 %
MCH RBC QN AUTO: 32.5 PG (ref 26–34)
MCHC RBC AUTO-ENTMCNC: 32.5 G/DL (ref 31–37)
MCV RBC AUTO: 100 FL
MONOCYTES # BLD AUTO: 0.32 X10(3) UL (ref 0.1–1)
MONOCYTES NFR BLD AUTO: 6.7 %
NEUTROPHILS # BLD AUTO: 2.72 X10 (3) UL (ref 1.5–7.7)
NEUTROPHILS # BLD AUTO: 2.72 X10(3) UL (ref 1.5–7.7)
NEUTROPHILS NFR BLD AUTO: 56.5 %
OSMOLALITY SERPL CALC.SUM OF ELEC: 319 MOSM/KG (ref 275–295)
PHOSPHATE SERPL-MCNC: 6 MG/DL (ref 2.5–4.9)
PLATELET # BLD AUTO: 135 10(3)UL (ref 150–450)
POTASSIUM SERPL-SCNC: 5 MMOL/L (ref 3.5–5.1)
RBC # BLD AUTO: 3.14 X10(6)UL
SODIUM SERPL-SCNC: 140 MMOL/L (ref 136–145)
WBC # BLD AUTO: 4.8 X10(3) UL (ref 4–11)

## 2021-03-01 PROCEDURE — 97530 THERAPEUTIC ACTIVITIES: CPT

## 2021-03-01 PROCEDURE — 97116 GAIT TRAINING THERAPY: CPT

## 2021-03-01 PROCEDURE — 82962 GLUCOSE BLOOD TEST: CPT

## 2021-03-01 PROCEDURE — 80069 RENAL FUNCTION PANEL: CPT | Performed by: INTERNAL MEDICINE

## 2021-03-01 PROCEDURE — 85025 COMPLETE CBC W/AUTO DIFF WBC: CPT | Performed by: INTERNAL MEDICINE

## 2021-03-01 RX ORDER — POTASSIUM CHLORIDE 20 MEQ/1
20 TABLET, EXTENDED RELEASE ORAL 2 TIMES DAILY
Status: DISCONTINUED | OUTPATIENT
Start: 2021-03-01 | End: 2021-03-01

## 2021-03-01 RX ORDER — FUROSEMIDE 40 MG/1
80 TABLET ORAL
Status: DISCONTINUED | OUTPATIENT
Start: 2021-03-01 | End: 2021-03-01

## 2021-03-01 RX ORDER — SODIUM CHLORIDE 9 MG/ML
INJECTION, SOLUTION INTRAVENOUS CONTINUOUS
Status: DISCONTINUED | OUTPATIENT
Start: 2021-03-01 | End: 2021-03-02

## 2021-03-01 NOTE — PHYSICAL THERAPY NOTE
PHYSICAL THERAPY TREATMENT NOTE - INPATIENT     Room Number: 781/690-B       Presenting Problem: acute on chronic CHF    Problem List  Principal Problem:    Acute on chronic congestive heart failure, unspecified heart failure type [facility])  Active Problems: alarm    WEIGHT BEARING RESTRICTION  Weight Bearing Restriction: None                PAIN ASSESSMENT   Ratin          BALANCE                                                                                                                     Static Sit walker    Goal #3 Patient is able to ambulate 50 feet with assist device: walker - rolling at assistance level: CG   Goal #3   Current Status 6ft with rolling walker min A x 1    Goal #4 Patient to demonstrate independence with home activity/exercise instr

## 2021-03-01 NOTE — PLAN OF CARE
AOX4, RA, AV paced on tele, 1 assist with walker.  Plan for [facility] when med clear    Problem: Patient Centered Care  Goal: Patient preferences are identified and integrated in the patient's plan of care  Description: Interventions:  - What wo

## 2021-03-01 NOTE — PROGRESS NOTES
[facility]    Progress Note    Ayala Nino Patient Status:  Inpatient    1950 MRN [id number]   Location [facility]5W Attending Erendira Murguia MD   Hosp Day # 6 PCP Andrew Holder MD        Subjective:   Subjective 11/10/2020     03/05/2020    DDIMER 1.23 (H) 03/05/2020    MG 2.0 08/22/2020    PHOS 6.0 (H) 03/01/2021    TROP <0.045 02/23/2021    CK 73 11/10/2020    ETOH 3 (H) 11/10/2020       No results found.           Assessment and Plan:    73 y/o female fro for [facility], will hold discharge today, repeat Alexandro Celestin MD  3/1/21  [phone]

## 2021-03-01 NOTE — PROGRESS NOTES
[facility]    Progress Note    Meg Castellanos Patient Status:  Inpatient    1950 MRN [id number]   Location [facility]5W Attending Marah Ogden MD   Hosp Day # 6 PCP Caryl Le MD       Subjective:   Aries Lopes CO2 26.0 27.0 28.0          No results found.         Ronnie Trinh MD  3/1/2021

## 2021-03-01 NOTE — PROGRESS NOTES
Patient seen in follow up. No CP or SOB.      03/01/21  0838   BP: 119/48   Pulse: 80   Resp: 15   Temp: 98.3 °F (36.8 °C)       Intake/Output Summary (Last 24 hours) at 3/1/2021 1334  Last data filed at 3/1/2021 0900  Gross per 24 hour   Intake 420 m •  furosemide 20 MG Oral Tab, For the next three days, Take two tablets by mouth in the AM, then take 1 tablets by mouth in PM. On day 4, resume your usual 40mg daily.     •  Isosorbide Mononitrate ER 60 MG Oral Tablet 24 Hr, Take 1 tablet (60 mg total) by •  ALPRAZolam 0.25 MG Oral Tab, Take 1 tablet (0.25 mg total) by mouth 2 (two) times daily as needed for Sleep or Anxiety.  (Patient not taking: Reported on 12/10/2020 )    •  traMADol HCl 50 MG Oral Tab, Take 1 tablet (50 mg total) by mouth every 12 (twelv    reduced. There was mild to moderate stenosis. Mild      regurgitation. Peak velocity (S): 2.75m/sec. Mean gradient (S):      16mm Hg. Peak gradient (S): 30mm Hg. Valve area (VTI): 1.09cm^2.      Valve area (Vmax): 1.11cm^2.   4. Mitral valve:  Moderately - has been following with [facility] for surgery as outpatient and is seeing a surgeon there as well she is high surgical risk which is why to be done at tertiary center once better if at all, she is also not a candidate for mitral clip due to severe MAC   -evalu

## 2021-03-01 NOTE — CM/SW NOTE
Per David SHEA, pt agreeable to [facility]. Roc Hennessy made via [facility], clinical updates uploaded. Pt will not discharge today due to creatine levels rising, and fluids to be started per Dr. Rubia Barrera.       MONSE/CM to remain available for support and/or discharge

## 2021-03-01 NOTE — DIETARY NOTE
Nutrition Re-screen    Pt seen by RD d/t ROXANNE. Pt not at nutrition risk. Pt with good appetite and PO intake >75%. Noted decreased intake yesterday 2/28 due to +emesis. Pt said this was from eating too fast and has not had any GI issues since.  Noted wt loss

## 2021-03-02 VITALS
DIASTOLIC BLOOD PRESSURE: 54 MMHG | RESPIRATION RATE: 16 BRPM | SYSTOLIC BLOOD PRESSURE: 124 MMHG | BODY MASS INDEX: 28.24 KG/M2 | HEIGHT: 65 IN | TEMPERATURE: 98 F | OXYGEN SATURATION: 95 % | WEIGHT: 169.5 LBS | HEART RATE: 71 BPM

## 2021-03-02 LAB
ALBUMIN SERPL-MCNC: 2.6 G/DL (ref 3.4–5)
ANION GAP SERPL CALC-SCNC: 6 MMOL/L (ref 0–18)
BUN BLD-MCNC: 91 MG/DL (ref 7–18)
BUN/CREAT SERPL: 31.9 (ref 10–20)
CALCIUM BLD-MCNC: 8.3 MG/DL (ref 8.5–10.1)
CHLORIDE SERPL-SCNC: 106 MMOL/L (ref 98–112)
CO2 SERPL-SCNC: 26 MMOL/L (ref 21–32)
CREAT BLD-MCNC: 2.85 MG/DL
GLUCOSE BLD-MCNC: 81 MG/DL (ref 70–99)
GLUCOSE BLDC GLUCOMTR-MCNC: 113 MG/DL (ref 70–99)
GLUCOSE BLDC GLUCOMTR-MCNC: 82 MG/DL (ref 70–99)
OSMOLALITY SERPL CALC.SUM OF ELEC: 313 MOSM/KG (ref 275–295)
PHOSPHATE SERPL-MCNC: 5.5 MG/DL (ref 2.5–4.9)
POTASSIUM SERPL-SCNC: 4.4 MMOL/L (ref 3.5–5.1)
SODIUM SERPL-SCNC: 138 MMOL/L (ref 136–145)

## 2021-03-02 PROCEDURE — 82962 GLUCOSE BLOOD TEST: CPT

## 2021-03-02 PROCEDURE — 80069 RENAL FUNCTION PANEL: CPT | Performed by: INTERNAL MEDICINE

## 2021-03-02 RX ORDER — RANOLAZINE 500 MG/1
500 TABLET, EXTENDED RELEASE ORAL 2 TIMES DAILY
Qty: 60 TABLET | Refills: 0 | Status: SHIPPED | COMMUNITY
Start: 2021-03-02

## 2021-03-02 RX ORDER — HEPARIN SODIUM 5000 [USP'U]/ML
5000 INJECTION, SOLUTION INTRAVENOUS; SUBCUTANEOUS EVERY 12 HOURS SCHEDULED
Qty: 1 VIAL | Refills: 1 | Status: ON HOLD | COMMUNITY
Start: 2021-03-02 | End: 2021-03-25

## 2021-03-02 NOTE — PLAN OF CARE
Alert. No c/o pain. Stewart dressing reinforced. IVF infusing. Purewick in place. Bed locked and in lowest position. Call light in reach. Will continue to monitor.      Problem: Patient Centered Care  Goal: Patient preferences are identified and integrated in

## 2021-03-02 NOTE — DISCHARGE SUMMARY
[facility]    Discharge Summary    Albin Posada Patient Status:  Inpatient    1950 MRN [id number]   Location [address] [facility] Attending Monica Zimmerman MD   [facility] Day # 7 PCP Yony Shelton MD     Date of Admission:  -2DECHO 12/20: EF 55% and grade 3 diastolic dysfunction   -Cards following  -proBNP of 4K, CXR: fluid overloaded  -Daily weights: no wt loss, monitor in's and out's.   -Wt down 11lbs since admission  -Fluid restriction  -lasix held yesterday 2/2 increase i Pulmonary/Chest: Effort normal. She has rales. Crackles LLL   Abdominal: Soft. Bowel sounds are normal. There is no abdominal tenderness. Musculoskeletal: Normal range of motion. Neurological: She is alert and oriented to person, place, and time.    for Commonly known as: DUONEB     Isosorbide Mononitrate ER 60 MG Tb24  Commonly known as: IMDUR     Levothyroxine Sodium 25 MCG Tabs  Take 1 tablet (25 mcg total) by mouth before breakfast.     linagliptin 5 mg Tabs  Commonly known as: TRADJENTA     lisinopri

## 2021-03-02 NOTE — CM/SW NOTE
MDO received- discharge today. Plan for pt to discharge to [facility]. Per Marc [facility] bed is available. Per RN, pt is aware/in agreement w/discharge plan.     Plan: Discharge today to [facility] via [employer] at 4:30pm.  PCS completed

## 2021-03-02 NOTE — PROGRESS NOTES
[facility]    Progress Note    Albin Posada Patient Status:  Inpatient    1950 MRN [id number]   Location [facility]5W Attending Monica Zimmerman MD   Hosp Day # 7 PCP Yony Shelton MD       Subjective:   Saige Abarca 4.4    107 106   CO2 27.0 28.0 26.0          No results found.         Ben Moore MD  3/2/2021

## 2021-03-02 NOTE — DISCHARGE PLANNING
I called and gave report to nurse Semaj Henderson at [facility] rehab facility.  Patient's physical and history were relayed to nursing staff and included past medical history, admitting diagnosis of acute on chronic heart failure now on tapering diuretic d

## 2021-03-02 NOTE — PLAN OF CARE
Problem: Patient Centered Care  Goal: Patient preferences are identified and integrated in the patient's plan of care  Description: Interventions:  - What would you like us to know as we care for you?  I live at home alone (assisted living)  - Provide violette by Rebecca Guy, RN  Outcome: Progressing  3/2/2021 1136 by Rebecca Guy, RN  Outcome: Progressing   Iv lasix still on hold. Gentle hydration with 0.9 NS. Kidney function improved. Awaiting renal for recommendations or possible dc planning.

## 2021-03-02 NOTE — PLAN OF CARE
Problem: Patient Centered Care  Goal: Patient preferences are identified and integrated in the patient's plan of care  Description: Interventions:  - What would you like us to know as we care for you?  I live at home alone (assisted living)  - Provide violette Patient's Short Term Goal: Gain strength in BLE    Interventions:   - sit at side of bed 2x a day  - attempt to ambulate with staff  - Sit up in bed  - See additional Care Plan goals for specific interventions  3/2/2021 1318 by KATIE Cox

## 2021-03-02 NOTE — PROGRESS NOTES
Patient seen in follow up. No CP or SOB. 03/02/21  0857   BP: 124/54   Pulse: 71   Resp: 16   Temp: 97.9 °F (36.6 °C)       Intake/Output Summary (Last 24 hours) at 3/2/2021 1144  Last data filed at 3/2/2021 0600  Gross per 24 hour   Intake 1197. three days, Take two tablets by mouth in the AM, then take 1 tablets by mouth in PM. On day 4, resume your usual 40mg daily. •  Isosorbide Mononitrate ER 60 MG Oral Tablet 24 Hr, Take 1 tablet (60 mg total) by mouth daily.     •  furosemide 40 MG Oral Ta traMADol HCl 50 MG Oral Tab, Take 1 tablet (50 mg total) by mouth every 12 (twelve) hours as needed.     •  Alum & Mag Hydroxide-Simeth (ALUMINA-MAGNESIA-SIMETHICONE) 195-069-81 MG/5ML Oral Suspension, Take 30 mL by mouth 4 (four) times daily as needed for annulus. Normal thickened      leaflets. The findings are consistent with mild stenosis.    Moderate to severe regurgitation. Valve area by pressure      half-time: 2.34cm^2. Valve area by continuity equation (using      LVOT flow): 1.3cm^2. 5.  Left at turned down     8. CP, chronic, does not look secondary to ischemia   - ranexa 500 BID was added yesterday  -Trop x 2 negative      PLAN:     - Creatinine better but still high, lasix on hold  - monitor creatinine        Thank you for allowing me to partic

## 2021-03-04 ENCOUNTER — INITIAL APN SNF VISIT (OUTPATIENT)
Dept: INTERNAL MEDICINE CLINIC | Facility: SKILLED NURSING FACILITY | Age: 71
End: 2021-03-04

## 2021-03-04 DIAGNOSIS — D64.9 ANEMIA, UNSPECIFIED TYPE: ICD-10-CM

## 2021-03-04 DIAGNOSIS — E11.59 TYPE 2 DIABETES MELLITUS WITH OTHER CIRCULATORY COMPLICATION, WITHOUT LONG-TERM CURRENT USE OF INSULIN (HCC): Chronic | ICD-10-CM

## 2021-03-04 DIAGNOSIS — R73.9 HYPERGLYCEMIA: ICD-10-CM

## 2021-03-04 DIAGNOSIS — E78.5 HYPERLIPIDEMIA, UNSPECIFIED HYPERLIPIDEMIA TYPE: Chronic | ICD-10-CM

## 2021-03-04 DIAGNOSIS — N18.32 STAGE 3B CHRONIC KIDNEY DISEASE (HCC): Chronic | ICD-10-CM

## 2021-03-04 DIAGNOSIS — I95.9 TRANSIENT HYPOTENSION: ICD-10-CM

## 2021-03-04 DIAGNOSIS — J30.2 SEASONAL ALLERGIES: ICD-10-CM

## 2021-03-04 DIAGNOSIS — I50.33 ACUTE ON CHRONIC HEART FAILURE WITH PRESERVED EJECTION FRACTION (HFPEF) (HCC): Chronic | ICD-10-CM

## 2021-03-04 DIAGNOSIS — E03.9 HYPOTHYROIDISM, UNSPECIFIED TYPE: Chronic | ICD-10-CM

## 2021-03-04 DIAGNOSIS — R07.9 ACUTE CHEST PAIN: ICD-10-CM

## 2021-03-04 DIAGNOSIS — I50.9 ACUTE ON CHRONIC CONGESTIVE HEART FAILURE, UNSPECIFIED HEART FAILURE TYPE (HCC): ICD-10-CM

## 2021-03-04 DIAGNOSIS — E11.69 TYPE 2 DIABETES MELLITUS WITH OTHER SPECIFIED COMPLICATION, WITHOUT LONG-TERM CURRENT USE OF INSULIN (HCC): ICD-10-CM

## 2021-03-04 DIAGNOSIS — K59.00 CONSTIPATION, UNSPECIFIED CONSTIPATION TYPE: ICD-10-CM

## 2021-03-04 DIAGNOSIS — F33.1 MAJOR DEPRESSIVE DISORDER, RECURRENT EPISODE, MODERATE (HCC): ICD-10-CM

## 2021-03-04 DIAGNOSIS — I50.32 CHRONIC DIASTOLIC HF (HEART FAILURE) (HCC): Chronic | ICD-10-CM

## 2021-03-04 DIAGNOSIS — R41.82 ALTERED MENTAL STATUS, UNSPECIFIED ALTERED MENTAL STATUS TYPE: ICD-10-CM

## 2021-03-04 DIAGNOSIS — R29.6 FREQUENT FALLS: ICD-10-CM

## 2021-03-04 PROCEDURE — 1123F ACP DISCUSS/DSCN MKR DOCD: CPT | Performed by: NURSE PRACTITIONER

## 2021-03-04 PROCEDURE — 99306 1ST NF CARE HIGH MDM 50: CPT | Performed by: NURSE PRACTITIONER

## 2021-03-04 NOTE — PROGRESS NOTES
Yadiel Antoine  : 1950  Age 72year old  female patient is admitted to [place] [address] for rehabilitation and strengthening.       Chief complaint: Chest pain    HPI  81-year-old female with history of hypertension, diabetes, hyperlipidemia, corona Izabel Rivera MD at [facility] OR   • CYSTOSCOPY URETEROSCOPY Left 9/29/2020    Performed by Izabel Rivera MD at [facility] OR   • LASER HOLMIUM LITHOTRIPSY Left 9/29/2020    Performed by Izabel Rivera MD at [facility]   • [address] [place] [address] daily. 9 tablet 0   • Isosorbide Mononitrate ER 60 MG Oral Tablet 24 Hr Take 1 tablet (60 mg total) by mouth daily. 30 tablet 0   • furosemide 40 MG Oral Tab Take 40 mg by mouth daily.      • ondansetron 4 MG Oral Tablet Dispersible Take 4 mg by mouth every tablet (81 mg total) by mouth daily. 30 tablet 0   • atorvastatin 40 MG Oral Tab Take 80 mg by mouth nightly. • acetaminophen 325 MG Oral Tab Take 650 mg by mouth every 6 (six) hours as needed for Pain.      • nitroGLYCERIN 0.4 MG Sublingual SL Tab Pl overloaded in [address] [place] [address]  - Daily weights  - Fluid restriction  - Lasix held 03/04 due to elevating Cr.  - Resume lasix 40 daily on 03/05  - monitor CBC and BMP  - cardiology to follow in rehab     Acute chest pain /CAD, Hx JONAS 7/2019  - appears chronic  - contin

## 2021-03-05 ENCOUNTER — SNF VISIT (OUTPATIENT)
Dept: INTERNAL MEDICINE CLINIC | Facility: SKILLED NURSING FACILITY | Age: 71
End: 2021-03-05

## 2021-03-05 DIAGNOSIS — Z09 FOLLOW UP: ICD-10-CM

## 2021-03-05 DIAGNOSIS — I95.9 HYPOTENSION, UNSPECIFIED HYPOTENSION TYPE: ICD-10-CM

## 2021-03-05 DIAGNOSIS — R42 DIZZINESS: ICD-10-CM

## 2021-03-05 DIAGNOSIS — Z51.89 ENCOUNTER FOR MEDICATION ADJUSTMENT: ICD-10-CM

## 2021-03-05 DIAGNOSIS — Z79.899 ENCOUNTER FOR MEDICATION REVIEW: ICD-10-CM

## 2021-03-05 PROCEDURE — 1111F DSCHRG MED/CURRENT MED MERGE: CPT | Performed by: NURSE PRACTITIONER

## 2021-03-05 PROCEDURE — 99310 SBSQ NF CARE HIGH MDM 45: CPT | Performed by: NURSE PRACTITIONER

## 2021-03-05 NOTE — PROGRESS NOTES
Ayala Trung  : 1950  Age 72year old  female patient is admitted to [address] [place] [address] for SHERICE. Reason for Visit:Follow up chest. C/o dizziness and hypotension.       HPI  Patient is a 71-year-old female with PMH significant for hypert infection)      Past Surgical History:   Procedure Laterality Date   • CHOLECYSTECTOMY     • CYSTOSCOPY RETROGRADE Left 9/26/2020    Performed by Cherise Burnette MD at [address] [place] [address] OR   • CYSTOSCOPY URETEROSCOPY Left 9/29/2020    Performed by Cecelia Del Angel preserved ejection fraction (HFpEF)   - 2DECHO 12/20: EF 55% and grade 3 diastolic dysfunction   - proBNP of 4K, CXR: fluid overloaded in [address] [place] [address] Avenue  - Daily weights  - Fluid restriction  - Lasix held 03/04 due to elevating Cr.  - Resumed lasix 40 daily on 03/05 100mg BID      Total of 45 minutes visit and > 50% of the time was spent counseling the patient and/or coordinating care.      DONTE Marcelo  03/05/21

## 2021-03-08 ENCOUNTER — SNF VISIT (OUTPATIENT)
Dept: INTERNAL MEDICINE CLINIC | Facility: SKILLED NURSING FACILITY | Age: 71
End: 2021-03-08

## 2021-03-08 DIAGNOSIS — R07.9 ACUTE CHEST PAIN: ICD-10-CM

## 2021-03-08 DIAGNOSIS — E11.9 TYPE 2 DIABETES MELLITUS WITHOUT COMPLICATION, WITHOUT LONG-TERM CURRENT USE OF INSULIN (HCC): ICD-10-CM

## 2021-03-08 DIAGNOSIS — N17.9 AKI (ACUTE KIDNEY INJURY) (HCC): ICD-10-CM

## 2021-03-08 DIAGNOSIS — R09.81 CONGESTION OF NASAL SINUS: ICD-10-CM

## 2021-03-08 DIAGNOSIS — F03.90 DEMENTIA WITHOUT BEHAVIORAL DISTURBANCE, UNSPECIFIED DEMENTIA TYPE (HCC): ICD-10-CM

## 2021-03-08 DIAGNOSIS — Z02.9 DISCHARGE PLANNING ISSUES: ICD-10-CM

## 2021-03-08 DIAGNOSIS — I15.9 SECONDARY HYPERTENSION: ICD-10-CM

## 2021-03-08 DIAGNOSIS — E78.5 HYPERLIPIDEMIA, UNSPECIFIED HYPERLIPIDEMIA TYPE: Chronic | ICD-10-CM

## 2021-03-08 DIAGNOSIS — I50.9 ACUTE ON CHRONIC CONGESTIVE HEART FAILURE, UNSPECIFIED HEART FAILURE TYPE (HCC): ICD-10-CM

## 2021-03-08 DIAGNOSIS — E03.9 HYPOTHYROIDISM, UNSPECIFIED TYPE: Chronic | ICD-10-CM

## 2021-03-08 PROCEDURE — 99309 SBSQ NF CARE MODERATE MDM 30: CPT | Performed by: NURSE PRACTITIONER

## 2021-03-08 NOTE — PROGRESS NOTES
Shirley Mcburney  : 1950  Age 72year old  female patient is admitted to [place] [address] for rehabilitation and strengthening     Chief complaint: Chest pain     HPI  41-year-old female with history of hypertension, diabetes, hyperlipidemia, coronar percussion and auscultation  CARDIOVASCULAR: S1, S2 normal, RRR; no S3, no S4; + murmur  ABDOMEN:  normal active BS+, soft, nondistended; no organomegaly, no masses; no bruits; nontender, no guarding, no rebound tenderness.   :Deferred  LYMPHATIC:no lymph monitor     Dementia  - No SI, no AMS, no hallucinations, no agitation  - Continue Remeron 7.5 HS  - continue Zyprexa 0.25 BID  - continue Xanax prn agitation  - Monitor behavior     Hx Falls  - Casandra Radha around 02/23/21, back bruise, no rib fx seen on cxr  - P

## 2021-03-11 ENCOUNTER — SNF VISIT (OUTPATIENT)
Dept: INTERNAL MEDICINE CLINIC | Facility: SKILLED NURSING FACILITY | Age: 71
End: 2021-03-11

## 2021-03-11 DIAGNOSIS — N17.9 AKI (ACUTE KIDNEY INJURY) (HCC): ICD-10-CM

## 2021-03-11 DIAGNOSIS — Z91.81 HX OF FALLING: ICD-10-CM

## 2021-03-11 DIAGNOSIS — I10 ESSENTIAL HYPERTENSION: ICD-10-CM

## 2021-03-11 DIAGNOSIS — I50.33 ACUTE ON CHRONIC HEART FAILURE WITH PRESERVED EJECTION FRACTION (HFPEF) (HCC): Chronic | ICD-10-CM

## 2021-03-11 DIAGNOSIS — E11.59 TYPE 2 DIABETES MELLITUS WITH OTHER CIRCULATORY COMPLICATION, WITHOUT LONG-TERM CURRENT USE OF INSULIN (HCC): Chronic | ICD-10-CM

## 2021-03-11 DIAGNOSIS — R09.81 CONGESTION OF NASAL SINUS: ICD-10-CM

## 2021-03-11 DIAGNOSIS — J44.9 CHRONIC OBSTRUCTIVE PULMONARY DISEASE, UNSPECIFIED COPD TYPE (HCC): ICD-10-CM

## 2021-03-11 DIAGNOSIS — I50.9 ACUTE ON CHRONIC CONGESTIVE HEART FAILURE, UNSPECIFIED HEART FAILURE TYPE (HCC): ICD-10-CM

## 2021-03-11 DIAGNOSIS — E78.5 HYPERLIPIDEMIA, UNSPECIFIED HYPERLIPIDEMIA TYPE: Chronic | ICD-10-CM

## 2021-03-11 DIAGNOSIS — R41.82 ALTERED MENTAL STATUS, UNSPECIFIED ALTERED MENTAL STATUS TYPE: ICD-10-CM

## 2021-03-11 DIAGNOSIS — F03.90 DEMENTIA WITHOUT BEHAVIORAL DISTURBANCE, UNSPECIFIED DEMENTIA TYPE (HCC): ICD-10-CM

## 2021-03-11 DIAGNOSIS — R07.9 CHEST PAIN, UNSPECIFIED TYPE: ICD-10-CM

## 2021-03-11 DIAGNOSIS — E03.9 HYPOTHYROIDISM, UNSPECIFIED TYPE: Chronic | ICD-10-CM

## 2021-03-11 PROCEDURE — 99309 SBSQ NF CARE MODERATE MDM 30: CPT | Performed by: NURSE PRACTITIONER

## 2021-03-11 NOTE — PROGRESS NOTES
Nat Bruno  : 1950  Age 72year old  female patient is admitted to [place] [address] for rehabilitation and strengthening     Chief complaint: Chest pain     HPI  32-year-old female with history of hypertension, diabetes, hyperlipidemia, coronar S2 normal, RRR; no S3, no S4; + murmur  ABDOMEN:  normal active BS+, soft, nondistended; no organomegaly, no masses; no bruits; nontender, no guarding, no rebound tenderness.   :Deferred  LYMPHATIC:no lymphedema  MUSCULOSKELETAL: no acute synovitis upper monitor     Dementia  - No SI, no AMS, no hallucinations, no agitation  - Continue Remeron 7.5 HS  - continue Zyprexa 0.25 BID  - continue Xanax prn agitation  - Monitor behavior     Hx Falls  - Fell around 02/23/21, back bruise, no rib fx seen on cxr  - P

## 2021-03-15 ENCOUNTER — SNF DISCHARGE (OUTPATIENT)
Dept: INTERNAL MEDICINE CLINIC | Facility: SKILLED NURSING FACILITY | Age: 71
End: 2021-03-15

## 2021-03-15 DIAGNOSIS — D50.9 IRON DEFICIENCY ANEMIA, UNSPECIFIED IRON DEFICIENCY ANEMIA TYPE: ICD-10-CM

## 2021-03-15 DIAGNOSIS — I50.33 ACUTE ON CHRONIC HEART FAILURE WITH PRESERVED EJECTION FRACTION (HFPEF) (HCC): Chronic | ICD-10-CM

## 2021-03-15 DIAGNOSIS — K59.00 CONSTIPATION, UNSPECIFIED CONSTIPATION TYPE: ICD-10-CM

## 2021-03-15 DIAGNOSIS — Z79.4 TYPE 2 DIABETES MELLITUS WITHOUT COMPLICATION, WITH LONG-TERM CURRENT USE OF INSULIN (HCC): ICD-10-CM

## 2021-03-15 DIAGNOSIS — I10 ESSENTIAL HYPERTENSION: ICD-10-CM

## 2021-03-15 DIAGNOSIS — N17.9 AKI (ACUTE KIDNEY INJURY) (HCC): ICD-10-CM

## 2021-03-15 DIAGNOSIS — E11.9 TYPE 2 DIABETES MELLITUS WITHOUT COMPLICATION, WITH LONG-TERM CURRENT USE OF INSULIN (HCC): ICD-10-CM

## 2021-03-15 DIAGNOSIS — Z91.81 PERSONAL HISTORY OF FALL: ICD-10-CM

## 2021-03-15 DIAGNOSIS — E03.9 HYPOTHYROIDISM, UNSPECIFIED TYPE: Chronic | ICD-10-CM

## 2021-03-15 DIAGNOSIS — E78.5 HYPERLIPIDEMIA, UNSPECIFIED HYPERLIPIDEMIA TYPE: Chronic | ICD-10-CM

## 2021-03-15 DIAGNOSIS — I50.9 ACUTE ON CHRONIC CONGESTIVE HEART FAILURE, UNSPECIFIED HEART FAILURE TYPE (HCC): ICD-10-CM

## 2021-03-15 DIAGNOSIS — R07.9 ACUTE CHEST PAIN: ICD-10-CM

## 2021-03-15 PROCEDURE — 99316 NF DSCHRG MGMT 30 MIN+: CPT | Performed by: NURSE PRACTITIONER

## 2021-03-15 NOTE — PROGRESS NOTES
Eve Mejia, 69/1950, 72year old, female is being discharged from [place] [address] to home      [address]    Date of Admission to [facility] SHERICE:03/02/21    Date of Discharge from [facility] SHERICE: 03/15/2021                              Robert daily  - continue atorvastatin 80mg daily  - continue ASA 81mg daily  - continue plavix 75mg daily  - continue Ranexa 500mg BID  - cardiology to follow in rehab     PHILIP on CKD stage 3  - baseline Cr of 1.9.   - Elevated Cr 2.54 on 03/03/21  -  no hydrone

## 2021-03-25 ENCOUNTER — APPOINTMENT (OUTPATIENT)
Dept: GENERAL RADIOLOGY | Facility: HOSPITAL | Age: 71
DRG: 291 | End: 2021-03-25
Attending: EMERGENCY MEDICINE
Payer: MEDICARE

## 2021-03-25 ENCOUNTER — APPOINTMENT (OUTPATIENT)
Dept: CT IMAGING | Facility: HOSPITAL | Age: 71
DRG: 291 | End: 2021-03-25
Attending: EMERGENCY MEDICINE
Payer: MEDICARE

## 2021-03-25 ENCOUNTER — HOSPITAL ENCOUNTER (INPATIENT)
Facility: HOSPITAL | Age: 71
LOS: 4 days | Discharge: NURSING, REHAB OR LONG-TERM CARE | DRG: 291 | End: 2021-03-30
Attending: INTERNAL MEDICINE | Admitting: EMERGENCY MEDICINE
Payer: MEDICARE

## 2021-03-25 DIAGNOSIS — I25.118 CORONARY ARTERY DISEASE INVOLVING NATIVE HEART WITH OTHER FORM OF ANGINA PECTORIS, UNSPECIFIED VESSEL OR LESION TYPE (HCC): ICD-10-CM

## 2021-03-25 DIAGNOSIS — R07.9 CHEST PAIN OF UNCERTAIN ETIOLOGY: Primary | ICD-10-CM

## 2021-03-25 PROCEDURE — 70450 CT HEAD/BRAIN W/O DYE: CPT | Performed by: EMERGENCY MEDICINE

## 2021-03-25 PROCEDURE — 71045 X-RAY EXAM CHEST 1 VIEW: CPT | Performed by: EMERGENCY MEDICINE

## 2021-03-25 RX ORDER — POTASSIUM CHLORIDE 750 MG/1
10 TABLET, FILM COATED, EXTENDED RELEASE ORAL DAILY
COMMUNITY

## 2021-03-25 RX ORDER — FUROSEMIDE 40 MG/1
40 TABLET ORAL EVERY OTHER DAY
Status: ON HOLD | COMMUNITY
End: 2021-03-30

## 2021-03-26 ENCOUNTER — APPOINTMENT (OUTPATIENT)
Dept: GENERAL RADIOLOGY | Facility: HOSPITAL | Age: 71
DRG: 291 | End: 2021-03-26
Attending: EMERGENCY MEDICINE
Payer: MEDICARE

## 2021-03-26 PROCEDURE — 71045 X-RAY EXAM CHEST 1 VIEW: CPT | Performed by: EMERGENCY MEDICINE

## 2021-03-26 RX ORDER — DOCUSATE SODIUM 100 MG/1
100 CAPSULE, LIQUID FILLED ORAL 2 TIMES DAILY
Status: DISCONTINUED | OUTPATIENT
Start: 2021-03-26 | End: 2021-03-30

## 2021-03-26 RX ORDER — CETIRIZINE HYDROCHLORIDE 5 MG/1
5 TABLET ORAL DAILY
Status: DISCONTINUED | OUTPATIENT
Start: 2021-03-26 | End: 2021-03-30

## 2021-03-26 RX ORDER — OLANZAPINE 2.5 MG/1
2.5 TABLET ORAL NIGHTLY
Status: DISCONTINUED | OUTPATIENT
Start: 2021-03-26 | End: 2021-03-30

## 2021-03-26 RX ORDER — MIRTAZAPINE 15 MG/1
7.5 TABLET, FILM COATED ORAL NIGHTLY
Status: DISCONTINUED | OUTPATIENT
Start: 2021-03-26 | End: 2021-03-30

## 2021-03-26 RX ORDER — ACETAMINOPHEN 325 MG/1
650 TABLET ORAL EVERY 6 HOURS PRN
Status: DISCONTINUED | OUTPATIENT
Start: 2021-03-26 | End: 2021-03-30

## 2021-03-26 RX ORDER — MAGNESIUM HYDROXIDE/ALUMINUM HYDROXICE/SIMETHICONE 120; 1200; 1200 MG/30ML; MG/30ML; MG/30ML
30 SUSPENSION ORAL 4 TIMES DAILY PRN
Status: DISCONTINUED | OUTPATIENT
Start: 2021-03-26 | End: 2021-03-30

## 2021-03-26 RX ORDER — POTASSIUM CHLORIDE 750 MG/1
10 TABLET, EXTENDED RELEASE ORAL DAILY
Status: DISCONTINUED | OUTPATIENT
Start: 2021-03-26 | End: 2021-03-26

## 2021-03-26 RX ORDER — CARVEDILOL 12.5 MG/1
12.5 TABLET ORAL 2 TIMES DAILY WITH MEALS
Status: DISCONTINUED | OUTPATIENT
Start: 2021-03-26 | End: 2021-03-27

## 2021-03-26 RX ORDER — ATORVASTATIN CALCIUM 80 MG/1
80 TABLET, FILM COATED ORAL NIGHTLY
Status: DISCONTINUED | OUTPATIENT
Start: 2021-03-26 | End: 2021-03-30

## 2021-03-26 RX ORDER — ASPIRIN 81 MG/1
81 TABLET, CHEWABLE ORAL DAILY
Status: DISCONTINUED | OUTPATIENT
Start: 2021-03-26 | End: 2021-03-30

## 2021-03-26 RX ORDER — MELATONIN
325
Status: DISCONTINUED | OUTPATIENT
Start: 2021-03-26 | End: 2021-03-30

## 2021-03-26 RX ORDER — ISOSORBIDE MONONITRATE 60 MG/1
60 TABLET, EXTENDED RELEASE ORAL DAILY
Status: DISCONTINUED | OUTPATIENT
Start: 2021-03-26 | End: 2021-03-30

## 2021-03-26 RX ORDER — LEVOTHYROXINE SODIUM 0.03 MG/1
25 TABLET ORAL
Status: DISCONTINUED | OUTPATIENT
Start: 2021-03-26 | End: 2021-03-30

## 2021-03-26 RX ORDER — FUROSEMIDE 10 MG/ML
INJECTION INTRAMUSCULAR; INTRAVENOUS
Status: COMPLETED
Start: 2021-03-26 | End: 2021-03-26

## 2021-03-26 RX ORDER — FUROSEMIDE 20 MG/1
20 TABLET ORAL DAILY
Status: DISCONTINUED | OUTPATIENT
Start: 2021-03-26 | End: 2021-03-26

## 2021-03-26 RX ORDER — BACLOFEN 10 MG/1
10 TABLET ORAL 2 TIMES DAILY
Status: DISCONTINUED | OUTPATIENT
Start: 2021-03-26 | End: 2021-03-30

## 2021-03-26 RX ORDER — CLOPIDOGREL BISULFATE 75 MG/1
75 TABLET ORAL DAILY
Status: DISCONTINUED | OUTPATIENT
Start: 2021-03-26 | End: 2021-03-30

## 2021-03-26 RX ORDER — FUROSEMIDE 10 MG/ML
60 INJECTION INTRAMUSCULAR; INTRAVENOUS DAILY
Status: DISCONTINUED | OUTPATIENT
Start: 2021-03-26 | End: 2021-03-27

## 2021-03-26 RX ORDER — FUROSEMIDE 10 MG/ML
60 INJECTION INTRAMUSCULAR; INTRAVENOUS
Status: DISCONTINUED | OUTPATIENT
Start: 2021-03-26 | End: 2021-03-26

## 2021-03-26 RX ORDER — LISINOPRIL 5 MG/1
5 TABLET ORAL DAILY
Status: DISCONTINUED | OUTPATIENT
Start: 2021-03-26 | End: 2021-03-26

## 2021-03-26 RX ORDER — ROPINIROLE 0.5 MG/1
0.25 TABLET, FILM COATED ORAL 2 TIMES DAILY
Status: DISCONTINUED | OUTPATIENT
Start: 2021-03-26 | End: 2021-03-30

## 2021-03-26 RX ORDER — ASCORBIC ACID, THIAMINE, RIBOFLAVIN, NIACINAMIDE, PYRIDOXINE, FOLIC ACID, COBALAMIN, BIOTIN, PANTOTHENIC ACID 100; 1.5; 1.7; 20; 10; 1; 6; 300; 1 MG/1; MG/1; MG/1; MG/1; MG/1; MG/1; UG/1; UG/1; MG/1
1 TABLET, COATED ORAL DAILY
Status: DISCONTINUED | OUTPATIENT
Start: 2021-03-26 | End: 2021-03-30

## 2021-03-26 RX ORDER — MELATONIN
6 NIGHTLY PRN
Status: DISCONTINUED | OUTPATIENT
Start: 2021-03-26 | End: 2021-03-30

## 2021-03-26 RX ORDER — MECLIZINE HYDROCHLORIDE 25 MG/1
25 TABLET ORAL 3 TIMES DAILY PRN
Status: DISCONTINUED | OUTPATIENT
Start: 2021-03-26 | End: 2021-03-30

## 2021-03-26 RX ORDER — RANOLAZINE 500 MG/1
1000 TABLET, EXTENDED RELEASE ORAL 2 TIMES DAILY
Status: DISCONTINUED | OUTPATIENT
Start: 2021-03-26 | End: 2021-03-30

## 2021-03-26 RX ORDER — ONDANSETRON 4 MG/1
4 TABLET, ORALLY DISINTEGRATING ORAL EVERY 6 HOURS PRN
Status: DISCONTINUED | OUTPATIENT
Start: 2021-03-26 | End: 2021-03-30

## 2021-03-26 RX ORDER — DEXTROSE MONOHYDRATE 25 G/50ML
50 INJECTION, SOLUTION INTRAVENOUS
Status: DISCONTINUED | OUTPATIENT
Start: 2021-03-26 | End: 2021-03-30

## 2021-03-26 RX ORDER — PANTOPRAZOLE SODIUM 40 MG/1
40 TABLET, DELAYED RELEASE ORAL
Status: DISCONTINUED | OUTPATIENT
Start: 2021-03-26 | End: 2021-03-30

## 2021-03-26 RX ORDER — NITROGLYCERIN 0.4 MG/1
0.4 TABLET SUBLINGUAL EVERY 5 MIN PRN
Status: DISCONTINUED | OUTPATIENT
Start: 2021-03-26 | End: 2021-03-30

## 2021-03-26 RX ORDER — RANOLAZINE 500 MG/1
500 TABLET, EXTENDED RELEASE ORAL 2 TIMES DAILY
Status: DISCONTINUED | OUTPATIENT
Start: 2021-03-26 | End: 2021-03-26

## 2021-03-26 NOTE — PHYSICAL THERAPY NOTE
PHYSICAL THERAPY EVALUATION - INPATIENT     Room Number: 220/220-A  Evaluation Date: 3/26/2021  Type of Evaluation: Initial   Physician Order: PT Eval and Treat    Presenting Problem: Chest pain  Reason for Therapy: Mobility Dysfunction and Discharge Plan sub-acute rehab to optimize functional outcomes. Patient will benefit from continued IP PT services to address these deficits in preparation for discharge.     DISCHARGE RECOMMENDATIONS  PT Discharge Recommendations: Sub-acute rehabilitation;24 hour care Performed by Elaina Blake MD at [facility] OR   • LASER HOLMIUM LITHOTRIPSY Left 9/29/2020    Performed by Elaina Blake MD at [facility] OR   • [address]  Type of Home: Assisted living facility [facility])   Home Layout: (including adjusting bedclothes, sheets and blankets)?: A Little   -   Sitting down on and standing up from a chair with arms (e.g., wheelchair, bedside commode, etc.): A Little   -   Moving from lying on back to sitting on the side of the bed?: A Little

## 2021-03-26 NOTE — RESPIRATORY THERAPY NOTE
Patient placed on bipap on the following settings per Dr. Tiffanie Roberson S/T RR: 14, IPAP: 12, EPAP: 5, Fio2: 60%. Patient saturating 99% after being placed on bipap. RT will continue to monitor and wean when appropriate.

## 2021-03-26 NOTE — CONSULTS
[facility] HOSP - [facility]    Report of Consultation    Grace White Patient Status:  Inpatient    1950 MRN [id number]   Location [facility] 2W/SW Attending Samuel Bradford MD   Hosp Day # 0 PCP Tesha Starks MD     Date of Admission: Karie Fink MD at [facility] OR   • LASER HOLMIUM LITHOTRIPSY Left 9/29/2020    Performed by Karie Fink MD at [facility] OR   • SINUS SURGERY           Family History  Family History   Problem Relation Age of Onset   • Other (ULCERS) Father    • Nightly  multivitamin (DIALYVITE - RENAL) tab 1 tablet, 1 tablet, Oral, Daily  nitroGLYCERIN (NITROSTAT) SL tab 0.4 mg, 0.4 mg, Sublingual, Q5 Min PRN  OLANZapine (ZYPREXA) tab 2.5 mg, 2.5 mg, Oral, Nightly  ondansetron (ZOFRAN-ODT) disintegrating tab 4 mg daily. ferrous sulfate 325 (65 FE) MG Oral Tab EC, Take 325 mg by mouth daily with breakfast.  Meclizine HCl 25 MG Oral Tab, Take 25 mg by mouth 3 (three) times daily as needed. Melatonin 3 MG Oral Cap, Take 2 capsules by mouth nightly as needed.   Nephro supple  Pulm: Lungs clear, normal respiratory effort  CV: Heart with regular rate and rhythm, no edema  Abd: Abdomen soft, nontender, nondistended, no organomegaly, bowel sounds present  Skin: no symptoms reported  Psych: alert and oriented        Results: pacemaker patient.   Resolution of findings of mild CHF/fluid overload seen on the prior exam.    Dictated by (CST): Giovanny Morel MD on 3/25/2021 at 8:56 PM     Finalized by (CST): Giovanny Morel MD on 3/25/2021 at 8:58 PM                    Derek

## 2021-03-26 NOTE — ED INITIAL ASSESSMENT (HPI)
Pt presents to ED via EMS for chest pain while building a chair. Pt took 2 nitroglycerin prior to ems arrival. Medics gave 324 asa. Pt has a cardiac hx. Pt arrives bruised up from frequent falls. Pt also notes some shortness of breath.

## 2021-03-26 NOTE — RESPIRATORY THERAPY NOTE
Weaned pt off the BIPAP to [facility]. Pt is comfortable and not in any respiratory distress at this time. spo2 98%, RR 20. RT to continue to wean and uses BIPAP as needed for respiratory distress.

## 2021-03-26 NOTE — H&P
[facility]    History and Physical    Kamla Luke Patient Status:  Inpatient    1950 MRN [id number]   Location [facility] 2W/SW Attending Dipak Lerma MD   Hosp Day # 0 PCP Lee Mckenzie MD     Date:  3/26/2021  Da URETEROSCOPY Left 9/29/2020    Performed by Karie Fink MD at [address] [place] [address] MAIN OR   • LASER HOLMIUM LITHOTRIPSY Left 9/29/2020    Performed by Karie Fink MD at [address] [place] [address] OR   • SINUS SURGERY         Family History   Problem Relation Age of Onset   • Indigestion. baclofen 10 MG Oral Tab, Take 10 mg by mouth 2 (two) times daily. ferrous sulfate 325 (65 FE) MG Oral Tab EC, Take 325 mg by mouth daily with breakfast.  Meclizine HCl 25 MG Oral Tab, Take 25 mg by mouth 3 (three) times daily as needed.   Ya agitation. Physical Exam:   Vital Signs:  Blood pressure (!) 149/129, pulse 81, temperature 98.3 °F (36.8 °C), temperature source Temporal, resp. rate 22, height 5' 5\" (1.651 m), weight 177 lb (80.3 kg), SpO2 100 %.     Nursing note and vitals review (05734)    Result Date: 3/25/2021  CONCLUSION:  1. No acute intracranial process. 2. Stable mild generalized atrophy and mild chronic microangiopathic ischemic changes with large vessel calcific atherosclerosis.  3. Stable chronic lacunar infarctions in the failure  -Os sat dropped to 89% last night.  Placed on BiPAP, now on 8L HFNC  -wean as tolerated    *HFpEF / Severe MR-acute on chronic diastolic  -EF 47% (97/7321) with grade 3 DD  -Fluid restriction  -Daily weights, monitor in's and out's  -IV Lasix  -Car

## 2021-03-26 NOTE — SIGNIFICANT EVENT
Called by RN. Increasing dyspnea and hypoxia now, changed from admit. Pt eval'd. She is tachypneic now and sitting upright. End-exp wheeze appreciated. Suspect pulmonary edema. Not hypertensive. Will give 40mg laxis now.   Pt will be placed on BiPap a

## 2021-03-26 NOTE — ED NOTES
Orders for admission, patient is aware of plan and ready to go upstairs. Any questions, please call ED RN wilber  at extension [phone].    Type of COVID test sent: rapid  COVID Suspicion level: Low    Titratable drug(s) infusing:None    LOC at time of transport:

## 2021-03-26 NOTE — PROGRESS NOTES
[facility] Note:  Renal Dose Adjustment for Cetirizine (ZYRTEC)    Jenene Cabot has been prescribed Cetirizine (Zyrtec) 10 mg orally daily. Estimated Creatinine Clearance: 17.7 mL/min (A) (based on SCr of 2.7 mg/dL (H)).     The dose has been acuna

## 2021-03-26 NOTE — OCCUPATIONAL THERAPY NOTE
OCCUPATIONAL THERAPY EVALUATION - INPATIENT     Room Number: 220/220-A  Evaluation Date: 3/26/2021  Type of Evaluation: Initial  Presenting Problem:  (Chest pain, pulmonary edema)    Physician Order: IP Consult to Occupational Therapy  Reason for Therapy independence, and safety. The patient's Approx Degree of Impairment: 59.67% has been calculated based on documentation in the [facility] '6 clicks' Inpatient Daily Activity Short Form.   Research supports that patients with this level of impairment may cherri Surgical History:   Procedure Laterality Date   • CHOLECYSTECTOMY     • CYSTOSCOPY RETROGRADE Left 9/26/2020    Performed by Kole Cloud MD at [facility] OR   • CYSTOSCOPY URETEROSCOPY Left 9/29/2020    Performed by Kole Cloud MD at [facility] on and taking off regular upper body clothing?: A Lot  -   Taking care of personal grooming such as brushing teeth?: A Little  -   Eating meals?: A Little    AM-PAC Score:  Score: 14  Approx Degree of Impairment: 59.67%  Standardized Score (AM-PAC Scale):

## 2021-03-26 NOTE — CONSULTS
[facility]    Report of Consultation    Stan Medina Patient Status:  Inpatient    1950 MRN [id number]   Location [facility] 2W/SW Attending Zo Garcia MD   Hosp Day # 0 PCP Marnell Eisenmenger, MD     Date of Admission: SINUS SURGERY           Family History  Family History   Problem Relation Age of Onset   • Other (ULCERS) Father    • Cancer Mother         UTERINE/COLON       Social History  Social History    Tobacco Use      Smoking status: Former Smoker      Smokeless mg, 4 mg, Oral, Q6H PRN  Pantoprazole Sodium (PROTONIX) EC tab 40 mg, 40 mg, Oral, QAM AC  Ranolazine ER (RANEXA) 12 hr tab 500 mg, 500 mg, Oral, BID  rOPINIRole HCl (REQUIP) tab 0.25 mg, 0.25 mg, Oral, BID  furosemide (LASIX) injection 60 mg, 60 mg, Intra needed. Nephro-Ramses 0.8 MG Oral Tab, Take 0.8 mg by mouth daily. carvedilol 12.5 MG Oral Tab, Take 1 tablet (12.5 mg total) by mouth 2 (two) times daily with meals. loratadine 10 MG Oral Tab, Take 10 mg by mouth daily.   Pantoprazole Sodium 40 MG Oral Ta Oral Daily   • Cetirizine HCl  5 mg Oral Daily   • mirtazapine  7.5 mg Oral Nightly   • multivitamin  1 tablet Oral Daily   • OLANZapine  2.5 mg Oral Nightly   • Pantoprazole Sodium  40 mg Oral QAM AC   • Ranolazine ER  500 mg Oral BID   • rOPINIRole HCl thalamus. 4. Lesser incidental findings as above.     Dictated by (CST): Aroldo Fuentes MD on 3/25/2021 at 9:02 PM     Finalized by (CST): Aroldo Fuentes MD on 3/25/2021 at 9:07 PM          XR CHEST AP PORTABLE  (CPT=71045)    Result Date: 3/26/2021 30mm Hg. Valve area (VTI): 1.09cm^2.      Valve area (Vmax): 1.11cm^2.   4. Mitral valve: Moderately calcified annulus. Normal thickened      leaflets. The findings are consistent with mild stenosis.    Moderate to severe regurgitation.  Valve area by pre negative        PLAN:     - Creatinine better but still high, continue lasix IV  - monitor creatinine  Thank you for allowing me to participate in the care of your patient.     Tabitha Schuler  3/26/2021

## 2021-03-26 NOTE — ED PROVIDER NOTES
Patient Seen in: [facility] AND [facility] Emergency Department      History   Patient presents with:  Chest Pain Angina    Stated Complaint:     HPI/Subjective:   HPI    73 y/o female w/ extensive PMH including chronic heart failure and grade 3 diastolic dysfun Social History    Tobacco Use      Smoking status: Former Smoker      Smokeless tobacco: Never Used    Vaping Use      Vaping Use: Never used    Alcohol use: Not Currently    Drug use: Never             Review of Systems    Positive for sta Notable for the following components:       Result Value    Glucose 114 (*)     BUN 56 (*)     Creatinine 2.70 (*)     BUN/CREA Ratio 20.7 (*)     Calculated Osmolality 298 (*)     GFR, Non- 17 (*)     GFR, -American 20 (*)     All o cisterns, and sulci are prominent but symmetric consistent with mild generalized atrophy, unchanged. No hydrocephalus, subarachnoid hemorrhage, or mass. No midline shift.   CEREBRUM: Age-appropriate atrophy is present, without visible acute hemorrhage or PELVIS KIDNEYSTONE 2D RNDR (NO IV NO ORAL) (CPT=741, 11/02/2020, 11:06 PM.  INDICATIONS: PHILIP. TECHNIQUE: Ultrasound examination was performed to visualize the kidneys and bladder. FINDINGS:  RIGHT KIDNEY: Right kidney length is 9.72 cm.   Normal echotext lesion. OTHER: Negative. CONCLUSION:  Stable cardiomegaly in this pacemaker patient.   Resolution of findings of mild CHF/fluid overload seen on the prior exam.    Dictated by (CST): Haresh Milton MD on 3/25/2021 at 8:56 PM     Finalized by VESNA

## 2021-03-26 NOTE — CM/SW NOTE
03/26/21 1400   CM/SW Referral Data   Referral Source Physician   Reason for Referral Discharge planning;Readmission   Informant Patient   Readmission Assessment   Did any new symptoms or issues develop after you were discharged?  Yes  (Chest Pain)   --- Manager  [id number]

## 2021-03-27 PROCEDURE — 99232 SBSQ HOSP IP/OBS MODERATE 35: CPT | Performed by: INTERNAL MEDICINE

## 2021-03-27 RX ORDER — CARVEDILOL 6.25 MG/1
6.25 TABLET ORAL 2 TIMES DAILY WITH MEALS
Status: DISCONTINUED | OUTPATIENT
Start: 2021-03-28 | End: 2021-03-30

## 2021-03-27 RX ORDER — FUROSEMIDE 10 MG/ML
40 INJECTION INTRAMUSCULAR; INTRAVENOUS ONCE
Status: COMPLETED | OUTPATIENT
Start: 2021-03-27 | End: 2021-03-27

## 2021-03-27 RX ORDER — FUROSEMIDE 10 MG/ML
40 INJECTION INTRAMUSCULAR; INTRAVENOUS
Status: DISCONTINUED | OUTPATIENT
Start: 2021-03-28 | End: 2021-03-28

## 2021-03-27 NOTE — PLAN OF CARE
A&Ox4, on 2L high flow NC, IV lasix, purwick intact and in place. Denies any SOB or chest pain. Bed locked/lowest position and call light within reach. Educated pt on call light. Weaning oxygen as tolerated.    Problem: Patient Centered Care  Goal: Patient Maintains optimal cardiac output and hemodynamic stability  Description: INTERVENTIONS:  - Monitor vital signs, rhythm, and trends  - Monitor for bleeding, hypotension and signs of decreased cardiac output  - Evaluate effectiveness of vasoactive medication

## 2021-03-27 NOTE — PROGRESS NOTES
[facility]    Progress Note    Grace White Patient Status:  Inpatient    1950 MRN [id number]   Location [facility] 3W/SW Attending Samuel Bradford MD   Hosp Day # 1 PCP Tesha Starks MD     HPI/Subjective:   Subject (L) 11/10/2020     03/05/2020    DDIMER 1.23 (H) 03/05/2020    MG 2.0 03/26/2021    PHOS 3.9 03/27/2021    TROP <0.045 03/26/2021    CK 73 11/10/2020    ETOH 3 (H) 11/10/2020       CT BRAIN OR HEAD (23684)    Result Date: 3/25/2021  CONCLUSION:  1. Alfredo Eric MD    EKG 12-LEAD    Result Date: 3/25/2021  ECG Report  Interpretation  -------------------------- Paced rhythm ABNORMAL When compared with ECG of 02/23/2021 17:11:12 No significant changes have occurred Electronically signed on 03/26/2021 at 17:

## 2021-03-27 NOTE — PROGRESS NOTES
Patient seen in follow up. Patient reports chest pain that began half an hour ago that she describes as a dull left-sided pain. Pain is reproducible by palpation.  Chest pain is non-exertional. She denies accompanying symptoms, such as SOB, palpitations tab 25 mcg, 25 mcg, Oral, Before breakfast  linagliptin (TRADJENTA) tab TABS 5 mg, 5 mg, Oral, Daily  Cetirizine HCl (ZYRTEC) tab 5 mg, 5 mg, Oral, Daily  Meclizine HCl (ANTIVERT) tab 25 mg, 25 mg, Oral, TID PRN  melatonin tab 6 mg, 6 mg, Oral, Nightly PRN nightly. rOPINIRole HCl 0.25 MG Oral Tab, Take 1 tablet (0.25 mg total) by mouth 2 (two) times a day.   Alum & Mag Hydroxide-Simeth (ALUMINA-MAGNESIA-SIMETHICONE) 200-200-20 MG/5ML Oral Suspension, Take 30 mL by mouth 4 (four) times daily as needed for Ind (CST): Haresh Milton MD on 3/25/2021 at 9:07 PM          XR CHEST AP PORTABLE  (CPT=71045)    Result Date: 3/26/2021  CONCLUSION:  1. Interval worsening in the chest with now moderate pulmonary edema pattern increased from previous earlier study.   Barron Guo daily and ACEI, holding ACEI due to elevated creatinine     6.  CAD   -Hx of chest pain with patent circumflex stent and a jailed marginal branch, vessel size being small also with apical RCA CAD and vessel being too small for stenting, 40-50% in-stent rest

## 2021-03-27 NOTE — PLAN OF CARE
Problem: Patient Centered Care  Goal: Patient preferences are identified and integrated in the patient's plan of care  Description: Interventions:  - What would you like us to know as we care for you?  From [place]  - Provide timely, complete, and accurat Evaluate effectiveness of vasoactive medications to optimize hemodynamic stability  - Monitor arterial and/or venous puncture sites for bleeding and/or hematoma  - Assess quality of pulses, skin color and temperature  - Assess for signs of decreased corona myself

## 2021-03-27 NOTE — PROGRESS NOTES
[facility]    Progress Note    Kamla Luke Patient Status:  Inpatient    1950 MRN [id number]   Location [facility] 3W/SW Attending Dipak Lerma, [address] [facility] Se Day # 1 PCP Lee Mckenzie MD       Subjective:   Chato Morrison CT BRAIN OR HEAD (86484)    Result Date: 3/25/2021  CONCLUSION:  1. No acute intracranial process. 2. Stable mild generalized atrophy and mild chronic microangiopathic ischemic changes with large vessel calcific atherosclerosis.  3. Stable chronic

## 2021-03-28 ENCOUNTER — APPOINTMENT (OUTPATIENT)
Dept: GENERAL RADIOLOGY | Facility: HOSPITAL | Age: 71
DRG: 291 | End: 2021-03-28
Attending: INTERNAL MEDICINE
Payer: MEDICARE

## 2021-03-28 PROCEDURE — 71045 X-RAY EXAM CHEST 1 VIEW: CPT | Performed by: INTERNAL MEDICINE

## 2021-03-28 PROCEDURE — 99232 SBSQ HOSP IP/OBS MODERATE 35: CPT | Performed by: INTERNAL MEDICINE

## 2021-03-28 RX ORDER — FUROSEMIDE 10 MG/ML
40 INJECTION INTRAMUSCULAR; INTRAVENOUS DAILY
Status: DISCONTINUED | OUTPATIENT
Start: 2021-03-29 | End: 2021-03-29

## 2021-03-28 NOTE — PLAN OF CARE
A&Ox4, on RA, IV lasix continued. Denies any SOB or pain. Fatigue and drowsy. Bed locked/lowest position and call light within reach. Educated on call light. Bed alarm activated. Plan concord vs yuliana.    Problem: Patient Centered Care  Goal: Patient pr Maintains optimal cardiac output and hemodynamic stability  Description: INTERVENTIONS:  - Monitor vital signs, rhythm, and trends  - Monitor for bleeding, hypotension and signs of decreased cardiac output  - Evaluate effectiveness of vasoactive medication

## 2021-03-28 NOTE — PROGRESS NOTES
[facility] HOSP - [facility]    Progress Note    Ayala Nino Patient Status:  Inpatient    1950 MRN [id number]   Location [facility] 3W/SW Attending Sharlene Mcintyre MD   Hosp Day # 2 PCP Andrew Holder MD     HPI/Subjective:   Subject 0.039 (L) 11/10/2020     03/05/2020    DDIMER 1.23 (H) 03/05/2020    MG 2.0 03/26/2021    PHOS 3.8 03/28/2021    TROP <0.045 03/26/2021    CK 73 11/10/2020    ETOH 3 (H) 11/10/2020       No results found.   EKG 12-LEAD    Result Date: 3/27/2021  ECG

## 2021-03-28 NOTE — PROGRESS NOTES
[place] [facility] - [facility]    Progress Note    Jimmy Morocho Patient Status:  Inpatient    1950 MRN [id number]   Location [facility] 3W/SW Attending Mel Ashraf, [address] [facility] Se Day # 2 PCP Radha Clemons MD       Subjective:   Westley Hood CA 8.4* 8.4* 8.4*   * 138 135*   K 5.2* 5.1 5.2*    110 106   CO2 17.0* 23.0 23.0          No results found.         Sebastian Liang MD  3/28/2021

## 2021-03-28 NOTE — PROGRESS NOTES
Patient seen in follow up. Patient states that her chest pain resolved after taking Tylenol and has not reoccurred. Patient denies SOB, n/v, diaphoresis, and palpitations. She reports no increase in edema.    Seen with Yolanda HARRISON.    03/28/21  080 tab 25 mcg, 25 mcg, Oral, Before breakfast  linagliptin (TRADJENTA) tab TABS 5 mg, 5 mg, Oral, Daily  Cetirizine HCl (ZYRTEC) tab 5 mg, 5 mg, Oral, Daily  Meclizine HCl (ANTIVERT) tab 25 mg, 25 mg, Oral, TID PRN  melatonin tab 6 mg, 6 mg, Oral, Nightly PRN total) by mouth 2 (two) times a day. Alum & Mag Hydroxide-Simeth (ALUMINA-MAGNESIA-SIMETHICONE) 200-200-20 MG/5ML Oral Suspension, Take 30 mL by mouth 4 (four) times daily as needed for Indigestion.   baclofen 10 MG Oral Tab, Take 10 mg by mouth 2 (two) ti grade 3 DD,    -Overloaded   -monitor urine output and I/O. -BNP 3841 on admission, similar to previous BNP levels  -Iv lasix started    2. Hyperlipidemia  -Atorvastatin 40 mg will be increased to 80 .     3.  Dual chamber PPM recently   - S/P DDD PPM 1/16

## 2021-03-28 NOTE — PLAN OF CARE
Problem: Patient Centered Care  Goal: Patient preferences are identified and integrated in the patient's plan of care  Description: Interventions:  - What would you like us to know as we care for you?  From [place]  - Provide timely, complete, and accurat Evaluate effectiveness of vasoactive medications to optimize hemodynamic stability  - Monitor arterial and/or venous puncture sites for bleeding and/or hematoma  - Assess quality of pulses, skin color and temperature  - Assess for signs of decreased corona

## 2021-03-29 RX ORDER — FUROSEMIDE 40 MG/1
40 TABLET ORAL DAILY
Status: DISCONTINUED | OUTPATIENT
Start: 2021-03-29 | End: 2021-03-30

## 2021-03-29 NOTE — PLAN OF CARE
Pt alert and orientated. VSS. On room air. Up with standby assist and a walker. No complaints of pain or discomfort. Plans to discharge to [facility] once medically cleared. Call light within reach, will continue to round.        Problem: Patient CARDIOVASCULAR - ADULT  Goal: Maintains optimal cardiac output and hemodynamic stability  Description: INTERVENTIONS:  - Monitor vital signs, rhythm, and trends  - Monitor for bleeding, hypotension and signs of decreased cardiac output  - Evaluate effectiv

## 2021-03-29 NOTE — DISCHARGE SUMMARY
[facility]    Discharge Summary    Leni Johnson Patient Status:  Inpatient    1950 MRN [id number]   Location [facility] [place] 3W/SW Attending Madaline Seip, MD   Hosp Day # 3 PCP Rosemary Carlisle MD     Date of Admission: 3/25 nephrotoxins  -Renal consulted  ACE discontinued 2/2 hyperkalemia     *Anemia, macrocytic  -Hgb stable  -cont Iron.  folic acid     *DM2  -U9S 5.2%  -ADA diet, carb controlled  -Hypoglycemia protocol  -BS stable  -Novolog Insulin SS, Tradjenta     *Hypothyr mouth 2 (two) times daily with meals.      Clopidogrel Bisulfate 75 MG Tabs  Commonly known as: PLAVIX     docusate sodium 100 MG Caps  Commonly known as: COLACE     ferrous sulfate 325 (65 FE) MG Tbec     Isosorbide Mononitrate ER 60 MG Tb24  Commonly know

## 2021-03-29 NOTE — PLAN OF CARE
Problem: Diabetes/Glucose Control  Goal: Glucose maintained within prescribed range  Description: INTERVENTIONS:  - Monitor Blood Glucose as ordered  - Assess for signs and symptoms of hyperglycemia and hypoglycemia  - Administer ordered medications to [facility] heart rate control medications as ordered  - Initiate emergency measures for life threatening arrhythmias  - Monitor electrolytes and administer replacement therapy as ordered  Outcome: Progressing     Problem: RESPIRATORY - ADULT  Goal: Achieves optimal v

## 2021-03-29 NOTE — PROGRESS NOTES
[facility]    Progress Note    Nat Bruno Patient Status:  Inpatient    1950 MRN [id number]   Location [facility] 3W/SW Attending Shane Leija, [address] [facility] Day # 3 PCP Antonia Villasenor MD       Subjective:   Alexander Pelayo 1.23 (H) 03/05/2020    MG 2.0 03/26/2021    PHOS 4.1 03/29/2021    TROP <0.045 03/26/2021    CK 73 11/10/2020    ETOH 3 (H) 11/10/2020       XR CHEST AP PORTABLE  (CPT=71045)    Result Date: 3/28/2021  CONCLUSION:  1. Marked resolution of pulmonary edema a

## 2021-03-29 NOTE — CM/SW NOTE
11: 35AM  Per chart review, pt is agreeable to [facility], pending PT/OT evals and recommendations. KAYLI Mckinney sent referral to [facility] on Friday 3/26. SW reviewed referral in [facility] 310 has accepted.     Sierar Ruggiero

## 2021-03-29 NOTE — PROGRESS NOTES
Patient seen in follow up.  Patient doing well, no CP or SOB.   03/29/21  0816   BP: 129/66   Pulse:    Resp: 20   Temp: 97.9 °F (36.6 °C)       Intake/Output Summary (Last 24 hours) at 3/29/2021 1311  Last data filed at 3/29/2021 1100  Gross per 24 h melatonin tab 6 mg, 6 mg, Oral, Nightly PRN  mirtazapine (REMERON) tab 7.5 mg, 7.5 mg, Oral, Nightly  multivitamin (DIALYVITE - RENAL) tab 1 tablet, 1 tablet, Oral, Daily  nitroGLYCERIN (NITROSTAT) SL tab 0.4 mg, 0.4 mg, Sublingual, Q5 Min PRN  OLANZapine MG Oral Tab, Take 10 mg by mouth 2 (two) times daily. ferrous sulfate 325 (65 FE) MG Oral Tab EC, Take 325 mg by mouth daily with breakfast.  Meclizine HCl 25 MG Oral Tab, Take 25 mg by mouth 3 (three) times daily as needed.   Melatonin 3 MG Oral Cap, Take congestive heart failure - diastolic   - echo DG 66/1232 YWRPIO EF 55-60% and grade 3 DD,    -Overloaded   -monitor urine output and I/O. -BNP 3841 on admission, similar to previous BNP levels  -Iv lasix started    2.  Hyperlipidemia  -Atorvastatin 40 mg w

## 2021-03-29 NOTE — PROGRESS NOTES
[place] [facility] - [facility]    Progress Note    Meg Mccormickarya Patient Status:  Inpatient    1950 MRN [id number]   Location [facility] 3W/SW Attending Kam Pablo, [address] [facility] Se Day # 3 PCP Caryl Le MD        Subjective:   Ethel Nix 07/21/2020    T4F 2.0 (H) 11/10/2020    TSH 0.039 (L) 11/10/2020     03/05/2020    DDIMER 1.23 (H) 03/05/2020    MG 2.0 03/26/2021    PHOS 4.1 03/29/2021    TROP <0.045 03/26/2021    CK 73 11/10/2020    ETOH 3 (H) 11/10/2020       XR CHEST AP PORTAB stable  -Novolog Insulin SS, Tradjenta     *Hypothyroidism  -Cont  Levothyroxine     *Dementia/Bipolar disorder/anxiety  -No SI, no AMS, no hallucinations, no agitation  -Cont Remeron, Zyprexa.  Xanax prn agitation  -Monitor behavior     *Hx Falls  PT/OT/Fa

## 2021-03-30 VITALS
HEART RATE: 68 BPM | WEIGHT: 172.5 LBS | TEMPERATURE: 98 F | DIASTOLIC BLOOD PRESSURE: 61 MMHG | RESPIRATION RATE: 18 BRPM | HEIGHT: 65 IN | OXYGEN SATURATION: 97 % | SYSTOLIC BLOOD PRESSURE: 112 MMHG | BODY MASS INDEX: 28.74 KG/M2

## 2021-03-30 RX ORDER — FUROSEMIDE 40 MG/1
40 TABLET ORAL DAILY
Qty: 30 TABLET | Refills: 1 | Status: ON HOLD | COMMUNITY
Start: 2021-03-31 | End: 2021-10-04

## 2021-03-30 NOTE — DISCHARGE PLANNING
I called and gave report to nurse Miriam Meredith at [facility] rehab facility. Patient's physical and history were relayed to nursing staff and included past medical history, admitting diagnosis of chest pain/acute on chronic heart failure.  Patient will b

## 2021-03-30 NOTE — PROGRESS NOTES
Patient seen in follow up.  Patient doing well, no CP or SOB.   03/30/21  1630   BP: 112/61   Pulse: 68   Resp: 18   Temp: 97.7 °F (36.5 °C)       Intake/Output Summary (Last 24 hours) at 3/30/2021 1742  Last data filed at 3/30/2021 0845  Gross per 24 melatonin tab 6 mg, 6 mg, Oral, Nightly PRN  mirtazapine (REMERON) tab 7.5 mg, 7.5 mg, Oral, Nightly  multivitamin (DIALYVITE - RENAL) tab 1 tablet, 1 tablet, Oral, Daily  nitroGLYCERIN (NITROSTAT) SL tab 0.4 mg, 0.4 mg, Sublingual, Q5 Min PRN  OLANZapine HCl 25 MG Oral Tab, Take 25 mg by mouth 3 (three) times daily as needed. Melatonin 3 MG Oral Cap, Take 2 capsules by mouth nightly as needed. Nephro-Ramses 0.8 MG Oral Tab, Take 0.8 mg by mouth daily.   carvedilol 12.5 MG Oral Tab, Take 1 tablet (12.5 mg to pain with patent circumflex stent and a jailed marginal branch, vessel size being small also with apical RCA CAD and vessel being too small for stenting, 40-50% in-stent restenosis of the proximal mid LAD stents by cardiac cath 7/2019   -On plavix 75 mg da

## 2021-03-30 NOTE — CM/SW NOTE
09: 25AM  Per RN rounds, pt should be medically cleared today 3/30. SW provided updates to Maribell via Aidin. SW spoke to Angie escalante/ Alexandra Krishnamurthy 2 set on [address] [place] [address] & [facility] for 3/30 and 3/31.  PCS completed in Epic - pt's RN to print w/ pt's AV

## 2021-03-30 NOTE — PLAN OF CARE
Continues room air, denies SOB and CP. Up with RW and 1x assist, calls appropriately. Purewick in place. Expect discharge to [facility] today.     Problem: Patient Centered Care  Goal: Patient preferences are identified and integrated in the patient stability  Description: INTERVENTIONS:  - Monitor vital signs, rhythm, and trends  - Monitor for bleeding, hypotension and signs of decreased cardiac output  - Evaluate effectiveness of vasoactive medications to optimize hemodynamic stability  - Monitor ar

## 2021-03-30 NOTE — PLAN OF CARE
Problem: Diabetes/Glucose Control  Goal: Glucose maintained within prescribed range  Description: INTERVENTIONS:  - Monitor Blood Glucose as ordered  - Assess for signs and symptoms of hyperglycemia and hypoglycemia  - Administer ordered medications to [facility] heart rate control medications as ordered  - Initiate emergency measures for life threatening arrhythmias  - Monitor electrolytes and administer replacement therapy as ordered  Outcome: Progressing     Problem: RESPIRATORY - ADULT  Goal: Achieves optimal v

## 2021-03-30 NOTE — PROGRESS NOTES
Discharged to [facility] in stable condition with Kira Womack. Report called by discharge leader.

## 2021-03-30 NOTE — PHYSICAL THERAPY NOTE
PHYSICAL THERAPY TREATMENT NOTE - INPATIENT     Room Number: 997/023-B       Presenting Problem: Chest pain    Problem List  Principal Problem:    Chest pain of uncertain etiology  Active Problems:    Coronary artery disease involving native heart with oth motion;Strengthening;Stoop training;Stair training;Transfer training;Balance training    SUBJECTIVE  \"I moved to [facility] so I could be near my family.  They never visit though\"    OBJECTIVE  Precautions: Bed/chair alarm    WEIGHT BEARING RESTRICTION  Mark Waite session/findings; Alarm set    CURRENT GOALS   Goals to be met by: 4/9/21, goals updated 3/29/21  Patient Goal Patient's self-stated goal is: return to PLOF   Goal #1 Patient is able to demonstrate supine - sit EOB @ level: mod I      Goal #1   Current Stat

## 2021-04-02 ENCOUNTER — SNF ADMIT/H&P (OUTPATIENT)
Dept: INTERNAL MEDICINE CLINIC | Facility: SKILLED NURSING FACILITY | Age: 71
End: 2021-04-02
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
## 2021-04-02 DIAGNOSIS — Z71.89 ENCOUNTER FOR MEDICATION REVIEW AND COUNSELING: ICD-10-CM

## 2021-04-02 DIAGNOSIS — Z09 FOLLOW UP: ICD-10-CM

## 2021-04-02 DIAGNOSIS — I50.32 CHRONIC DIASTOLIC CONGESTIVE HEART FAILURE (HCC): ICD-10-CM

## 2021-04-02 PROCEDURE — 99310 SBSQ NF CARE HIGH MDM 45: CPT | Performed by: NURSE PRACTITIONER

## 2021-04-02 NOTE — PROGRESS NOTES
Kamla Luke  : 1950  Age 72year old  female patient is admitted to [facility] for SHERICE.       Reason for Visit:Initial APRN Assessment/Follow up S/P Fall and Acute on Chronic CHF      HPI  Patient is a 71-year-old female with PMH si cholesterol    • Hyperlipidemia    • Hypothyroidism    • Hypothyroidism    • Incontinence    • Muscle weakness    • NSTEMI (non-ST elevated myocardial infarction) [facility])    • Peripheral vascular disease (HCC)    • Pneumonia due to organism    • Renal disord deficit, follows commands, ---  PSYCHIATRIC: alert and oriented x 3; affect appropriate    DIAGNOSTICS/LABS:Reviewed last labs @ [facility] 3/29. CBC/BMP Weekly.     Assessment/Plan  Chest pain,chronic-Give Ranexa now instead of 9PM and nitroglycerin Sublingual X Folic acid  - monitor     DM2  - A1c 5.2%  - accuchecks  - carb controlled diet  - continue Tradjenta 5mg daily  - continue sliding scale  - Hypoglycemia protocol    Hypothyroidism  - Continue Levothyroxine 25 mcg daily  - monitor     Dementia  - No SI, no

## 2021-04-06 ENCOUNTER — SNF VISIT (OUTPATIENT)
Dept: INTERNAL MEDICINE CLINIC | Facility: SKILLED NURSING FACILITY | Age: 71
End: 2021-04-06

## 2021-04-06 DIAGNOSIS — E78.5 HYPERLIPIDEMIA, UNSPECIFIED HYPERLIPIDEMIA TYPE: Chronic | ICD-10-CM

## 2021-04-06 DIAGNOSIS — I50.9 ACUTE ON CHRONIC CONGESTIVE HEART FAILURE, UNSPECIFIED HEART FAILURE TYPE (HCC): ICD-10-CM

## 2021-04-06 DIAGNOSIS — N17.9 AKI (ACUTE KIDNEY INJURY) (HCC): ICD-10-CM

## 2021-04-06 DIAGNOSIS — G25.81 RLS (RESTLESS LEGS SYNDROME): ICD-10-CM

## 2021-04-06 DIAGNOSIS — R07.9 CHRONIC CHEST PAIN: ICD-10-CM

## 2021-04-06 DIAGNOSIS — E11.59 TYPE 2 DIABETES MELLITUS WITH OTHER CIRCULATORY COMPLICATION, WITHOUT LONG-TERM CURRENT USE OF INSULIN (HCC): Chronic | ICD-10-CM

## 2021-04-06 DIAGNOSIS — E03.9 HYPOTHYROIDISM, UNSPECIFIED TYPE: Chronic | ICD-10-CM

## 2021-04-06 DIAGNOSIS — I15.9 SECONDARY HYPERTENSION: ICD-10-CM

## 2021-04-06 DIAGNOSIS — G89.29 CHRONIC CHEST PAIN: ICD-10-CM

## 2021-04-06 DIAGNOSIS — D50.9 IRON DEFICIENCY ANEMIA, UNSPECIFIED IRON DEFICIENCY ANEMIA TYPE: ICD-10-CM

## 2021-04-06 PROCEDURE — 99309 SBSQ NF CARE MODERATE MDM 30: CPT | Performed by: NURSE PRACTITIONER

## 2021-04-06 NOTE — PROGRESS NOTES
Grace White  : 1950  Age 72year old  female patient is admitted to [place] [address] for rehabilitation and strengthening       Chief complaint: Acute on chronic CHF    HPI   Patient is a 50-year-old female with PMH significant for hypertension, tenderness.   :Deferred  LYMPHATIC:no lymphedema  MUSCULOSKELETAL: no acute synovitis upper or lower extremity  EXTREMITIES/VASCULAR:no cyanosis, clubbing or edema and dorsalis pedal pulses 2+  NEUROLOGIC: intact; no sensorimotor deficit, follows commands a 40 minute visit and greater than 50% of the time was spent counseling the patient and/or coordinating care.       DONTE Hawkins  04/06/21

## 2021-04-08 ENCOUNTER — SNF VISIT (OUTPATIENT)
Dept: INTERNAL MEDICINE CLINIC | Facility: SKILLED NURSING FACILITY | Age: 71
End: 2021-04-08

## 2021-04-08 DIAGNOSIS — N18.4 ACUTE RENAL FAILURE SUPERIMPOSED ON STAGE 4 CHRONIC KIDNEY DISEASE, UNSPECIFIED ACUTE RENAL FAILURE TYPE (HCC): ICD-10-CM

## 2021-04-08 DIAGNOSIS — N17.9 ACUTE RENAL FAILURE SUPERIMPOSED ON STAGE 4 CHRONIC KIDNEY DISEASE, UNSPECIFIED ACUTE RENAL FAILURE TYPE (HCC): ICD-10-CM

## 2021-04-08 DIAGNOSIS — R07.9 CHEST PAIN OF UNCERTAIN ETIOLOGY: ICD-10-CM

## 2021-04-08 DIAGNOSIS — I50.33 ACUTE ON CHRONIC HEART FAILURE WITH PRESERVED EJECTION FRACTION (HFPEF) (HCC): Chronic | ICD-10-CM

## 2021-04-08 DIAGNOSIS — E11.59 TYPE 2 DIABETES MELLITUS WITH OTHER CIRCULATORY COMPLICATION, WITHOUT LONG-TERM CURRENT USE OF INSULIN (HCC): Chronic | ICD-10-CM

## 2021-04-08 DIAGNOSIS — N17.9 AKI (ACUTE KIDNEY INJURY) (HCC): ICD-10-CM

## 2021-04-08 DIAGNOSIS — M62.838 MUSCLE SPASMS OF BOTH LOWER EXTREMITIES: ICD-10-CM

## 2021-04-08 DIAGNOSIS — F03.90 DEMENTIA WITHOUT BEHAVIORAL DISTURBANCE, UNSPECIFIED DEMENTIA TYPE (HCC): ICD-10-CM

## 2021-04-08 DIAGNOSIS — D64.9 ANEMIA, UNSPECIFIED TYPE: ICD-10-CM

## 2021-04-08 DIAGNOSIS — E03.9 HYPOTHYROIDISM, UNSPECIFIED TYPE: Chronic | ICD-10-CM

## 2021-04-08 DIAGNOSIS — E78.5 HYPERLIPIDEMIA, UNSPECIFIED HYPERLIPIDEMIA TYPE: Chronic | ICD-10-CM

## 2021-04-08 DIAGNOSIS — I50.9 ACUTE ON CHRONIC CONGESTIVE HEART FAILURE, UNSPECIFIED HEART FAILURE TYPE (HCC): ICD-10-CM

## 2021-04-08 DIAGNOSIS — I10 ESSENTIAL HYPERTENSION: ICD-10-CM

## 2021-04-08 PROCEDURE — 99309 SBSQ NF CARE MODERATE MDM 30: CPT | Performed by: NURSE PRACTITIONER

## 2021-04-08 NOTE — PROGRESS NOTES
Ursula Ornelas  : 1950  Age 72year old  female patient is admitted to [place] [address] for rehabilitation and strengthening     Chief complaint: Acute on chronic CHF     HPI   Patient is a 45-year-old female with PMH significant for hypertension, cyanosis, clubbing or edema and dorsalis pedal pulses 2+  NEUROLOGIC: intact; no sensorimotor deficit, follows commands  PSYCHIATRIC: alert and oriented x 3; affect appropriate    SEE PLAN BELOW  Muscle Spasms/RLS  - continue baclofen 10mg Q12  - continue monitor     Dementia  - Continue Remeron 7.5 HS  - continue Zyprexa 0.25 BID  - continue Xanax prn for agitation  - Monitor behavior      Seasonal Allergies  - continue Claritin 10mg daily     Constipation  - colace 100mg BID    This is a 45 minute visit a

## 2021-04-13 ENCOUNTER — SNF VISIT (OUTPATIENT)
Dept: INTERNAL MEDICINE CLINIC | Facility: SKILLED NURSING FACILITY | Age: 71
End: 2021-04-13

## 2021-04-13 DIAGNOSIS — N17.9 ACUTE RENAL FAILURE SUPERIMPOSED ON STAGE 4 CHRONIC KIDNEY DISEASE, UNSPECIFIED ACUTE RENAL FAILURE TYPE (HCC): ICD-10-CM

## 2021-04-13 DIAGNOSIS — R79.89 ELEVATED SERUM CREATININE: ICD-10-CM

## 2021-04-13 DIAGNOSIS — I50.9 ACUTE ON CHRONIC CONGESTIVE HEART FAILURE, UNSPECIFIED HEART FAILURE TYPE (HCC): ICD-10-CM

## 2021-04-13 DIAGNOSIS — E11.59 TYPE 2 DIABETES MELLITUS WITH OTHER CIRCULATORY COMPLICATION, WITHOUT LONG-TERM CURRENT USE OF INSULIN (HCC): Chronic | ICD-10-CM

## 2021-04-13 DIAGNOSIS — E78.5 HYPERLIPIDEMIA, UNSPECIFIED HYPERLIPIDEMIA TYPE: Chronic | ICD-10-CM

## 2021-04-13 DIAGNOSIS — M62.838 MUSCLE SPASM: ICD-10-CM

## 2021-04-13 DIAGNOSIS — N17.9 AKI (ACUTE KIDNEY INJURY) (HCC): ICD-10-CM

## 2021-04-13 DIAGNOSIS — E03.9 HYPOTHYROIDISM, UNSPECIFIED TYPE: Chronic | ICD-10-CM

## 2021-04-13 DIAGNOSIS — R07.9 ACUTE CHEST PAIN: ICD-10-CM

## 2021-04-13 DIAGNOSIS — N18.4 ACUTE RENAL FAILURE SUPERIMPOSED ON STAGE 4 CHRONIC KIDNEY DISEASE, UNSPECIFIED ACUTE RENAL FAILURE TYPE (HCC): ICD-10-CM

## 2021-04-13 DIAGNOSIS — F03.90 DEMENTIA WITHOUT BEHAVIORAL DISTURBANCE, UNSPECIFIED DEMENTIA TYPE (HCC): ICD-10-CM

## 2021-04-13 PROCEDURE — 99309 SBSQ NF CARE MODERATE MDM 30: CPT | Performed by: NURSE PRACTITIONER

## 2021-04-13 NOTE — PROGRESS NOTES
Jimmy Morocho  : 1950  Age 72year old  female patient is admitted to [place] [address] for rehabilitation and strengthening        Chief complaint: Acute on chronic CHF     HPI   Patient is a 64-year-old female with PMH significant for hypertensio acute synovitis upper or lower extremity  EXTREMITIES/VASCULAR:no cyanosis, clubbing or edema and dorsalis pedal pulses 2+  NEUROLOGIC: intact; no sensorimotor deficit, follows commands  PSYCHIATRIC: alert and oriented x 3; affect appropriate    SEE PLAN B 25 mcg daily  - monitor     Dementia  - Continue Remeron 7.5 HS  - continue Zyprexa 0.25 BID  - continue Xanax prn for agitation  - Monitor behavior     Seasonal Allergies  - continue Claritin 10mg daily     Constipation  - colace 100mg BID       This is a

## 2021-04-15 ENCOUNTER — SNF DISCHARGE (OUTPATIENT)
Dept: INTERNAL MEDICINE CLINIC | Facility: SKILLED NURSING FACILITY | Age: 71
End: 2021-04-15

## 2021-04-15 DIAGNOSIS — I10 ESSENTIAL HYPERTENSION: ICD-10-CM

## 2021-04-15 DIAGNOSIS — N17.9 AKI (ACUTE KIDNEY INJURY) (HCC): ICD-10-CM

## 2021-04-15 DIAGNOSIS — F33.1 MAJOR DEPRESSIVE DISORDER, RECURRENT EPISODE, MODERATE (HCC): ICD-10-CM

## 2021-04-15 DIAGNOSIS — I50.9 ACUTE ON CHRONIC CONGESTIVE HEART FAILURE, UNSPECIFIED HEART FAILURE TYPE (HCC): ICD-10-CM

## 2021-04-15 DIAGNOSIS — R07.9 ACUTE CHEST PAIN: ICD-10-CM

## 2021-04-15 DIAGNOSIS — E78.5 HYPERLIPIDEMIA, UNSPECIFIED HYPERLIPIDEMIA TYPE: Chronic | ICD-10-CM

## 2021-04-15 DIAGNOSIS — G25.81 RLS (RESTLESS LEGS SYNDROME): ICD-10-CM

## 2021-04-15 DIAGNOSIS — E11.59 TYPE 2 DIABETES MELLITUS WITH OTHER CIRCULATORY COMPLICATION, WITHOUT LONG-TERM CURRENT USE OF INSULIN (HCC): Chronic | ICD-10-CM

## 2021-04-15 PROCEDURE — 99316 NF DSCHRG MGMT 30 MIN+: CPT | Performed by: NURSE PRACTITIONER

## 2021-04-15 NOTE — PROGRESS NOTES
Mavis Le, 69/1950, 72year old, female is being discharged from [place] [address] to home      [address]    Date of Admission to [facility] SHERICE:03/30/21    Date of Discharge from [facility] SHERICE: 04/16/21                              Admitting EKG no changes, Troponin neg x2, pro BNP elevated  - S/p stent 7/2019  - continue Coreg 12.5 BID, hold meds if SBP <100  - continue Imdur 60mg daily,hold meds if SBP <110  - continue atorvastatin 40mg daily  - continue ASA 81mg daily  - continue plavix 75m

## 2021-05-18 ENCOUNTER — APPOINTMENT (OUTPATIENT)
Dept: GENERAL RADIOLOGY | Facility: HOSPITAL | Age: 71
DRG: 291 | End: 2021-05-18
Attending: EMERGENCY MEDICINE
Payer: MEDICARE

## 2021-05-18 ENCOUNTER — HOSPITAL ENCOUNTER (INPATIENT)
Facility: HOSPITAL | Age: 71
LOS: 1 days | Discharge: NURSING, REHAB OR LONG-TERM CARE | DRG: 291 | End: 2021-05-19
Attending: EMERGENCY MEDICINE | Admitting: INTERNAL MEDICINE
Payer: MEDICARE

## 2021-05-18 DIAGNOSIS — I50.33 ACUTE ON CHRONIC DIASTOLIC CONGESTIVE HEART FAILURE (HCC): Primary | ICD-10-CM

## 2021-05-18 PROCEDURE — 84484 ASSAY OF TROPONIN QUANT: CPT | Performed by: EMERGENCY MEDICINE

## 2021-05-18 PROCEDURE — 80048 BASIC METABOLIC PNL TOTAL CA: CPT | Performed by: EMERGENCY MEDICINE

## 2021-05-18 PROCEDURE — 99285 EMERGENCY DEPT VISIT HI MDM: CPT

## 2021-05-18 PROCEDURE — 93005 ELECTROCARDIOGRAM TRACING: CPT

## 2021-05-18 PROCEDURE — 83690 ASSAY OF LIPASE: CPT | Performed by: EMERGENCY MEDICINE

## 2021-05-18 PROCEDURE — 83880 ASSAY OF NATRIURETIC PEPTIDE: CPT | Performed by: EMERGENCY MEDICINE

## 2021-05-18 PROCEDURE — 96374 THER/PROPH/DIAG INJ IV PUSH: CPT

## 2021-05-18 PROCEDURE — 82962 GLUCOSE BLOOD TEST: CPT

## 2021-05-18 PROCEDURE — 80076 HEPATIC FUNCTION PANEL: CPT | Performed by: EMERGENCY MEDICINE

## 2021-05-18 PROCEDURE — 93010 ELECTROCARDIOGRAM REPORT: CPT | Performed by: EMERGENCY MEDICINE

## 2021-05-18 PROCEDURE — 71045 X-RAY EXAM CHEST 1 VIEW: CPT | Performed by: EMERGENCY MEDICINE

## 2021-05-18 PROCEDURE — 85025 COMPLETE CBC W/AUTO DIFF WBC: CPT | Performed by: EMERGENCY MEDICINE

## 2021-05-18 RX ORDER — ASCORBIC ACID, THIAMINE, RIBOFLAVIN, NIACINAMIDE, PYRIDOXINE, FOLIC ACID, COBALAMIN, BIOTIN, PANTOTHENIC ACID 100; 1.5; 1.7; 20; 10; 1; 6; 300; 1 MG/1; MG/1; MG/1; MG/1; MG/1; MG/1; UG/1; UG/1; MG/1
1 TABLET, COATED ORAL DAILY
Status: DISCONTINUED | OUTPATIENT
Start: 2021-05-18 | End: 2021-05-19

## 2021-05-18 RX ORDER — OLANZAPINE 2.5 MG/1
2.5 TABLET ORAL NIGHTLY
Status: DISCONTINUED | OUTPATIENT
Start: 2021-05-18 | End: 2021-05-19

## 2021-05-18 RX ORDER — ONDANSETRON 4 MG/1
4 TABLET, ORALLY DISINTEGRATING ORAL EVERY 6 HOURS PRN
Status: DISCONTINUED | OUTPATIENT
Start: 2021-05-18 | End: 2021-05-19

## 2021-05-18 RX ORDER — MELATONIN
6 NIGHTLY PRN
Status: DISCONTINUED | OUTPATIENT
Start: 2021-05-18 | End: 2021-05-19

## 2021-05-18 RX ORDER — MIRTAZAPINE 15 MG/1
7.5 TABLET, FILM COATED ORAL NIGHTLY
Status: DISCONTINUED | OUTPATIENT
Start: 2021-05-18 | End: 2021-05-19

## 2021-05-18 RX ORDER — DOCUSATE SODIUM 100 MG/1
100 CAPSULE, LIQUID FILLED ORAL 2 TIMES DAILY
Status: DISCONTINUED | OUTPATIENT
Start: 2021-05-18 | End: 2021-05-19

## 2021-05-18 RX ORDER — ROPINIROLE 0.5 MG/1
0.25 TABLET, FILM COATED ORAL 2 TIMES DAILY
Status: DISCONTINUED | OUTPATIENT
Start: 2021-05-18 | End: 2021-05-19

## 2021-05-18 RX ORDER — RANOLAZINE 500 MG/1
1000 TABLET, EXTENDED RELEASE ORAL 2 TIMES DAILY
Status: DISCONTINUED | OUTPATIENT
Start: 2021-05-18 | End: 2021-05-19

## 2021-05-18 RX ORDER — RANOLAZINE 500 MG/1
500 TABLET, EXTENDED RELEASE ORAL 2 TIMES DAILY
Status: DISCONTINUED | OUTPATIENT
Start: 2021-05-18 | End: 2021-05-18

## 2021-05-18 RX ORDER — CLOPIDOGREL BISULFATE 75 MG/1
75 TABLET ORAL DAILY
Status: DISCONTINUED | OUTPATIENT
Start: 2021-05-18 | End: 2021-05-19

## 2021-05-18 RX ORDER — MECLIZINE HYDROCHLORIDE 25 MG/1
25 TABLET ORAL 3 TIMES DAILY PRN
Status: DISCONTINUED | OUTPATIENT
Start: 2021-05-18 | End: 2021-05-19

## 2021-05-18 RX ORDER — BACLOFEN 10 MG/1
10 TABLET ORAL 2 TIMES DAILY
Status: DISCONTINUED | OUTPATIENT
Start: 2021-05-18 | End: 2021-05-19

## 2021-05-18 RX ORDER — LEVOTHYROXINE SODIUM 0.03 MG/1
25 TABLET ORAL
Status: DISCONTINUED | OUTPATIENT
Start: 2021-05-18 | End: 2021-05-19

## 2021-05-18 RX ORDER — MAGNESIUM HYDROXIDE/ALUMINUM HYDROXICE/SIMETHICONE 120; 1200; 1200 MG/30ML; MG/30ML; MG/30ML
30 SUSPENSION ORAL 4 TIMES DAILY PRN
Status: DISCONTINUED | OUTPATIENT
Start: 2021-05-18 | End: 2021-05-19

## 2021-05-18 RX ORDER — CETIRIZINE HYDROCHLORIDE 10 MG/1
10 TABLET ORAL DAILY
Status: DISCONTINUED | OUTPATIENT
Start: 2021-05-18 | End: 2021-05-19

## 2021-05-18 RX ORDER — ACETAMINOPHEN 325 MG/1
650 TABLET ORAL EVERY 6 HOURS PRN
Status: DISCONTINUED | OUTPATIENT
Start: 2021-05-18 | End: 2021-05-19

## 2021-05-18 RX ORDER — ISOSORBIDE MONONITRATE 60 MG/1
60 TABLET, EXTENDED RELEASE ORAL DAILY
Status: DISCONTINUED | OUTPATIENT
Start: 2021-05-18 | End: 2021-05-19

## 2021-05-18 RX ORDER — LOPERAMIDE HYDROCHLORIDE 2 MG/1
2 CAPSULE ORAL 4 TIMES DAILY PRN
Status: DISCONTINUED | OUTPATIENT
Start: 2021-05-18 | End: 2021-05-19

## 2021-05-18 RX ORDER — ASPIRIN 81 MG/1
81 TABLET, CHEWABLE ORAL DAILY
Status: DISCONTINUED | OUTPATIENT
Start: 2021-05-18 | End: 2021-05-19

## 2021-05-18 RX ORDER — FUROSEMIDE 10 MG/ML
40 INJECTION INTRAMUSCULAR; INTRAVENOUS ONCE
Status: COMPLETED | OUTPATIENT
Start: 2021-05-18 | End: 2021-05-18

## 2021-05-18 RX ORDER — LOPERAMIDE HYDROCHLORIDE 2 MG/1
2 CAPSULE ORAL 4 TIMES DAILY PRN
COMMUNITY

## 2021-05-18 RX ORDER — DEXTROSE MONOHYDRATE 25 G/50ML
50 INJECTION, SOLUTION INTRAVENOUS
Status: DISCONTINUED | OUTPATIENT
Start: 2021-05-18 | End: 2021-05-19

## 2021-05-18 RX ORDER — CARVEDILOL 12.5 MG/1
12.5 TABLET ORAL 2 TIMES DAILY WITH MEALS
Status: DISCONTINUED | OUTPATIENT
Start: 2021-05-18 | End: 2021-05-19

## 2021-05-18 RX ORDER — PANTOPRAZOLE SODIUM 40 MG/1
40 TABLET, DELAYED RELEASE ORAL
Status: DISCONTINUED | OUTPATIENT
Start: 2021-05-19 | End: 2021-05-19

## 2021-05-18 RX ORDER — LISINOPRIL 10 MG/1
10 TABLET ORAL DAILY
Status: DISCONTINUED | OUTPATIENT
Start: 2021-05-18 | End: 2021-05-18

## 2021-05-18 RX ORDER — ATORVASTATIN CALCIUM 80 MG/1
80 TABLET, FILM COATED ORAL NIGHTLY
Status: DISCONTINUED | OUTPATIENT
Start: 2021-05-18 | End: 2021-05-19

## 2021-05-18 RX ORDER — FUROSEMIDE 10 MG/ML
60 INJECTION INTRAMUSCULAR; INTRAVENOUS
Status: DISCONTINUED | OUTPATIENT
Start: 2021-05-18 | End: 2021-05-19

## 2021-05-18 NOTE — ED PROVIDER NOTES
Patient Seen in: [facility] Emergency Department      History   Patient presents with:  Difficulty Breathing    Stated Complaint: SOB    HPI/Subjective:   HPI    72 yoF with chronic diastolic heart failure and preserved ejection fraction, CKD, yanira Systems    Positive for stated complaint: SOB  Other systems are as noted in HPI. Constitutional and vital signs reviewed. All other systems reviewed and negative except as noted above.     Physical Exam     ED Triage Vitals [05/18/21 1125]   /5 301 (*)     GFR, Non- 23 (*)     GFR, -American 27 (*)     All other components within normal limits   PRO BETA NATRIURETIC PEPTIDE - Abnormal; Notable for the following components:    Pro-Beta Natriuretic Peptide 10,294 (*)     All exacerbation and fluid overload with elevated BNP and worsening pulmonary edema. IV Lasix ordered. Serial abdominal exams remain benign. Spoke with Dr. Rah Zavaleta and Yany Rock for consultation. D/w DR. Eduar Atkins for admission.       Admission disposition: 5/18/2021

## 2021-05-18 NOTE — ED QUICK NOTES
Orders for admission, patient is aware of plan and ready to go upstairs. Any questions, please call ED RN Laurent at extension [phone].       Type of COVID test sent: Rapid  COVID Suspicion level: Not Detected    Titratable drug(s) infusing: NA  Rate: NA    LOC

## 2021-05-18 NOTE — ED INITIAL ASSESSMENT (HPI)
Pt from [facility]. States she became nauseated and vomited during breakfast. States \"I thought it was from eating too fast\" EMS found the patient SOB and placed on NC. Pt is a/o x 4.

## 2021-05-19 VITALS
TEMPERATURE: 98 F | WEIGHT: 166.13 LBS | BODY MASS INDEX: 27.68 KG/M2 | HEIGHT: 65 IN | HEART RATE: 71 BPM | DIASTOLIC BLOOD PRESSURE: 66 MMHG | SYSTOLIC BLOOD PRESSURE: 113 MMHG | OXYGEN SATURATION: 95 % | RESPIRATION RATE: 22 BRPM

## 2021-05-19 PROCEDURE — 80048 BASIC METABOLIC PNL TOTAL CA: CPT | Performed by: INTERNAL MEDICINE

## 2021-05-19 PROCEDURE — 97162 PT EVAL MOD COMPLEX 30 MIN: CPT

## 2021-05-19 PROCEDURE — 82962 GLUCOSE BLOOD TEST: CPT

## 2021-05-19 PROCEDURE — 97166 OT EVAL MOD COMPLEX 45 MIN: CPT

## 2021-05-19 PROCEDURE — 97530 THERAPEUTIC ACTIVITIES: CPT

## 2021-05-19 PROCEDURE — 85025 COMPLETE CBC W/AUTO DIFF WBC: CPT | Performed by: INTERNAL MEDICINE

## 2021-05-19 RX ORDER — FUROSEMIDE 10 MG/ML
40 INJECTION INTRAMUSCULAR; INTRAVENOUS DAILY
Status: DISCONTINUED | OUTPATIENT
Start: 2021-05-20 | End: 2021-05-19

## 2021-05-19 RX ORDER — FUROSEMIDE 10 MG/ML
40 INJECTION INTRAMUSCULAR; INTRAVENOUS
Status: DISCONTINUED | OUTPATIENT
Start: 2021-05-19 | End: 2021-05-19

## 2021-05-19 RX ORDER — CETIRIZINE HYDROCHLORIDE 5 MG/1
5 TABLET ORAL DAILY
Status: DISCONTINUED | OUTPATIENT
Start: 2021-05-20 | End: 2021-05-19

## 2021-05-19 RX ORDER — FUROSEMIDE 10 MG/ML
40 INJECTION INTRAMUSCULAR; INTRAVENOUS DAILY
Qty: 20 ML | Refills: 0 | Status: SHIPPED | OUTPATIENT
Start: 2021-05-20 | End: 2021-05-25

## 2021-05-19 NOTE — CONSULTS
[facility] HOSP - [facility]    Report of Consultation    Boston Galarza Patient Status:  Inpatient    1950 MRN [id number]   Location [facility] 3W/SW Attending George Castro MD   Hosp Day # 0 PCP Livia Wellington MD     Date of Admission: Never           Current Medications:  acetaminophen (TYLENOL) tab 650 mg, 650 mg, Oral, Q6H PRN  Alum & Mag Hydroxide-Simeth (MAALOX) oral suspension 30 mL, 30 mL, Oral, QID PRN  aspirin chewable tab 81 mg, 81 mg, Oral, Daily  atorvastatin (LIPITOR) tab 80 Aspart Pen 100 UNIT/ML Subcutaneous Solution Pen-injector, Inject 1-5 Units into the skin 3 (three) times daily with meals.  CORRECTION FACTOR - LOW DOSE  Continue to give correction insulin even if NPO  DO NOT HOLD OR ALTER INSULIN DOSE WITHOUT A PHYSICIAN 75 MG Oral Tab, Take 75 mg by mouth daily. aspirin 81 MG Oral Chew Tab, Chew 1 tablet (81 mg total) by mouth daily. atorvastatin 40 MG Oral Tab, Take 80 mg by mouth nightly.     acetaminophen 325 MG Oral Tab, Take 650 mg by mouth every 6 (six) hours as ne OLANZapine  2.5 mg Oral Nightly   • [START ON 5/19/2021] Pantoprazole Sodium  40 mg Oral QAM AC   • Ranolazine ER  500 mg Oral BID   • rOPINIRole HCl  0.25 mg Oral BID   • Insulin Aspart Pen  1-7 Units Subcutaneous TID CC       Continuous Infusions:     Ge ventricle: The cavity size was normal. Wall thickness was      normal. Systolic function was normal. The estimated ejection      fraction was 55%, by biplane method of disks. Hypokinesis of the      apicalinferoseptal myocardium.  Doppler parameters are con CAD and vessel being too small for stenting, 40-50% in-stent restenosis of the proximal mid LAD stents by cardiac cath 7/2019   -On plavix 75 mg daily, aspirin, lipitor 80 mg daily, coreg 12.5 mg BID, Imdur 60 mg daily        7. Severe mitral regurgitation

## 2021-05-19 NOTE — PLAN OF CARE
Pt received from ED alert and orientated, VSS - on 2L nasal cannula. Pt orientated to room upon arrival, return demonstration on use of call light. Plans for IV lasix, wean oxygen as tolerated. Bed locked in lowest position, bed alarm activated.  Call light Problem: CARDIOVASCULAR - ADULT  Goal: Maintains optimal cardiac output and hemodynamic stability  Description: INTERVENTIONS:  - Monitor vital signs, rhythm, and trends  - Monitor for bleeding, hypotension and signs of decreased cardiac output  - Evalua for standing, transferring and ambulating w/ or w/o assistive devices  - Assist with transfers and ambulation using safe patient handling equipment as needed  - Ensure adequate protection for wounds/incisions during mobilization  - Obtain PT/OT consults as with strengthening/mobility  - Encourage toileting schedule  Outcome: Progressing     Problem: DISCHARGE PLANNING  Goal: Discharge to home or other facility with appropriate resources  Description: INTERVENTIONS:  - Identify barriers to discharge w/pt and

## 2021-05-19 NOTE — PROGRESS NOTES
Critical access hospital Pharmacy Note:  Renal Dose Adjustment for Cetirizine (ZYRTEC)    Mavis Le has been prescribed Cetirizine (Zyrtec) 10 mg orally daily. Estimated Creatinine Clearance: 25.5 mL/min (A) (based on SCr of 1.87 mg/dL (H)).     The dose has been [place]

## 2021-05-19 NOTE — CONSULTS
[facility] HOSP - [facility]    Report of Consultation    Tonny Winters Patient Status:  Inpatient    1950 MRN [id number]   Location [facility] 3W/SW Attending Ruben Garcia MD   Hosp Day # 1 PCP Nannette Cardona MD     Date of Admission: Laterality Date   • CHOLECYSTECTOMY     • SINUS SURGERY           Family History  Family History   Problem Relation Age of Onset   • Other (ULCERS) Father    • Cancer Mother         UTERINE/COLON       Social History  Social History    Tobacco Use      Smo injection 50 mL, 50 mL, Intravenous, Q15 Min PRN   Or  glucose (DEX4) oral liquid 30 g, 30 g, Oral, Q15 Min PRN   Or  Glucose-Vitamin C (DEX-4) chewable tab 8 tablet, 8 tablet, Oral, Q15 Min PRN  Insulin Aspart Pen (NOVOLOG) 100 UNIT/ML flexpen 1-7 Units, daily. ferrous sulfate 325 (65 FE) MG Oral Tab EC, Take 325 mg by mouth daily with breakfast.  Nephro-Ramses 0.8 MG Oral Tab, Take 0.8 mg by mouth daily. carvedilol 12.5 MG Oral Tab, Take 1 tablet (12.5 mg total) by mouth 2 (two) times daily with meals.   l Wt Readings from Last 1 Encounters:  05/19/21 : 166 lb 1.6 oz (75.3 kg)      Exam  Gen: No acute distress  Heent: NC AT, mucous memb clear, neck supple  Pulm: Lungs clear, normal respiratory effort  CV: Heart with regular rate and rhythm, no edema  Abd

## 2021-05-19 NOTE — PLAN OF CARE
Alert. Denies pain. IV Lasix given. Purewick in place. Adequate urine output. Bed alarm and ibed in place. Call light in reach. Will continue to monitor.      Problem: Patient Centered Care  Goal: Patient preferences are identified and integrated in the pat signs, rhythm, and trends  - Monitor for bleeding, hypotension and signs of decreased cardiac output  - Evaluate effectiveness of vasoactive medications to optimize hemodynamic stability  - Monitor arterial and/or venous puncture sites for bleeding and/or as needed  - Ensure adequate protection for wounds/incisions during mobilization  - Obtain PT/OT consults as needed  - Advance activity as appropriate  - Communicate ordered activity level and limitations with patient/family  Outcome: Progressing     Probl facility with appropriate resources  Description: INTERVENTIONS:  - Identify barriers to discharge w/pt and caregiver  - Include patient/family/discharge partner in discharge planning  - Arrange for needed discharge resources and transportation as appropri

## 2021-05-19 NOTE — DISCHARGE PLANNING
I called and gave report to nurse Elsie Hood at [facility] rehab facility. Patient's physical and history were relayed to nursing staff and included past medical history, admitting diagnosis of acute on chronic heart failure.  Patient will be picked up

## 2021-05-19 NOTE — CM/SW NOTE
08: 30AM  Received message via Epic from [employer]. Per Juan Carlos Oneill, pt would qualify for SNF placement under wavier for IV diuretics. Juan Carlos Oneill stated she has discussed this w/ Dr. Ju Shah who will discuss w/ cardiology to confirm they are agreeable.  Per Yolanda Olivares

## 2021-05-19 NOTE — OCCUPATIONAL THERAPY NOTE
OCCUPATIONAL THERAPY EVALUATION - INPATIENT     Room Number: 305/305-A  Evaluation Date: 5/19/2021  Type of Evaluation: Initial  Presenting Problem:  (Acute on chronic dialstolic CHF )    Physician Order: IP Consult to Occupational Therapy  Reason for Ther education;Patient/Family training;Equipment eval/education; Compensatory technique education       OCCUPATIONAL THERAPY MEDICAL/SOCIAL HISTORY     Problem List  Principal Problem:    Acute on chronic diastolic congestive heart failure ([facility] [address]  Active Problems SUBJECTIVE  Pt agreeable for OOB activities.      OCCUPATIONAL THERAPY EXAMINATION      OBJECTIVE  Precautions: Bed/chair alarm (Hx falls )  Fall Risk: High fall risk    PAIN ASSESSMENT  Ratin          ACTIVITY TOLERANCE  Pulse: 71  Heart Rate Sourc Upper Extremity Dressing: n/t   Lower Extremity Dressing: min. A     Education Provided: Role of therapies, bed mobility, transfer skills, ADLs  Patient End of Session: Up in chair;Needs met;Call light within reach;RN aware of session/findings; All patie

## 2021-05-19 NOTE — PROGRESS NOTES
Patient is seen in follow-up. Patient was readmitted for congestive heart failure exacerbation. Patient states that she has been compliant with the diuretic at her facility. She has been seeing a primary care physician Dr. Reji Harris. Denies chest pain. PRN  cetirizine (ZYRTEC) tab 10 mg, 10 mg, Oral, Daily  Meclizine HCl (ANTIVERT) tab 25 mg, 25 mg, Oral, TID PRN  melatonin tab 6 mg, 6 mg, Oral, Nightly PRN  mirtazapine (REMERON) tab 7.5 mg, 7.5 mg, Oral, Nightly  multivitamin (DIALYVITE - RENAL) tab 1 t 24 Hr, Take 1 tablet (60 mg total) by mouth daily. docusate sodium 100 MG Oral Cap, Take 100 mg by mouth 2 (two) times daily. lisinopril 10 MG Oral Tab, Take 1 tablet (10 mg total) by mouth daily.   OLANZapine 2.5 MG Oral Tab, Take 1 tablet (2.5 mg total) needed. nitroGLYCERIN 0.4 MG Sublingual SL Tab, Place 0.4 mg under the tongue every 5 (five) minutes as needed for Chest pain. XR CHEST AP PORTABLE  (CPT=71045)    Result Date: 5/18/2021  CONCLUSION:  1.  Interval worsening in the chest with mild card plavix 75 mg daily, aspirin, lipitor 80 mg daily, coreg 12.5 mg BID, Imdur 60 mg daily        7. Severe mitral regurgitation  - has been following with [facility] for surgery as outpatient and is seeing a surgeon there as well she is high surgical risk which is w

## 2021-05-19 NOTE — PLAN OF CARE
Pt alert and orientatedx4, can be forgetful at times. On 1L nasal cannula, VSS. Up with moderate assist and a walker. Plans to discharge to [place] to finish IV lasix per md order. Pt updated with plan of care.  No complaints of pain or discomfort - call goals for specific interventions  Outcome: Adequate for Discharge     Problem: CARDIOVASCULAR - ADULT  Goal: Maintains optimal cardiac output and hemodynamic stability  Description: INTERVENTIONS:  - Monitor vital signs, rhythm, and trends  - Monitor for b level of function  Description: INTERVENTIONS:  - Assess patient stability and activity tolerance for standing, transferring and ambulating w/ or w/o assistive devices  - Assist with transfers and ambulation using safe patient handling equipment as needed assessment  - Modify environment to reduce risk of injury  - Provide assistive devices as appropriate  - Consider OT/PT consult to assist with strengthening/mobility  - Encourage toileting schedule  Outcome: Adequate for Discharge     Problem: DISCHARGE PL

## 2021-05-19 NOTE — DISCHARGE SUMMARY
[facility] AND [facility]  Discharge Summary    Stan Medina Patient Status:  Inpatient    1950 MRN [id number]   Location [facility] 3W/SW Attending Zo Garcia MD   Hosp Day # 1 PCP Francine Shine MD     Date of Admission: 2021    Da Delirium due to another medical condition     Altered mental status     Altered mental status, unspecified altered mental status type     Transient hypotension     PHILIP (acute kidney injury) ([facility] [address]     Catat[place]     Acute on chronic heart failure with preser Plavix, SCDs       Consultations:   Consultants  Chat With All Active Members    Provider Role Specialty    Yareli Pop MD  Consulting Physician  Nannette Dhillon MD  Consulting Physician  Cardiovascular Diseases    Adolfo Wilhelm MD  Con tablet Refills: 2    Levothyroxine Sodium 25 MCG Oral Tab  Take 1 tablet (25 mcg total) by mouth before breakfast.  Qty: 30 tablet Refills: 2    mirtazapine 7.5 MG Oral Tab  Take 1 tablet (7.5 mg total) by mouth nightly.   Qty: 30 tablet Refills: 0    rOPIN 0.4 mg under the tongue every 5 (five) minutes as needed for Chest pain.           Joanne Schmitz MD  5/19/2021  2:56 PM

## 2021-05-19 NOTE — PHYSICAL THERAPY NOTE
PHYSICAL THERAPY EVALUATION - INPATIENT     Room Number: 305/305-A  Evaluation Date: 5/19/2021  Type of Evaluation: Initial   Physician Order: PT Eval and Treat    Presenting Problem: acute on chronic diastolic CHF  Reason for Therapy: Mobility Dysfunctio impairment may benefit from home with home PT and initial 24 hour supervision/assist- if this level of assist is not available, pt will require sub-acute rehab. Patient expressed that she can request increased caregiver hours if needed.     Patient will cherri Laterality Date   • CHOLECYSTECTOMY     • SINUS SURGERY           HOME SITUATION  Type of Home: Independent living facility [facility] )   Home Layout: One level                Lives With: Alone  Drives: No  Patient Owned Equipment:  (rollator)  Kavita wheelchair, bedside commode, etc.): A Little   -   Moving from lying on back to sitting on the side of the bed?: A Little   How much help from another person does the patient currently need. ..   -   Moving to and from a bed to a chair (including a wheelcha

## 2021-05-19 NOTE — CM/SW NOTE
BPCI Bundled Advanced Medicare Program       Plan of care reviewed for care coordination and discharge planning.  Noted pt falls under  BPCI/Medicare program, with DRG Congestive Heart Failure (291, F)  [facility]  SW/CM to remain availa

## 2021-05-19 NOTE — H&P
[facility] - [facility]    History and Physical    Stan Medina Patient Status:  Inpatient    1950 MRN [id number]   Location [facility] 3W/SW Attending Zo Garcia MD   Hosp Day # 1 PCP Marnell Eisenmenger, MD     Date:  2021  Da DIARRHEA  Tetracycline            RASH  furosemide 40 MG Oral Tab, Take 1 tablet (40 mg total) by mouth daily. Insulin Aspart Pen 100 UNIT/ML Subcutaneous Solution Pen-injector, Inject 1-5 Units into the skin 3 (three) times daily with meals.  Osorio Silva daily. Pantoprazole Sodium 40 MG Oral Tab EC, Take 1 tablet (40 mg total) by mouth every morning before breakfast.  Clopidogrel Bisulfate 75 MG Oral Tab, Take 75 mg by mouth daily. aspirin 81 MG Oral Chew Tab, Chew 1 tablet (81 mg total) by mouth daily. mood and affect.      Cervical Papanicolaou contraindicated    Results:     Lab Results   Component Value Date    WBC 5.8 05/19/2021    HGB 9.5 (L) 05/19/2021    HCT 30.8 (L) 05/19/2021    .0 05/19/2021    CREATSERUM 1.87 (H) 05/19/2021    BUN 45 (H) coreg, imdur, ASA, plavix  Stable      HLP  Cont atorvastatin      HTN  Controlled  Cont lisinopril, coreg, imdur      DM  Cont meds  Monitor for hypoglycemia      Hypothyroidism  Cont levothyroxine 25mcg daily      Dementia  Cont remeron, zyprexa  Xanax p

## 2021-05-20 ENCOUNTER — INITIAL APN SNF VISIT (OUTPATIENT)
Dept: INTERNAL MEDICINE CLINIC | Facility: SKILLED NURSING FACILITY | Age: 71
End: 2021-05-20

## 2021-05-20 DIAGNOSIS — E78.5 HYPERLIPIDEMIA, UNSPECIFIED HYPERLIPIDEMIA TYPE: Chronic | ICD-10-CM

## 2021-05-20 DIAGNOSIS — F33.1 MAJOR DEPRESSIVE DISORDER, RECURRENT EPISODE, MODERATE (HCC): ICD-10-CM

## 2021-05-20 DIAGNOSIS — I50.33 ACUTE ON CHRONIC DIASTOLIC CONGESTIVE HEART FAILURE (HCC): ICD-10-CM

## 2021-05-20 DIAGNOSIS — R07.9 CHEST PAIN OF UNCERTAIN ETIOLOGY: ICD-10-CM

## 2021-05-20 DIAGNOSIS — Z79.01 ON CLOPIDOGREL THERAPY: ICD-10-CM

## 2021-05-20 DIAGNOSIS — E11.59 TYPE 2 DIABETES MELLITUS WITH OTHER CIRCULATORY COMPLICATION, WITHOUT LONG-TERM CURRENT USE OF INSULIN (HCC): Chronic | ICD-10-CM

## 2021-05-20 DIAGNOSIS — I50.32 CHRONIC DIASTOLIC CONGESTIVE HEART FAILURE (HCC): ICD-10-CM

## 2021-05-20 DIAGNOSIS — E03.9 HYPOTHYROIDISM, UNSPECIFIED TYPE: Chronic | ICD-10-CM

## 2021-05-20 PROCEDURE — 99306 1ST NF CARE HIGH MDM 50: CPT | Performed by: NURSE PRACTITIONER

## 2021-05-20 PROCEDURE — 1123F ACP DISCUSS/DSCN MKR DOCD: CPT | Performed by: NURSE PRACTITIONER

## 2021-05-20 PROCEDURE — 1111F DSCHRG MED/CURRENT MED MERGE: CPT | Performed by: NURSE PRACTITIONER

## 2021-05-20 NOTE — PROGRESS NOTES
Albin Posada  : 1950  Age 72year old  female patient is admitted to [place] [address] for rehabilitation and strengthening.       Chief complaint: SOB    HPI  72 yoF with chronic diastolic heart failure and preserved ejection fraction, CKD, diabet Use: Never used    Alcohol use: Not Currently    Drug use: Never      ALLERGIES:    Tetracyclines & Rel*    RASH  Metformin               DIARRHEA  Tetracycline            RASH    CODE STATUS:  Full Code    ADVANCED CARE PLANNING TEAM: Will need family car Levothyroxine Sodium 25 MCG Oral Tab Take 1 tablet (25 mcg total) by mouth before breakfast. 30 tablet 2   • mirtazapine 7.5 MG Oral Tab Take 1 tablet (7.5 mg total) by mouth nightly.  30 tablet 0   • rOPINIRole HCl 0.25 MG Oral Tab Take 1 tablet (0.25 mg t HEALTH: well developed, well nourished, in no apparent distress  LINES, TUBES, DRAINS:  none  SKIN: no rashes, no suspicious lesions, warm, dry  WOUND: see wound assessment  EYES: PERRLA, EOMI, sclera anicteric, conjunctiva normal; there is no nystagmus, n monitor     CAD/Chronic chest pain   - Hx of chest pain with patent circumflex stent and a jailed marginal branch, vessel size being small also with apical RCA CAD and vessel being too small for stenting, 40-50% in-stent restenosis of the proximal mid LAD

## 2021-05-21 ENCOUNTER — SNF VISIT (OUTPATIENT)
Dept: INTERNAL MEDICINE CLINIC | Facility: SKILLED NURSING FACILITY | Age: 71
End: 2021-05-21

## 2021-05-21 DIAGNOSIS — I50.9 ACUTE ON CHRONIC CONGESTIVE HEART FAILURE, UNSPECIFIED HEART FAILURE TYPE (HCC): ICD-10-CM

## 2021-05-21 DIAGNOSIS — E11.59 TYPE 2 DIABETES MELLITUS WITH OTHER CIRCULATORY COMPLICATION, WITHOUT LONG-TERM CURRENT USE OF INSULIN (HCC): Chronic | ICD-10-CM

## 2021-05-21 DIAGNOSIS — Z79.01 ON CLOPIDOGREL THERAPY: ICD-10-CM

## 2021-05-21 DIAGNOSIS — F33.1 MAJOR DEPRESSIVE DISORDER, RECURRENT EPISODE, MODERATE (HCC): ICD-10-CM

## 2021-05-21 DIAGNOSIS — E03.9 HYPOTHYROIDISM, UNSPECIFIED TYPE: Chronic | ICD-10-CM

## 2021-05-21 DIAGNOSIS — N17.9 ACUTE RENAL FAILURE SUPERIMPOSED ON STAGE 4 CHRONIC KIDNEY DISEASE, UNSPECIFIED ACUTE RENAL FAILURE TYPE (HCC): ICD-10-CM

## 2021-05-21 DIAGNOSIS — Z95.0 PRESENCE OF PERMANENT CARDIAC PACEMAKER: Chronic | ICD-10-CM

## 2021-05-21 DIAGNOSIS — I10 ESSENTIAL HYPERTENSION: ICD-10-CM

## 2021-05-21 DIAGNOSIS — N18.4 ACUTE RENAL FAILURE SUPERIMPOSED ON STAGE 4 CHRONIC KIDNEY DISEASE, UNSPECIFIED ACUTE RENAL FAILURE TYPE (HCC): ICD-10-CM

## 2021-05-21 DIAGNOSIS — E78.5 HYPERLIPIDEMIA, UNSPECIFIED HYPERLIPIDEMIA TYPE: Chronic | ICD-10-CM

## 2021-05-21 DIAGNOSIS — R07.9 CHEST PAIN OF UNCERTAIN ETIOLOGY: ICD-10-CM

## 2021-05-21 PROCEDURE — 99308 SBSQ NF CARE LOW MDM 20: CPT | Performed by: NURSE PRACTITIONER

## 2021-05-21 NOTE — PROGRESS NOTES
Luz Martínez  : 1950  Age 72year old  female patient is admitted to [place] [address] for rehabilitation and strengthening     Chief complaint: SOB     HPI  72 yoF with chronic diastolic heart failure and preserved ejection fraction, CKD, diabete nontender, no guarding, no rebound tenderness.   :Deferred  LYMPHATIC:no lymphedema  MUSCULOSKELETAL: no acute synovitis upper or lower extremity  EXTREMITIES/VASCULAR:no cyanosis, clubbing or edema and dorsalis pedal pulses 2+  NEUROLOGIC: intact; no sen Hypoglycemia protocol     Hypothyroidism  - Continue Levothyroxine 25 mcg daily  - monitor     Dementia  - Continue Remeron 7.5 HS  - continue Zyprexa 0.25 BID  - continue Xanax prn for agitation  - Monitor behavior     Seasonal Allergies  - continue Renita

## 2021-05-25 ENCOUNTER — SNF VISIT (OUTPATIENT)
Dept: INTERNAL MEDICINE CLINIC | Facility: SKILLED NURSING FACILITY | Age: 71
End: 2021-05-25

## 2021-05-25 DIAGNOSIS — F03.90 DEMENTIA WITHOUT BEHAVIORAL DISTURBANCE, UNSPECIFIED DEMENTIA TYPE (HCC): ICD-10-CM

## 2021-05-25 DIAGNOSIS — I50.9 ACUTE ON CHRONIC CONGESTIVE HEART FAILURE, UNSPECIFIED HEART FAILURE TYPE (HCC): ICD-10-CM

## 2021-05-25 DIAGNOSIS — E03.9 HYPOTHYROIDISM, UNSPECIFIED TYPE: Chronic | ICD-10-CM

## 2021-05-25 DIAGNOSIS — N17.9 ACUTE RENAL FAILURE SUPERIMPOSED ON STAGE 4 CHRONIC KIDNEY DISEASE, UNSPECIFIED ACUTE RENAL FAILURE TYPE (HCC): ICD-10-CM

## 2021-05-25 DIAGNOSIS — I10 ESSENTIAL HYPERTENSION: ICD-10-CM

## 2021-05-25 DIAGNOSIS — R07.9 CHEST PAIN, UNSPECIFIED TYPE: ICD-10-CM

## 2021-05-25 DIAGNOSIS — N18.4 ACUTE RENAL FAILURE SUPERIMPOSED ON STAGE 4 CHRONIC KIDNEY DISEASE, UNSPECIFIED ACUTE RENAL FAILURE TYPE (HCC): ICD-10-CM

## 2021-05-25 DIAGNOSIS — E78.5 HYPERLIPIDEMIA, UNSPECIFIED HYPERLIPIDEMIA TYPE: Chronic | ICD-10-CM

## 2021-05-25 DIAGNOSIS — E87.5 HYPERKALEMIA: ICD-10-CM

## 2021-05-25 PROCEDURE — 99310 SBSQ NF CARE HIGH MDM 45: CPT | Performed by: NURSE PRACTITIONER

## 2021-05-25 NOTE — PROGRESS NOTES
Iris Ford  : 1950  Age 72year old  female patient is admitted to [place] [address] for rehabilitation and strengthening       Chief complaint: SOB     HPI  72 yoF with chronic diastolic heart failure and preserved ejection fraction, CKD, diabe synovitis upper or lower extremity  EXTREMITIES/VASCULAR:no cyanosis, clubbing or edema and dorsalis pedal pulses 2+  NEUROLOGIC: intact; no sensorimotor deficit, follows commands  PSYCHIATRIC: alert and oriented x 3; affect appropriate        SEE PLAN BEL diet  - continue Tradjenta 5mg daily  - continue Humalog sliding scale  - Hypoglycemia protocol     Hypothyroidism  - Continue Levothyroxine 25 mcg daily  - monitor     Dementia  - Continue Remeron 7.5 HS  - continue Zyprexa 0.25 BID  - continue Xanax prn

## 2021-05-27 ENCOUNTER — SNF VISIT (OUTPATIENT)
Dept: INTERNAL MEDICINE CLINIC | Facility: SKILLED NURSING FACILITY | Age: 71
End: 2021-05-27

## 2021-05-27 DIAGNOSIS — Z95.0 PRESENCE OF PERMANENT CARDIAC PACEMAKER: Chronic | ICD-10-CM

## 2021-05-27 DIAGNOSIS — I25.10 CORONARY ARTERY DISEASE INVOLVING NATIVE CORONARY ARTERY OF NATIVE HEART WITHOUT ANGINA PECTORIS: ICD-10-CM

## 2021-05-27 DIAGNOSIS — E03.9 HYPOTHYROIDISM, UNSPECIFIED TYPE: Chronic | ICD-10-CM

## 2021-05-27 DIAGNOSIS — N17.9 ACUTE RENAL FAILURE SUPERIMPOSED ON STAGE 4 CHRONIC KIDNEY DISEASE, UNSPECIFIED ACUTE RENAL FAILURE TYPE (HCC): ICD-10-CM

## 2021-05-27 DIAGNOSIS — N18.4 ACUTE RENAL FAILURE SUPERIMPOSED ON STAGE 4 CHRONIC KIDNEY DISEASE, UNSPECIFIED ACUTE RENAL FAILURE TYPE (HCC): ICD-10-CM

## 2021-05-27 DIAGNOSIS — E87.5 HYPERKALEMIA: ICD-10-CM

## 2021-05-27 DIAGNOSIS — I95.9 TRANSIENT HYPOTENSION: ICD-10-CM

## 2021-05-27 DIAGNOSIS — D64.9 ANEMIA, UNSPECIFIED TYPE: ICD-10-CM

## 2021-05-27 DIAGNOSIS — R07.9 CHEST PAIN, UNSPECIFIED TYPE: ICD-10-CM

## 2021-05-27 DIAGNOSIS — E11.59 TYPE 2 DIABETES MELLITUS WITH OTHER CIRCULATORY COMPLICATION, WITHOUT LONG-TERM CURRENT USE OF INSULIN (HCC): Chronic | ICD-10-CM

## 2021-05-27 DIAGNOSIS — E78.5 HYPERLIPIDEMIA, UNSPECIFIED HYPERLIPIDEMIA TYPE: Chronic | ICD-10-CM

## 2021-05-27 DIAGNOSIS — I10 ESSENTIAL HYPERTENSION: ICD-10-CM

## 2021-05-27 DIAGNOSIS — F33.1 MAJOR DEPRESSIVE DISORDER, RECURRENT EPISODE, MODERATE (HCC): ICD-10-CM

## 2021-05-27 DIAGNOSIS — I50.9 ACUTE ON CHRONIC CONGESTIVE HEART FAILURE, UNSPECIFIED HEART FAILURE TYPE (HCC): ICD-10-CM

## 2021-05-27 PROCEDURE — 99310 SBSQ NF CARE HIGH MDM 45: CPT | Performed by: NURSE PRACTITIONER

## 2021-05-27 NOTE — PROGRESS NOTES
Stan Medina  : 1950  Age 72year old  female patient is admitted to [place] [address] for rehabilitation and strengthening     Chief complaint: SOB     HPI  72 yoF with chronic diastolic heart failure and preserved ejection fraction, CKD, diabete tenderness.   :Deferred  LYMPHATIC:no lymphedema  MUSCULOSKELETAL: no acute synovitis upper or lower extremity  EXTREMITIES/VASCULAR:no cyanosis, clubbing or edema and dorsalis pedal pulses 2+  NEUROLOGIC: intact; no sensorimotor deficit, follows commands proximal mid LAD stents by cardiac cath 7/2019   - continue plavix 75mg daily  - continue ASA 81mg dialy  - continue lipitor 80mg daily  - continue coreg 12.5mg BID  - continue Imdur 60mg daily  - monitor     HLD  - continue atorvastatin 80mg daily  - sal

## 2021-05-28 ENCOUNTER — SNF VISIT (OUTPATIENT)
Dept: INTERNAL MEDICINE CLINIC | Facility: SKILLED NURSING FACILITY | Age: 71
End: 2021-05-28

## 2021-05-28 DIAGNOSIS — E11.59 TYPE 2 DIABETES MELLITUS WITH OTHER CIRCULATORY COMPLICATION, WITHOUT LONG-TERM CURRENT USE OF INSULIN (HCC): Chronic | ICD-10-CM

## 2021-05-28 DIAGNOSIS — N17.9 ACUTE RENAL FAILURE SUPERIMPOSED ON STAGE 4 CHRONIC KIDNEY DISEASE, UNSPECIFIED ACUTE RENAL FAILURE TYPE (HCC): ICD-10-CM

## 2021-05-28 DIAGNOSIS — I50.9 ACUTE ON CHRONIC CONGESTIVE HEART FAILURE, UNSPECIFIED HEART FAILURE TYPE (HCC): ICD-10-CM

## 2021-05-28 DIAGNOSIS — E78.5 HYPERLIPIDEMIA, UNSPECIFIED HYPERLIPIDEMIA TYPE: Chronic | ICD-10-CM

## 2021-05-28 DIAGNOSIS — E03.9 HYPOTHYROIDISM, UNSPECIFIED TYPE: Chronic | ICD-10-CM

## 2021-05-28 DIAGNOSIS — I50.33 ACUTE ON CHRONIC HEART FAILURE WITH PRESERVED EJECTION FRACTION (HFPEF) (HCC): Chronic | ICD-10-CM

## 2021-05-28 DIAGNOSIS — F33.1 MAJOR DEPRESSIVE DISORDER, RECURRENT EPISODE, MODERATE (HCC): ICD-10-CM

## 2021-05-28 DIAGNOSIS — R07.9 CHEST PAIN, UNSPECIFIED TYPE: ICD-10-CM

## 2021-05-28 DIAGNOSIS — N18.4 ACUTE RENAL FAILURE SUPERIMPOSED ON STAGE 4 CHRONIC KIDNEY DISEASE, UNSPECIFIED ACUTE RENAL FAILURE TYPE (HCC): ICD-10-CM

## 2021-05-28 PROCEDURE — 99310 SBSQ NF CARE HIGH MDM 45: CPT | Performed by: NURSE PRACTITIONER

## 2021-05-28 NOTE — PROGRESS NOTES
Eve Mejia  : 1950  Age 72year old  female patient is admitted to [place] [address] for rehabilitation and strengthening     Chief complaint: SOB     HPI  72 yoF with chronic diastolic heart failure and preserved ejection fraction, CKD, diabete lymphedema  MUSCULOSKELETAL: no acute synovitis upper or lower extremity  EXTREMITIES/VASCULAR:no cyanosis, clubbing or edema and dorsalis pedal pulses 2+  NEUROLOGIC: intact; no sensorimotor deficit, follows commands  PSYCHIATRIC: alert and oriented x 3; restenosis of the proximal mid LAD stents by cardiac cath 7/2019   - continue plavix 75mg daily  - continue ASA 81mg dialy  - continue lipitor 80mg daily  - continue coreg 12.5mg BID  - continue Imdur 60mg daily  - monitor     HLD  - continue atorvastatin

## 2021-06-03 ENCOUNTER — SNF DISCHARGE (OUTPATIENT)
Dept: INTERNAL MEDICINE CLINIC | Facility: SKILLED NURSING FACILITY | Age: 71
End: 2021-06-03

## 2021-06-03 DIAGNOSIS — K59.00 CONSTIPATION, UNSPECIFIED CONSTIPATION TYPE: ICD-10-CM

## 2021-06-03 DIAGNOSIS — E11.9 TYPE 2 DIABETES MELLITUS WITHOUT COMPLICATION, WITH LONG-TERM CURRENT USE OF INSULIN (HCC): ICD-10-CM

## 2021-06-03 DIAGNOSIS — Z79.4 TYPE 2 DIABETES MELLITUS WITHOUT COMPLICATION, WITH LONG-TERM CURRENT USE OF INSULIN (HCC): ICD-10-CM

## 2021-06-03 DIAGNOSIS — E78.5 HYPERLIPIDEMIA, UNSPECIFIED HYPERLIPIDEMIA TYPE: Chronic | ICD-10-CM

## 2021-06-03 DIAGNOSIS — F03.90 DEMENTIA WITHOUT BEHAVIORAL DISTURBANCE, UNSPECIFIED DEMENTIA TYPE (HCC): ICD-10-CM

## 2021-06-03 DIAGNOSIS — I25.10 CORONARY ARTERY DISEASE INVOLVING NATIVE CORONARY ARTERY OF NATIVE HEART WITHOUT ANGINA PECTORIS: ICD-10-CM

## 2021-06-03 DIAGNOSIS — J30.2 SEASONAL ALLERGIES: ICD-10-CM

## 2021-06-03 DIAGNOSIS — I50.9 CHRONIC CONGESTIVE HEART FAILURE, UNSPECIFIED HEART FAILURE TYPE (HCC): ICD-10-CM

## 2021-06-03 PROCEDURE — 99316 NF DSCHRG MGMT 30 MIN+: CPT | Performed by: NURSE PRACTITIONER

## 2021-06-03 NOTE — PROGRESS NOTES
Haley Smart, 69/1950, 72year old, female is being discharged from [address] [place] [address] to home      [address]    Date of Admission to [facility]:05/19/2021    Date of Discharge from [address] [place] [address]: 06/03/21                               A [id number]     Acute on chronic kidney disease  - Cr 1.87 -> 2.56 -> 2.13 -> 2.08  - BUN 55 -> 60 -> 55 -> 55  - monitor labs, sent to cardiology     CHF  - daily weight  - 1500 fluid restriction   - discharge weight from VPO 721  - baseline weight 164  - di now    This is a 35 minute visit and greater than 50% of the time was spent counseling the patient and/or coordinating care.     Ginny Singer, APRN  6/3/2021

## 2021-09-29 ENCOUNTER — APPOINTMENT (OUTPATIENT)
Dept: GENERAL RADIOLOGY | Facility: HOSPITAL | Age: 71
DRG: 377 | End: 2021-09-29
Attending: EMERGENCY MEDICINE
Payer: MEDICARE

## 2021-09-29 ENCOUNTER — APPOINTMENT (OUTPATIENT)
Dept: CT IMAGING | Facility: HOSPITAL | Age: 71
DRG: 377 | End: 2021-09-29
Attending: EMERGENCY MEDICINE
Payer: MEDICARE

## 2021-09-29 ENCOUNTER — HOSPITAL ENCOUNTER (INPATIENT)
Facility: HOSPITAL | Age: 71
LOS: 4 days | Discharge: NURSING, REHAB OR LONG-TERM CARE | DRG: 377 | End: 2021-10-04
Attending: EMERGENCY MEDICINE | Admitting: INTERNAL MEDICINE
Payer: MEDICARE

## 2021-09-29 DIAGNOSIS — N17.9 AKI (ACUTE KIDNEY INJURY) (HCC): ICD-10-CM

## 2021-09-29 DIAGNOSIS — N30.00 ACUTE CYSTITIS WITHOUT HEMATURIA: ICD-10-CM

## 2021-09-29 DIAGNOSIS — K92.1 MELENA: ICD-10-CM

## 2021-09-29 DIAGNOSIS — K92.1 GASTROINTESTINAL HEMORRHAGE WITH MELENA: ICD-10-CM

## 2021-09-29 DIAGNOSIS — D64.9 ANEMIA, UNSPECIFIED TYPE: Primary | ICD-10-CM

## 2021-09-29 PROCEDURE — 74176 CT ABD & PELVIS W/O CONTRAST: CPT | Performed by: EMERGENCY MEDICINE

## 2021-09-29 PROCEDURE — 30233R1 TRANSFUSION OF NONAUTOLOGOUS PLATELETS INTO PERIPHERAL VEIN, PERCUTANEOUS APPROACH: ICD-10-PCS | Performed by: INTERNAL MEDICINE

## 2021-09-29 PROCEDURE — 71045 X-RAY EXAM CHEST 1 VIEW: CPT | Performed by: EMERGENCY MEDICINE

## 2021-09-29 NOTE — ED PROVIDER NOTES
Patient Seen in: [facility] AND [facility] Emergency Department      History   Patient presents with:  Abnormal Result    Stated Complaint: hgb 4.2    Subjective:   HPI  67-year-old female with CHF, CKD, CAD, hyperlipidemia, hypertension, diabetes, hypothyroidis 4.2  Other systems are as noted in HPI. Constitutional and vital signs reviewed. All other systems reviewed and negative except as noted above.     Physical Exam     ED Triage Vitals [09/29/21 1820]   BP (!) 85/40   Pulse 81   Resp 16   Temp 98.4 °F ( components:       Result Value    Glucose 146 (*)     Sodium 133 (*)     BUN 83 (*)     Creatinine 2.40 (*)     BUN/CREA Ratio 34.6 (*)     Calcium, Total 8.1 (*)     Calculated Osmolality 304 (*)     GFR, Non- 20 (*)     GFR, -Ameri -----------         ------                     82 Shannan Davis (Blood Type)[[id number]]                               Final result               Antibody Screen[[id number]]                                  Final result                 Please view results for thes UTI.  Her lactate is normal.  No leukocytosis or fever. She will be treated with IV Zosyn. Case discussed with Dr. Erica Amos for GI consultation, IV Protonix ordered. Case discussed with Dr. Mathis for critical care consultation.   Case discussed with Dr. Mars Nieves

## 2021-09-30 ENCOUNTER — ANESTHESIA EVENT (OUTPATIENT)
Dept: ENDOSCOPY | Facility: HOSPITAL | Age: 71
DRG: 377 | End: 2021-09-30
Payer: MEDICARE

## 2021-09-30 ENCOUNTER — APPOINTMENT (OUTPATIENT)
Dept: GENERAL RADIOLOGY | Facility: HOSPITAL | Age: 71
DRG: 377 | End: 2021-09-30
Attending: INTERNAL MEDICINE
Payer: MEDICARE

## 2021-09-30 ENCOUNTER — ANESTHESIA (OUTPATIENT)
Dept: ENDOSCOPY | Facility: HOSPITAL | Age: 71
DRG: 377 | End: 2021-09-30
Payer: MEDICARE

## 2021-09-30 ENCOUNTER — APPOINTMENT (OUTPATIENT)
Dept: CV DIAGNOSTICS | Facility: HOSPITAL | Age: 71
DRG: 377 | End: 2021-09-30
Attending: NURSE PRACTITIONER
Payer: MEDICARE

## 2021-09-30 PROBLEM — D64.9 ANEMIA, UNSPECIFIED TYPE: Status: ACTIVE | Noted: 2021-09-30

## 2021-09-30 PROBLEM — K92.1 GASTROINTESTINAL HEMORRHAGE WITH MELENA: Status: ACTIVE | Noted: 2021-09-30

## 2021-09-30 PROCEDURE — 71045 X-RAY EXAM CHEST 1 VIEW: CPT | Performed by: INTERNAL MEDICINE

## 2021-09-30 PROCEDURE — 99291 CRITICAL CARE FIRST HOUR: CPT | Performed by: INTERNAL MEDICINE

## 2021-09-30 PROCEDURE — 93306 TTE W/DOPPLER COMPLETE: CPT | Performed by: NURSE PRACTITIONER

## 2021-09-30 PROCEDURE — 0DJ08ZZ INSPECTION OF UPPER INTESTINAL TRACT, VIA NATURAL OR ARTIFICIAL OPENING ENDOSCOPIC: ICD-10-PCS | Performed by: INTERNAL MEDICINE

## 2021-09-30 RX ORDER — HYDRALAZINE HYDROCHLORIDE 25 MG/1
25 TABLET, FILM COATED ORAL EVERY 8 HOURS PRN
COMMUNITY
End: 2021-10-04

## 2021-09-30 RX ORDER — FUROSEMIDE 10 MG/ML
INJECTION INTRAMUSCULAR; INTRAVENOUS
Status: COMPLETED
Start: 2021-09-30 | End: 2021-09-30

## 2021-09-30 RX ORDER — FUROSEMIDE 20 MG/1
20 TABLET ORAL ONCE
Status: DISCONTINUED | OUTPATIENT
Start: 2021-09-30 | End: 2021-09-30

## 2021-09-30 RX ORDER — FUROSEMIDE 10 MG/ML
20 INJECTION INTRAMUSCULAR; INTRAVENOUS ONCE
Status: COMPLETED | OUTPATIENT
Start: 2021-09-30 | End: 2021-09-30

## 2021-09-30 RX ORDER — IPRATROPIUM BROMIDE AND ALBUTEROL SULFATE 2.5; .5 MG/3ML; MG/3ML
3 SOLUTION RESPIRATORY (INHALATION) EVERY 6 HOURS PRN
COMMUNITY

## 2021-09-30 RX ORDER — RANOLAZINE 500 MG/1
500 TABLET, EXTENDED RELEASE ORAL 2 TIMES DAILY
Status: DISCONTINUED | OUTPATIENT
Start: 2021-09-30 | End: 2021-10-04

## 2021-09-30 RX ORDER — DEXTROSE MONOHYDRATE 25 G/50ML
50 INJECTION, SOLUTION INTRAVENOUS
Status: DISCONTINUED | OUTPATIENT
Start: 2021-09-30 | End: 2021-10-04

## 2021-09-30 RX ORDER — ISOSORBIDE MONONITRATE 60 MG/1
60 TABLET, EXTENDED RELEASE ORAL DAILY
Status: DISCONTINUED | OUTPATIENT
Start: 2021-09-30 | End: 2021-10-03

## 2021-09-30 RX ORDER — SODIUM CHLORIDE, SODIUM LACTATE, POTASSIUM CHLORIDE, CALCIUM CHLORIDE 600; 310; 30; 20 MG/100ML; MG/100ML; MG/100ML; MG/100ML
INJECTION, SOLUTION INTRAVENOUS CONTINUOUS PRN
Status: DISCONTINUED | OUTPATIENT
Start: 2021-09-30 | End: 2021-09-30 | Stop reason: SURG

## 2021-09-30 RX ORDER — LORAZEPAM 2 MG/ML
INJECTION INTRAMUSCULAR
Status: COMPLETED
Start: 2021-09-30 | End: 2021-09-30

## 2021-09-30 RX ORDER — MAGNESIUM CARB/ALUMINUM HYDROX 105-160MG
296 TABLET,CHEWABLE ORAL ONCE
Status: COMPLETED | OUTPATIENT
Start: 2021-09-30 | End: 2021-09-30

## 2021-09-30 RX ORDER — TORSEMIDE 20 MG/1
20 TABLET ORAL DAILY
COMMUNITY
End: 2021-10-04

## 2021-09-30 RX ORDER — ATORVASTATIN CALCIUM 80 MG/1
80 TABLET, FILM COATED ORAL NIGHTLY
Status: DISCONTINUED | OUTPATIENT
Start: 2021-09-30 | End: 2021-10-04

## 2021-09-30 RX ORDER — LORAZEPAM 2 MG/ML
0.25 INJECTION INTRAMUSCULAR ONCE
Status: COMPLETED | OUTPATIENT
Start: 2021-09-30 | End: 2021-09-30

## 2021-09-30 RX ORDER — OLANZAPINE 2.5 MG/1
2.5 TABLET ORAL NIGHTLY
Status: DISCONTINUED | OUTPATIENT
Start: 2021-09-30 | End: 2021-10-04

## 2021-09-30 RX ORDER — FUROSEMIDE 10 MG/ML
40 INJECTION INTRAMUSCULAR; INTRAVENOUS DAILY
Status: DISCONTINUED | OUTPATIENT
Start: 2021-09-30 | End: 2021-10-02

## 2021-09-30 RX ORDER — LEVOTHYROXINE SODIUM 0.03 MG/1
25 TABLET ORAL
Status: DISCONTINUED | OUTPATIENT
Start: 2021-09-30 | End: 2021-10-04

## 2021-09-30 RX ORDER — CARVEDILOL 12.5 MG/1
12.5 TABLET ORAL 2 TIMES DAILY WITH MEALS
Status: DISCONTINUED | OUTPATIENT
Start: 2021-09-30 | End: 2021-10-04

## 2021-09-30 RX ADMIN — SODIUM CHLORIDE, SODIUM LACTATE, POTASSIUM CHLORIDE, CALCIUM CHLORIDE: 600; 310; 30; 20 INJECTION, SOLUTION INTRAVENOUS at 17:27:00

## 2021-09-30 RX ADMIN — SODIUM CHLORIDE, SODIUM LACTATE, POTASSIUM CHLORIDE, CALCIUM CHLORIDE: 600; 310; 30; 20 INJECTION, SOLUTION INTRAVENOUS at 17:45:00

## 2021-09-30 NOTE — H&P
[facility]    History and Physical    Kamla Luke Patient Status:  Inpatient    1950 MRN [id number]   Location [facility] 2W/SW Attending Terri Trent MD   Hosp Day # 0 PCP Lee Mckenzie MD     Date:  2021 Social History:  Social History    Tobacco Use      Smoking status: Former Smoker      Smokeless tobacco: Never Used    Vaping Use      Vaping Use: Never used    Alcohol use: Not Currently    Drug use: Never    Allergies/Medications:    Allergies:   Tet 100 MG Oral Cap, Take 100 mg by mouth 2 (two) times daily. lisinopril 10 MG Oral Tab, Take 1 tablet (10 mg total) by mouth daily. OLANZapine 2.5 MG Oral Tab, Take 1 tablet (2.5 mg total) by mouth nightly.   Levothyroxine Sodium 25 MCG Oral Tab, Take 1 tab (!) 30, height 5' 6\" (1.676 m), weight 166 lb 0.1 oz (75.3 kg), SpO2 100 %. Constitutional: She is thin. HENT:   Head: Normocephalic and atraumatic. Eyes: Pupils are equal, round, and reactive to light. Cardiovascular: Normal rate.     PPM    Elvis Mild cardiomegaly with cardiac pacing device. Correlate for CHF. 2.  Stable nonspecific scattered pulmonary markings which may represent mild edema or fibrosis/scarring.   Stable small pulmonary opacity in the right lung base most likely representing atele mirtazapine  -zyprexa qhs    *DM2  -novolog SS  Hold po meds    *Hypothyroidism  Check TFT's  Continue synthroid    *HLD   statin    DVT prophylaxis: scd  GI prophylaxis: ppi  Code status: Full    Disposition: admit to ICU, GI, cardiodlogy, renal  consult

## 2021-09-30 NOTE — ED QUICK NOTES
Called RN at [facility] and received contact for Argenis Alcaraz [phone] per RN this is patient's son. Called number given and no answer at this time. This RN also tried all other contacts again and continue to have no answer.

## 2021-09-30 NOTE — CONSULTS
[facility]    Consult Note    Date:  9/30/2021  Date of Admission:  9/29/2021      Chief Complaint:   Boston Galarza is a(n) 72year old female with severe anemia.     HPI:   The patient was found to be very pale in the nursing home yeste Social History: , 2 children, no tobacco, no alcohol, retired  Social History    Tobacco Use      Smoking status: Former Smoker      Smokeless tobacco: Never Used    Vaping Use      Vaping Use: Never used    Alcohol use: Not Currently    Drug u 6 (six) hours as needed for Nausea. docusate sodium 100 MG Oral Cap, Take 100 mg by mouth 2 (two) times daily. lisinopril 10 MG Oral Tab, Take 1 tablet (10 mg total) by mouth daily.   OLANZapine 2.5 MG Oral Tab, Take 1 tablet (2.5 mg total) by mouth night disease. No lymphatic system concerns. No rash. Muscles and joints notable not the patient is very poorly mobile. No weight loss no weight gain.     Physical Exam:   Vital Signs:  Blood pressure 141/78, pulse 91, temperature 97 °F (36.1 °C), temperature so ago.  She is in no respiratory distress. Recommendations: As per cardiology    3. CNS vasculopathy with hemiplegia    4. DVT prophylaxis–SCDs in patient with bleed    5. Diabetes mellitus–insulin    I am delighted to assist with Kayleigh's care.     JOSUE

## 2021-09-30 NOTE — CM/SW NOTE
09/30/21 1500   CM/SW Referral Data   Referral Source    Reason for Referral Discharge planning   [employer] EMR;  Other   Patient Info   Patient's Current Mental Status at Time of Assessment Confused or unable to complete assessment   Patient

## 2021-09-30 NOTE — ANESTHESIA PREPROCEDURE EVALUATION
Anesthesia PreOp Note    HPI:     Leni Johnson is a 72year old female who presents for preoperative consultation requested by: Rosemary Henning MD    Date of Surgery: 9/29/2021 - 9/30/2021    Procedure(s):  ESOPHAGOGASTRODUODENOSCOPY (EGD)  Indicati Date Noted: 09/26/2020      Emphysematous pyelonephritis of left kidney      Acute renal failure superimposed on stage 4 chronic kidney disease ([address]      Acute pyelonephritis      Presence of permanent cardiac pacemaker         Date Noted: 08/22/2020 left      Stage 4 chronic kidney disease (HCC)      On clopidogrel therapy      Long-term use of aspirin therapy        Past Medical History:   Diagnosis Date   • Acute ischemic heart disease ([address]    • Anemia    • Atherosclerosis of coronary artery    • A the skin 3 (three) times daily with meals.  CORRECTION FACTOR - LOW DOSE  Continue to give correction insulin even if NPO  DO NOT HOLD OR ALTER INSULIN DOSE WITHOUT A PHYSICIAN ORDER     Give 1 unit for blood glucose 160-200 mg/dL  Give 2 units for blood gl 1, Unknown at Unknown time  Alum & Mag Hydroxide-Simeth 651-835-14 MG/5ML Oral Suspension, Take 30 mL by mouth 4 (four) times daily as needed for Indigestion. , Disp: , Rfl: , Unknown at Unknown time  baclofen 10 MG Oral Tab, Take 5 mg by mouth 2 (two) time 0.9 % 10 mL IV push, 40 mg, Intravenous, Q12H, Sharlene Mcintyre MD, 40 mg at 09/30/21 0500  atorvastatin (LIPITOR) tab 80 mg, 80 mg, Oral, Nightly, DONTE Vargas  carvedilol (COREG) tab 12.5 mg, 12.5 mg, Oral, BID with meals, DONTE Vargas, 12 Occupational History      Not on file    Tobacco Use      Smoking status: Former Smoker      Smokeless tobacco: Never Used    Vaping Use      Vaping Use: Never used    Substance and Sexual Activity      Alcohol use: Not Currently      Drug use: Never 09/30/2021     Lab Results   Component Value Date     09/30/2021    K 3.9 09/30/2021     09/30/2021    CO2 22.0 09/30/2021    BUN 77 (H) 09/30/2021    CREATSERUM 2.20 (H) 09/30/2021     (H) 09/30/2021    PGLU 152 (H) 09/30/2021    CA 8.4 SpO2: 100% 99% 100% 100%   Weight:       Height:            Anesthesia Evaluation     Patient summary reviewed and Nursing notes reviewed    Airway   Mallampati: III  TM distance: >3 FB  Neck ROM: full  Dental    (+) upper dentures and lower dentures

## 2021-09-30 NOTE — OPERATIVE REPORT
ESOPHAGOGASTRODUODENOSCOPY REPORT    Patient Name:  Jack Granger Record #: [id number]  YOB: 1950  Date of Procedure: 9/30/2021    Referring physician: Luciano Whitman MD    Surgeon:  Bruno Varghese.  Sudha Nicole MD    Pre-op diagnosis: Ac

## 2021-09-30 NOTE — ANESTHESIA POSTPROCEDURE EVALUATION
Patient: Leni Johnson    Procedure Summary     Date: 09/30/21 Room / Location: [address] [place] [address] ENDOSCOPY 05 / [address] [place] [address] ENDOSCOPY    Anesthesia Start: 1265 Anesthesia Stop: 3264    Procedure: ESOPHAGOGASTRODUODENOSCOPY (EGD) (N/A ) Diagnosis: (Normal EGD)    Surgeons:

## 2021-09-30 NOTE — PLAN OF CARE
Problem: Patient Centered Care  Goal: Patient preferences are identified and integrated in the patient's plan of care  Description: Interventions:  - What would you like us to know as we care for you?   - Provide timely, complete, and accurate informatio devices as appropriate  - Consider OT/PT consult to assist with strengthening/mobility  - Encourage toileting schedule  Outcome: Progressing     Problem: CARDIOVASCULAR - ADULT  Goal: Maintains optimal cardiac output and hemodynamic stability  Description: paced,  given lasix 40 grajeda cathetor in place, no bm today from this pt. Buttocks red, right lower leg scab. Pt refused scd's. Clear liq tonight.

## 2021-09-30 NOTE — ED QUICK NOTES
This RN attempted X2 to contact emergency  without success. Blood released for emergency transfusion.

## 2021-09-30 NOTE — CONSULTS
Cardiology Consult Note:    72year old female, nursing home resident, who was transferred from NH for severe anemia, hgb 4.5. Cardiology has been consulted for HF. Past medical history is significant for DM/Htn/CKD/PPM/CVA/dementia/afib/CAD s/p PCI.     P on file    Social History Narrative    None on file            Current Medications:  Pantoprazole Sodium (PROTONIX) 40 mg in Sodium Chloride (PF) 0.9 % 10 mL IV push, 40 mg, Intravenous, Q12H  atorvastatin (LIPITOR) tab 80 mg, 80 mg, Oral, Nightly  carvedi blood glucose 201-240 mg/dL  Give 3 units for blood glucose 241-280 mg/dL  Give 4 units for blood glucose 281-320 mg/dL  Give 5 units for blood glucose 321-360 mg/dL  Call physician if blood glucose is greater than 360 mg/dL  Potassium Chloride ER 10 MEQ O morning before breakfast.  Clopidogrel Bisulfate 75 MG Oral Tab, Take 75 mg by mouth daily. aspirin 81 MG Oral Chew Tab, Chew 1 tablet (81 mg total) by mouth daily. atorvastatin 40 MG Oral Tab, Take 80 mg by mouth nightly.     acetaminophen 325 MG Oral Ta 09/29/2021    PTT 28.1 09/29/2021    INR 1.25 (H) 09/29/2021    PTP 15.5 (H) 09/29/2021    T4F 1.4 09/30/2021    TSH 3.950 (H) 09/30/2021    LIP 83 05/18/2021    DDIMER 1.23 (H) 03/05/2020    MG 2.0 03/26/2021    PHOS 4.1 03/29/2021    TROP <0.045 05/18/20 was mildly dilated. Assessment/Plan:    1. Acute anemia:  - s/p 3 units pRBC  - EGD is low risk procedure  - can proceed without further cardiac work up    2.  Acute on chronic systolic CHF  - likely exacerbated by severe anemia  - Acute drop in left

## 2021-09-30 NOTE — PROGRESS NOTES
Pt yelling out, \"I dont want to get intubated,\" \"I dont want covid. \" Pt has hx of ams. Baseline confused. Lasix and cxr ordered by Dr. Guevara Courser.  assessed the pt at bedside. Ordered low dose zyprexa and ativan.  Pt stopped yelling, appears slightly

## 2021-09-30 NOTE — ED QUICK NOTES
Patient starting second unit at this time. Vital signs stable without reaction noted at this time. Will continue to monitor.

## 2021-09-30 NOTE — CONSULTS
[facility] HOSP - [facility]    Report of Consultation    Stewart Lalo Patient Status:  Inpatient    1950 MRN [id number]   Location [facility] 2W/SW Attending Lucero Colby MD   Hosp Day # 0 PCP Karly Browne MD     Date of Admissio Incontinence    • Muscle weakness    • NSTEMI (non-ST elevated myocardial infarction) [facility])    • Peripheral vascular disease (HCC)    • Pneumonia due to organism    • Renal disorder    • Stroke [facility])    • UTI (urinary tract infection)        Past Surgical daily.  mupirocin 2 % External Ointment, Apply topically 2 (two) times daily as needed. To left lower leg for skin discoloration as needed  Ranolazine  MG Oral Tablet 12 Hr, Take 1 tablet (500 mg total) by mouth 2 (two) times daily.   Isosorbide Mobile (six) hours as needed for Pain. nitroGLYCERIN 0.4 MG Sublingual SL Tab, Place 0.4 mg under the tongue every 5 (five) minutes as needed for Chest pain. linagliptin 5 mg Oral Tab, Take 5 mg by mouth daily.         Allergies    Tetracyclines & Rel*    RASH Date: 9/29/2021  PROCEDURE:   CT ABDOMEN+PELVIS(CPT=74176)  COMPARISON:   [facility], [address] [place],  [address] 2D RNDR (NO IV NO ORAL) (CPT=741, 11/02/2020, 11:06 PM.  INDICATIONS:   hgb 4.2 r/o intraabdominal bleed  TECHNIQUE: CT im OTHER: Negative. CONCLUSION:  1. No evidence of intra-abdominal hemorrhage. 2. Colonic diverticulosis without evidence of diverticulitis. 3. Small bilateral pleural effusions.  4. Chronic mosaic opacification bilateral lower lungs suggestive of sma (CST): Ashlyn Connors MD on 9/30/2021 at 6:27 AM     Finalized by (CST): Ashlyn Connors MD on 9/30/2021 at 6:29 AM          XR CHEST AP PORTABLE  (CPT=71045)    Result Date: 9/29/2021  PROCEDURE: XR CHEST AP PORTABLE  (CPT=71045) TIME: 1950 hours.    COMPARISON Lee Ann Barahona MD Debora Poll    9/30/2021

## 2021-10-01 ENCOUNTER — ANESTHESIA EVENT (OUTPATIENT)
Dept: ENDOSCOPY | Facility: HOSPITAL | Age: 71
DRG: 377 | End: 2021-10-01
Payer: MEDICARE

## 2021-10-01 ENCOUNTER — ANESTHESIA (OUTPATIENT)
Dept: ENDOSCOPY | Facility: HOSPITAL | Age: 71
DRG: 377 | End: 2021-10-01
Payer: MEDICARE

## 2021-10-01 PROCEDURE — 0W3P8ZZ CONTROL BLEEDING IN GASTROINTESTINAL TRACT, VIA NATURAL OR ARTIFICIAL OPENING ENDOSCOPIC: ICD-10-PCS | Performed by: INTERNAL MEDICINE

## 2021-10-01 PROCEDURE — 99233 SBSQ HOSP IP/OBS HIGH 50: CPT | Performed by: INTERNAL MEDICINE

## 2021-10-01 PROCEDURE — 0DBL8ZZ EXCISION OF TRANSVERSE COLON, VIA NATURAL OR ARTIFICIAL OPENING ENDOSCOPIC: ICD-10-PCS | Performed by: INTERNAL MEDICINE

## 2021-10-01 RX ORDER — SODIUM CHLORIDE, SODIUM LACTATE, POTASSIUM CHLORIDE, CALCIUM CHLORIDE 600; 310; 30; 20 MG/100ML; MG/100ML; MG/100ML; MG/100ML
INJECTION, SOLUTION INTRAVENOUS CONTINUOUS PRN
Status: DISCONTINUED | OUTPATIENT
Start: 2021-10-01 | End: 2021-10-01 | Stop reason: SURG

## 2021-10-01 RX ADMIN — SODIUM CHLORIDE, SODIUM LACTATE, POTASSIUM CHLORIDE, CALCIUM CHLORIDE: 600; 310; 30; 20 INJECTION, SOLUTION INTRAVENOUS at 09:43:00

## 2021-10-01 RX ADMIN — SODIUM CHLORIDE, SODIUM LACTATE, POTASSIUM CHLORIDE, CALCIUM CHLORIDE: 600; 310; 30; 20 INJECTION, SOLUTION INTRAVENOUS at 09:23:00

## 2021-10-01 NOTE — OPERATIVE REPORT
COLONOSCOPY REPORT    Patient Name:  Liss Velarde Record #: [id number]  YOB: 1950  Date of Procedure: 10/1/2021    Referring physician: Andrew Holder MD    Surgeon:  Nitin Mccormick.  Josefina Oliva MD    Pre-op diagnosis: Acute GI blood lo

## 2021-10-01 NOTE — ANESTHESIA PREPROCEDURE EVALUATION
Anesthesia PreOp Note    HPI:     Jimmy Morocho is a 72year old female who presents for preoperative consultation requested by: Olegario Salazar MD    Date of Surgery: 9/29/2021 - 10/1/2021    Procedure(s):  COLONOSCOPY  Indication: Nataly Bruce Noted: 11/03/2020      E coli bacteremia      Hydronephrosis         Date Noted: 09/26/2020      Hydronephrosis, unspecified hydronephrosis type         Date Noted: 09/26/2020      Emphysematous pyelonephritis of left kidney      Acute renal failure superi Ureteral calculus         Date Noted: 06/08/2019      Ureteral stent retained         Date Noted: 06/08/2019      Duplex kidney      Renal calculus, left      Stage 4 chronic kidney disease ([address]      On clopidogrel therapy      Long-term use of aspirin t Unknown at Unknown time  furosemide 40 MG Oral Tab, Take 1 tablet (40 mg total) by mouth daily. , Disp: 30 tablet, Rfl: 1  Insulin Aspart Pen 100 UNIT/ML Subcutaneous Solution Pen-injector, Inject 1-5 Units into the skin 3 (three) times daily with meals.  CO Take 1 tablet (7.5 mg total) by mouth nightly., Disp: 30 tablet, Rfl: 0, Unknown at Unknown time  rOPINIRole HCl 0.25 MG Oral Tab, Take 1 tablet (0.25 mg total) by mouth 2 (two) times a day., Disp: 60 tablet, Rfl: 1, Unknown at Unknown time  Alum & Mag Hyd for Chest pain., Disp: , Rfl: , Unknown at Unknown time  linagliptin 5 mg Oral Tab, Take 5 mg by mouth daily. , Disp: , Rfl: , Unknown at Unknown time      Pantoprazole Sodium (PROTONIX) 40 mg in Sodium Chloride (PF) 0.9 % 10 mL IV push, 40 mg, Intravenous, • Other (ULCERS) Father    • Cancer Mother         UTERINE/COLON     Social History    Socioeconomic History      Marital status:       Spouse name: Not on file      Number of children: Not on file      Years of education: Not on file      Highes file    Available pre-op labs reviewed.   Lab Results   Component Value Date    WBC 9.0 10/01/2021    RBC 2.73 (L) 10/01/2021    HGB 7.1 (L) 10/01/2021    HCT 25.1 (L) 10/01/2021    MCV 91.9 10/01/2021    MCH 26.0 10/01/2021    MCHC 28.3 (L) 10/01/2021    R s/p 3 unit pRBCs),   Abdominal  - normal exam               Anesthesia Plan:   ASA:  4  Plan:   MAC  Post-op Pain Management: IV analgesics  Plan Comments: NPO for procedure. Cleared by cardiology for surgery. Discussed with Ms. Jose L Lorenz that she will re

## 2021-10-01 NOTE — PROGRESS NOTES
[facility]    History and Physical    Haley Smart Patient Status:  Inpatient    1950 MRN [id number]   Location [facility] 2W/SW Attending Judy Cline MD   [facility] Day # 1 PCP Miquel Acharya MD     Date:  2021 (ULCERS) Father    • Cancer Mother         UTERINE/COLON     Social History:  Social History    Tobacco Use      Smoking status: Former Smoker      Smokeless tobacco: Never Used    Vaping Use      Vaping Use: Never used    Alcohol use: Not Currently    Abimael every 6 (six) hours as needed for Nausea. docusate sodium 100 MG Oral Cap, Take 100 mg by mouth 2 (two) times daily. lisinopril 10 MG Oral Tab, Take 1 tablet (10 mg total) by mouth daily.   OLANZapine 2.5 MG Oral Tab, Take 1 tablet (2.5 mg total) by mouth 97.6 °F (36.4 °C), temperature source Temporal, resp. rate 17, height 5' 6\" (1.676 m), weight 169 lb 12.1 oz (77 kg), SpO2 100 %. Constitutional: She is thin. HENT:   Head: Normocephalic and atraumatic.    Eyes: Pupils are equal, round, and reactive Increasing pulmonary vascular congestion. Mild cardiomegaly with cardiac pacing device. Correlate for CHF. 2.  Stable nonspecific scattered pulmonary markings which may represent mild edema or fibrosis/scarring.   Stable small pulmonary opacity in the rig 32   -hold ACE  -Renal consult; Dr. Kelly Purvis  Creatinine stable    *HTN  Hold lisinopril, hydralazine  -Continue coreg    *CAD, Hx PCI 2019  Hold ASA & Plavix 2/2 GIB  Continue coreg, isosorbide, ranolazine  cardiology following: resume ASA when ok with GI

## 2021-10-01 NOTE — PROGRESS NOTES
Patient seen in follow up. TIAGO RN, had black stools. For colonoscopy today. Remains on 2L O2. Notes reviewed. Time spent > 35 min.    10/01/21  0400   BP: 100/57   Pulse: 76   Resp: 22   Temp: 97.6 °F (36.4 °C)       Intake/Output Summary (Last 24 john Tab, Take 20 mg by mouth daily. hydrALAZINE 25 MG Oral Tab, Take 25 mg by mouth every 8 (eight) hours as needed. Loperamide HCl 2 MG Oral Cap, Take 2 mg by mouth 4 (four) times daily as needed for Diarrhea.   furosemide 40 MG Oral Tab, Take 1 tablet (40 m needed for Indigestion. baclofen 10 MG Oral Tab, Take 5 mg by mouth 2 (two) times daily.   ferrous sulfate 325 (65 FE) MG Oral Tab EC, Take 325 mg by mouth daily with breakfast.  Meclizine HCl 25 MG Oral Tab, Take 25 mg by mouth 3 (three) times daily as ne representing atelectasis however correlate for pneumonia. Stable linear atelectasis or scar in the left mid lung zone. [facility] provided a preliminary report for this examination. This final report agrees with their preliminary findings.    Dictat PPM  - h/o CVA     5. Chronic kidney disease:  - on lisinopril at [facility]  - ok to hold for now        6. Hypertension:  - BP ok  - continue current meds     7. Hyperlipidemia:  - continue statin     8. Diabetes:     9. H/o CVA     10.  Severe mitral regurgitati

## 2021-10-01 NOTE — PLAN OF CARE
Problem: Patient Centered Care  Goal: Patient preferences are identified and integrated in the patient's plan of care  Description: Interventions:  - What would you like us to know as we care for you? I live qt [facility] and am wheel chair bound.    - Pro output  - Evaluate effectiveness of vasoactive medications to optimize hemodynamic stability  - Monitor arterial and/or venous puncture sites for bleeding and/or hematoma  - Assess quality of pulses, skin color and temperature  - Assess for signs of decrea

## 2021-10-01 NOTE — PLAN OF CARE
Problem: Patient Centered Care  Goal: Patient preferences are identified and integrated in the patient's plan of care  Description: Interventions:  - What would you like us to know as we care for you? I live qt [facility] and am wheel chair bound.    - Pro environment to reduce risk of injury  - Provide assistive devices as appropriate  - Consider OT/PT consult to assist with strengthening/mobility  - Encourage toileting schedule  Outcome: Progressing     Problem: CARDIOVASCULAR - ADULT  Goal: Maintains opti transfer when bed is availble. Pt needed emtional support, reviewed call light usage, reviewed required diabetic booklet.

## 2021-10-01 NOTE — ANESTHESIA POSTPROCEDURE EVALUATION
Patient: Grace White    Procedure Summary     Date: 10/01/21 Room / Location: [facility] ENDOSCOPY 01 / [facility] ENDOSCOPY    Anesthesia Start: 9691 Anesthesia Stop: 9954    Procedure: COLONOSCOPY (N/A ) Diagnosis:       Melena      (polyp, diverticulosis, )

## 2021-10-01 NOTE — PROGRESS NOTES
[facility]    Progress Note    Haley Smart Patient Status:  Inpatient    1950 MRN [id number]   Location [facility] 2W/SW Attending Judy Cline MD   Hosp Day # 1 PCP Miquel Acharya MD        Subjective:   Georgena Northern 9/29/2021  CONCLUSION:  1. No evidence of intra-abdominal hemorrhage. 2. Colonic diverticulosis without evidence of diverticulitis. 3. Small bilateral pleural effusions.  4. Chronic mosaic opacification bilateral lower lungs suggestive of small airways dise endoscopy 9/30 : normal  Large black stools overnight, hgb down to 7.1, H&H q 12, transfuse if < 7  Plan for colonoscopy today     *UTI  Urine cx:Kelbsiella  Blood cx no growth to date  Afebrile, no leukoytosis  -IV zosyn     *HFpEF and HFrEF  -cxr shows c

## 2021-10-01 NOTE — PROGRESS NOTES
Pulmonary/Critical Care Follow Up Note    HPI:   Maulik Rodrigez is a 72year old female with Patient presents with:  Abnormal Result      PCP Jessica Esteban MD  Admission Attending Steve Enriquez 15 Day #1    No n/v  Low appetite  No sob tablet, 4 tablet, Oral, Q15 Min PRN **OR** dextrose 50 % injection 50 mL, 50 mL, Intravenous, Q15 Min PRN **OR** glucose (DEX4) oral liquid 30 g, 30 g, Oral, Q15 Min PRN **OR** glucose-vitamin C (DEX-4) chewable tab 8 tablet, 8 tablet, Oral, Q15 Min PRN  •

## 2021-10-01 NOTE — CONSULTS
[facility]    Report of Consultation    Luz Martínez Patient Status:  Inpatient    1950 MRN [id number]   Location [facility] 2W/SW Attending Delfina Rodriguez MD   Hosp Day # 1 PCP Joseline Pitt MD     Date of Admissio infection)        Past Surgical History  Past Surgical History:   Procedure Laterality Date   • CHOLECYSTECTOMY     • EGD  09/2021    normal   • SINUS SURGERY           Family History  Family History   Problem Relation Age of Onset   • Other (ULCERS) Fathe 4 (four) times daily as needed for Diarrhea. furosemide 40 MG Oral Tab, Take 1 tablet (40 mg total) by mouth daily. Insulin Aspart Pen 100 UNIT/ML Subcutaneous Solution Pen-injector, Inject 1-5 Units into the skin 3 (three) times daily with meals.  Mag Antonio breakfast.  Meclizine HCl 25 MG Oral Tab, Take 25 mg by mouth 3 (three) times daily as needed. Melatonin 3 MG Oral Cap, Take 10 mg by mouth nightly as needed. Nephro-Ramses 0.8 MG Oral Tab, Take 0.8 mg by mouth daily.   carvedilol 12.5 MG Oral Tab, Take 1 t .0 10/01/2021    CREATSERUM 2.23 (H) 10/01/2021    BUN 68 (H) 10/01/2021     10/01/2021    K 3.8 10/01/2021     10/01/2021    CO2 25.0 10/01/2021     (H) 10/01/2021    CA 8.5 10/01/2021    ALB 2.3 (L) 09/29/2021    ALKPHO 141 09/2 interstitial edema.     Dictated by (CST): Anahi Craft MD on 9/29/2021 at 8:11 PM     Finalized by (CST): Anahi Craft MD on 9/29/2021 at 8:12 PM              Assessment & Plan :     PHILIP (acute kidney injury) / CKD 4   Creatinine appe

## 2021-10-02 PROCEDURE — 99232 SBSQ HOSP IP/OBS MODERATE 35: CPT | Performed by: INTERNAL MEDICINE

## 2021-10-02 RX ORDER — FUROSEMIDE 40 MG/1
40 TABLET ORAL
Status: DISCONTINUED | OUTPATIENT
Start: 2021-10-02 | End: 2021-10-04

## 2021-10-02 RX ORDER — HYDRALAZINE HYDROCHLORIDE 10 MG/1
10 TABLET, FILM COATED ORAL EVERY 8 HOURS SCHEDULED
Status: DISCONTINUED | OUTPATIENT
Start: 2021-10-02 | End: 2021-10-04

## 2021-10-02 NOTE — PROGRESS NOTES
[facility]    Progress Note    Ganga Bowen Patient Status:  Inpatient    1950 MRN [id number]   Location [facility] 5SW/SE Attending Dany Ha MD   Hosp Day # 2 PCP Diallo Strange MD       Subjective:   Deo Haley (H) 03/05/2020    MG 2.0 03/26/2021    PHOS 4.1 03/29/2021    TROP <0.045 05/18/2021    CK 73 11/10/2020    ETOH 3 (H) 11/10/2020       No results found.           Allan Duran MD  10/2/2021

## 2021-10-02 NOTE — PLAN OF CARE
Transfer from CCU last night. Denies pain. Last hgb 8.0. Continue to monitor.     Problem: Patient Centered Care  Goal: Patient preferences are identified and integrated in the patient's plan of care  Description: Interventions:  - What would you like us to and trends  - Monitor for bleeding, hypotension and signs of decreased cardiac output  - Evaluate effectiveness of vasoactive medications to optimize hemodynamic stability  - Monitor arterial and/or venous puncture sites for bleeding and/or hematoma  - Ass

## 2021-10-02 NOTE — PROGRESS NOTES
Care endorsed to KATIE Herrera. Pt stable; A&O x3, follows commands. On 2L NC, tolerating well. VSS. Velarde in place. 1 small smear. Bed locked and in lowest position. Will continue to monitor.

## 2021-10-02 NOTE — PLAN OF CARE
Problem: Patient Centered Care  Goal: Patient preferences are identified and integrated in the patient's plan of care  Description: Interventions:  - What would you like us to know as we care for you? I live qt [facility] and am wheel chair bound.    - Pro Provide assistive devices as appropriate  - Consider OT/PT consult to assist with strengthening/mobility  - Encourage toileting schedule  Outcome: Progressing     Problem: CARDIOVASCULAR - ADULT  Goal: Maintains optimal cardiac output and hemodynamic stabi

## 2021-10-02 NOTE — PROGRESS NOTES
Double RN skin check done prior to transfer off Unit. Skin check performed by this RN and PANDA Bowman. Wounds are as follows: generalized buttocks redness and scaliness on BLE     Will remain available for any further questions or concerns.

## 2021-10-02 NOTE — PROGRESS NOTES
Patient seen in follow up. Patient much more awake and alert today. She denies chest pain. She reports SOB and dizziness has improved. Currently on 1 L of O2 via NC. Transferred to floor  from CCU overnight.   Seen with [address] Andreas HARRISON.    10/02/21  08 Sodium 1 % External Gel, Apply topically every 4 (four) hours as needed. Right ankle  torsemide 20 MG Oral Tab, Take 20 mg by mouth daily. hydrALAZINE 25 MG Oral Tab, Take 25 mg by mouth every 8 (eight) hours as needed.   Loperamide HCl 2 MG Oral Cap, Take & Mag Hydroxide-[facility] 677-217-53 MG/5ML Oral Suspension, Take 30 mL by mouth 4 (four) times daily as needed for Indigestion. baclofen 10 MG Oral Tab, Take 5 mg by mouth 2 (two) times daily.   ferrous sulfate 325 (65 FE) MG Oral Tab EC, Take 325 mg by mout hydralazine/imdur/ranexa/metoprolol/statin  - will like to restart asa once ok with GI  - Plavix can be held as no recent stents    - may need cath to see if LAD stents are patent however cannot be done.  There is anterolateral wall motion abnormality noted

## 2021-10-02 NOTE — PROGRESS NOTES
[facility]    Progress Note    Grisel Carrillo Patient Status:  Inpatient    1950 MRN [id number]   Location [facility] 5SW/SE Attending Stanton Gómez MD   Hosp Day # 2 PCP Tonja Hager MD     Subjective:     Constitu down  Plan follow Hb              GI follow              May need more blood              PPI           2- s/p pulmonary edema  Better  No sob  Plan follow sx              Follow I/O              Prn lasix        3.  CNS vasculopathy with hemiplegia  Plan

## 2021-10-03 RX ORDER — CEPHALEXIN 500 MG/1
500 CAPSULE ORAL EVERY 12 HOURS SCHEDULED
Status: DISCONTINUED | OUTPATIENT
Start: 2021-10-03 | End: 2021-10-04

## 2021-10-03 RX ORDER — ISOSORBIDE MONONITRATE 30 MG/1
30 TABLET, EXTENDED RELEASE ORAL DAILY
Status: DISCONTINUED | OUTPATIENT
Start: 2021-10-04 | End: 2021-10-04

## 2021-10-03 RX ORDER — ZOLPIDEM TARTRATE 5 MG/1
5 TABLET ORAL NIGHTLY PRN
Status: DISCONTINUED | OUTPATIENT
Start: 2021-10-03 | End: 2021-10-04

## 2021-10-03 RX ORDER — VANCOMYCIN HYDROCHLORIDE 125 MG/1
125 CAPSULE ORAL DAILY
Status: DISCONTINUED | OUTPATIENT
Start: 2021-10-03 | End: 2021-10-04

## 2021-10-03 NOTE — PROGRESS NOTES
[facility]  Gastroenterology Progress Note    Boston Galarza Patient Status:  Inpatient    1950 MRN [id number]   Location [facility] 5SW/SE Attending George Castro MD   Hosp Day # 3 PCP Livia Wellington MD     Subjective:  Laurent Mojica infectious organism     C. difficile enteritis     Thrombocytopenia (HCC)     Anemia     Hyperglycemia     Acute cystitis without hematuria     Urinary tract infection without hematuria, site unspecified     Presence of permanent cardiac pacemaker     Hydr

## 2021-10-03 NOTE — PROGRESS NOTES
Patient seen in follow up. Continues to be awake and alert today. Patient denies chest pain and SOB. She denies dizziness, palpitations and BLE edema.    Seen with Yolanda HARRISON.    10/03/21  0810   BP: 101/78   Pulse: 80   Resp: 20   Temp: 98.8 °F ( Or  glucose-vitamin C (DEX-4) chewable tab 8 tablet, 8 tablet, Oral, Q15 Min PRN      ipratropium-albuterol 0.5-2.5 (3) MG/3ML Inhalation Solution, Take 3 mL by nebulization every 6 (six) hours as needed.   Diclofenac Sodium 1 % External Gel, Apply topicall (25 mcg total) by mouth before breakfast.  mirtazapine 7.5 MG Oral Tab, Take 1 tablet (7.5 mg total) by mouth nightly. rOPINIRole HCl 0.25 MG Oral Tab, Take 1 tablet (0.25 mg total) by mouth 2 (two) times a day.   Alum & Mag Hydroxide-Simeth 470-151-99 MG/ GI  - Plavix can be held as no recent stents    - may need cath to see if LAD stents are patent however cannot be done. There is anterolateral wall motion abnormality noted .  Unfortunately, in setting of acute GIB, not a candidate for acute intervention wi

## 2021-10-03 NOTE — PLAN OF CARE
VSS, patient started on PO antibiotic for UTI, nasal antibiotic for MRSA. PO hydralazine restarted per cardiology. Velarde removed per nephrology, hemoglobin stable this morning.  Will continue to monitor, possible dc tomorrow per MD.    Problem: Patient Cent physical deficits and behaviors that affect risk of falls.   - [place] fall precautions as indicated by assessment.  - Educate pt/family on patient safety including physical limitations  - Instruct pt to call for assistance with activity based on assessme medication profile  Outcome: Progressing     Problem: HEMATOLOGIC - ADULT  Goal: Free from bleeding injury  Description: (Example usage: patient with low platelets)  INTERVENTIONS:  - Avoid intramuscular injections, enemas and rectal medication administrat

## 2021-10-03 NOTE — PLAN OF CARE
Problem: Patient Centered Care  Goal: Patient preferences are identified and integrated in the patient's plan of care  Description: Interventions:  - What would you like us to know as we care for you? I live qt [facility] and am wheel chair bound.    - Pro cognitive and physical deficits and behaviors that affect risk of falls.   - [place] fall precautions as indicated by assessment.  - Educate pt/family on patient safety including physical limitations  - Instruct pt to call for assistance with activity bas hemodynamic stability  - Monitor arterial and/or venous puncture sites for bleeding and/or hematoma  - Assess quality of pulses, skin color and temperature  - Assess for signs of decreased coronary artery perfusion - ex.  Angina  - Evaluate fluid balance, a blood glucose levels per order. Patient is on 1 L/min of oxygen via nasal cannula. Velarde in place with adequate output. Patient does not complain of pain. Frequent repositioning encouraged.  Fall precautions maintained- bed alarm on, bed locked in lowest po

## 2021-10-03 NOTE — PROGRESS NOTES
[facility]    Progress Note    Kareen Band Patient Status:  Inpatient    1950 MRN [id number]   Location [facility] 2W/SW Attending Venkat Wheeler, [facility] Day # 2 PCP Idalia Forte MD        Subjective:   Bay Reeves and Plan:     *Anemia/Melena  -admission hgb 4.2 s/p 3 U prbc, repeat hgb 8.5  CT a/p: diverticulosis without diverticulitis, no hemorrhage  IV protoix Q 12  GI consulted:Dr. Antonette Ann: s/p upper endoscopy 9/30 : normal  Colonoscopy with no obvious bleed [facility]

## 2021-10-04 VITALS
SYSTOLIC BLOOD PRESSURE: 126 MMHG | HEIGHT: 66 IN | BODY MASS INDEX: 27.28 KG/M2 | OXYGEN SATURATION: 96 % | DIASTOLIC BLOOD PRESSURE: 54 MMHG | RESPIRATION RATE: 18 BRPM | HEART RATE: 73 BPM | TEMPERATURE: 97 F | WEIGHT: 169.75 LBS

## 2021-10-04 RX ORDER — ISOSORBIDE MONONITRATE 30 MG/1
30 TABLET, EXTENDED RELEASE ORAL DAILY
Qty: 30 TABLET | Refills: 0 | Status: SHIPPED | OUTPATIENT
Start: 2021-10-05

## 2021-10-04 RX ORDER — HYDRALAZINE HYDROCHLORIDE 10 MG/1
10 TABLET, FILM COATED ORAL EVERY 8 HOURS SCHEDULED
Qty: 90 TABLET | Refills: 0 | Status: SHIPPED | OUTPATIENT
Start: 2021-10-04 | End: 2021-10-04

## 2021-10-04 RX ORDER — HYDRALAZINE HYDROCHLORIDE 10 MG/1
10 TABLET, FILM COATED ORAL EVERY 8 HOURS SCHEDULED
Qty: 90 TABLET | Refills: 0 | Status: SHIPPED | OUTPATIENT
Start: 2021-10-04

## 2021-10-04 RX ORDER — FUROSEMIDE 40 MG/1
40 TABLET ORAL 2 TIMES DAILY
Qty: 60 TABLET | Refills: 1 | Status: SHIPPED | OUTPATIENT
Start: 2021-10-04

## 2021-10-04 RX ORDER — CEPHALEXIN 500 MG/1
500 CAPSULE ORAL EVERY 12 HOURS SCHEDULED
Qty: 11 CAPSULE | Refills: 0 | Status: SHIPPED | OUTPATIENT
Start: 2021-10-04 | End: 2021-10-04

## 2021-10-04 RX ORDER — VANCOMYCIN HYDROCHLORIDE 125 MG/1
125 CAPSULE ORAL DAILY
Qty: 14 CAPSULE | Refills: 0 | Status: SHIPPED | OUTPATIENT
Start: 2021-10-05

## 2021-10-04 RX ORDER — FUROSEMIDE 40 MG/1
40 TABLET ORAL 2 TIMES DAILY
Qty: 60 TABLET | Refills: 1 | Status: SHIPPED | OUTPATIENT
Start: 2021-10-04 | End: 2021-10-04

## 2021-10-04 RX ORDER — VANCOMYCIN HYDROCHLORIDE 125 MG/1
125 CAPSULE ORAL DAILY
Qty: 14 CAPSULE | Refills: 0 | Status: SHIPPED | OUTPATIENT
Start: 2021-10-05 | End: 2021-10-04

## 2021-10-04 RX ORDER — CEPHALEXIN 500 MG/1
500 CAPSULE ORAL EVERY 12 HOURS SCHEDULED
Qty: 11 CAPSULE | Refills: 0 | Status: SHIPPED | OUTPATIENT
Start: 2021-10-04

## 2021-10-04 RX ORDER — ISOSORBIDE MONONITRATE 30 MG/1
30 TABLET, EXTENDED RELEASE ORAL DAILY
Qty: 30 TABLET | Refills: 0 | Status: SHIPPED | OUTPATIENT
Start: 2021-10-05 | End: 2021-10-04

## 2021-10-04 NOTE — DISCHARGE SUMMARY
[facility] [facility]    Discharge Summary    Meg Castellanos Patient Status:  Inpatient    1950 MRN [id number]   Location [facility] 5SW/SE Attending Kam Pablo MD   [facility] Day # 4 PCP Caryl Le MD     Date of Admission:  hydralazine  Continue coreg     *CAD, Hx PCI 2019  Hold ASA & Plavix 2/2 GIB  Continue coreg, isosorbide, ranolazine  cardiology following: resume ASA when ok with GI     *Mobitz 2 heart block/ s/p PPM 1/13/2020     *MRSA nares positive  Cont mupirocin x 1 Corinne Shire,  [facility] ENDOSCOPY Comp        Pending Labs     Order Current Status    Blood Culture Preliminary result    Blood Culture Preliminary result          Discharge Plan:   Discharge Medications:      Discharge Medications      START taking thes LIPITOR      Take 80 mg by mouth nightly. Refills: 0     baclofen 10 MG Tabs  Commonly known as: LIORESAL      Take 5 mg by mouth 2 (two) times daily.    Refills: 0     carvedilol 12.5 MG Tabs  Commonly known as: COREG      Take 1 tablet (12.5 mg total) b IMODIUM      Take 2 mg by mouth 4 (four) times daily as needed for Diarrhea. Refills: 0     loratadine 10 MG Tabs  Commonly known as: CLARITIN      Take 10 mg by mouth daily.    Refills: 0     meclizine 25 MG Tabs  Commonly known as: ANTIVERT      Take 25 Please  your prescriptions at the location directed by your doctor or nurse    Bring a paper prescription for each of these medications  · cephalexin 500 MG Caps  · furosemide 40 MG Tabs  · hydrALAZINE 10 MG Tabs  · isosorbide mononitrate ER 30 MG regarding your results contact the physician who ordered the test/exam by phone or via [address] by choosing \"Ask a Medical Question. \"        DONTE Rucker MD  Office: [phone]  10/4/2021

## 2021-10-04 NOTE — CM/SW NOTE
10/04/21 1036   Discharge disposition   Expected discharge disposition Skilled [address] [place] [address] Provider [facility]   Discharge transportation [facility]     MD discharge order entered.    Pt is stable to return to [facility]

## 2021-10-04 NOTE — PROGRESS NOTES
[place] [facility] - [facility]    Progress Note    Grace White Patient Status:  Inpatient    1950 MRN [id number]   Location [facility] 2W/SW Attending Valentina Walker, [address] [facility] Day # 4 PCP Tesha Starks MD        Subjective:   [facility] Plan:     *Anemia/Melena  -admission hgb 4.2 s/p 3 U prbc, repeat hgb 8.5  CT a/p: diverticulosis without diverticulitis, no hemorrhage  IV protoix Q 12  Upper endoscopy normal  Colonoscopy with hyperplastic/inflammatory polyp wit active oozing of blood -

## 2021-10-04 NOTE — PROGRESS NOTES
[facility]    Progress Note    Nat Bruno Patient Status:  Inpatient    1950 MRN [id number]   Location [facility] 5SW/SE Attending Shane Leija MD   Hosp Day # 4 PCP Antonia Villasenor MD       Subjective:   Alexander Pelayo (H) 09/30/2021    LIP 83 05/18/2021    DDIMER 1.23 (H) 03/05/2020    MG 2.0 03/26/2021    PHOS 3.3 10/04/2021    TROP <0.045 05/18/2021    CK 73 11/10/2020    ETOH 3 (H) 11/10/2020       No results found.           Jovita Min MD  10/4/2021

## 2021-10-04 NOTE — PLAN OF CARE
Problem: Patient Centered Care  Goal: Patient preferences are identified and integrated in the patient's plan of care  Description: Interventions:  - What would you like us to know as we care for you? I live qt [facility] and am wheel chair bound.    - Pro deficits and behaviors that affect risk of falls.   - [place] fall precautions as indicated by assessment.  - Educate pt/family on patient safety including physical limitations  - Instruct pt to call for assistance with activity based on assessment  - Mod profile  Outcome: Progressing     Problem: HEMATOLOGIC - ADULT  Goal: Free from bleeding injury  Description: (Example usage: patient with low platelets)  INTERVENTIONS:  - Avoid intramuscular injections, enemas and rectal medication administration  - Ensu

## 2021-10-04 NOTE — DISCHARGE PLANNING
Report called to [address] at [facility]

## 2021-11-20 ENCOUNTER — APPOINTMENT (OUTPATIENT)
Dept: GENERAL RADIOLOGY | Facility: HOSPITAL | Age: 71
End: 2021-11-20
Attending: EMERGENCY MEDICINE
Payer: MEDICARE

## 2021-11-20 ENCOUNTER — HOSPITAL ENCOUNTER (EMERGENCY)
Facility: HOSPITAL | Age: 71
Discharge: NURSING, REHAB OR LONG-TERM CARE | End: 2021-11-21
Attending: EMERGENCY MEDICINE
Payer: MEDICARE

## 2021-11-20 DIAGNOSIS — R07.9 CHEST PAIN OF UNCERTAIN ETIOLOGY: Primary | ICD-10-CM

## 2021-11-20 PROCEDURE — 71045 X-RAY EXAM CHEST 1 VIEW: CPT | Performed by: EMERGENCY MEDICINE

## 2021-11-20 PROCEDURE — 80048 BASIC METABOLIC PNL TOTAL CA: CPT | Performed by: EMERGENCY MEDICINE

## 2021-11-20 PROCEDURE — 36415 COLL VENOUS BLD VENIPUNCTURE: CPT

## 2021-11-20 PROCEDURE — 99284 EMERGENCY DEPT VISIT MOD MDM: CPT

## 2021-11-20 PROCEDURE — 93010 ELECTROCARDIOGRAM REPORT: CPT | Performed by: EMERGENCY MEDICINE

## 2021-11-20 PROCEDURE — 84484 ASSAY OF TROPONIN QUANT: CPT | Performed by: EMERGENCY MEDICINE

## 2021-11-20 PROCEDURE — 85025 COMPLETE CBC W/AUTO DIFF WBC: CPT | Performed by: EMERGENCY MEDICINE

## 2021-11-20 PROCEDURE — 93005 ELECTROCARDIOGRAM TRACING: CPT

## 2021-11-21 VITALS
BODY MASS INDEX: 26 KG/M2 | RESPIRATION RATE: 16 BRPM | WEIGHT: 163 LBS | DIASTOLIC BLOOD PRESSURE: 79 MMHG | TEMPERATURE: 98 F | SYSTOLIC BLOOD PRESSURE: 139 MMHG | OXYGEN SATURATION: 95 % | HEART RATE: 84 BPM

## 2021-11-21 NOTE — ED QUICK NOTES
Called [facility] to set up transportation. ETA is 6 hours. Charge RN made aware. Pt is sleeping on cart with no signs of distress. DISPLAY PLAN FREE TEXT 89.9

## 2021-11-21 NOTE — ED PROVIDER NOTES
Patient Seen in: [facility] Emergency Department    History   Patient presents with:  Chest Pain Angina    Stated Complaint: cp    HPI    Patient presents to the emergency department by ambulance with complaint of chest pain.   She reports she has Past Surgical History:   Procedure Laterality Date   • CHOLECYSTECTOMY     • COLONOSCOPY  10/2021   • COLONOSCOPY N/A 10/1/2021    Procedure: COLONOSCOPY;  Surgeon: Sandy Ramos MD;  Location: [address] [place] [address] ENDOSCOPY   • EGD  09/2021    normal   • SINUS S times daily as needed. To left lower leg for skin discoloration as needed   Ranolazine  MG Oral Tablet 12 Hr,  Take 1 tablet (500 mg total) by mouth 2 (two) times daily.    ondansetron 4 MG Oral Tablet Dispersible,  Take 4 mg by mouth every 6 (six) ho minutes as needed for Chest pain. linagliptin 5 mg Oral Tab,  Take 5 mg by mouth daily.          Review of Systems  Constitutional: no fever, normal appetite, normal energy, has otherwise been feeling well  HEENT: No cough, no congestion  Respiratory: no During her entire stay in the emergency department she did not have any chest discomfort of any time.   She appears comfortable she has had chronic chest pain issues and no indication of acute myocardial infarction at this time we will send her back to fac diagnosis)    Disposition:  Discharge    Follow-up:  Meryle Blood, [place] [address] [facility] [address] [phone]            Medications Prescribed:  Current Discharge Medication List

## 2021-11-21 NOTE — ED INITIAL ASSESSMENT (HPI)
Dull chest pain since 8pm.  Given 3 sublingual nitroglycerin prior to arrival.  Notes she has had this same pain in the past.   Denies shortness of breath.

## 2022-02-22 NOTE — PROGRESS NOTES
"[facility] -  PROGRESS NOTE               Author: Karishma Cruz M.D. Date & Time: 2/22/2022  1:16 PM     HPI:  Krishan Acevedo is a 74 y.o. 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 Was additionally found to have worsening renal function.   Patient shares that he first tested positive for COVID at an outside hospital several days ago. He felt that he was not improving and possibly getting worse at home which prompted him to call EMS. He mentions that for several days he has been having loose stools, noting 3-5 loose but not watery BMs daily, noting his last loose stool was day prior to presentation. He also mentions that he has become more dependent on his wife to bring him food and water citing fatigue with his COVID infection. He does note that for the past several weeks he has been feeling \"like I'm always thirsty\".  While in the ED he was vitally stable. Was placed on 2L NC sating in the mid 90s BP ranged 123/70 from 159/117. Labs notable for Cr of 2.62. BUN 80, COVID-19 positive, UA noted for large occult blood, small leukocyte esterase. US of kidneys was completed with indicated no hydronephrosis or fluid collections, no renal artery stenosis.   In regards to his transplant history. Living donor transplant was completed in 2008 at an outside hospital. Patient is on Prednisone 5mg, Mycophenolate 500mg BID, and Tacrolimus 1mg and 0.5mg daily. His GFR trends in the 30s-40s although in chart he has climbed to the 56-53 ranges in the past.     DAILY NEPHROLOGY SUMMARY:  2/1: Admitted, consult placed  2/2: no overnight events, renal function improving, patient tolerating PO intake  2/3: started on IVF yesterday, renal function improved to around prior baseline range  2/4: renal function continues " [facility]  Progress Note    Goyo Filter Patient Status:  Inpatient    1950 MRN [id number]   Location [facility] Attending Jaden Mondragon MD   [facility] Day # 4 PCP Moshe Gonzalez MD     Date:  3/25/2020  Date of Admission 60 MG Oral Tablet 24 Hr, Take 60 mg by mouth daily. traMADol HCl 50 MG Oral Tab, Take 1 tablet (50 mg total) by mouth every 12 (twelve) hours as needed. Clopidogrel Bisulfate 75 MG Oral Tab, Take 75 mg by mouth daily.   ipratropium-albuterol 0.5-2.5 (3) M to improve  2/5: Scr down to baseline levels. No new overnight renal events. Feels ok.  2/6: Feels ok. Urine culture from 2/1 positive for carbapenem resistant Pseudomonas aeruginosa. He is on zosyn per sensitivity. No labs today  2/7: Potassium is 5.7 today.  Creatinine up to 1.95.  Corrected calcium elevated.  CO2 16.  2/08: Chart and notes reviewed. No new labs this am. +Tachycardia.   2/09: Nursing notes and assessments reviewed.  Int tachycardia still. Room air. SBP labile 100s-160s. K 5.5  2/10: Spoke with RN, patient has no complaints at this time.   Plan is for discharge today or tomorrow to SNF pending 1L bolus and Calcium recheck.  Current SCa 11.5  VSS RA  -160s  2/11: Sodium up to 149.  Calcium increasing.  Creatinine stable 1.7.  2/12: Sodium up to 152.  Calcium remains elevated.  Creatinine 1.8.  I/Os not documented  2/13: Sodium still at 152.  Calcium trending down.  Creatinine up to 2.06.  On bicarb drip.  Somnolent  2/14: Unable to participate in interview, improved hemodynamics, cr stable  2/15: stable hemodynamics, 1.9L UOP, cr to 1.9, labile blood pressures, ongoing intermittent delirium  2/16: Hypertensive this AM, slight improvement in Cr, ca climbing, net neg 600cc with 1.9L UOP, confused  2/17: 1.4L UOP,ongoing encephalopthy, sodium climbing, minimal PO intake    2/18: IOs not recorded, cr 1.5, tremulous, minimal PO intake-serum sodium climbing  2/19: 2.6L  UOP, sodium climbing, more confused this a.m, wife at bedside  2/21: remains confused, biopsy was postponed to until tomorrow due to holiday.   2/22: confused. Refused Veltassa for 2 days but talked to him about it.     REVIEW OF SYSTEMS:    10 point ROS reviewed and is as per HPI/daily summary or otherwise negative    PMH/PSH/SH/FH:   Reviewed and unchanged since admission note    CURRENT MEDICATIONS:   Reviewed from admission to present day    VS:  /68   Pulse 98   Temp 36.3 °C (97.3 °F) (Temporal)   Resp 20   Ht 1.803 m  "(5' 11\")   Wt 112 kg (247 lb 5.7 oz)   SpO2 93%   BMI 34.50 kg/m²     Physical Exam  Vitals and nursing note reviewed.   Constitutional:       Appearance: He is obese. He is ill-appearing.   HENT:      Head: Normocephalic and atraumatic.   Eyes:      General: No scleral icterus.  Cardiovascular:      Rate and Rhythm: Normal rate and regular rhythm.      Pulses: Normal pulses.      Comments: No edema  Pulmonary:      Effort: Pulmonary effort is normal. No respiratory distress.   Abdominal:      General: Abdomen is flat. There is no distension.   Musculoskeletal:         General: No swelling or deformity.   Skin:     Coloration: Skin is not jaundiced.      Findings: No bruising or rash.   Neurological:      Mental Status: He is alert. He is disoriented.      Motor: Tremor present.   Psychiatric:         Behavior: Behavior normal.         Fluids:  In: -   Out: 1150     LABS:  Recent Labs     02/20/22  1456 02/21/22  0245 02/22/22  0151   SODIUM 140 135 144   POTASSIUM 4.8 5.4 5.7*   CHLORIDE 109 106 112   CO2 19* 21 21   GLUCOSE 171* 175* 115*   BUN 61* 60* 59*   CREATININE 1.15 1.13 1.41*   CALCIUM 9.4 9.5 9.9       IMAGING:   All imaging reviewed from admission to present day    IMPRESSION:  # History of Renal Transplant with RADHA   -Tacrolimus 22.5  2/8, dose reduced, repeated FK level 2/18 8.9 but lab was drawn after the dose so it is not a trough.   # History of ANCA Vasculitis, chronic hematuria   - Repeat serologies positive. ? recurrence of vasculitis in kidney allograft vs COVID induced RADHA   # CKD 3  # Acidosis, improved   # Afib  # DM2  # HTN  # Hypercalcemia: PTH mediated   # Hyperparathyroid: may need further imaging going forward  # Acute encephalopathy  - azotemia is partially driven by steroids. BUN is improving , unlikely uremic        PLAN:  - c/w  pred 60mg daily  - awaiting transplant kidney biopsy  - c/w d5w at 50 ml/hr since unclear if eating at all  - get Tacro level prior to next Tacro dose in " a normal mood and affect.      Cervical Papanicolaou contraindicated    Results:     Lab Results   Component Value Date    WBC 7.9 03/26/2020    HGB 11.0 (L) 03/26/2020    HCT 33.6 (L) 03/26/2020    .0 03/26/2020    CREATSERUM 2.02 (H) 03/26/2020 am   - sensipar 30mg TIW  - Hold Cellcept for now   - increase bicarb to 650 mg bid.  - Continue to hold losartan  - increase veltassa to 16 g qd.  Pt has been refusing but discussed with him and primary team today about being compliant.   -Low potassium renal diet   -Dose meds for reduced GFR     Thank you   changes and continued infiltrate or atelectasis at the left base with a left effusion and some focal infiltrate. Overall appearance however is improved. 3. Continued patchy infiltrate at the right base with improvement when compared to the earlier study. will require inpatient services that will reasonably be expected to span two midnight's based on the clinical documentation in H+P. Based on patients current state of illness, I anticipate that, after discharge, patient will require TBD.         Sommer Morton

## 2022-06-27 ENCOUNTER — APPOINTMENT (OUTPATIENT)
Dept: GENERAL RADIOLOGY | Facility: HOSPITAL | Age: 72
End: 2022-06-27
Attending: EMERGENCY MEDICINE
Payer: MEDICARE

## 2022-06-27 ENCOUNTER — APPOINTMENT (OUTPATIENT)
Dept: CV DIAGNOSTICS | Facility: HOSPITAL | Age: 72
End: 2022-06-27
Attending: INTERNAL MEDICINE
Payer: MEDICARE

## 2022-06-27 ENCOUNTER — APPOINTMENT (OUTPATIENT)
Dept: NUCLEAR MEDICINE | Facility: HOSPITAL | Age: 72
End: 2022-06-27
Attending: EMERGENCY MEDICINE
Payer: MEDICARE

## 2022-06-27 ENCOUNTER — HOSPITAL ENCOUNTER (INPATIENT)
Facility: HOSPITAL | Age: 72
LOS: 3 days | Discharge: NURSING, REHAB OR LONG-TERM CARE | End: 2022-06-30
Attending: EMERGENCY MEDICINE | Admitting: INTERNAL MEDICINE
Payer: MEDICARE

## 2022-06-27 DIAGNOSIS — J81.0 ACUTE PULMONARY EDEMA (HCC): Primary | ICD-10-CM

## 2022-06-27 LAB
ANION GAP SERPL CALC-SCNC: 9 MMOL/L (ref 0–18)
BASOPHILS # BLD AUTO: 0.02 X10(3) UL (ref 0–0.2)
BASOPHILS NFR BLD AUTO: 0.4 %
BUN BLD-MCNC: 37 MG/DL (ref 7–18)
BUN/CREAT SERPL: 18.9 (ref 10–20)
CALCIUM BLD-MCNC: 8.6 MG/DL (ref 8.5–10.1)
CHLORIDE SERPL-SCNC: 110 MMOL/L (ref 98–112)
CO2 SERPL-SCNC: 22 MMOL/L (ref 21–32)
CREAT BLD-MCNC: 1.96 MG/DL
D DIMER PPP FEU-MCNC: 2.73 UG/ML FEU (ref ?–0.7)
DEPRECATED RDW RBC AUTO: 56.8 FL (ref 35.1–46.3)
EOSINOPHIL # BLD AUTO: 0.17 X10(3) UL (ref 0–0.7)
EOSINOPHIL NFR BLD AUTO: 3.6 %
ERYTHROCYTE [DISTWIDTH] IN BLOOD BY AUTOMATED COUNT: 14.5 % (ref 11–15)
EST. AVERAGE GLUCOSE BLD GHB EST-MCNC: 105 MG/DL (ref 68–126)
GLUCOSE BLD-MCNC: 132 MG/DL (ref 70–99)
GLUCOSE BLDC GLUCOMTR-MCNC: 114 MG/DL (ref 70–99)
GLUCOSE BLDC GLUCOMTR-MCNC: 139 MG/DL (ref 70–99)
GLUCOSE BLDC GLUCOMTR-MCNC: 153 MG/DL (ref 70–99)
GLUCOSE BLDC GLUCOMTR-MCNC: 184 MG/DL (ref 70–99)
HBA1C MFR BLD: 5.3 % (ref ?–5.7)
HCT VFR BLD AUTO: 36.5 %
HGB BLD-MCNC: 11.6 G/DL
IMM GRANULOCYTES # BLD AUTO: 0.01 X10(3) UL (ref 0–1)
IMM GRANULOCYTES NFR BLD: 0.2 %
LYMPHOCYTES # BLD AUTO: 0.93 X10(3) UL (ref 1–4)
LYMPHOCYTES NFR BLD AUTO: 19.9 %
MCH RBC QN AUTO: 33.6 PG (ref 26–34)
MCHC RBC AUTO-ENTMCNC: 31.8 G/DL (ref 31–37)
MCV RBC AUTO: 105.8 FL
MONOCYTES # BLD AUTO: 0.2 X10(3) UL (ref 0.1–1)
MONOCYTES NFR BLD AUTO: 4.3 %
MRSA DNA SPEC QL NAA+PROBE: NEGATIVE
NEUTROPHILS # BLD AUTO: 3.35 X10 (3) UL (ref 1.5–7.7)
NEUTROPHILS # BLD AUTO: 3.35 X10(3) UL (ref 1.5–7.7)
NEUTROPHILS NFR BLD AUTO: 71.6 %
NT-PROBNP SERPL-MCNC: 5237 PG/ML (ref ?–125)
OSMOLALITY SERPL CALC.SUM OF ELEC: 303 MOSM/KG (ref 275–295)
PLATELET # BLD AUTO: 92 10(3)UL (ref 150–450)
POTASSIUM SERPL-SCNC: 4.5 MMOL/L (ref 3.5–5.1)
RBC # BLD AUTO: 3.45 X10(6)UL
SARS-COV-2 RNA RESP QL NAA+PROBE: NOT DETECTED
SODIUM SERPL-SCNC: 141 MMOL/L (ref 136–145)
TROPONIN I HIGH SENSITIVITY: 23 NG/L
TROPONIN I HIGH SENSITIVITY: 24 NG/L
WBC # BLD AUTO: 4.7 X10(3) UL (ref 4–11)

## 2022-06-27 PROCEDURE — 87641 MR-STAPH DNA AMP PROBE: CPT | Performed by: EMERGENCY MEDICINE

## 2022-06-27 PROCEDURE — 78582 LUNG VENTILAT&PERFUS IMAGING: CPT | Performed by: EMERGENCY MEDICINE

## 2022-06-27 PROCEDURE — 82962 GLUCOSE BLOOD TEST: CPT

## 2022-06-27 PROCEDURE — 93306 TTE W/DOPPLER COMPLETE: CPT | Performed by: INTERNAL MEDICINE

## 2022-06-27 PROCEDURE — 85025 COMPLETE CBC W/AUTO DIFF WBC: CPT

## 2022-06-27 PROCEDURE — 80048 BASIC METABOLIC PNL TOTAL CA: CPT

## 2022-06-27 PROCEDURE — 99285 EMERGENCY DEPT VISIT HI MDM: CPT

## 2022-06-27 PROCEDURE — 96374 THER/PROPH/DIAG INJ IV PUSH: CPT

## 2022-06-27 PROCEDURE — 84484 ASSAY OF TROPONIN QUANT: CPT

## 2022-06-27 PROCEDURE — 84484 ASSAY OF TROPONIN QUANT: CPT | Performed by: INTERNAL MEDICINE

## 2022-06-27 PROCEDURE — 85379 FIBRIN DEGRADATION QUANT: CPT | Performed by: EMERGENCY MEDICINE

## 2022-06-27 PROCEDURE — 71045 X-RAY EXAM CHEST 1 VIEW: CPT | Performed by: EMERGENCY MEDICINE

## 2022-06-27 PROCEDURE — 93005 ELECTROCARDIOGRAM TRACING: CPT

## 2022-06-27 PROCEDURE — 93010 ELECTROCARDIOGRAM REPORT: CPT | Performed by: EMERGENCY MEDICINE

## 2022-06-27 PROCEDURE — 83880 ASSAY OF NATRIURETIC PEPTIDE: CPT | Performed by: EMERGENCY MEDICINE

## 2022-06-27 PROCEDURE — 83036 HEMOGLOBIN GLYCOSYLATED A1C: CPT | Performed by: INTERNAL MEDICINE

## 2022-06-27 RX ORDER — ISOSORBIDE MONONITRATE 30 MG/1
30 TABLET, EXTENDED RELEASE ORAL DAILY
Status: DISCONTINUED | OUTPATIENT
Start: 2022-06-27 | End: 2022-06-30

## 2022-06-27 RX ORDER — CETIRIZINE HYDROCHLORIDE 5 MG/1
10 TABLET ORAL DAILY
Status: DISCONTINUED | OUTPATIENT
Start: 2022-06-27 | End: 2022-06-28

## 2022-06-27 RX ORDER — POLYETHYLENE GLYCOL 3350 17 G/17G
17 POWDER, FOR SOLUTION ORAL DAILY PRN
Status: DISCONTINUED | OUTPATIENT
Start: 2022-06-27 | End: 2022-06-30

## 2022-06-27 RX ORDER — CLOPIDOGREL BISULFATE 75 MG/1
75 TABLET ORAL DAILY
Status: DISCONTINUED | OUTPATIENT
Start: 2022-06-27 | End: 2022-06-30

## 2022-06-27 RX ORDER — LISINOPRIL 10 MG/1
10 TABLET ORAL DAILY
Status: DISCONTINUED | OUTPATIENT
Start: 2022-06-27 | End: 2022-06-27

## 2022-06-27 RX ORDER — FLUTICASONE PROPIONATE 50 MCG
1 SPRAY, SUSPENSION (ML) NASAL DAILY
Status: DISCONTINUED | OUTPATIENT
Start: 2022-06-27 | End: 2022-06-30

## 2022-06-27 RX ORDER — HEPARIN SODIUM 5000 [USP'U]/ML
5000 INJECTION, SOLUTION INTRAVENOUS; SUBCUTANEOUS EVERY 12 HOURS SCHEDULED
Status: DISCONTINUED | OUTPATIENT
Start: 2022-06-27 | End: 2022-06-30

## 2022-06-27 RX ORDER — MAGNESIUM HYDROXIDE/ALUMINUM HYDROXICE/SIMETHICONE 120; 1200; 1200 MG/30ML; MG/30ML; MG/30ML
30 SUSPENSION ORAL 4 TIMES DAILY PRN
Status: DISCONTINUED | OUTPATIENT
Start: 2022-06-27 | End: 2022-06-30

## 2022-06-27 RX ORDER — TORSEMIDE 20 MG/1
40 TABLET ORAL 2 TIMES DAILY
Status: ON HOLD | COMMUNITY
End: 2022-06-27

## 2022-06-27 RX ORDER — HYDRALAZINE HYDROCHLORIDE 10 MG/1
10 TABLET, FILM COATED ORAL EVERY 8 HOURS SCHEDULED
Status: DISCONTINUED | OUTPATIENT
Start: 2022-06-27 | End: 2022-06-30

## 2022-06-27 RX ORDER — FUROSEMIDE 10 MG/ML
40 INJECTION INTRAMUSCULAR; INTRAVENOUS
Status: DISCONTINUED | OUTPATIENT
Start: 2022-06-27 | End: 2022-06-29

## 2022-06-27 RX ORDER — MELATONIN
3 NIGHTLY PRN
Status: DISCONTINUED | OUTPATIENT
Start: 2022-06-27 | End: 2022-06-27

## 2022-06-27 RX ORDER — ACETAMINOPHEN 325 MG/1
650 TABLET ORAL EVERY 4 HOURS PRN
Status: DISCONTINUED | OUTPATIENT
Start: 2022-06-27 | End: 2022-06-30

## 2022-06-27 RX ORDER — PANTOPRAZOLE SODIUM 40 MG/1
40 TABLET, DELAYED RELEASE ORAL
Status: DISCONTINUED | OUTPATIENT
Start: 2022-06-27 | End: 2022-06-30

## 2022-06-27 RX ORDER — ASPIRIN 81 MG/1
81 TABLET, CHEWABLE ORAL DAILY
Status: DISCONTINUED | OUTPATIENT
Start: 2022-06-27 | End: 2022-06-30

## 2022-06-27 RX ORDER — RANOLAZINE 500 MG/1
500 TABLET, EXTENDED RELEASE ORAL 2 TIMES DAILY
Status: DISCONTINUED | OUTPATIENT
Start: 2022-06-27 | End: 2022-06-30

## 2022-06-27 RX ORDER — MELATONIN
325
Status: DISCONTINUED | OUTPATIENT
Start: 2022-06-27 | End: 2022-06-30

## 2022-06-27 RX ORDER — POLYETHYLENE GLYCOL 3350 17 G/17G
17 POWDER, FOR SOLUTION ORAL DAILY PRN
COMMUNITY

## 2022-06-27 RX ORDER — NITROGLYCERIN 0.4 MG/1
0.4 TABLET SUBLINGUAL EVERY 5 MIN PRN
Status: DISCONTINUED | OUTPATIENT
Start: 2022-06-27 | End: 2022-06-30

## 2022-06-27 RX ORDER — GUAIFENESIN 600 MG
600 TABLET, EXTENDED RELEASE 12 HR ORAL 2 TIMES DAILY
Status: DISCONTINUED | OUTPATIENT
Start: 2022-06-27 | End: 2022-06-30

## 2022-06-27 RX ORDER — ALPRAZOLAM 0.5 MG/1
0.5 TABLET ORAL NIGHTLY PRN
COMMUNITY

## 2022-06-27 RX ORDER — ALPRAZOLAM 0.5 MG/1
0.5 TABLET ORAL NIGHTLY PRN
Status: DISCONTINUED | OUTPATIENT
Start: 2022-06-27 | End: 2022-06-30

## 2022-06-27 RX ORDER — HYDROCODONE BITARTRATE AND ACETAMINOPHEN 5; 325 MG/1; MG/1
1 TABLET ORAL EVERY 4 HOURS PRN
Status: DISCONTINUED | OUTPATIENT
Start: 2022-06-27 | End: 2022-06-30

## 2022-06-27 RX ORDER — MIRTAZAPINE 7.5 MG/1
7.5 TABLET, FILM COATED ORAL NIGHTLY
Status: DISCONTINUED | OUTPATIENT
Start: 2022-06-27 | End: 2022-06-30

## 2022-06-27 RX ORDER — ROPINIROLE 0.25 MG/1
0.25 TABLET, FILM COATED ORAL 2 TIMES DAILY
Status: DISCONTINUED | OUTPATIENT
Start: 2022-06-27 | End: 2022-06-30

## 2022-06-27 RX ORDER — LEVOTHYROXINE SODIUM 0.03 MG/1
25 TABLET ORAL
Status: DISCONTINUED | OUTPATIENT
Start: 2022-06-28 | End: 2022-06-30

## 2022-06-27 RX ORDER — OLANZAPINE 2.5 MG/1
2.5 TABLET ORAL NIGHTLY
Status: DISCONTINUED | OUTPATIENT
Start: 2022-06-27 | End: 2022-06-30

## 2022-06-27 RX ORDER — MECLIZINE HYDROCHLORIDE 25 MG/1
25 TABLET ORAL 3 TIMES DAILY PRN
Status: DISCONTINUED | OUTPATIENT
Start: 2022-06-27 | End: 2022-06-30

## 2022-06-27 RX ORDER — ATORVASTATIN CALCIUM 80 MG/1
80 TABLET, FILM COATED ORAL NIGHTLY
Status: DISCONTINUED | OUTPATIENT
Start: 2022-06-27 | End: 2022-06-30

## 2022-06-27 RX ORDER — TROLAMINE SALICYLATE 10 G/100G
CREAM TOPICAL 4 TIMES DAILY PRN
Status: DISCONTINUED | OUTPATIENT
Start: 2022-06-27 | End: 2022-06-30

## 2022-06-27 RX ORDER — ONDANSETRON 4 MG/1
4 TABLET, ORALLY DISINTEGRATING ORAL EVERY 6 HOURS PRN
Status: DISCONTINUED | OUTPATIENT
Start: 2022-06-27 | End: 2022-06-30

## 2022-06-27 RX ORDER — DOCUSATE SODIUM 100 MG/1
100 CAPSULE, LIQUID FILLED ORAL 2 TIMES DAILY
Status: DISCONTINUED | OUTPATIENT
Start: 2022-06-27 | End: 2022-06-30

## 2022-06-27 RX ORDER — ACETAMINOPHEN 325 MG/1
650 TABLET ORAL EVERY 6 HOURS PRN
Status: DISCONTINUED | OUTPATIENT
Start: 2022-06-27 | End: 2022-06-30

## 2022-06-27 RX ORDER — FUROSEMIDE 10 MG/ML
40 INJECTION INTRAMUSCULAR; INTRAVENOUS ONCE
Status: COMPLETED | OUTPATIENT
Start: 2022-06-27 | End: 2022-06-27

## 2022-06-27 RX ORDER — IPRATROPIUM BROMIDE AND ALBUTEROL SULFATE 2.5; .5 MG/3ML; MG/3ML
3 SOLUTION RESPIRATORY (INHALATION) EVERY 6 HOURS PRN
Status: DISCONTINUED | OUTPATIENT
Start: 2022-06-27 | End: 2022-06-30

## 2022-06-27 RX ORDER — HYDROCODONE BITARTRATE AND ACETAMINOPHEN 5; 325 MG/1; MG/1
2 TABLET ORAL EVERY 4 HOURS PRN
Status: DISCONTINUED | OUTPATIENT
Start: 2022-06-27 | End: 2022-06-30

## 2022-06-27 RX ORDER — BACLOFEN 10 MG/1
5 TABLET ORAL 2 TIMES DAILY
Status: DISCONTINUED | OUTPATIENT
Start: 2022-06-27 | End: 2022-06-30

## 2022-06-27 RX ORDER — FLUTICASONE PROPIONATE 50 MCG
SPRAY, SUSPENSION (ML) NASAL DAILY
COMMUNITY

## 2022-06-27 RX ORDER — CARVEDILOL 12.5 MG/1
12.5 TABLET ORAL 2 TIMES DAILY WITH MEALS
Status: DISCONTINUED | OUTPATIENT
Start: 2022-06-27 | End: 2022-06-30

## 2022-06-27 RX ORDER — ASCORBIC ACID, THIAMINE, RIBOFLAVIN, NIACINAMIDE, PYRIDOXINE, FOLIC ACID, COBALAMIN, BIOTIN, PANTOTHENIC ACID 100; 1.5; 1.7; 20; 10; 1; 6; 300; 1 MG/1; MG/1; MG/1; MG/1; MG/1; MG/1; UG/1; UG/1; MG/1
1 TABLET, COATED ORAL DAILY
Status: DISCONTINUED | OUTPATIENT
Start: 2022-06-27 | End: 2022-06-30

## 2022-06-27 NOTE — CM/SW NOTE
06/27/22 1200   CM/SW Referral Data   Referral Source Physician;   Reason for Referral Discharge planning   Informant EMR   Patient [id number] [facility] Provider Upon Admission   [facility])   Discharge Needs   Anticipated D/C needs Long term care facility       Per chart review pt is a long-term resident at [facility].   [id number] [facility] requested to send updates [address]. Deborah Dean.  Ivette Okeefe RN, BSN  Nurse   [phone]

## 2022-06-27 NOTE — CM/SW NOTE
Department  notified of request for ana luara SMILEY referrals started. Assigned CM/SW to follow up with pt/family on further discharge planning.      Sadie Mann   June 27, 2022   14:25

## 2022-06-27 NOTE — PROGRESS NOTES
Therapeutic substitution for diclofenac to trolamine per P&T approved protocol.     Lisa Ramos, PharmD, 06/27/22, 11:38 AM

## 2022-06-27 NOTE — ED INITIAL ASSESSMENT (HPI)
Arrived via ems from [facility] for cp that started this evening along with sob. 90% RA, EMS placed on NRB 10L. EMS gave 324 asa and 1 nitroglycerin sl. Pt no longer complaints of chest pain.

## 2022-06-27 NOTE — PLAN OF CARE
Problem: Patient Centered Care  Goal: Patient preferences are identified and integrated in the patient's plan of care  Description: Interventions:  - What would you like us to know as we care for you? I live at [facility]  - Provide timely, complete, and accurate information to patient/family  - Incorporate patient and family knowledge, values, beliefs, and cultural backgrounds into the planning and delivery of care  - Encourage patient/family to participate in care and decision-making at the level they choose  - Honor patient and family perspectives and choices  Outcome: Progressing     Problem: Patient/Family Goals  Goal: Patient/Family Long Term Goal  Description: Patient's Long Term Goal: to return to [facility]    Interventions:  - PT/OT  -ECHO  - See additional Care Plan goals for specific interventions  Outcome: Progressing  Goal: Patient/Family Short Term Goal  Description: Patient's Short Term Goal: to feel better    Interventions:   -  PT/OT  -ECHO    - See additional Care Plan goals for specific interventions  Outcome: Progressing     Problem: CARDIOVASCULAR - ADULT  Goal: Maintains optimal cardiac output and hemodynamic stability  Description: INTERVENTIONS:  - Monitor vital signs, rhythm, and trends  - Monitor for bleeding, hypotension and signs of decreased cardiac output  - Evaluate effectiveness of vasoactive medications to optimize hemodynamic stability  - Monitor arterial and/or venous puncture sites for bleeding and/or hematoma  - Assess quality of pulses, skin color and temperature  - Assess for signs of decreased coronary artery perfusion - ex.  Angina  - Evaluate fluid balance, assess for edema, trend weights  Outcome: Progressing  Goal: Absence of cardiac arrhythmias or at baseline  Description: INTERVENTIONS:  - Continuous cardiac monitoring, monitor vital signs, obtain 12 lead EKG if indicated  - Evaluate effectiveness of antiarrhythmic and heart rate control medications as ordered  - Initiate emergency measures for life threatening arrhythmias  - Monitor electrolytes and administer replacement therapy as ordered  Outcome: Progressing     Problem: SKIN/TISSUE INTEGRITY - ADULT  Goal: Skin integrity remains intact  Description: INTERVENTIONS  - Assess and document risk factors for pressure ulcer development  - Assess and document skin integrity  - Monitor for areas of redness and/or skin breakdown  - Initiate interventions, skin care algorithm/standards of care as needed  Outcome: Progressing     Problem: Impaired Functional Mobility  Goal: Achieve highest/safest level of mobility/gait  Description: Interventions:  - Assess patient's functional ability and stability  - Promote increasing activity/tolerance for mobility and gait  - Educate and engage patient/family in tolerated activity level and precautions    Outcome: Progressing     Problem: Impaired Activities of Daily Living  Goal: Achieve highest/safest level of independence in self care  Description: Interventions:  - Assess ability and encourage patient to participate in ADLs to maximize function  - Promote sitting position while performing ADLs such as feeding, grooming, and bathing  - Educate and encourage patient/family in tolerated functional activity level and precautions during self-care    Outcome: Progressing    Patient is alert and oriented, 5L NC, VSS, ECHO scheduled, no complaints of pain. Bed in lowest position, call light within reach.

## 2022-06-28 LAB
ANION GAP SERPL CALC-SCNC: 7 MMOL/L (ref 0–18)
BASOPHILS # BLD AUTO: 0.01 X10(3) UL (ref 0–0.2)
BASOPHILS NFR BLD AUTO: 0.2 %
BUN BLD-MCNC: 40 MG/DL (ref 7–18)
BUN/CREAT SERPL: 20.1 (ref 10–20)
CALCIUM BLD-MCNC: 8.1 MG/DL (ref 8.5–10.1)
CHLORIDE SERPL-SCNC: 109 MMOL/L (ref 98–112)
CO2 SERPL-SCNC: 24 MMOL/L (ref 21–32)
CREAT BLD-MCNC: 1.99 MG/DL
DEPRECATED HBV CORE AB SER IA-ACNC: 62.3 NG/ML
DEPRECATED RDW RBC AUTO: 57.7 FL (ref 35.1–46.3)
EOSINOPHIL # BLD AUTO: 0.12 X10(3) UL (ref 0–0.7)
EOSINOPHIL NFR BLD AUTO: 2.6 %
ERYTHROCYTE [DISTWIDTH] IN BLOOD BY AUTOMATED COUNT: 14.8 % (ref 11–15)
GLUCOSE BLD-MCNC: 94 MG/DL (ref 70–99)
GLUCOSE BLDC GLUCOMTR-MCNC: 135 MG/DL (ref 70–99)
GLUCOSE BLDC GLUCOMTR-MCNC: 146 MG/DL (ref 70–99)
GLUCOSE BLDC GLUCOMTR-MCNC: 162 MG/DL (ref 70–99)
GLUCOSE BLDC GLUCOMTR-MCNC: 98 MG/DL (ref 70–99)
HCT VFR BLD AUTO: 30.1 %
HCT VFR BLD AUTO: 32.1 %
HGB BLD-MCNC: 10.4 G/DL
HGB BLD-MCNC: 9.6 G/DL
IMM GRANULOCYTES # BLD AUTO: 0.01 X10(3) UL (ref 0–1)
IMM GRANULOCYTES NFR BLD: 0.2 %
IRON SATN MFR SERPL: 16 %
IRON SERPL-MCNC: 42 UG/DL
LYMPHOCYTES # BLD AUTO: 1.18 X10(3) UL (ref 1–4)
LYMPHOCYTES NFR BLD AUTO: 25.8 %
MCH RBC QN AUTO: 33.7 PG (ref 26–34)
MCHC RBC AUTO-ENTMCNC: 31.9 G/DL (ref 31–37)
MCV RBC AUTO: 105.6 FL
MONOCYTES # BLD AUTO: 0.22 X10(3) UL (ref 0.1–1)
MONOCYTES NFR BLD AUTO: 4.8 %
NEUTROPHILS # BLD AUTO: 3.03 X10 (3) UL (ref 1.5–7.7)
NEUTROPHILS # BLD AUTO: 3.03 X10(3) UL (ref 1.5–7.7)
NEUTROPHILS NFR BLD AUTO: 66.4 %
OSMOLALITY SERPL CALC.SUM OF ELEC: 300 MOSM/KG (ref 275–295)
PLATELET # BLD AUTO: 75 10(3)UL (ref 150–450)
POTASSIUM SERPL-SCNC: 4.1 MMOL/L (ref 3.5–5.1)
RBC # BLD AUTO: 2.85 X10(6)UL
SODIUM SERPL-SCNC: 140 MMOL/L (ref 136–145)
TIBC SERPL-MCNC: 255 UG/DL (ref 240–450)
TRANSFERRIN SERPL-MCNC: 171 MG/DL (ref 200–360)
VIT B12 SERPL-MCNC: 1173 PG/ML (ref 193–986)
WBC # BLD AUTO: 4.6 X10(3) UL (ref 4–11)

## 2022-06-28 PROCEDURE — 97530 THERAPEUTIC ACTIVITIES: CPT

## 2022-06-28 PROCEDURE — 85014 HEMATOCRIT: CPT | Performed by: INTERNAL MEDICINE

## 2022-06-28 PROCEDURE — 82607 VITAMIN B-12: CPT | Performed by: INTERNAL MEDICINE

## 2022-06-28 PROCEDURE — 82728 ASSAY OF FERRITIN: CPT | Performed by: INTERNAL MEDICINE

## 2022-06-28 PROCEDURE — 84466 ASSAY OF TRANSFERRIN: CPT | Performed by: INTERNAL MEDICINE

## 2022-06-28 PROCEDURE — 85025 COMPLETE CBC W/AUTO DIFF WBC: CPT | Performed by: INTERNAL MEDICINE

## 2022-06-28 PROCEDURE — 82962 GLUCOSE BLOOD TEST: CPT

## 2022-06-28 PROCEDURE — 97166 OT EVAL MOD COMPLEX 45 MIN: CPT

## 2022-06-28 PROCEDURE — 83540 ASSAY OF IRON: CPT | Performed by: INTERNAL MEDICINE

## 2022-06-28 PROCEDURE — 80048 BASIC METABOLIC PNL TOTAL CA: CPT | Performed by: INTERNAL MEDICINE

## 2022-06-28 PROCEDURE — 97161 PT EVAL LOW COMPLEX 20 MIN: CPT

## 2022-06-28 PROCEDURE — 85018 HEMOGLOBIN: CPT | Performed by: INTERNAL MEDICINE

## 2022-06-28 RX ORDER — CETIRIZINE HYDROCHLORIDE 5 MG/1
5 TABLET ORAL DAILY
Status: DISCONTINUED | OUTPATIENT
Start: 2022-06-28 | End: 2022-06-30

## 2022-06-28 NOTE — PLAN OF CARE
Problem: Patient Centered Care  Goal: Patient preferences are identified and integrated in the patient's plan of care  Description: Interventions:  - What would you like us to know as we care for you? I live at [facility]  - Provide timely, complete, and accurate information to patient/family  - Incorporate patient and family knowledge, values, beliefs, and cultural backgrounds into the planning and delivery of care  - Encourage patient/family to participate in care and decision-making at the level they choose  - Honor patient and family perspectives and choices  Outcome: Progressing     Problem: Patient/Family Goals  Goal: Patient/Family Long Term Goal  Description: Patient's Long Term Goal: to return to [facility]    Interventions:  - PT/OT  -ECHO  - See additional Care Plan goals for specific interventions  Outcome: Progressing  Goal: Patient/Family Short Term Goal  Description: Patient's Short Term Goal: to feel better    Interventions:   -  PT/OT  -ECHO    - See additional Care Plan goals for specific interventions  Outcome: Progressing     Problem: CARDIOVASCULAR - ADULT  Goal: Maintains optimal cardiac output and hemodynamic stability  Description: INTERVENTIONS:  - Monitor vital signs, rhythm, and trends  - Monitor for bleeding, hypotension and signs of decreased cardiac output  - Evaluate effectiveness of vasoactive medications to optimize hemodynamic stability  - Monitor arterial and/or venous puncture sites for bleeding and/or hematoma  - Assess quality of pulses, skin color and temperature  - Assess for signs of decreased coronary artery perfusion - ex.  Angina  - Evaluate fluid balance, assess for edema, trend weights  Outcome: Progressing  Goal: Absence of cardiac arrhythmias or at baseline  Description: INTERVENTIONS:  - Continuous cardiac monitoring, monitor vital signs, obtain 12 lead EKG if indicated  - Evaluate effectiveness of antiarrhythmic and heart rate control medications as ordered  - Initiate emergency measures for life threatening arrhythmias  - Monitor electrolytes and administer replacement therapy as ordered  Outcome: Progressing     Problem: SKIN/TISSUE INTEGRITY - ADULT  Goal: Skin integrity remains intact  Description: INTERVENTIONS  - Assess and document risk factors for pressure ulcer development  - Assess and document skin integrity  - Monitor for areas of redness and/or skin breakdown  - Initiate interventions, skin care algorithm/standards of care as needed  Outcome: Progressing     Problem: Impaired Functional Mobility  Goal: Achieve highest/safest level of mobility/gait  Description: Interventions:  - Assess patient's functional ability and stability  - Promote increasing activity/tolerance for mobility and gait  - Educate and engage patient/family in tolerated activity level and precautions    Outcome: Progressing     Problem: Impaired Activities of Daily Living  Goal: Achieve highest/safest level of independence in self care  Description: Interventions:  - Assess ability and encourage patient to participate in ADLs to maximize function  - Promote sitting position while performing ADLs such as feeding, grooming, and bathing  - Educate and encourage patient/family in tolerated functional activity level and precautions during self-care    Outcome: Progressing    Patient is alert and oriented, 2L NC, VSS, no complaints of pain. IV lasix BID. PT/OT worked with patient, X2 assist with walker. Call light within reach, bed in lowest position.

## 2022-06-28 NOTE — PLAN OF CARE
Tylenol administered for headache overnight. Purewick in place. Con't IV lasix. Maintained 2L O2 nasal cannula with saturations > 95%. Plan for PT/OT eval in AM.     Problem: Patient Centered Care  Goal: Patient preferences are identified and integrated in the patient's plan of care  Description: Interventions:  - What would you like us to know as we care for you? I live at [facility]  - Provide timely, complete, and accurate information to patient/family  - Incorporate patient and family knowledge, values, beliefs, and cultural backgrounds into the planning and delivery of care  - Encourage patient/family to participate in care and decision-making at the level they choose  - Honor patient and family perspectives and choices  Outcome: Progressing     Problem: Patient/Family Goals  Goal: Patient/Family Long Term Goal  Description: Patient's Long Term Goal: to return to [facility]    Interventions:  - PT/OT  -ECHO  - See additional Care Plan goals for specific interventions  Outcome: Progressing  Goal: Patient/Family Short Term Goal  Description: Patient's Short Term Goal: to feel better    Interventions:   -  PT/OT  -ECHO    - See additional Care Plan goals for specific interventions  Outcome: Progressing     Problem: CARDIOVASCULAR - ADULT  Goal: Maintains optimal cardiac output and hemodynamic stability  Description: INTERVENTIONS:  - Monitor vital signs, rhythm, and trends  - Monitor for bleeding, hypotension and signs of decreased cardiac output  - Evaluate effectiveness of vasoactive medications to optimize hemodynamic stability  - Monitor arterial and/or venous puncture sites for bleeding and/or hematoma  - Assess quality of pulses, skin color and temperature  - Assess for signs of decreased coronary artery perfusion - ex.  Angina  - Evaluate fluid balance, assess for edema, trend weights  Outcome: Progressing  Goal: Absence of cardiac arrhythmias or at baseline  Description: INTERVENTIONS:  - Continuous cardiac monitoring, monitor vital signs, obtain 12 lead EKG if indicated  - Evaluate effectiveness of antiarrhythmic and heart rate control medications as ordered  - Initiate emergency measures for life threatening arrhythmias  - Monitor electrolytes and administer replacement therapy as ordered  Outcome: Progressing     Problem: SKIN/TISSUE INTEGRITY - ADULT  Goal: Skin integrity remains intact  Description: INTERVENTIONS  - Assess and document risk factors for pressure ulcer development  - Assess and document skin integrity  - Monitor for areas of redness and/or skin breakdown  - Initiate interventions, skin care algorithm/standards of care as needed  Outcome: Progressing     Problem: Impaired Functional Mobility  Goal: Achieve highest/safest level of mobility/gait  Description: Interventions:  - Assess patient's functional ability and stability  - Promote increasing activity/tolerance for mobility and gait  - Educate and engage patient/family in tolerated activity level and precaution  Outcome: Progressing     Problem: Impaired Activities of Daily Living  Goal: Achieve highest/safest level of independence in self care  Description: Interventions:  - Assess ability and encourage patient to participate in ADLs to maximize function  - Promote sitting position while performing ADLs such as feeding, grooming, and bathing  - Educate and encourage patient/family in tolerated functional activity level and precautions during self-care  Outcome: Progressing     Problem: Impaired Activities of Daily Living  Goal: Achieve highest/safest level of independence in self care  Description: Interventions:  - Assess ability and encourage patient to participate in ADLs to maximize function  - Promote sitting position while performing ADLs such as feeding, grooming, and bathing  - Educate and encourage patient/family in tolerated functional activity level and precautions during self-care  Outcome: Progressing

## 2022-06-28 NOTE — PROGRESS NOTES
[facility] Note:  Renal Dose Adjustment for Cetirizine (ZYRTEC)    Felicita Bills has been prescribed Cetirizine (Zyrtec) 10 mg orally daily. Estimated Creatinine Clearance: 23.7 mL/min (A) (based on SCr of 1.99 mg/dL (H)). The dose has been changed to 5 mg orally daily per P&T approved protocol. Pharmacy will follow and resume original order if renal function improves.       Thank you,  Pretty Webb, PharmD  6/28/2022  7:37 AM  [address]: [phone]

## 2022-06-28 NOTE — CM/SW NOTE
BPCI Bundled Advanced Medicare Program    Plan of care reviewed for care coordination and discharge planning. Noted pt falls under  BPCI/Medicare program, with  for Congestive Heart Failure     [address] Proposal:  Home pending progress    Patient is a L[facility] resident [facility] - will return to site at [place]. / to remain available for support and/or discharge planning.      Marily PACKN RN [address]  RN Case Manager  [phone]

## 2022-06-28 NOTE — PLAN OF CARE
This Rn called Report to [facility], Patient is alert and oriented, RA, VS, no complaints of pain.

## 2022-06-29 LAB
ANION GAP SERPL CALC-SCNC: 9 MMOL/L (ref 0–18)
BUN BLD-MCNC: 44 MG/DL (ref 7–18)
BUN/CREAT SERPL: 21.3 (ref 10–20)
CALCIUM BLD-MCNC: 8.2 MG/DL (ref 8.5–10.1)
CHLORIDE SERPL-SCNC: 109 MMOL/L (ref 98–112)
CO2 SERPL-SCNC: 22 MMOL/L (ref 21–32)
CREAT BLD-MCNC: 2.07 MG/DL
DEPRECATED RDW RBC AUTO: 58.4 FL (ref 35.1–46.3)
ERYTHROCYTE [DISTWIDTH] IN BLOOD BY AUTOMATED COUNT: 14.7 % (ref 11–15)
GLUCOSE BLD-MCNC: 124 MG/DL (ref 70–99)
GLUCOSE BLDC GLUCOMTR-MCNC: 112 MG/DL (ref 70–99)
GLUCOSE BLDC GLUCOMTR-MCNC: 138 MG/DL (ref 70–99)
GLUCOSE BLDC GLUCOMTR-MCNC: 143 MG/DL (ref 70–99)
GLUCOSE BLDC GLUCOMTR-MCNC: 154 MG/DL (ref 70–99)
HCT VFR BLD AUTO: 31.2 %
HGB BLD-MCNC: 9.8 G/DL
MCH RBC QN AUTO: 33.7 PG (ref 26–34)
MCHC RBC AUTO-ENTMCNC: 31.4 G/DL (ref 31–37)
MCV RBC AUTO: 107.2 FL
OSMOLALITY SERPL CALC.SUM OF ELEC: 303 MOSM/KG (ref 275–295)
PLATELET # BLD AUTO: 82 10(3)UL (ref 150–450)
POTASSIUM SERPL-SCNC: 4.1 MMOL/L (ref 3.5–5.1)
RBC # BLD AUTO: 2.91 X10(6)UL
SODIUM SERPL-SCNC: 140 MMOL/L (ref 136–145)
WBC # BLD AUTO: 4.4 X10(3) UL (ref 4–11)

## 2022-06-29 PROCEDURE — 85027 COMPLETE CBC AUTOMATED: CPT | Performed by: INTERNAL MEDICINE

## 2022-06-29 PROCEDURE — 80048 BASIC METABOLIC PNL TOTAL CA: CPT | Performed by: INTERNAL MEDICINE

## 2022-06-29 PROCEDURE — 82962 GLUCOSE BLOOD TEST: CPT

## 2022-06-29 RX ORDER — FUROSEMIDE 10 MG/ML
40 INJECTION INTRAMUSCULAR; INTRAVENOUS ONCE
Status: COMPLETED | OUTPATIENT
Start: 2022-06-29 | End: 2022-06-29

## 2022-06-29 RX ORDER — TORSEMIDE 20 MG/1
40 TABLET ORAL
Status: DISCONTINUED | OUTPATIENT
Start: 2022-06-29 | End: 2022-06-30

## 2022-06-29 NOTE — PLAN OF CARE
Tylenol administered for headache and Xanax for sleep overnight. Con't IV lasix. Purewick in place. Maintained on RA with saturations mid 90s. OB stool to be collected. Plan to be discharged back to [facility] once medically cleared. Problem: Patient Centered Care  Goal: Patient preferences are identified and integrated in the patient's plan of care  Description: Interventions:  - What would you like us to know as we care for you? I live at [facility]  - Provide timely, complete, and accurate information to patient/family  - Incorporate patient and family knowledge, values, beliefs, and cultural backgrounds into the planning and delivery of care  - Encourage patient/family to participate in care and decision-making at the level they choose  - Honor patient and family perspectives and choices  Outcome: Progressing     Problem: Patient/Family Goals  Goal: Patient/Family Long Term Goal  Description: Patient's Long Term Goal: to return to [facility]    Interventions:  - PT/OT  -ECHO  - See additional Care Plan goals for specific interventions  Outcome: Progressing  Goal: Patient/Family Short Term Goal  Description: Patient's Short Term Goal: to feel better    Interventions:   -  PT/OT  -ECHO    - See additional Care Plan goals for specific interventions  Outcome: Progressing     Problem: CARDIOVASCULAR - ADULT  Goal: Maintains optimal cardiac output and hemodynamic stability  Description: INTERVENTIONS:  - Monitor vital signs, rhythm, and trends  - Monitor for bleeding, hypotension and signs of decreased cardiac output  - Evaluate effectiveness of vasoactive medications to optimize hemodynamic stability  - Monitor arterial and/or venous puncture sites for bleeding and/or hematoma  - Assess quality of pulses, skin color and temperature  - Assess for signs of decreased coronary artery perfusion - ex.  Angina  - Evaluate fluid balance, assess for edema, trend weights  Outcome: Progressing  Goal: Absence of cardiac arrhythmias or at baseline  Description: INTERVENTIONS:  - Continuous cardiac monitoring, monitor vital signs, obtain 12 lead EKG if indicated  - Evaluate effectiveness of antiarrhythmic and heart rate control medications as ordered  - Initiate emergency measures for life threatening arrhythmias  - Monitor electrolytes and administer replacement therapy as ordered  Outcome: Progressing     Problem: SKIN/TISSUE INTEGRITY - ADULT  Goal: Skin integrity remains intact  Description: INTERVENTIONS  - Assess and document risk factors for pressure ulcer development  - Assess and document skin integrity  - Monitor for areas of redness and/or skin breakdown  - Initiate interventions, skin care algorithm/standards of care as needed  Outcome: Progressing     Problem: Impaired Functional Mobility  Goal: Achieve highest/safest level of mobility/gait  Description: Interventions:  - Assess patient's functional ability and stability  - Promote increasing activity/tolerance for mobility and gait  - Educate and engage patient/family in tolerated activity level and precaution  Outcome: Progressing     Problem: Impaired Activities of Daily Living  Goal: Achieve highest/safest level of independence in self care  Description: Interventions:  - Assess ability and encourage patient to participate in ADLs to maximize function  - Promote sitting position while performing ADLs such as feeding, grooming, and bathing  - Educate and encourage patient/family in tolerated functional activity level and precautions during self-care  Outcome: Progressing

## 2022-06-29 NOTE — PROGRESS NOTES
Patient seen in follow up. Notes reviewed. Time spent > 35 min. Still some sob.     06/29/22  1409   BP: 103/81   Pulse:    Resp:    Temp:        Intake/Output Summary (Last 24 hours) at 6/29/2022 1746  Last data filed at 6/29/2022 1300  Gross per 24 hour   Intake 250 ml   Output 1050 ml   Net -800 ml     Wt Readings from Last 1 Encounters:  06/29/22 : 179 lb 14.4 oz (81.6 kg)       General: No acute distress. Neck: Jugular venous pulsations not seen. Lungs: Crackles alix. Heart: Normal rate. No murmurs. Abdomen: Soft. Non tender  Extremities: 1+ edema. Neurological: Alert. No focal deficits. Psychiatric: Appropriate mood and affect.   Insulin Aspart Pen (NOVOLOG) 100 UNIT/ML flexpen 1-5 Units, 1-5 Units, Subcutaneous, TID CC  cetirizine (ZyrTEC) tab 5 mg, 5 mg, Oral, Daily  acetaminophen (Tylenol) tab 650 mg, 650 mg, Oral, Q6H PRN  ALPRAZolam (Xanax) tab 0.5 mg, 0.5 mg, Oral, Nightly PRN  alum-mag hydroxide-simethicone (Maalox) 200-200-20 MG/5ML oral suspension 30 mL, 30 mL, Oral, QID PRN  aspirin chewable tab 81 mg, 81 mg, Oral, Daily  atorvastatin (Lipitor) tab 80 mg, 80 mg, Oral, Nightly  baclofen (Lioresal) tab 5 mg, 5 mg, Oral, BID  carvedilol (Coreg) tab 12.5 mg, 12.5 mg, Oral, BID with meals  clopidogrel (Plavix) tab 75 mg, 75 mg, Oral, Daily  docusate sodium (Colace) cap 100 mg, 100 mg, Oral, BID  ferrous sulfate DR tab 325 mg, 325 mg, Oral, Daily with breakfast  fluticasone propionate (Flonase) 50 MCG/ACT nasal suspension 1 spray, 1 spray, Each Nare, Daily  guaiFENesin (Robitussin) 100 MG/5 ML oral liquid 100 mg, 100 mg, Oral, Q6H PRN  hydrALAZINE (Apresoline) tab 10 mg, 10 mg, Oral, Q8H KYLEE  ipratropium-albuterol (Duoneb) 0.5-2.5 (3) MG/3ML inhalation solution 3 mL, 3 mL, Nebulization, Q6H PRN  isosorbide mononitrate ER (Imdur) 24 hr tab 30 mg, 30 mg, Oral, Daily  levothyroxine (Synthroid) tab 25 mcg, 25 mcg, Oral, Before breakfast  linaGLIPtin (Tradjenta) tab 5 mg, 5 mg, Oral, Daily  meclizine (Antivert) tab 25 mg, 25 mg, Oral, TID PRN  melatonin cap/tab 10 mg, 10 mg, Oral, Nightly PRN  mirtazapine (Remeron) tab 7.5 mg, 7.5 mg, Oral, Nightly  multivitamin (Dialyvite - Renal) tab 1 tablet, 1 tablet, Oral, Daily  nitroglycerin (Nitrostat) SL tab 0.4 mg, 0.4 mg, Sublingual, Q5 Min PRN  OLANZapine (ZyPREXA) tab 2.5 mg, 2.5 mg, Oral, Nightly  ondansetron (Zofran-ODT) disintegrating tab 4 mg, 4 mg, Oral, Q6H PRN  pantoprazole (Protonix) DR tab 40 mg, 40 mg, Oral, QAM AC  ranolazine ER (Ranexa) 12 hr tab 500 mg, 500 mg, Oral, BID  rOPINIRole (Requip) tab 0.25 mg, 0.25 mg, Oral, BID  sacubitril-valsartan (Entresto) 24-26 MG per tab 1 tablet, 1 tablet, Oral, BID  polyethylene glycol (PEG 3350) (Miralax) 17 g oral packet 17 g, 17 g, Oral, Daily PRN  heparin 5000 UNIT/ML injection 5,000 Units, 5,000 Units, Subcutaneous, 2 times per day  acetaminophen (Tylenol) tab 650 mg, 650 mg, Oral, Q4H PRN   Or  HYDROcodone-acetaminophen (Norco) 5-325 MG per tab 1 tablet, 1 tablet, Oral, Q4H PRN   Or  HYDROcodone-acetaminophen (Norco) 5-325 MG per tab 2 tablet, 2 tablet, Oral, Q4H PRN  guaiFENesin ER (Mucinex) 12 hr tab 600 mg, 600 mg, Oral, BID  trolamine salicyliate 10 % cream, , Topical, QID PRN  furosemide (LASIX) injection 40 mg, 40 mg, Intravenous, BID (Diuretic)      guaiFENesin 100 MG/5 ML Oral, Take 100 mg by mouth every 6 (six) hours as needed. Give 10ml Q6 PRN for cough  fluticasone propionate 50 MCG/ACT Nasal Suspension, by Each Nare route daily. sacubitril-valsartan 24-26 MG Oral Tab, Take 1 tablet by mouth 2 (two) times daily. ALPRAZolam 0.5 MG Oral Tab, Take 0.5 mg by mouth nightly as needed. polyethylene glycol, PEG 3350, 17 g Oral Powd Pack, Take 17 g by mouth daily as needed. hydrALAZINE 10 MG Oral Tab, Take 1 tablet (10 mg total) by mouth every 8 (eight) hours. isosorbide mononitrate ER 30 MG Oral Tablet 24 Hr, Take 1 tablet (30 mg total) by mouth daily.   ipratropium-albuterol 0.5-2.5 (3) MG/3ML Inhalation Solution, Take 3 mL by nebulization every 6 (six) hours as needed. diclofenac 1 % External Gel, Apply topically every 4 (four) hours as needed. Right ankle  Loperamide HCl 2 MG Oral Cap, Take 2 mg by mouth 4 (four) times daily as needed for Diarrhea. Insulin Aspart Pen 100 UNIT/ML Subcutaneous Solution Pen-injector, Inject 1-5 Units into the skin 3 (three) times daily with meals. CORRECTION FACTOR - LOW DOSE  Continue to give correction insulin even if NPO  DO NOT HOLD OR ALTER INSULIN DOSE WITHOUT A PHYSICIAN ORDER     Give 1 unit for blood glucose 160-200 mg/dL  Give 2 units for blood glucose 201-240 mg/dL  Give 3 units for blood glucose 241-280 mg/dL  Give 4 units for blood glucose 281-320 mg/dL  Give 5 units for blood glucose 321-360 mg/dL  Call physician if blood glucose is greater than 360 mg/dL  mupirocin 2 % External Ointment, Apply topically 2 (two) times daily as needed. To left lower leg for skin discoloration as needed  Ranolazine  MG Oral Tablet 12 Hr, Take 1 tablet (500 mg total) by mouth 2 (two) times daily. ondansetron 4 MG Oral Tablet Dispersible, Take 4 mg by mouth every 6 (six) hours as needed for Nausea. docusate sodium 100 MG Oral Cap, Take 100 mg by mouth 2 (two) times daily. Levothyroxine Sodium 25 MCG Oral Tab, Take 1 tablet (25 mcg total) by mouth before breakfast.  mirtazapine 7.5 MG Oral Tab, Take 1 tablet (7.5 mg total) by mouth nightly. rOPINIRole HCl 0.25 MG Oral Tab, Take 1 tablet (0.25 mg total) by mouth 2 (two) times a day. Alum & Mag Hydroxide-Simeth 470-252-00 MG/5ML Oral Suspension, Take 30 mL by mouth 4 (four) times daily as needed for Indigestion. baclofen 10 MG Oral Tab, Take 5 mg by mouth 2 (two) times daily. ferrous sulfate 325 (65 FE) MG Oral Tab EC, Take 325 mg by mouth daily with breakfast.  Meclizine HCl 25 MG Oral Tab, Take 25 mg by mouth 3 (three) times daily as needed.   Melatonin 3 MG Oral Cap, Take 10 mg by mouth nightly as needed. Nephro-Ramses 0.8 MG Oral Tab, Take 0.8 mg by mouth daily. carvedilol 12.5 MG Oral Tab, Take 1 tablet (12.5 mg total) by mouth 2 (two) times daily with meals. loratadine 10 MG Oral Tab, Take 10 mg by mouth daily. Pantoprazole Sodium 40 MG Oral Tab EC, Take 1 tablet (40 mg total) by mouth every morning before breakfast.  aspirin 81 MG Oral Chew Tab, Chew 1 tablet (81 mg total) by mouth daily. atorvastatin 40 MG Oral Tab, Take 80 mg by mouth nightly. acetaminophen 325 MG Oral Tab, Take 650 mg by mouth every 6 (six) hours as needed for Pain. nitroGLYCERIN 0.4 MG Sublingual SL Tab, Place 0.4 mg under the tongue every 5 (five) minutes as needed for Chest pain. linagliptin 5 mg Oral Tab, Take 5 mg by mouth daily. furosemide 40 MG Oral Tab, Take 1 tablet (40 mg total) by mouth 2 (two) times daily. lisinopril 10 MG Oral Tab, Take 1 tablet (10 mg total) by mouth daily. OLANZapine 2.5 MG Oral Tab, Take 1 tablet (2.5 mg total) by mouth nightly. Clopidogrel Bisulfate 75 MG Oral Tab, Take 75 mg by mouth daily. No results found. Lab Results   Component Value Date    WBC 4.4 06/29/2022    HGB 9.8 06/29/2022    HCT 31.2 06/29/2022    PLT 82.0 06/29/2022    CREATSERUM 2.07 06/29/2022    BUN 44 06/29/2022     06/29/2022    K 4.1 06/29/2022     06/29/2022    CO2 22.0 06/29/2022     06/29/2022    CA 8.2 06/29/2022       IMPRESSION: #1.  Acute on chronic combined congestive heart failure. History of LV systolic dysfunction in the past with ejection fraction estimated at 25 to 30% on previous echoes now improved to 50% on medication. Patient also has grade 2 diastolic dysfunction as well as moderate mitral regurgitation complicating congestive heart failure. #2.  Negative serial cardiac enzymes and therefore no suggestion for acute coronary syndrome. #3.  History of CVA in the past with residual hemiplegia and hemiparesis on the right side.   #4.  History of chronic stage III-IV renal insufficiency creatinine range of 1.9-2.0. #5. multiple other medical problems including history of hypertension hyperlipidemia, diabetes, hypothyroidism and peripheral arterial disease. History of chronic anemia and thrombocytopenia. #6. Patient has permanent transvenous pacemaker. 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 Followed by myself. #7. Severe mitral regurgitation. Turned down for surgery by 2 surgeons here and one at [facility]. PLAN:    Ok to dc back on torsemide 40 mg po bid.     Sebastian Flores [facility]  [address]  [facility]  [phone]

## 2022-06-29 NOTE — CM/SW NOTE
06/29/22 1537   Discharge disposition   Post Acute Care Provider   (Tameka Primes [place] [address].)   Discharge transportation [address] [facility] [address]     Patient to return to [place] [address]. as has dc orders in place. Rapid was completed 48 hours ago so still within SNF guidelines. POA left a message via voice mail that her mother will be returning. Spoke to patient and she is aware. Medicar ordered for patient- has [employer] as secondary insurance. Medicar to arrive at 6pm.  Report to be called to [phone].     Daily Tapia MBA BSN RN [address]  RN Case Manager  [phone]

## 2022-06-30 VITALS
RESPIRATION RATE: 18 BRPM | OXYGEN SATURATION: 95 % | WEIGHT: 176.88 LBS | BODY MASS INDEX: 29.47 KG/M2 | TEMPERATURE: 98 F | HEART RATE: 68 BPM | SYSTOLIC BLOOD PRESSURE: 107 MMHG | HEIGHT: 65 IN | DIASTOLIC BLOOD PRESSURE: 61 MMHG

## 2022-06-30 LAB
ANION GAP SERPL CALC-SCNC: 8 MMOL/L (ref 0–18)
BASOPHILS # BLD AUTO: 0 X10(3) UL (ref 0–0.2)
BASOPHILS NFR BLD AUTO: 0 %
BUN BLD-MCNC: 46 MG/DL (ref 7–18)
BUN/CREAT SERPL: 22 (ref 10–20)
CALCIUM BLD-MCNC: 8.3 MG/DL (ref 8.5–10.1)
CHLORIDE SERPL-SCNC: 108 MMOL/L (ref 98–112)
CO2 SERPL-SCNC: 23 MMOL/L (ref 21–32)
CREAT BLD-MCNC: 2.09 MG/DL
DEPRECATED RDW RBC AUTO: 57.8 FL (ref 35.1–46.3)
EOSINOPHIL # BLD AUTO: 0.19 X10(3) UL (ref 0–0.7)
EOSINOPHIL NFR BLD AUTO: 4.5 %
ERYTHROCYTE [DISTWIDTH] IN BLOOD BY AUTOMATED COUNT: 14.8 % (ref 11–15)
GLUCOSE BLD-MCNC: 149 MG/DL (ref 70–99)
GLUCOSE BLDC GLUCOMTR-MCNC: 105 MG/DL (ref 70–99)
GLUCOSE BLDC GLUCOMTR-MCNC: 131 MG/DL (ref 70–99)
HCT VFR BLD AUTO: 31.4 %
HEMOCCULT STL QL: POSITIVE
HGB BLD-MCNC: 10.2 G/DL
IMM GRANULOCYTES # BLD AUTO: 0.01 X10(3) UL (ref 0–1)
IMM GRANULOCYTES NFR BLD: 0.2 %
LYMPHOCYTES # BLD AUTO: 0.92 X10(3) UL (ref 1–4)
LYMPHOCYTES NFR BLD AUTO: 21.9 %
MCH RBC QN AUTO: 34.6 PG (ref 26–34)
MCHC RBC AUTO-ENTMCNC: 32.5 G/DL (ref 31–37)
MCV RBC AUTO: 106.4 FL
MONOCYTES # BLD AUTO: 0.23 X10(3) UL (ref 0.1–1)
MONOCYTES NFR BLD AUTO: 5.5 %
NEUTROPHILS # BLD AUTO: 2.85 X10 (3) UL (ref 1.5–7.7)
NEUTROPHILS # BLD AUTO: 2.85 X10(3) UL (ref 1.5–7.7)
NEUTROPHILS NFR BLD AUTO: 67.9 %
OSMOLALITY SERPL CALC.SUM OF ELEC: 303 MOSM/KG (ref 275–295)
PLATELET # BLD AUTO: 92 10(3)UL (ref 150–450)
POTASSIUM SERPL-SCNC: 4.2 MMOL/L (ref 3.5–5.1)
RBC # BLD AUTO: 2.95 X10(6)UL
SODIUM SERPL-SCNC: 139 MMOL/L (ref 136–145)
WBC # BLD AUTO: 4.2 X10(3) UL (ref 4–11)

## 2022-06-30 PROCEDURE — 85025 COMPLETE CBC W/AUTO DIFF WBC: CPT | Performed by: INTERNAL MEDICINE

## 2022-06-30 PROCEDURE — 97530 THERAPEUTIC ACTIVITIES: CPT

## 2022-06-30 PROCEDURE — 80048 BASIC METABOLIC PNL TOTAL CA: CPT | Performed by: INTERNAL MEDICINE

## 2022-06-30 PROCEDURE — 82272 OCCULT BLD FECES 1-3 TESTS: CPT | Performed by: INTERNAL MEDICINE

## 2022-06-30 PROCEDURE — 82962 GLUCOSE BLOOD TEST: CPT

## 2022-06-30 RX ORDER — FUROSEMIDE 10 MG/ML
20 INJECTION INTRAMUSCULAR; INTRAVENOUS ONCE
Status: COMPLETED | OUTPATIENT
Start: 2022-06-30 | End: 2022-06-30

## 2022-06-30 NOTE — PROGRESS NOTES
Patient complaining of shortness of breath prior to discharge -  respirations 28 at rest with Oxygen sat 93%. Dr Woody Friedman and Dr Rico Records notified and orders received for 40mg IVP Lasix. Discharge canceled.

## 2022-06-30 NOTE — PLAN OF CARE
Pt was anxious at the beginning of shift d/t sob- Xanax po given. Resting comfortably throughout the night. Pt was complaining of shortness of breath- NC was placed for comfort, pt sating in the low-mid 90s without O2. Pain meds given for headache. Fall precautions in place, call light within reach. Pending med clear- discharge to [facility]. Problem: Patient Centered Care  Goal: Patient preferences are identified and integrated in the patient's plan of care  Description: Interventions:  - What would you like us to know as we care for you? I live at [facility]  - Provide timely, complete, and accurate information to patient/family  - Incorporate patient and family knowledge, values, beliefs, and cultural backgrounds into the planning and delivery of care  - Encourage patient/family to participate in care and decision-making at the level they choose  - Honor patient and family perspectives and choices  Outcome: Progressing     Problem: Patient/Family Goals  Goal: Patient/Family Long Term Goal  Description: Patient's Long Term Goal: to return to [facility]    Interventions:  - PT/OT  -ECHO  - See additional Care Plan goals for specific interventions  Outcome: Progressing  Goal: Patient/Family Short Term Goal  Description: Patient's Short Term Goal: to feel better    Interventions:   -  PT/OT  -ECHO    - See additional Care Plan goals for specific interventions  Outcome: Progressing     Problem: CARDIOVASCULAR - ADULT  Goal: Maintains optimal cardiac output and hemodynamic stability  Description: INTERVENTIONS:  - Monitor vital signs, rhythm, and trends  - Monitor for bleeding, hypotension and signs of decreased cardiac output  - Evaluate effectiveness of vasoactive medications to optimize hemodynamic stability  - Monitor arterial and/or venous puncture sites for bleeding and/or hematoma  - Assess quality of pulses, skin color and temperature  - Assess for signs of decreased coronary artery perfusion - ex. Angina  - Evaluate fluid balance, assess for edema, trend weights  Outcome: Progressing  Goal: Absence of cardiac arrhythmias or at baseline  Description: INTERVENTIONS:  - Continuous cardiac monitoring, monitor vital signs, obtain 12 lead EKG if indicated  - Evaluate effectiveness of antiarrhythmic and heart rate control medications as ordered  - Initiate emergency measures for life threatening arrhythmias  - Monitor electrolytes and administer replacement therapy as ordered  Outcome: Progressing     Problem: SKIN/TISSUE INTEGRITY - ADULT  Goal: Skin integrity remains intact  Description: INTERVENTIONS  - Assess and document risk factors for pressure ulcer development  - Assess and document skin integrity  - Monitor for areas of redness and/or skin breakdown  - Initiate interventions, skin care algorithm/standards of care as needed  Outcome: Progressing     Problem: Impaired Functional Mobility  Goal: Achieve highest/safest level of mobility/gait  Description: Interventions:  - Assess patient's functional ability and stability  - Promote increasing activity/tolerance for mobility and gait  - Educate and engage patient/family in tolerated activity level and precautions    Outcome: Progressing     Problem: Impaired Activities of Daily Living  Goal: Achieve highest/safest level of independence in self care  Description: Interventions:  - Assess ability and encourage patient to participate in ADLs to maximize function  - Promote sitting position while performing ADLs such as feeding, grooming, and bathing  - Educate and encourage patient/family in tolerated functional activity level and precautions during self-care  Outcome: Progressing

## 2022-06-30 NOTE — CM/SW NOTE
06/30/22 1300   Discharge disposition   Expected discharge disposition [address] [facility] Provider   (Ankit Webb)   Discharge transportation [address] was set up yesterday to Return to [address] [facility]. Patient has clearance to discharge today. Carlos Enrique Woodruff reserved for a 4pm transfer. Number for report is [phone].     Juan Gage MBA BSN RN [address]  RN Case Manager  [phone]

## 2022-06-30 NOTE — PHYSICAL THERAPY NOTE
PHYSICAL THERAPY TREATMENT NOTE - INPATIENT     Room Number: 331/331-A       Presenting Problem: acute pulmonary edema    Problem List  Principal Problem:    Acute pulmonary edema ([address]      PHYSICAL THERAPY ASSESSMENT   Chart reviewed. RN approved participation in physical therapy. PPE worn by therapist: mask, gloves and goggles. Patient was wearing a mask during session. Patient presented in bed side chair with 7/10 pain. Patient with fair  progress towards goals during this session. Education provided on Physical therapy plan of care, physiological benefits of out of bed mobility and transfers. Patient with fair carryover. Bed mobility: min A  Transfers: min A  Gait Assistance: Minimum assistance; Other (Comment) (min A x 2)  Distance (ft): 3  Assistive Device: Rolling walker  Pattern: Shuffle;Comment (unsteady gait, decreased sarbjit)          Patient was left in bed per her request because she was cold pt is left at end of session with all needs in reach. The patient's Approx Degree of Impairment: 68.66% has been calculated based on documentation in the [facility] '6 clicks' Inpatient Basic Mobility Short Form. Research supports that patients with this level of impairment may benefit from return to nursing home. RN aware of patient status post session. DISCHARGE RECOMMENDATIONS  PT Discharge Recommendations: Other (Comment) (return to nursing home)     PLAN  PT Treatment Plan: Bed mobility; Family education;Gait training;Transfer training    SUBJECTIVE  \"I'm cold\"    OBJECTIVE  Precautions: Bed/chair alarm    WEIGHT BEARING RESTRICTION  Weight Bearing Restriction: None                PAIN ASSESSMENT   Ratin          BALANCE                                                                                                                       Static Sitting: Fair  Dynamic Sitting: Fair -           Static Standing: Poor +  Dynamic Standing: Poor    ACTIVITY TOLERANCE                         O2 WALK AM-PAC '6-Clicks' INPATIENT SHORT FORM - BASIC MOBILITY  How much difficulty does the patient currently have. .. Patient Difficulty: Turning over in bed (including adjusting bedclothes, sheets and blankets)?: A Little   Patient Difficulty: Sitting down on and standing up from a chair with arms (e.g., wheelchair, bedside commode, etc.): A Lot   Patient Difficulty: Moving from lying on back to sitting on the side of the bed?: A Lot   How much help from another person does the patient currently need. .. Help from Another: Moving to and from a bed to a chair (including a wheelchair)?: A Lot   Help from Another: Need to walk in hospital room?: A Lot   Help from Another: Climbing 3-5 steps with a railing?: Total     AM-PAC Score:  Raw Score: 12   Approx Degree of Impairment: 68.66%   Standardized Score (AM-PAC Scale): 35.33   CMS Modifier (G-Code): CL  Patient End of Session: Up in chair    CURRENT GOALS   Goals to be met by: 7/5/22  Patient Goal Patient's self-stated goal is: none stated   Goal #1 Patient is able to demonstrate supine - sit EOB @ level: modified independent     Goal #1   Current Status Not met   Goal #2 Patient is able to demonstrate transfers Sit to/from Stand at assistance level: minimum assistance with walker - rolling     Goal #2  Current Status Goal met   Goal #3 Patient is able to ambulate 5 feet with assist device: walker - rolling at assistance level: minimum assistance   Goal #3   Current Status Goal partially met   Goal #4    Goal #4   Current Status    Goal #5 Patient to demonstrate independence with home activity/exercise instructions provided to patient in preparation for discharge.    Goal #5   Current Status In progress   Goal #6    Goal #6  Current Status

## 2022-06-30 NOTE — PLAN OF CARE
This RN called report to Ar Bateman. Problem: Patient Centered Care  Goal: Patient preferences are identified and integrated in the patient's plan of care  Description: Interventions:  - What would you like us to know as we care for you? I live at [facility]  - Provide timely, complete, and accurate information to patient/family  - Incorporate patient and family knowledge, values, beliefs, and cultural backgrounds into the planning and delivery of care  - Encourage patient/family to participate in care and decision-making at the level they choose  - Honor patient and family perspectives and choices  Outcome: Adequate for Discharge     Problem: Patient/Family Goals  Goal: Patient/Family Long Term Goal  Description: Patient's Long Term Goal: to return to [facility]    Interventions:  - PT/OT  -ECHO  - See additional Care Plan goals for specific interventions  Outcome: Adequate for Discharge  Goal: Patient/Family Short Term Goal  Description: Patient's Short Term Goal: to feel better    Interventions:   -  PT/OT  -ECHO    - See additional Care Plan goals for specific interventions  Outcome: Adequate for Discharge     Problem: CARDIOVASCULAR - ADULT  Goal: Maintains optimal cardiac output and hemodynamic stability  Description: INTERVENTIONS:  - Monitor vital signs, rhythm, and trends  - Monitor for bleeding, hypotension and signs of decreased cardiac output  - Evaluate effectiveness of vasoactive medications to optimize hemodynamic stability  - Monitor arterial and/or venous puncture sites for bleeding and/or hematoma  - Assess quality of pulses, skin color and temperature  - Assess for signs of decreased coronary artery perfusion - ex.  Angina  - Evaluate fluid balance, assess for edema, trend weights  Outcome: Adequate for Discharge  Goal: Absence of cardiac arrhythmias or at baseline  Description: INTERVENTIONS:  - Continuous cardiac monitoring, monitor vital signs, obtain 12 lead EKG if indicated  - Evaluate effectiveness of antiarrhythmic and heart rate control medications as ordered  - Initiate emergency measures for life threatening arrhythmias  - Monitor electrolytes and administer replacement therapy as ordered  Outcome: Adequate for Discharge     Problem: SKIN/TISSUE INTEGRITY - ADULT  Goal: Skin integrity remains intact  Description: INTERVENTIONS  - Assess and document risk factors for pressure ulcer development  - Assess and document skin integrity  - Monitor for areas of redness and/or skin breakdown  - Initiate interventions, skin care algorithm/standards of care as needed  Outcome: Adequate for Discharge     Problem: Impaired Functional Mobility  Goal: Achieve highest/safest level of mobility/gait  Description: Interventions:  - Assess patient's functional ability and stability  - Promote increasing activity/tolerance for mobility and gait  - Educate and engage patient/family in tolerated activity level and precautions    Outcome: Adequate for Discharge     Problem: Impaired Activities of Daily Living  Goal: Achieve highest/safest level of independence in self care  Description: Interventions:  - Assess ability and encourage patient to participate in ADLs to maximize function  - Promote sitting position while performing ADLs such as feeding, grooming, and bathing  - Educate and encourage patient/family in tolerated functional activity level and precautions during self-care    Outcome: Adequate for Discharge

## 2022-06-30 NOTE — PLAN OF CARE
Patient AO x 4. Vitals stable throughout the day. At time of discharge, patient was experiencing shortness of breath with respirations at 28bpm, Other vitals were wdl. Patient received IV lasix and is continuing to be monitored for the evening. Problem: Patient Centered Care  Goal: Patient preferences are identified and integrated in the patient's plan of care  Description: Interventions:  - What would you like us to know as we care for you? I live at [facility]  - Provide timely, complete, and accurate information to patient/family  - Incorporate patient and family knowledge, values, beliefs, and cultural backgrounds into the planning and delivery of care  - Encourage patient/family to participate in care and decision-making at the level they choose  - Honor patient and family perspectives and choices  Outcome: Progressing     Problem: Patient/Family Goals  Goal: Patient/Family Long Term Goal  Description: Patient's Long Term Goal: to return to [facility]    Interventions:  - PT/OT  -ECHO  - See additional Care Plan goals for specific interventions  Outcome: Progressing  Goal: Patient/Family Short Term Goal  Description: Patient's Short Term Goal: to feel better    Interventions:   -  PT/OT  -ECHO    - See additional Care Plan goals for specific interventions  Outcome: Progressing     Problem: CARDIOVASCULAR - ADULT  Goal: Maintains optimal cardiac output and hemodynamic stability  Description: INTERVENTIONS:  - Monitor vital signs, rhythm, and trends  - Monitor for bleeding, hypotension and signs of decreased cardiac output  - Evaluate effectiveness of vasoactive medications to optimize hemodynamic stability  - Monitor arterial and/or venous puncture sites for bleeding and/or hematoma  - Assess quality of pulses, skin color and temperature  - Assess for signs of decreased coronary artery perfusion - ex.  Angina  - Evaluate fluid balance, assess for edema, trend weights  Outcome: Progressing  Goal: Absence of cardiac arrhythmias or at baseline  Description: INTERVENTIONS:  - Continuous cardiac monitoring, monitor vital signs, obtain 12 lead EKG if indicated  - Evaluate effectiveness of antiarrhythmic and heart rate control medications as ordered  - Initiate emergency measures for life threatening arrhythmias  - Monitor electrolytes and administer replacement therapy as ordered  Outcome: Progressing     Problem: SKIN/TISSUE INTEGRITY - ADULT  Goal: Skin integrity remains intact  Description: INTERVENTIONS  - Assess and document risk factors for pressure ulcer development  - Assess and document skin integrity  - Monitor for areas of redness and/or skin breakdown  - Initiate interventions, skin care algorithm/standards of care as needed  Outcome: Progressing     Problem: Impaired Functional Mobility  Goal: Achieve highest/safest level of mobility/gait  Description: Interventions:  - Assess patient's functional ability and stability  - Promote increasing activity/tolerance for mobility and gait  - Educate and engage patient/family in tolerated activity level and precautions    Outcome: Progressing     Problem: Impaired Activities of Daily Living  Goal: Achieve highest/safest level of independence in self care  Description: Interventions:  - Assess ability and encourage patient to participate in ADLs to maximize function  - Promote sitting position while performing ADLs such as feeding, grooming, and bathing  - Educate and encourage patient/family in tolerated functional activity level and precautions during self-care    Outcome: Progressing

## 2022-07-28 ENCOUNTER — ANESTHESIA (OUTPATIENT)
Dept: ENDOSCOPY | Facility: HOSPITAL | Age: 72
End: 2022-07-28
Payer: MEDICARE

## 2022-07-28 ENCOUNTER — APPOINTMENT (OUTPATIENT)
Dept: GENERAL RADIOLOGY | Facility: HOSPITAL | Age: 72
DRG: 377 | End: 2022-07-28
Attending: INTERNAL MEDICINE
Payer: MEDICARE

## 2022-07-28 ENCOUNTER — HOSPITAL ENCOUNTER (INPATIENT)
Facility: HOSPITAL | Age: 72
LOS: 13 days | Discharge: NURSING, REHAB OR LONG-TERM CARE | DRG: 377 | End: 2022-08-10
Attending: EMERGENCY MEDICINE | Admitting: INTERNAL MEDICINE
Payer: MEDICARE

## 2022-07-28 ENCOUNTER — ANESTHESIA EVENT (OUTPATIENT)
Dept: ENDOSCOPY | Facility: HOSPITAL | Age: 72
End: 2022-07-28
Payer: MEDICARE

## 2022-07-28 DIAGNOSIS — D64.9 ANEMIA: ICD-10-CM

## 2022-07-28 DIAGNOSIS — K92.2 GASTROINTESTINAL HEMORRHAGE, UNSPECIFIED GASTROINTESTINAL HEMORRHAGE TYPE: Primary | ICD-10-CM

## 2022-07-28 LAB
ALBUMIN SERPL-MCNC: 2.2 G/DL (ref 3.4–5)
ALBUMIN/GLOB SERPL: 0.6 {RATIO} (ref 1–2)
ALP LIVER SERPL-CCNC: 177 U/L
ALT SERPL-CCNC: 28 U/L
ANION GAP SERPL CALC-SCNC: 7 MMOL/L (ref 0–18)
ANION GAP SERPL CALC-SCNC: 7 MMOL/L (ref 0–18)
ANTIBODY SCREEN: NEGATIVE
AST SERPL-CCNC: 34 U/L (ref 15–37)
BASOPHILS # BLD AUTO: 0 X10(3) UL (ref 0–0.2)
BASOPHILS # BLD AUTO: 0 X10(3) UL (ref 0–0.2)
BASOPHILS NFR BLD AUTO: 0 %
BASOPHILS NFR BLD AUTO: 0 %
BILIRUB SERPL-MCNC: 0.5 MG/DL (ref 0.1–2)
BUN BLD-MCNC: 74 MG/DL (ref 7–18)
BUN BLD-MCNC: 80 MG/DL (ref 7–18)
BUN/CREAT SERPL: 28.5 (ref 10–20)
BUN/CREAT SERPL: 29 (ref 10–20)
CALCIUM BLD-MCNC: 8 MG/DL (ref 8.5–10.1)
CALCIUM BLD-MCNC: 8.1 MG/DL (ref 8.5–10.1)
CHLORIDE SERPL-SCNC: 110 MMOL/L (ref 98–112)
CHLORIDE SERPL-SCNC: 111 MMOL/L (ref 98–112)
CO2 SERPL-SCNC: 19 MMOL/L (ref 21–32)
CO2 SERPL-SCNC: 20 MMOL/L (ref 21–32)
CREAT BLD-MCNC: 2.55 MG/DL
CREAT BLD-MCNC: 2.81 MG/DL
DEPRECATED RDW RBC AUTO: 69 FL (ref 35.1–46.3)
DEPRECATED RDW RBC AUTO: 75 FL (ref 35.1–46.3)
EOSINOPHIL # BLD AUTO: 0.06 X10(3) UL (ref 0–0.7)
EOSINOPHIL # BLD AUTO: 0.14 X10(3) UL (ref 0–0.7)
EOSINOPHIL NFR BLD AUTO: 1.3 %
EOSINOPHIL NFR BLD AUTO: 3.2 %
ERYTHROCYTE [DISTWIDTH] IN BLOOD BY AUTOMATED COUNT: 16.9 % (ref 11–15)
ERYTHROCYTE [DISTWIDTH] IN BLOOD BY AUTOMATED COUNT: 18.7 % (ref 11–15)
GLOBULIN PLAS-MCNC: 3.5 G/DL (ref 2.8–4.4)
GLUCOSE BLD-MCNC: 116 MG/DL (ref 70–99)
GLUCOSE BLD-MCNC: 162 MG/DL (ref 70–99)
GLUCOSE BLDC GLUCOMTR-MCNC: 112 MG/DL (ref 70–99)
GLUCOSE BLDC GLUCOMTR-MCNC: 213 MG/DL (ref 70–99)
HCT VFR BLD AUTO: 22.6 %
HCT VFR BLD AUTO: 24.8 %
HCT VFR BLD AUTO: 25.8 %
HGB BLD-MCNC: 6.7 G/DL
HGB BLD-MCNC: 7.5 G/DL
HGB BLD-MCNC: 7.5 G/DL
IMM GRANULOCYTES # BLD AUTO: 0.01 X10(3) UL (ref 0–1)
IMM GRANULOCYTES # BLD AUTO: 0.02 X10(3) UL (ref 0–1)
IMM GRANULOCYTES NFR BLD: 0.2 %
IMM GRANULOCYTES NFR BLD: 0.4 %
LYMPHOCYTES # BLD AUTO: 0.79 X10(3) UL (ref 1–4)
LYMPHOCYTES # BLD AUTO: 0.89 X10(3) UL (ref 1–4)
LYMPHOCYTES NFR BLD AUTO: 17.8 %
LYMPHOCYTES NFR BLD AUTO: 19.2 %
MCH RBC QN AUTO: 32.3 PG (ref 26–34)
MCH RBC QN AUTO: 33.8 PG (ref 26–34)
MCHC RBC AUTO-ENTMCNC: 29.1 G/DL (ref 31–37)
MCHC RBC AUTO-ENTMCNC: 29.6 G/DL (ref 31–37)
MCV RBC AUTO: 111.2 FL
MCV RBC AUTO: 114.1 FL
MONOCYTES # BLD AUTO: 0.19 X10(3) UL (ref 0.1–1)
MONOCYTES # BLD AUTO: 0.29 X10(3) UL (ref 0.1–1)
MONOCYTES NFR BLD AUTO: 4.1 %
MONOCYTES NFR BLD AUTO: 6.5 %
MRSA DNA SPEC QL NAA+PROBE: NEGATIVE
NEUTROPHILS # BLD AUTO: 3.21 X10 (3) UL (ref 1.5–7.7)
NEUTROPHILS # BLD AUTO: 3.21 X10(3) UL (ref 1.5–7.7)
NEUTROPHILS # BLD AUTO: 3.48 X10 (3) UL (ref 1.5–7.7)
NEUTROPHILS # BLD AUTO: 3.48 X10(3) UL (ref 1.5–7.7)
NEUTROPHILS NFR BLD AUTO: 72.3 %
NEUTROPHILS NFR BLD AUTO: 75 %
NT-PROBNP SERPL-MCNC: 3414 PG/ML (ref ?–125)
OSMOLALITY SERPL CALC.SUM OF ELEC: 307 MOSM/KG (ref 275–295)
OSMOLALITY SERPL CALC.SUM OF ELEC: 312 MOSM/KG (ref 275–295)
PLATELET # BLD AUTO: 88 10(3)UL (ref 150–450)
PLATELET # BLD AUTO: 97 10(3)UL (ref 150–450)
PLATELET MORPHOLOGY: NORMAL
POTASSIUM SERPL-SCNC: 4.8 MMOL/L (ref 3.5–5.1)
POTASSIUM SERPL-SCNC: 5.2 MMOL/L (ref 3.5–5.1)
PROT SERPL-MCNC: 5.7 G/DL (ref 6.4–8.2)
RBC # BLD AUTO: 1.98 X10(6)UL
RBC # BLD AUTO: 2.32 X10(6)UL
RH BLOOD TYPE: POSITIVE
SARS-COV-2 RNA RESP QL NAA+PROBE: NOT DETECTED
SODIUM SERPL-SCNC: 137 MMOL/L (ref 136–145)
SODIUM SERPL-SCNC: 137 MMOL/L (ref 136–145)
TROPONIN I HIGH SENSITIVITY: 13 NG/L
VIT B12 SERPL-MCNC: 766 PG/ML (ref 193–986)
WBC # BLD AUTO: 4.4 X10(3) UL (ref 4–11)
WBC # BLD AUTO: 4.6 X10(3) UL (ref 4–11)

## 2022-07-28 PROCEDURE — 71045 X-RAY EXAM CHEST 1 VIEW: CPT | Performed by: INTERNAL MEDICINE

## 2022-07-28 PROCEDURE — 30233N1 TRANSFUSION OF NONAUTOLOGOUS RED BLOOD CELLS INTO PERIPHERAL VEIN, PERCUTANEOUS APPROACH: ICD-10-PCS | Performed by: INTERNAL MEDICINE

## 2022-07-28 RX ORDER — NYSTATIN 100000 U/G
CREAM TOPICAL 2 TIMES DAILY
Status: DISCONTINUED | OUTPATIENT
Start: 2022-07-28 | End: 2022-08-10

## 2022-07-28 RX ORDER — SODIUM CHLORIDE 450 MG/100ML
INJECTION, SOLUTION INTRAVENOUS CONTINUOUS
Status: DISCONTINUED | OUTPATIENT
Start: 2022-07-28 | End: 2022-07-28

## 2022-07-28 RX ORDER — LOPERAMIDE HYDROCHLORIDE 2 MG/1
2 CAPSULE ORAL 4 TIMES DAILY PRN
Status: DISCONTINUED | OUTPATIENT
Start: 2022-07-28 | End: 2022-08-10

## 2022-07-28 RX ORDER — FUROSEMIDE 10 MG/ML
20 INJECTION INTRAMUSCULAR; INTRAVENOUS
Status: DISCONTINUED | OUTPATIENT
Start: 2022-07-29 | End: 2022-07-30

## 2022-07-28 RX ORDER — ROPINIROLE 0.25 MG/1
0.25 TABLET, FILM COATED ORAL 2 TIMES DAILY
Status: DISCONTINUED | OUTPATIENT
Start: 2022-07-28 | End: 2022-08-10

## 2022-07-28 RX ORDER — OMEPRAZOLE 20 MG/1
20 CAPSULE, DELAYED RELEASE ORAL
COMMUNITY

## 2022-07-28 RX ORDER — ACETAMINOPHEN 325 MG/1
650 TABLET ORAL EVERY 6 HOURS PRN
Status: DISCONTINUED | OUTPATIENT
Start: 2022-07-28 | End: 2022-08-10

## 2022-07-28 RX ORDER — ASCORBIC ACID, THIAMINE, RIBOFLAVIN, NIACINAMIDE, PYRIDOXINE, FOLIC ACID, COBALAMIN, BIOTIN, PANTOTHENIC ACID 100; 1.5; 1.7; 20; 10; 1; 6; 300; 1 MG/1; MG/1; MG/1; MG/1; MG/1; MG/1; UG/1; UG/1; MG/1
1 TABLET, COATED ORAL DAILY
Status: DISCONTINUED | OUTPATIENT
Start: 2022-07-29 | End: 2022-08-10

## 2022-07-28 RX ORDER — ATORVASTATIN CALCIUM 80 MG/1
80 TABLET, FILM COATED ORAL NIGHTLY
Status: DISCONTINUED | OUTPATIENT
Start: 2022-07-28 | End: 2022-08-10

## 2022-07-28 RX ORDER — BACLOFEN 10 MG/1
5 TABLET ORAL 2 TIMES DAILY PRN
Status: DISCONTINUED | OUTPATIENT
Start: 2022-07-28 | End: 2022-08-10

## 2022-07-28 RX ORDER — ALPRAZOLAM 0.5 MG/1
0.5 TABLET ORAL NIGHTLY PRN
Status: DISCONTINUED | OUTPATIENT
Start: 2022-07-28 | End: 2022-08-10

## 2022-07-28 RX ORDER — MECLIZINE HYDROCHLORIDE 25 MG/1
25 TABLET ORAL 3 TIMES DAILY PRN
Status: DISCONTINUED | OUTPATIENT
Start: 2022-07-28 | End: 2022-08-10

## 2022-07-28 RX ORDER — MIRTAZAPINE 7.5 MG/1
7.5 TABLET, FILM COATED ORAL NIGHTLY
Status: DISCONTINUED | OUTPATIENT
Start: 2022-07-28 | End: 2022-08-10

## 2022-07-28 RX ORDER — OLANZAPINE 2.5 MG/1
2.5 TABLET ORAL NIGHTLY
Status: DISCONTINUED | OUTPATIENT
Start: 2022-07-28 | End: 2022-08-10

## 2022-07-28 RX ORDER — RANOLAZINE 500 MG/1
500 TABLET, EXTENDED RELEASE ORAL 2 TIMES DAILY
Status: DISCONTINUED | OUTPATIENT
Start: 2022-07-28 | End: 2022-08-10

## 2022-07-28 RX ORDER — FUROSEMIDE 10 MG/ML
20 INJECTION INTRAMUSCULAR; INTRAVENOUS ONCE
Status: COMPLETED | OUTPATIENT
Start: 2022-07-28 | End: 2022-07-28

## 2022-07-28 RX ORDER — LEVOTHYROXINE SODIUM 0.03 MG/1
25 TABLET ORAL
Status: DISCONTINUED | OUTPATIENT
Start: 2022-07-29 | End: 2022-08-10

## 2022-07-28 RX ORDER — FLUTICASONE PROPIONATE 50 MCG
2 SPRAY, SUSPENSION (ML) NASAL DAILY
Status: DISCONTINUED | OUTPATIENT
Start: 2022-07-29 | End: 2022-08-10

## 2022-07-28 NOTE — ED QUICK NOTES
Orders for admission, patient is aware of plan and ready to go upstairs. Any questions, please call ED RN ashley at extension [phone].      Patient Covid vaccination status: Unvaccinated     COVID Test Ordered in ED: Rapid SARS-CoV-2 by PCR    COVID Suspicion at Admission: Low clinical suspicion for COVID    Running Infusions:  None    Mental Status/LOC at time of transport: x3    Other pertinent information: 4L NC at baseline  CIWA score: N/A   NIH score:  N/A

## 2022-07-28 NOTE — ED INITIAL ASSESSMENT (HPI)
Pt to ED via [facility] for complaint of a low hgb of 5.9 and hypotension. Pt arrives a&o x4 and feeling lightheaded.

## 2022-07-29 LAB
ANION GAP SERPL CALC-SCNC: 7 MMOL/L (ref 0–18)
BASOPHILS # BLD AUTO: 0.01 X10(3) UL (ref 0–0.2)
BASOPHILS NFR BLD AUTO: 0.2 %
BLOOD TYPE BARCODE: 6200
BUN BLD-MCNC: 69 MG/DL (ref 7–18)
BUN/CREAT SERPL: 28.4 (ref 10–20)
CALCIUM BLD-MCNC: 8.3 MG/DL (ref 8.5–10.1)
CHLORIDE SERPL-SCNC: 111 MMOL/L (ref 98–112)
CO2 SERPL-SCNC: 21 MMOL/L (ref 21–32)
CREAT BLD-MCNC: 2.43 MG/DL
DEPRECATED RDW RBC AUTO: 73.5 FL (ref 35.1–46.3)
EOSINOPHIL # BLD AUTO: 0.17 X10(3) UL (ref 0–0.7)
EOSINOPHIL NFR BLD AUTO: 4.2 %
ERYTHROCYTE [DISTWIDTH] IN BLOOD BY AUTOMATED COUNT: 18.3 % (ref 11–15)
GLUCOSE BLD-MCNC: 103 MG/DL (ref 70–99)
GLUCOSE BLDC GLUCOMTR-MCNC: 107 MG/DL (ref 70–99)
GLUCOSE BLDC GLUCOMTR-MCNC: 110 MG/DL (ref 70–99)
GLUCOSE BLDC GLUCOMTR-MCNC: 113 MG/DL (ref 70–99)
GLUCOSE BLDC GLUCOMTR-MCNC: 199 MG/DL (ref 70–99)
GLUCOSE BLDC GLUCOMTR-MCNC: 219 MG/DL (ref 70–99)
HCT VFR BLD AUTO: 26 %
HGB BLD-MCNC: 7.7 G/DL
IMM GRANULOCYTES # BLD AUTO: 0.01 X10(3) UL (ref 0–1)
IMM GRANULOCYTES NFR BLD: 0.2 %
LYMPHOCYTES # BLD AUTO: 0.79 X10(3) UL (ref 1–4)
LYMPHOCYTES NFR BLD AUTO: 19.5 %
MCH RBC QN AUTO: 32.4 PG (ref 26–34)
MCHC RBC AUTO-ENTMCNC: 29.6 G/DL (ref 31–37)
MCV RBC AUTO: 109.2 FL
MONOCYTES # BLD AUTO: 0.25 X10(3) UL (ref 0.1–1)
MONOCYTES NFR BLD AUTO: 6.2 %
NEUTROPHILS # BLD AUTO: 2.82 X10 (3) UL (ref 1.5–7.7)
NEUTROPHILS # BLD AUTO: 2.82 X10(3) UL (ref 1.5–7.7)
NEUTROPHILS NFR BLD AUTO: 69.7 %
OSMOLALITY SERPL CALC.SUM OF ELEC: 308 MOSM/KG (ref 275–295)
PLATELET # BLD AUTO: 87 10(3)UL (ref 150–450)
POTASSIUM SERPL-SCNC: 4.6 MMOL/L (ref 3.5–5.1)
RBC # BLD AUTO: 2.38 X10(6)UL
SODIUM SERPL-SCNC: 139 MMOL/L (ref 136–145)
WBC # BLD AUTO: 4.1 X10(3) UL (ref 4–11)

## 2022-07-29 PROCEDURE — 0DB68ZX EXCISION OF STOMACH, VIA NATURAL OR ARTIFICIAL OPENING ENDOSCOPIC, DIAGNOSTIC: ICD-10-PCS | Performed by: INTERNAL MEDICINE

## 2022-07-29 RX ORDER — SODIUM CHLORIDE, SODIUM LACTATE, POTASSIUM CHLORIDE, CALCIUM CHLORIDE 600; 310; 30; 20 MG/100ML; MG/100ML; MG/100ML; MG/100ML
INJECTION, SOLUTION INTRAVENOUS CONTINUOUS
Status: DISCONTINUED | OUTPATIENT
Start: 2022-07-29 | End: 2022-08-01

## 2022-07-29 RX ORDER — NICOTINE POLACRILEX 4 MG
30 LOZENGE BUCCAL
Status: DISCONTINUED | OUTPATIENT
Start: 2022-07-29 | End: 2022-08-10

## 2022-07-29 RX ORDER — NICOTINE POLACRILEX 4 MG
30 LOZENGE BUCCAL
Status: DISCONTINUED | OUTPATIENT
Start: 2022-07-29 | End: 2022-07-29 | Stop reason: HOSPADM

## 2022-07-29 RX ORDER — ISOSORBIDE MONONITRATE 30 MG/1
30 TABLET, EXTENDED RELEASE ORAL DAILY
Status: DISCONTINUED | OUTPATIENT
Start: 2022-07-29 | End: 2022-08-10

## 2022-07-29 RX ORDER — DEXTROSE MONOHYDRATE 25 G/50ML
50 INJECTION, SOLUTION INTRAVENOUS
Status: DISCONTINUED | OUTPATIENT
Start: 2022-07-29 | End: 2022-07-29 | Stop reason: HOSPADM

## 2022-07-29 RX ORDER — PANTOPRAZOLE SODIUM 40 MG/1
40 TABLET, DELAYED RELEASE ORAL
Status: DISCONTINUED | OUTPATIENT
Start: 2022-07-29 | End: 2022-08-10

## 2022-07-29 RX ORDER — HYDRALAZINE HYDROCHLORIDE 10 MG/1
10 TABLET, FILM COATED ORAL EVERY 8 HOURS SCHEDULED
Status: DISCONTINUED | OUTPATIENT
Start: 2022-07-29 | End: 2022-08-10

## 2022-07-29 RX ORDER — NICOTINE POLACRILEX 4 MG
15 LOZENGE BUCCAL
Status: DISCONTINUED | OUTPATIENT
Start: 2022-07-29 | End: 2022-08-10

## 2022-07-29 RX ORDER — NALOXONE HYDROCHLORIDE 0.4 MG/ML
80 INJECTION, SOLUTION INTRAMUSCULAR; INTRAVENOUS; SUBCUTANEOUS AS NEEDED
Status: DISCONTINUED | OUTPATIENT
Start: 2022-07-29 | End: 2022-07-29 | Stop reason: HOSPADM

## 2022-07-29 RX ORDER — CARVEDILOL 3.12 MG/1
3.12 TABLET ORAL 2 TIMES DAILY WITH MEALS
Status: DISCONTINUED | OUTPATIENT
Start: 2022-07-29 | End: 2022-08-10

## 2022-07-29 RX ORDER — SODIUM CHLORIDE 9 MG/ML
INJECTION, SOLUTION INTRAVENOUS CONTINUOUS PRN
Status: DISCONTINUED | OUTPATIENT
Start: 2022-07-29 | End: 2022-07-29

## 2022-07-29 RX ORDER — DEXTROSE MONOHYDRATE 25 G/50ML
50 INJECTION, SOLUTION INTRAVENOUS
Status: DISCONTINUED | OUTPATIENT
Start: 2022-07-29 | End: 2022-08-10

## 2022-07-29 RX ORDER — NICOTINE POLACRILEX 4 MG
15 LOZENGE BUCCAL
Status: DISCONTINUED | OUTPATIENT
Start: 2022-07-29 | End: 2022-07-29 | Stop reason: HOSPADM

## 2022-07-29 RX ADMIN — SODIUM CHLORIDE: 9 INJECTION, SOLUTION INTRAVENOUS at 11:37:00

## 2022-07-29 NOTE — PLAN OF CARE
Problem: Patient Centered Care  Goal: Patient preferences are identified and integrated in the patient's plan of care  Description: Interventions:  - What would you like us to know as we care for you? From [facility]  - Provide timely, complete, and accurate information to patient/family  - Incorporate patient and family knowledge, values, beliefs, and cultural backgrounds into the planning and delivery of care  - Encourage patient/family to participate in care and decision-making at the level they choose  - Honor patient and family perspectives and choices  Outcome: Progressing     Problem: CARDIOVASCULAR - ADULT  Goal: Maintains optimal cardiac output and hemodynamic stability  Description: INTERVENTIONS:  - Monitor vital signs, rhythm, and trends  - Monitor for bleeding, hypotension and signs of decreased cardiac output  - Evaluate effectiveness of vasoactive medications to optimize hemodynamic stability  - Monitor arterial and/or venous puncture sites for bleeding and/or hematoma  - Assess quality of pulses, skin color and temperature  - Assess for signs of decreased coronary artery perfusion - ex.  Angina  - Evaluate fluid balance, assess for edema, trend weights  Outcome: Progressing  Goal: Absence of cardiac arrhythmias or at baseline  Description: INTERVENTIONS:  - Continuous cardiac monitoring, monitor vital signs, obtain 12 lead EKG if indicated  - Evaluate effectiveness of antiarrhythmic and heart rate control medications as ordered  - Initiate emergency measures for life threatening arrhythmias  - Monitor electrolytes and administer replacement therapy as ordered  Outcome: Progressing     Problem: RESPIRATORY - ADULT  Goal: Achieves optimal ventilation and oxygenation  Description: INTERVENTIONS:  - Assess for changes in respiratory status  - Assess for changes in mentation and behavior  - Position to facilitate oxygenation and minimize respiratory effort  - Oxygen supplementation based on oxygen saturation or ABGs  - Provide Smoking Cessation handout, if applicable  - Encourage broncho-pulmonary hygiene including cough, deep breathe, Incentive Spirometry  - Assess the need for suctioning and perform as needed  - Assess and instruct to report SOB or any respiratory difficulty  - Respiratory Therapy support as indicated  - Manage/alleviate anxiety  - Monitor for signs/symptoms of CO2 retention  Outcome: Progressing     Problem: METABOLIC/FLUID AND ELECTROLYTES - ADULT  Goal: Glucose maintained within prescribed range  Description: INTERVENTIONS:  - Monitor Blood Glucose as ordered  - Assess for signs and symptoms of hyperglycemia and hypoglycemia  - Administer ordered medications to maintain glucose within target range  - Assess barriers to adequate nutritional intake and initiate nutrition consult as needed  - Instruct patient on self management of diabetes  Outcome: Progressing     Problem: HEMATOLOGIC - ADULT  Goal: Maintains hematologic stability  Description: INTERVENTIONS  - Assess for signs and symptoms of bleeding or hemorrhage  - Monitor labs and vital signs for trends  - Administer supportive blood products/factors, fluids and medications as ordered and appropriate  - Administer supportive blood products/factors as ordered and appropriate  Outcome: Progressing  Goal: Free from bleeding injury  Description: (Example usage: patient with low platelets)  INTERVENTIONS:  - Avoid intramuscular injections, enemas and rectal medication administration  - Ensure safe mobilization of patient  - Hold pressure on venipuncture sites to achieve adequate hemostasis  - Assess for signs and symptoms of internal bleeding  - Monitor lab trends  - Patient is to report abnormal signs of bleeding to staff  - Avoid use of toothpicks and dental floss  - Use electric shaver for shaving  - Use soft bristle tooth brush  - Limit straining and forceful nose blowing  Outcome: Progressing     Patient vital signs stable.   No complaints of pain at this time. Kept on NPO after midnight. For EGD today. Educated patient on use of call light, bed alarm on, bed locked and in lowest position.

## 2022-07-29 NOTE — PLAN OF CARE
Patient from [facility]. On 4L NC at baseline. Hgb initially 5.9. Received 2units in ER. Repeat HGb 7.5. Sent for EGD this afternoon and procedure cancelled r/t patient complaining of SOB and fluid overload per Anesthesia. Received call from Dr. Anni Campbell to start CLD, NPO at midnight and plan for EGD tomorrow. PCP notified and Cardiology consulted. Plan for IV lasix. IVF stopped. PT/OT ordered. Antifungal cream ordered for rash, See skin assessment.

## 2022-07-29 NOTE — PLAN OF CARE
Problem: Patient Centered Care  Goal: Patient preferences are identified and integrated in the patient's plan of care  Description: Interventions:  - What would you like us to know as we care for you?   - Provide timely, complete, and accurate information to patient/family  - Incorporate patient and family knowledge, values, beliefs, and cultural backgrounds into the planning and delivery of care  - Encourage patient/family to participate in care and decision-making at the level they choose  - Honor patient and family perspectives and choices  Outcome: Progressing     Problem: CARDIOVASCULAR - ADULT  Goal: Maintains optimal cardiac output and hemodynamic stability  Description: INTERVENTIONS:  - Monitor vital signs, rhythm, and trends  - Monitor for bleeding, hypotension and signs of decreased cardiac output  - Evaluate effectiveness of vasoactive medications to optimize hemodynamic stability  - Monitor arterial and/or venous puncture sites for bleeding and/or hematoma  - Assess quality of pulses, skin color and temperature  - Assess for signs of decreased coronary artery perfusion - ex.  Angina  - Evaluate fluid balance, assess for edema, trend weights  Outcome: Progressing  Goal: Absence of cardiac arrhythmias or at baseline  Description: INTERVENTIONS:  - Continuous cardiac monitoring, monitor vital signs, obtain 12 lead EKG if indicated  - Evaluate effectiveness of antiarrhythmic and heart rate control medications as ordered  - Initiate emergency measures for life threatening arrhythmias  - Monitor electrolytes and administer replacement therapy as ordered  Outcome: Progressing     Problem: RESPIRATORY - ADULT  Goal: Achieves optimal ventilation and oxygenation  Description: INTERVENTIONS:  - Assess for changes in respiratory status  - Assess for changes in mentation and behavior  - Position to facilitate oxygenation and minimize respiratory effort  - Oxygen supplementation based on oxygen saturation or ABGs  - Provide Smoking Cessation handout, if applicable  - Encourage broncho-pulmonary hygiene including cough, deep breathe, Incentive Spirometry  - Assess the need for suctioning and perform as needed  - Assess and instruct to report SOB or any respiratory difficulty  - Respiratory Therapy support as indicated  - Manage/alleviate anxiety  - Monitor for signs/symptoms of CO2 retention  Outcome: Progressing     Problem: METABOLIC/FLUID AND ELECTROLYTES - ADULT  Goal: Glucose maintained within prescribed range  Description: INTERVENTIONS:  - Monitor Blood Glucose as ordered  - Assess for signs and symptoms of hyperglycemia and hypoglycemia  - Administer ordered medications to maintain glucose within target range  - Assess barriers to adequate nutritional intake and initiate nutrition consult as needed  - Instruct patient on self management of diabetes  Outcome: Progressing     Problem: HEMATOLOGIC - ADULT  Goal: Maintains hematologic stability  Description: INTERVENTIONS  - Assess for signs and symptoms of bleeding or hemorrhage  - Monitor labs and vital signs for trends  - Administer supportive blood products/factors, fluids and medications as ordered and appropriate  - Administer supportive blood products/factors as ordered and appropriate  Outcome: Progressing  Goal: Free from bleeding injury  Description: (Example usage: patient with low platelets)  INTERVENTIONS:  - Avoid intramuscular injections, enemas and rectal medication administration  - Ensure safe mobilization of patient  - Hold pressure on venipuncture sites to achieve adequate hemostasis  - Assess for signs and symptoms of internal bleeding  - Monitor lab trends  - Patient is to report abnormal signs of bleeding to staff  - Avoid use of toothpicks and dental floss  - Use electric shaver for shaving  - Use soft bristle tooth brush  - Limit straining and forceful nose blowing  Outcome: Progressing   EGD done. Tolerating diet. IV lasix.  Wean oxygen as tolerated, down to 1lt/nc. Turned and repositioned in bed.

## 2022-07-29 NOTE — CONSULTS
[facility]    PATIENT'S NAME: Viky Dailey   ATTENDING PHYSICIAN: Teri Qureshi MD   CONSULTING PHYSICIAN: Guido Levy DO   PATIENT ACCOUNT#:   [de-identified]    LOCATION:  [address] [place] [address] Beth #:   [id number]       YOB: 1950  ADMISSION DATE:       07/28/2022      CONSULT DATE:  07/28/2022    REPORT OF CONSULTATION      HISTORY OF PRESENT ILLNESS:  This is a 52-year-old female known to me who comes in with severe anemia with melenic stools. Hemoglobin was 5.9 and received 2 units of blood. Was supposed to have an EGD today that was canceled because of patient being in congestive heart failure. She was short of breath. Denied having any chest pain. She has had diffuse lower extremity edema, and Cardiology was consulted for further evaluation. 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 Patient has been turned down for mitral valve surgery by 2 surgeons here at [facility] AND [facility] and at the [facility] as well. MEDICATIONS:  List reviewed. SOCIAL HISTORY:  Nursing home resident. No current smoking or alcohol use. REVIEW OF SYSTEMS:  A 12-point review of systems is otherwise negative. PHYSICAL EXAMINATION:    GENERAL:  Patient is in no acute distress. VITAL SIGNS:  Blood pressure currently 106/55, pulse 78. Patient afebrile. HEENT:  Head is atraumatic. No scleral icterus appreciated.   Oral mucosa is moist.   NECK:  Veins are elevated. No carotid bruits. LUNGS:  With crackles bilaterally. HEART:  S1, S2.  Regular. There is a grade 3/6 systolic murmur at the apex. ABDOMEN:  Soft, nontender. Positive hepatojugular reflux appreciated. EXTREMITIES:  With 2+ bilateral edema. NEUROLOGIC:  Alert, appropriate. PSYCHIATRIC:  Patient has calm affect, appropriate mood. SKIN:  Without rashes and warm to touch. MUSCULOSKELETAL:  No joint effusion appreciated. LABORATORY DATA:  Creatinine is 2.55. Hemoglobin is 7.5, platelet count is 77,848. Chest x-ray demonstrating pulmonary vascular congestion. Image was personally reviewed. Dual-chamber pacemaker noted as well. EKG demonstrating ventricular pacemaker. IMPRESSION:    1. Acute on chronic systolic and diastolic heart failure, with most recent ejection fraction improved to 50%. 2.   Mild aortic stenosis. 3.   Moderate to severe mitral regurgitation. Patient has been turned down for valve surgery by surgeons here and at [facility]. 4.   Chronic kidney disease stage 4.  5.   Blood loss anemia with hypotension. RECOMMENDATIONS:  All of her cardiac medications were held because of hypotension. We gave a dose of Lasix today. We will resume it at 20 mg IV b.i.d. tomorrow. The patient was on torsemide 40 mg daily; we will hold. Imdur 30 mg daily on hold. Carvedilol is on hold; we can resume in future once she is improved with heart failure. Jackie Gomez is on hold; we can resume in future. Aspirin to be discontinued. Plavix to be discontinued for now. Atorvastatin has been reordered. Thank you very much for the consult.      Dictated By Triny Smart DO  d: 07/28/2022 18:42:33  t: 07/28/2022 20:05:48  Job [id number]  DY/

## 2022-07-29 NOTE — CM/SW NOTE
Per RN rounds, plan for EGD today and potential weekend DC. Updates sent via Aidin. SW spoke to [facility] (O2 and Dementia) set on WILL CALL for 7/30, 7/31 and 8/1. PCS completed in [facility] - will need date added day of actual DC. PLAN: [facility] of [address], Ambulance on WILL CALL, PCS completed (needs date) - pending med clear      SW/KAYLI to remain available for support and/or discharge planning.          Colin Frausot, MSW, [facility]

## 2022-07-29 NOTE — ADDENDUM NOTE
Addendum  created 07/29/22 1326 by Dliip Lo CRNA    Intraprocedure Event edited, Intraprocedure Staff edited

## 2022-07-29 NOTE — PHYSICAL THERAPY NOTE
PT evaluation orders received and chart review. Per RN, pt returned from EGD and too lethargic to participate at this time. Will reschedule.      Thank you,  Ki Stokes, PT, DPT

## 2022-07-30 ENCOUNTER — APPOINTMENT (OUTPATIENT)
Dept: GENERAL RADIOLOGY | Facility: HOSPITAL | Age: 72
DRG: 377 | End: 2022-07-30
Attending: INTERNAL MEDICINE
Payer: MEDICARE

## 2022-07-30 LAB
BASOPHILS # BLD AUTO: 0.01 X10(3) UL (ref 0–0.2)
BASOPHILS NFR BLD AUTO: 0.2 %
DEPRECATED RDW RBC AUTO: 71.7 FL (ref 35.1–46.3)
EOSINOPHIL # BLD AUTO: 0.18 X10(3) UL (ref 0–0.7)
EOSINOPHIL NFR BLD AUTO: 3.2 %
ERYTHROCYTE [DISTWIDTH] IN BLOOD BY AUTOMATED COUNT: 18 % (ref 11–15)
GLUCOSE BLDC GLUCOMTR-MCNC: 132 MG/DL (ref 70–99)
GLUCOSE BLDC GLUCOMTR-MCNC: 161 MG/DL (ref 70–99)
GLUCOSE BLDC GLUCOMTR-MCNC: 174 MG/DL (ref 70–99)
GLUCOSE BLDC GLUCOMTR-MCNC: 178 MG/DL (ref 70–99)
HCT VFR BLD AUTO: 25.5 %
HGB BLD-MCNC: 7.6 G/DL
IMM GRANULOCYTES # BLD AUTO: 0.01 X10(3) UL (ref 0–1)
IMM GRANULOCYTES NFR BLD: 0.2 %
LYMPHOCYTES # BLD AUTO: 1.18 X10(3) UL (ref 1–4)
LYMPHOCYTES NFR BLD AUTO: 20.9 %
MCH RBC QN AUTO: 32.5 PG (ref 26–34)
MCHC RBC AUTO-ENTMCNC: 29.8 G/DL (ref 31–37)
MCV RBC AUTO: 109 FL
MONOCYTES # BLD AUTO: 0.42 X10(3) UL (ref 0.1–1)
MONOCYTES NFR BLD AUTO: 7.4 %
NEUTROPHILS # BLD AUTO: 3.84 X10 (3) UL (ref 1.5–7.7)
NEUTROPHILS # BLD AUTO: 3.84 X10(3) UL (ref 1.5–7.7)
NEUTROPHILS NFR BLD AUTO: 68.1 %
PLATELET # BLD AUTO: 113 10(3)UL (ref 150–450)
RBC # BLD AUTO: 2.34 X10(6)UL
WBC # BLD AUTO: 5.6 X10(3) UL (ref 4–11)

## 2022-07-30 PROCEDURE — 71045 X-RAY EXAM CHEST 1 VIEW: CPT | Performed by: INTERNAL MEDICINE

## 2022-07-30 PROCEDURE — 99232 SBSQ HOSP IP/OBS MODERATE 35: CPT | Performed by: INTERNAL MEDICINE

## 2022-07-30 RX ORDER — FUROSEMIDE 10 MG/ML
40 INJECTION INTRAMUSCULAR; INTRAVENOUS
Status: DISCONTINUED | OUTPATIENT
Start: 2022-07-30 | End: 2022-08-01

## 2022-07-30 NOTE — TRANSITION NOTE
Received patient from [facility]. Resting comfortably in bed on 2LNC. Call light within reach and bed in lowest, locked position.

## 2022-07-30 NOTE — PROGRESS NOTES
Day +1 [employer] HSCT. Patient is progressing and involved with plan of care, communicating needs throughout shift. Up ad bri. Tolerating diet, voiding without difficulty. No c/o pain.Blood glucose below 200. IVF continued as ordered. All vitals stable; no acute events this shift. Pt. Remaining free from falls or injury throughout shift; bed in lowest position; side rails up X2; call light within reach; pt instructed to call for assistance as needed - verbalized understanding. Q2h rounding on patient. Will continue to monitor.         Yadiel Schultz is a 72year old female   female with history of CAD, diabetes mellitus, HTN and previous history of UTI who was in rehab and brought to our facility for altered mental status and patient upon admission was nonverbal resp [facility])    • Dysphagia    • Esophageal reflux    • Essential hypertension    • Heart attack [facility])    • Hemiplegia and hemiparesis following cerebral infarction affecting unspecified side (HCC)    • High blood pressure    • High cholesterol    • Hyperlipidemi & Mag Hydroxide-Simeth (MAALOX) oral suspension 30 mL, 30 mL, Oral, QID PRN    •  aspirin chewable tab 81 mg, 81 mg, Oral, Daily    •  atorvastatin (LIPITOR) tab 80 mg, 80 mg, Oral, Nightly    •  carvedilol (COREG) tab 12.5 mg, 12.5 mg, Oral, BID with meal 11/10/2020     03/05/2020    DDIMER 1.23 (H) 03/05/2020    MG 2.0 08/22/2020    PHOS 4.7 03/25/2020    TROP 0.111 (HH) 11/02/2020    CK 73 11/10/2020    ETOH 3 (H) 11/10/2020         Imaging  Ct Brain Or Head (38900)    Result Date: 11/11/2020  CONC retardation with rocking movement. Thought process: Tangential  Mood/Affect: Anxious mood with a flat affect. Suicidal ideation: None  Homicidal ideation: None  Thought content: Fixating on concern about her son put her on hospice. Perceptions:  Though FREQ X 2      Rapid SARS-CoV-2 by PCR STAT      Clostridium difficile(toxigenic)PCR Once      Meds This Visit:  Requested Prescriptions      No prescriptions requested or ordered in this encounter         11/13/2020  Vernon Henning MD

## 2022-07-30 NOTE — PLAN OF CARE
AO3, forgetful. Hgb stable at 7.7. S/P IV lasix given. Still waiting for r/o Cdiff sample, no BM overnight. Up with max assist.  S/P EGD done with negative bleed but showed gastritis. Extended IV placed overnight. Problem: CARDIOVASCULAR - ADULT  Goal: Maintains optimal cardiac output and hemodynamic stability  Description: INTERVENTIONS:  - Monitor vital signs, rhythm, and trends  - Monitor for bleeding, hypotension and signs of decreased cardiac output  - Evaluate effectiveness of vasoactive medications to optimize hemodynamic stability  - Monitor arterial and/or venous puncture sites for bleeding and/or hematoma  - Assess quality of pulses, skin color and temperature  - Assess for signs of decreased coronary artery perfusion - ex.  Angina  - Evaluate fluid balance, assess for edema, trend weights  Outcome: Not Progressing     Problem: CARDIOVASCULAR - ADULT  Goal: Absence of cardiac arrhythmias or at baseline  Description: INTERVENTIONS:  - Continuous cardiac monitoring, monitor vital signs, obtain 12 lead EKG if indicated  - Evaluate effectiveness of antiarrhythmic and heart rate control medications as ordered  - Initiate emergency measures for life threatening arrhythmias  - Monitor electrolytes and administer replacement therapy as ordered  Outcome: Not Progressing     Problem: RESPIRATORY - ADULT  Goal: Achieves optimal ventilation and oxygenation  Description: INTERVENTIONS:  - Assess for changes in respiratory status  - Assess for changes in mentation and behavior  - Position to facilitate oxygenation and minimize respiratory effort  - Oxygen supplementation based on oxygen saturation or ABGs  - Provide Smoking Cessation handout, if applicable  - Encourage broncho-pulmonary hygiene including cough, deep breathe, Incentive Spirometry  - Assess the need for suctioning and perform as needed  - Assess and instruct to report SOB or any respiratory difficulty  - Respiratory Therapy support as indicated  - Manage/alleviate anxiety  - Monitor for signs/symptoms of CO2 retention  Outcome: Not Progressing     Problem: METABOLIC/FLUID AND ELECTROLYTES - ADULT  Goal: Glucose maintained within prescribed range  Description: INTERVENTIONS:  - Monitor Blood Glucose as ordered  - Assess for signs and symptoms of hyperglycemia and hypoglycemia  - Administer ordered medications to maintain glucose within target range  - Assess barriers to adequate nutritional intake and initiate nutrition consult as needed  - Instruct patient on self management of diabetes  Outcome: Not Progressing

## 2022-07-30 NOTE — PLAN OF CARE
Report given to [facility]. Patient stated she will notify her family. Patient transported with all belongings.

## 2022-07-31 LAB
ANION GAP SERPL CALC-SCNC: 7 MMOL/L (ref 0–18)
BASOPHILS # BLD AUTO: 0.01 X10(3) UL (ref 0–0.2)
BASOPHILS NFR BLD AUTO: 0.2 %
BUN BLD-MCNC: 63 MG/DL (ref 7–18)
BUN/CREAT SERPL: 28.6 (ref 10–20)
CALCIUM BLD-MCNC: 8.1 MG/DL (ref 8.5–10.1)
CHLORIDE SERPL-SCNC: 109 MMOL/L (ref 98–112)
CO2 SERPL-SCNC: 22 MMOL/L (ref 21–32)
CREAT BLD-MCNC: 2.2 MG/DL
DEPRECATED RDW RBC AUTO: 69.3 FL (ref 35.1–46.3)
EOSINOPHIL # BLD AUTO: 0.16 X10(3) UL (ref 0–0.7)
EOSINOPHIL NFR BLD AUTO: 3.1 %
ERYTHROCYTE [DISTWIDTH] IN BLOOD BY AUTOMATED COUNT: 17.8 % (ref 11–15)
GLUCOSE BLD-MCNC: 159 MG/DL (ref 70–99)
GLUCOSE BLDC GLUCOMTR-MCNC: 145 MG/DL (ref 70–99)
GLUCOSE BLDC GLUCOMTR-MCNC: 170 MG/DL (ref 70–99)
GLUCOSE BLDC GLUCOMTR-MCNC: 170 MG/DL (ref 70–99)
GLUCOSE BLDC GLUCOMTR-MCNC: 178 MG/DL (ref 70–99)
HCT VFR BLD AUTO: 24.9 %
HGB BLD-MCNC: 7.5 G/DL
IMM GRANULOCYTES # BLD AUTO: 0.02 X10(3) UL (ref 0–1)
IMM GRANULOCYTES NFR BLD: 0.4 %
LYMPHOCYTES # BLD AUTO: 1.05 X10(3) UL (ref 1–4)
LYMPHOCYTES NFR BLD AUTO: 20 %
MCH RBC QN AUTO: 32.2 PG (ref 26–34)
MCHC RBC AUTO-ENTMCNC: 30.1 G/DL (ref 31–37)
MCV RBC AUTO: 106.9 FL
MONOCYTES # BLD AUTO: 0.39 X10(3) UL (ref 0.1–1)
MONOCYTES NFR BLD AUTO: 7.4 %
NEUTROPHILS # BLD AUTO: 3.61 X10 (3) UL (ref 1.5–7.7)
NEUTROPHILS # BLD AUTO: 3.61 X10(3) UL (ref 1.5–7.7)
NEUTROPHILS NFR BLD AUTO: 68.9 %
OSMOLALITY SERPL CALC.SUM OF ELEC: 307 MOSM/KG (ref 275–295)
PLATELET # BLD AUTO: 100 10(3)UL (ref 150–450)
POTASSIUM SERPL-SCNC: 4.6 MMOL/L (ref 3.5–5.1)
RBC # BLD AUTO: 2.33 X10(6)UL
SODIUM SERPL-SCNC: 138 MMOL/L (ref 136–145)
WBC # BLD AUTO: 5.2 X10(3) UL (ref 4–11)

## 2022-07-31 PROCEDURE — 99232 SBSQ HOSP IP/OBS MODERATE 35: CPT | Performed by: INTERNAL MEDICINE

## 2022-07-31 NOTE — PLAN OF CARE
O2 2 l n/c. Not in any respiratory distress. Iv lasix continues. PT/OT to see her. Problem: Patient Centered Care  Goal: Patient preferences are identified and integrated in the patient's plan of care  Description: Interventions:  - What would you like us to know as we care for you? Lives at [facility].  - Provide timely, complete, and accurate information to patient/family  - Incorporate patient and family knowledge, values, beliefs, and cultural backgrounds into the planning and delivery of care  - Encourage patient/family to participate in care and decision-making at the level they choose  - Honor patient and family perspectives and choices  Outcome: Progressing     Problem: CARDIOVASCULAR - ADULT  Goal: Maintains optimal cardiac output and hemodynamic stability  Description: INTERVENTIONS:  - Monitor vital signs, rhythm, and trends  - Monitor for bleeding, hypotension and signs of decreased cardiac output  - Evaluate effectiveness of vasoactive medications to optimize hemodynamic stability  - Monitor arterial and/or venous puncture sites for bleeding and/or hematoma  - Assess quality of pulses, skin color and temperature  - Assess for signs of decreased coronary artery perfusion - ex.  Angina  - Evaluate fluid balance, assess for edema, trend weights  Outcome: Progressing  Goal: Absence of cardiac arrhythmias or at baseline  Description: INTERVENTIONS:  - Continuous cardiac monitoring, monitor vital signs, obtain 12 lead EKG if indicated  - Evaluate effectiveness of antiarrhythmic and heart rate control medications as ordered  - Initiate emergency measures for life threatening arrhythmias  - Monitor electrolytes and administer replacement therapy as ordered  Outcome: Progressing     Problem: RESPIRATORY - ADULT  Goal: Achieves optimal ventilation and oxygenation  Description: INTERVENTIONS:  - Assess for changes in respiratory status  - Assess for changes in mentation and behavior  - Position to facilitate oxygenation and minimize respiratory effort  - Oxygen supplementation based on oxygen saturation or ABGs  - Provide Smoking Cessation handout, if applicable  - Encourage broncho-pulmonary hygiene including cough, deep breathe, Incentive Spirometry  - Assess the need for suctioning and perform as needed  - Assess and instruct to report SOB or any respiratory difficulty  - Respiratory Therapy support as indicated  - Manage/alleviate anxiety  - Monitor for signs/symptoms of CO2 retention  Outcome: Progressing     Problem: METABOLIC/FLUID AND ELECTROLYTES - ADULT  Goal: Glucose maintained within prescribed range  Description: INTERVENTIONS:  - Monitor Blood Glucose as ordered  - Assess for signs and symptoms of hyperglycemia and hypoglycemia  - Administer ordered medications to maintain glucose within target range  - Assess barriers to adequate nutritional intake and initiate nutrition consult as needed  - Instruct patient on self management of diabetes  Outcome: Progressing     Problem: HEMATOLOGIC - ADULT  Goal: Maintains hematologic stability  Description: INTERVENTIONS  - Assess for signs and symptoms of bleeding or hemorrhage  - Monitor labs and vital signs for trends  - Administer supportive blood products/factors, fluids and medications as ordered and appropriate  - Administer supportive blood products/factors as ordered and appropriate  Outcome: Progressing  Goal: Free from bleeding injury  Description: (Example usage: patient with low platelets)  INTERVENTIONS:  - Avoid intramuscular injections, enemas and rectal medication administration  - Ensure safe mobilization of patient  - Hold pressure on venipuncture sites to achieve adequate hemostasis  - Assess for signs and symptoms of internal bleeding  - Monitor lab trends  - Patient is to report abnormal signs of bleeding to staff  - Avoid use of toothpicks and dental floss  - Use electric shaver for shaving  - Use soft bristle tooth brush  - Limit straining and forceful nose blowing  Outcome: Progressing     Problem: PAIN - ADULT  Goal: Verbalizes/displays adequate comfort level or patient's stated pain goal  Description: INTERVENTIONS:  - Encourage pt to monitor pain and request assistance  - Assess pain using appropriate pain scale  - Administer analgesics based on type and severity of pain and evaluate response  - Implement non-pharmacological measures as appropriate and evaluate response  - Consider cultural and social influences on pain and pain management  - Manage/alleviate anxiety  - Utilize distraction and/or relaxation techniques  - Monitor for opioid side effects  - Notify MD/LIP if interventions unsuccessful or patient reports new pain  - Anticipate increased pain with activity and pre-medicate as appropriate  Outcome: Progressing     Problem: SAFETY ADULT - FALL  Goal: Free from fall injury  Description: INTERVENTIONS:  - Assess pt frequently for physical needs  - Identify cognitive and physical deficits and behaviors that affect risk of falls.   - [place] fall precautions as indicated by assessment.  - Educate pt/family on patient safety including physical limitations  - Instruct pt to call for assistance with activity based on assessment  - Modify environment to reduce risk of injury  - Provide assistive devices as appropriate  - Consider OT/PT consult to assist with strengthening/mobility  - Encourage toileting schedule  Outcome: Progressing     Problem: DISCHARGE PLANNING  Goal: Discharge to home or other facility with appropriate resources  Description: INTERVENTIONS:  - Identify barriers to discharge w/pt and caregiver  - Include patient/family/discharge partner in discharge planning  - Arrange for needed discharge resources and transportation as appropriate  - Identify discharge learning needs (meds, wound care, etc)  - Arrange for interpreters to assist at discharge as needed  - Consider post-discharge preferences of patient/family/discharge partner  - Complete POLST form as appropriate  - Assess patient's ability to be responsible for managing their own health  - Refer to Case Management Department for coordinating discharge planning if the patient needs post-hospital services based on physician/LIP order or complex needs related to functional status, cognitive ability or social support system  Outcome: Progressing

## 2022-07-31 NOTE — PLAN OF CARE
Carmine Hi is alert and oriented, up with 1-2 assist to bedside chair. 2LNC for comfort. IV lasix. Plan is to possibly switch to PO lasix tomorrow. Problem: Patient Centered Care  Goal: Patient preferences are identified and integrated in the patient's plan of care  Description: Interventions:  - What would you like us to know as we care for you? I live at [facility]. - Provide timely, complete, and accurate information to patient/family  - Incorporate patient and family knowledge, values, beliefs, and cultural backgrounds into the planning and delivery of care  - Encourage patient/family to participate in care and decision-making at the level they choose  - Honor patient and family perspectives and choices  Outcome: Progressing     Problem: CARDIOVASCULAR - ADULT  Goal: Maintains optimal cardiac output and hemodynamic stability  Description: INTERVENTIONS:  - Monitor vital signs, rhythm, and trends  - Monitor for bleeding, hypotension and signs of decreased cardiac output  - Evaluate effectiveness of vasoactive medications to optimize hemodynamic stability  - Monitor arterial and/or venous puncture sites for bleeding and/or hematoma  - Assess quality of pulses, skin color and temperature  - Assess for signs of decreased coronary artery perfusion - ex.  Angina  - Evaluate fluid balance, assess for edema, trend weights  Outcome: Progressing  Goal: Absence of cardiac arrhythmias or at baseline  Description: INTERVENTIONS:  - Continuous cardiac monitoring, monitor vital signs, obtain 12 lead EKG if indicated  - Evaluate effectiveness of antiarrhythmic and heart rate control medications as ordered  - Initiate emergency measures for life threatening arrhythmias  - Monitor electrolytes and administer replacement therapy as ordered  Outcome: Progressing     Problem: RESPIRATORY - ADULT  Goal: Achieves optimal ventilation and oxygenation  Description: INTERVENTIONS:  - Assess for changes in respiratory status  - Assess for changes in mentation and behavior  - Position to facilitate oxygenation and minimize respiratory effort  - Oxygen supplementation based on oxygen saturation or ABGs  - Provide Smoking Cessation handout, if applicable  - Encourage broncho-pulmonary hygiene including cough, deep breathe, Incentive Spirometry  - Assess the need for suctioning and perform as needed  - Assess and instruct to report SOB or any respiratory difficulty  - Respiratory Therapy support as indicated  - Manage/alleviate anxiety  - Monitor for signs/symptoms of CO2 retention  Outcome: Progressing     Problem: METABOLIC/FLUID AND ELECTROLYTES - ADULT  Goal: Glucose maintained within prescribed range  Description: INTERVENTIONS:  - Monitor Blood Glucose as ordered  - Assess for signs and symptoms of hyperglycemia and hypoglycemia  - Administer ordered medications to maintain glucose within target range  - Assess barriers to adequate nutritional intake and initiate nutrition consult as needed  - Instruct patient on self management of diabetes  Outcome: Progressing     Problem: HEMATOLOGIC - ADULT  Goal: Maintains hematologic stability  Description: INTERVENTIONS  - Assess for signs and symptoms of bleeding or hemorrhage  - Monitor labs and vital signs for trends  - Administer supportive blood products/factors, fluids and medications as ordered and appropriate  - Administer supportive blood products/factors as ordered and appropriate  Outcome: Progressing  Goal: Free from bleeding injury  Description: (Example usage: patient with low platelets)  INTERVENTIONS:  - Avoid intramuscular injections, enemas and rectal medication administration  - Ensure safe mobilization of patient  - Hold pressure on venipuncture sites to achieve adequate hemostasis  - Assess for signs and symptoms of internal bleeding  - Monitor lab trends  - Patient is to report abnormal signs of bleeding to staff  - Avoid use of toothpicks and dental floss  - Use electric shaver for shaving  - Use soft bristle tooth brush  - Limit straining and forceful nose blowing  Outcome: Progressing     Problem: PAIN - ADULT  Goal: Verbalizes/displays adequate comfort level or patient's stated pain goal  Description: INTERVENTIONS:  - Encourage pt to monitor pain and request assistance  - Assess pain using appropriate pain scale  - Administer analgesics based on type and severity of pain and evaluate response  - Implement non-pharmacological measures as appropriate and evaluate response  - Consider cultural and social influences on pain and pain management  - Manage/alleviate anxiety  - Utilize distraction and/or relaxation techniques  - Monitor for opioid side effects  - Notify MD/LIP if interventions unsuccessful or patient reports new pain  - Anticipate increased pain with activity and pre-medicate as appropriate  Outcome: Progressing     Problem: SAFETY ADULT - FALL  Goal: Free from fall injury  Description: INTERVENTIONS:  - Assess pt frequently for physical needs  - Identify cognitive and physical deficits and behaviors that affect risk of falls.   - [place] fall precautions as indicated by assessment.  - Educate pt/family on patient safety including physical limitations  - Instruct pt to call for assistance with activity based on assessment  - Modify environment to reduce risk of injury  - Provide assistive devices as appropriate  - Consider OT/PT consult to assist with strengthening/mobility  - Encourage toileting schedule  Outcome: Progressing     Problem: DISCHARGE PLANNING  Goal: Discharge to home or other facility with appropriate resources  Description: INTERVENTIONS:  - Identify barriers to discharge w/pt and caregiver  - Include patient/family/discharge partner in discharge planning  - Arrange for needed discharge resources and transportation as appropriate  - Identify discharge learning needs (meds, wound care, etc)  - Arrange for interpreters to assist at discharge as needed  - Consider post-discharge preferences of patient/family/discharge partner  - Complete POLST form as appropriate  - Assess patient's ability to be responsible for managing their own health  - Refer to Case Management Department for coordinating discharge planning if the patient needs post-hospital services based on physician/LIP order or complex needs related to functional status, cognitive ability or social support system  Outcome: Progressing

## 2022-08-01 ENCOUNTER — APPOINTMENT (OUTPATIENT)
Dept: GENERAL RADIOLOGY | Facility: HOSPITAL | Age: 72
DRG: 377 | End: 2022-08-01
Attending: HOSPITALIST
Payer: MEDICARE

## 2022-08-01 ENCOUNTER — APPOINTMENT (OUTPATIENT)
Dept: GENERAL RADIOLOGY | Facility: HOSPITAL | Age: 72
DRG: 377 | End: 2022-08-01
Attending: INTERNAL MEDICINE
Payer: MEDICARE

## 2022-08-01 ENCOUNTER — APPOINTMENT (OUTPATIENT)
Dept: NUCLEAR MEDICINE | Facility: HOSPITAL | Age: 72
DRG: 377 | End: 2022-08-01
Attending: INTERNAL MEDICINE
Payer: MEDICARE

## 2022-08-01 LAB
ALBUMIN SERPL-MCNC: 2.1 G/DL (ref 3.4–5)
ALBUMIN/GLOB SERPL: 0.6 {RATIO} (ref 1–2)
ALP LIVER SERPL-CCNC: 192 U/L
ALT SERPL-CCNC: 30 U/L
ANION GAP SERPL CALC-SCNC: 7 MMOL/L (ref 0–18)
AST SERPL-CCNC: 32 U/L (ref 15–37)
BASE EXCESS BLD CALC-SCNC: -2.2 MMOL/L (ref ?–2)
BASOPHILS # BLD AUTO: 0.01 X10(3) UL (ref 0–0.2)
BASOPHILS NFR BLD AUTO: 0.2 %
BILIRUB SERPL-MCNC: 0.4 MG/DL (ref 0.1–2)
BUN BLD-MCNC: 70 MG/DL (ref 7–18)
BUN/CREAT SERPL: 32.1 (ref 10–20)
CALCIUM BLD-MCNC: 8 MG/DL (ref 8.5–10.1)
CHLORIDE SERPL-SCNC: 111 MMOL/L (ref 98–112)
CO2 SERPL-SCNC: 23 MMOL/L (ref 21–32)
CREAT BLD-MCNC: 2.18 MG/DL
D DIMER PPP FEU-MCNC: 2.57 UG/ML FEU (ref ?–0.7)
DEPRECATED RDW RBC AUTO: 67.5 FL (ref 35.1–46.3)
EOSINOPHIL # BLD AUTO: 0.16 X10(3) UL (ref 0–0.7)
EOSINOPHIL NFR BLD AUTO: 2.6 %
ERYTHROCYTE [DISTWIDTH] IN BLOOD BY AUTOMATED COUNT: 17.3 % (ref 11–15)
GLOBULIN PLAS-MCNC: 3.7 G/DL (ref 2.8–4.4)
GLUCOSE BLD-MCNC: 158 MG/DL (ref 70–99)
GLUCOSE BLDC GLUCOMTR-MCNC: 154 MG/DL (ref 70–99)
GLUCOSE BLDC GLUCOMTR-MCNC: 158 MG/DL (ref 70–99)
GLUCOSE BLDC GLUCOMTR-MCNC: 181 MG/DL (ref 70–99)
GLUCOSE BLDC GLUCOMTR-MCNC: 193 MG/DL (ref 70–99)
HCO3 BLDA-SCNC: 23.2 MEQ/L (ref 21–27)
HCT VFR BLD AUTO: 24.8 %
HGB BLD-MCNC: 7.2 G/DL
IMM GRANULOCYTES # BLD AUTO: 0.01 X10(3) UL (ref 0–1)
IMM GRANULOCYTES NFR BLD: 0.2 %
LYMPHOCYTES # BLD AUTO: 0.99 X10(3) UL (ref 1–4)
LYMPHOCYTES NFR BLD AUTO: 16.3 %
MCH RBC QN AUTO: 31.4 PG (ref 26–34)
MCHC RBC AUTO-ENTMCNC: 29 G/DL (ref 31–37)
MCV RBC AUTO: 108.3 FL
MONOCYTES # BLD AUTO: 0.48 X10(3) UL (ref 0.1–1)
MONOCYTES NFR BLD AUTO: 7.9 %
NEUTROPHILS # BLD AUTO: 4.41 X10 (3) UL (ref 1.5–7.7)
NEUTROPHILS # BLD AUTO: 4.41 X10(3) UL (ref 1.5–7.7)
NEUTROPHILS NFR BLD AUTO: 72.8 %
OSMOLALITY SERPL CALC.SUM OF ELEC: 316 MOSM/KG (ref 275–295)
OXYGEN LITERS/MINUTE: 2 L/MIN
PCO2 BLDA: 37 MM HG (ref 35–45)
PH BLDA: 7.39 [PH] (ref 7.35–7.45)
PLATELET # BLD AUTO: 115 10(3)UL (ref 150–450)
PO2 BLDA: 104 MM HG (ref 80–100)
POTASSIUM SERPL-SCNC: 4.7 MMOL/L (ref 3.5–5.1)
PROT SERPL-MCNC: 5.8 G/DL (ref 6.4–8.2)
PUNCTURE CHARGE: YES
RBC # BLD AUTO: 2.29 X10(6)UL
SODIUM SERPL-SCNC: 141 MMOL/L (ref 136–145)
WBC # BLD AUTO: 6.1 X10(3) UL (ref 4–11)

## 2022-08-01 PROCEDURE — 99223 1ST HOSP IP/OBS HIGH 75: CPT | Performed by: INTERNAL MEDICINE

## 2022-08-01 PROCEDURE — 71045 X-RAY EXAM CHEST 1 VIEW: CPT | Performed by: HOSPITALIST

## 2022-08-01 PROCEDURE — 71046 X-RAY EXAM CHEST 2 VIEWS: CPT | Performed by: INTERNAL MEDICINE

## 2022-08-01 PROCEDURE — 99291 CRITICAL CARE FIRST HOUR: CPT | Performed by: HOSPITALIST

## 2022-08-01 PROCEDURE — 78582 LUNG VENTILAT&PERFUS IMAGING: CPT | Performed by: INTERNAL MEDICINE

## 2022-08-01 RX ORDER — FUROSEMIDE 10 MG/ML
40 INJECTION INTRAMUSCULAR; INTRAVENOUS
Status: DISCONTINUED | OUTPATIENT
Start: 2022-08-01 | End: 2022-08-02

## 2022-08-01 RX ORDER — FUROSEMIDE 40 MG/1
40 TABLET ORAL
Status: DISCONTINUED | OUTPATIENT
Start: 2022-08-01 | End: 2022-08-01

## 2022-08-01 RX ORDER — IPRATROPIUM BROMIDE AND ALBUTEROL SULFATE 2.5; .5 MG/3ML; MG/3ML
3 SOLUTION RESPIRATORY (INHALATION) EVERY 6 HOURS PRN
Status: DISCONTINUED | OUTPATIENT
Start: 2022-08-01 | End: 2022-08-10

## 2022-08-01 NOTE — CM/SW NOTE
Left message with Katharine Tom for discharge planning needs. Awaiting return call.     Iwona PACKN RN [address]  RN Case Manager  [phone]

## 2022-08-01 NOTE — PLAN OF CARE
Problem: CARDIOVASCULAR - ADULT  Goal: Maintains optimal cardiac output and hemodynamic stability  Description: INTERVENTIONS:  - Monitor vital signs, rhythm, and trends  - Monitor for bleeding, hypotension and signs of decreased cardiac output  - Evaluate effectiveness of vasoactive medications to optimize hemodynamic stability  - Monitor arterial and/or venous puncture sites for bleeding and/or hematoma  - Assess quality of pulses, skin color and temperature  - Assess for signs of decreased coronary artery perfusion - ex.  Angina  - Evaluate fluid balance, assess for edema, trend weights  Outcome: Progressing  Goal: Absence of cardiac arrhythmias or at baseline  Description: INTERVENTIONS:  - Continuous cardiac monitoring, monitor vital signs, obtain 12 lead EKG if indicated  - Evaluate effectiveness of antiarrhythmic and heart rate control medications as ordered  - Initiate emergency measures for life threatening arrhythmias  - Monitor electrolytes and administer replacement therapy as ordered  Outcome: Progressing     Problem: RESPIRATORY - ADULT  Goal: Achieves optimal ventilation and oxygenation  Description: INTERVENTIONS:  - Assess for changes in respiratory status  - Assess for changes in mentation and behavior  - Position to facilitate oxygenation and minimize respiratory effort  - Oxygen supplementation based on oxygen saturation or ABGs  - Provide Smoking Cessation handout, if applicable  - Encourage broncho-pulmonary hygiene including cough, deep breathe, Incentive Spirometry  - Assess the need for suctioning and perform as needed  - Assess and instruct to report SOB or any respiratory difficulty  - Respiratory Therapy support as indicated  - Manage/alleviate anxiety  - Monitor for signs/symptoms of CO2 retention  Outcome: Not Progressing     Problem: METABOLIC/FLUID AND ELECTROLYTES - ADULT  Goal: Glucose maintained within prescribed range  Description: INTERVENTIONS:  - Monitor Blood Glucose as ordered  - Assess for signs and symptoms of hyperglycemia and hypoglycemia  - Administer ordered medications to maintain glucose within target range  - Assess barriers to adequate nutritional intake and initiate nutrition consult as needed  - Instruct patient on self management of diabetes  Outcome: Progressing     Problem: HEMATOLOGIC - ADULT  Goal: Maintains hematologic stability  Description: INTERVENTIONS  - Assess for signs and symptoms of bleeding or hemorrhage  - Monitor labs and vital signs for trends  - Administer supportive blood products/factors, fluids and medications as ordered and appropriate  - Administer supportive blood products/factors as ordered and appropriate  Outcome: Progressing  Goal: Free from bleeding injury  Description: (Example usage: patient with low platelets)  INTERVENTIONS:  - Avoid intramuscular injections, enemas and rectal medication administration  - Ensure safe mobilization of patient  - Hold pressure on venipuncture sites to achieve adequate hemostasis  - Assess for signs and symptoms of internal bleeding  - Monitor lab trends  - Patient is to report abnormal signs of bleeding to staff  - Avoid use of toothpicks and dental floss  - Use electric shaver for shaving  - Use soft bristle tooth brush  - Limit straining and forceful nose blowing  Outcome: Progressing     Problem: PAIN - ADULT  Goal: Verbalizes/displays adequate comfort level or patient's stated pain goal  Description: INTERVENTIONS:  - Encourage pt to monitor pain and request assistance  - Assess pain using appropriate pain scale  - Administer analgesics based on type and severity of pain and evaluate response  - Implement non-pharmacological measures as appropriate and evaluate response  - Consider cultural and social influences on pain and pain management  - Manage/alleviate anxiety  - Utilize distraction and/or relaxation techniques  - Monitor for opioid side effects  - Notify MD/LIP if interventions unsuccessful or patient reports new pain  - Anticipate increased pain with activity and pre-medicate as appropriate  Outcome: Not Progressing     Problem: SAFETY ADULT - FALL  Goal: Free from fall injury  Description: INTERVENTIONS:  - Assess pt frequently for physical needs  - Identify cognitive and physical deficits and behaviors that affect risk of falls. - [place] fall precautions as indicated by assessment.  - Educate pt/family on patient safety including physical limitations  - Instruct pt to call for assistance with activity based on assessment  - Modify environment to reduce risk of injury  - Provide assistive devices as appropriate  - Consider OT/PT consult to assist with strengthening/mobility  - Encourage toileting schedule  Outcome: Progressing    Received awake,oriented. Pt. with mild shortness of breath(see flow sheet for vital signs),no complaints of chest pain. .Pt.complained of pain rt.side of abdomen,no relief with tylenol. Dr. Ricardo Catching with orders to have nocturnalist assess pt. notified-per MD, will manage the pt. Orders taken from [id number] was positive. Chest x-ray done. Lidocaine patch applied. Plans for VQ scan @ 0800. Call light within reach. Instructed to call for assistance. Will continue to monitor pt.

## 2022-08-01 NOTE — CM/SW NOTE
BPCI-Advanced Medicare Program Note:   Plan of care reviewed for care coordination and discharge planning. Noted that patient is a BPCI-A readmission, previously enrolled under , for CHF, currently on day 33 of 90 day in the current BPCI-A episode. LYNNE tool was used to help determine next care setting. Thus, [address] recommendation is for post-acute facility pending patient progress. Case Management team is following for care coordination and discharge planning needs. Patient had a rapid response this afternoon and is on bi-pap. May be transferred to PCU depending on respiratory needs. Patient is in LTC at [address] will accept back and provide PT/OT therapy per therapists' recommendations. Call placed earlier to POA and CM has not received a call back. PLAN:  Once medically ready, to return to [facility] as LTC with therapies to be provided upon return. / to remain available for support and/or discharge planning.      Santhosh PACKN RN [address]  RN Case Manager  [phone]

## 2022-08-02 ENCOUNTER — APPOINTMENT (OUTPATIENT)
Dept: ULTRASOUND IMAGING | Facility: HOSPITAL | Age: 72
DRG: 377 | End: 2022-08-02
Attending: INTERNAL MEDICINE
Payer: MEDICARE

## 2022-08-02 LAB
ANION GAP SERPL CALC-SCNC: 7 MMOL/L (ref 0–18)
ANTIBODY SCREEN: NEGATIVE
BASOPHILS # BLD AUTO: 0.01 X10(3) UL (ref 0–0.2)
BASOPHILS NFR BLD AUTO: 0.2 %
BUN BLD-MCNC: 79 MG/DL (ref 7–18)
BUN/CREAT SERPL: 32.8 (ref 10–20)
CALCIUM BLD-MCNC: 8.2 MG/DL (ref 8.5–10.1)
CHLORIDE SERPL-SCNC: 110 MMOL/L (ref 98–112)
CO2 SERPL-SCNC: 22 MMOL/L (ref 21–32)
CREAT BLD-MCNC: 2.41 MG/DL
DEPRECATED RDW RBC AUTO: 67.9 FL (ref 35.1–46.3)
EOSINOPHIL # BLD AUTO: 0.13 X10(3) UL (ref 0–0.7)
EOSINOPHIL NFR BLD AUTO: 2.5 %
ERYTHROCYTE [DISTWIDTH] IN BLOOD BY AUTOMATED COUNT: 16.8 % (ref 11–15)
GLUCOSE BLD-MCNC: 141 MG/DL (ref 70–99)
GLUCOSE BLDC GLUCOMTR-MCNC: 147 MG/DL (ref 70–99)
GLUCOSE BLDC GLUCOMTR-MCNC: 161 MG/DL (ref 70–99)
GLUCOSE BLDC GLUCOMTR-MCNC: 202 MG/DL (ref 70–99)
GLUCOSE BLDC GLUCOMTR-MCNC: 202 MG/DL (ref 70–99)
HCT VFR BLD AUTO: 23.9 %
HCT VFR BLD AUTO: 31.3 %
HGB BLD-MCNC: 7 G/DL
HGB BLD-MCNC: 8.5 G/DL
IMM GRANULOCYTES # BLD AUTO: 0.01 X10(3) UL (ref 0–1)
IMM GRANULOCYTES NFR BLD: 0.2 %
LYMPHOCYTES # BLD AUTO: 0.91 X10(3) UL (ref 1–4)
LYMPHOCYTES NFR BLD AUTO: 17.2 %
MCH RBC QN AUTO: 32.3 PG (ref 26–34)
MCHC RBC AUTO-ENTMCNC: 29.3 G/DL (ref 31–37)
MCV RBC AUTO: 110.1 FL
MONOCYTES # BLD AUTO: 0.34 X10(3) UL (ref 0.1–1)
MONOCYTES NFR BLD AUTO: 6.4 %
NEUTROPHILS # BLD AUTO: 3.88 X10 (3) UL (ref 1.5–7.7)
NEUTROPHILS # BLD AUTO: 3.88 X10(3) UL (ref 1.5–7.7)
NEUTROPHILS NFR BLD AUTO: 73.5 %
OSMOLALITY SERPL CALC.SUM OF ELEC: 314 MOSM/KG (ref 275–295)
PLATELET # BLD AUTO: 94 10(3)UL (ref 150–450)
POTASSIUM SERPL-SCNC: 4.8 MMOL/L (ref 3.5–5.1)
RBC # BLD AUTO: 2.17 X10(6)UL
RH BLOOD TYPE: POSITIVE
SODIUM SERPL-SCNC: 139 MMOL/L (ref 136–145)
WBC # BLD AUTO: 5.3 X10(3) UL (ref 4–11)

## 2022-08-02 PROCEDURE — 76770 US EXAM ABDO BACK WALL COMP: CPT | Performed by: INTERNAL MEDICINE

## 2022-08-02 PROCEDURE — 99233 SBSQ HOSP IP/OBS HIGH 50: CPT | Performed by: INTERNAL MEDICINE

## 2022-08-02 RX ORDER — BUMETANIDE 0.25 MG/ML
2 INJECTION, SOLUTION INTRAMUSCULAR; INTRAVENOUS EVERY 8 HOURS
Status: DISCONTINUED | OUTPATIENT
Start: 2022-08-02 | End: 2022-08-04

## 2022-08-02 NOTE — CM/SW NOTE
CM left message yesterday and today for Norbert Cortez without return phone calls. CM called friend Nolene Reasons and received no VM to leave a message. Derick Dawson called back and notified that Juma Ramos is still an IP at [facility] is comfortable with her returning to [facility] at discharge.     Oliver Giron MBA BSN RN [address]  RN Case Manager  [phone]

## 2022-08-02 NOTE — PLAN OF CARE
Uses bipap lat night. Now on O2 @2 l n/c. Hob kept elevated. Iv lasix continues. Plan: back to [address]. Problem: Patient Centered Care  Goal: Patient preferences are identified and integrated in the patient's plan of care  Description: Interventions:  - What would you like us to know as we care for you? Lives at [facility].  - Provide timely, complete, and accurate information to patient/family  - Incorporate patient and family knowledge, values, beliefs, and cultural backgrounds into the planning and delivery of care  - Encourage patient/family to participate in care and decision-making at the level they choose  - Honor patient and family perspectives and choices  Outcome: Progressing     Problem: CARDIOVASCULAR - ADULT  Goal: Maintains optimal cardiac output and hemodynamic stability  Description: INTERVENTIONS:  - Monitor vital signs, rhythm, and trends  - Monitor for bleeding, hypotension and signs of decreased cardiac output  - Evaluate effectiveness of vasoactive medications to optimize hemodynamic stability  - Monitor arterial and/or venous puncture sites for bleeding and/or hematoma  - Assess quality of pulses, skin color and temperature  - Assess for signs of decreased coronary artery perfusion - ex.  Angina  - Evaluate fluid balance, assess for edema, trend weights  Outcome: Progressing  Goal: Absence of cardiac arrhythmias or at baseline  Description: INTERVENTIONS:  - Continuous cardiac monitoring, monitor vital signs, obtain 12 lead EKG if indicated  - Evaluate effectiveness of antiarrhythmic and heart rate control medications as ordered  - Initiate emergency measures for life threatening arrhythmias  - Monitor electrolytes and administer replacement therapy as ordered  Outcome: Progressing     Problem: RESPIRATORY - ADULT  Goal: Achieves optimal ventilation and oxygenation  Description: INTERVENTIONS:  - Assess for changes in respiratory status  - Assess for changes in mentation and behavior  - Position to facilitate oxygenation and minimize respiratory effort  - Oxygen supplementation based on oxygen saturation or ABGs  - Provide Smoking Cessation handout, if applicable  - Encourage broncho-pulmonary hygiene including cough, deep breathe, Incentive Spirometry  - Assess the need for suctioning and perform as needed  - Assess and instruct to report SOB or any respiratory difficulty  - Respiratory Therapy support as indicated  - Manage/alleviate anxiety  - Monitor for signs/symptoms of CO2 retention  Outcome: Progressing     Problem: METABOLIC/FLUID AND ELECTROLYTES - ADULT  Goal: Glucose maintained within prescribed range  Description: INTERVENTIONS:  - Monitor Blood Glucose as ordered  - Assess for signs and symptoms of hyperglycemia and hypoglycemia  - Administer ordered medications to maintain glucose within target range  - Assess barriers to adequate nutritional intake and initiate nutrition consult as needed  - Instruct patient on self management of diabetes  Outcome: Progressing     Problem: HEMATOLOGIC - ADULT  Goal: Maintains hematologic stability  Description: INTERVENTIONS  - Assess for signs and symptoms of bleeding or hemorrhage  - Monitor labs and vital signs for trends  - Administer supportive blood products/factors, fluids and medications as ordered and appropriate  - Administer supportive blood products/factors as ordered and appropriate  Outcome: Progressing  Goal: Free from bleeding injury  Description: (Example usage: patient with low platelets)  INTERVENTIONS:  - Avoid intramuscular injections, enemas and rectal medication administration  - Ensure safe mobilization of patient  - Hold pressure on venipuncture sites to achieve adequate hemostasis  - Assess for signs and symptoms of internal bleeding  - Monitor lab trends  - Patient is to report abnormal signs of bleeding to staff  - Avoid use of toothpicks and dental floss  - Use electric shaver for shaving  - Use soft bristle tooth brush  - Limit straining and forceful nose blowing  Outcome: Progressing     Problem: PAIN - ADULT  Goal: Verbalizes/displays adequate comfort level or patient's stated pain goal  Description: INTERVENTIONS:  - Encourage pt to monitor pain and request assistance  - Assess pain using appropriate pain scale  - Administer analgesics based on type and severity of pain and evaluate response  - Implement non-pharmacological measures as appropriate and evaluate response  - Consider cultural and social influences on pain and pain management  - Manage/alleviate anxiety  - Utilize distraction and/or relaxation techniques  - Monitor for opioid side effects  - Notify MD/LIP if interventions unsuccessful or patient reports new pain  - Anticipate increased pain with activity and pre-medicate as appropriate  Outcome: Progressing     Problem: SAFETY ADULT - FALL  Goal: Free from fall injury  Description: INTERVENTIONS:  - Assess pt frequently for physical needs  - Identify cognitive and physical deficits and behaviors that affect risk of falls.   - [place] fall precautions as indicated by assessment.  - Educate pt/family on patient safety including physical limitations  - Instruct pt to call for assistance with activity based on assessment  - Modify environment to reduce risk of injury  - Provide assistive devices as appropriate  - Consider OT/PT consult to assist with strengthening/mobility  - Encourage toileting schedule  Outcome: Progressing     Problem: DISCHARGE PLANNING  Goal: Discharge to home or other facility with appropriate resources  Description: INTERVENTIONS:  - Identify barriers to discharge w/pt and caregiver  - Include patient/family/discharge partner in discharge planning  - Arrange for needed discharge resources and transportation as appropriate  - Identify discharge learning needs (meds, wound care, etc)  - Arrange for interpreters to assist at discharge as needed  - Consider post-discharge preferences of patient/family/discharge partner  - Complete POLST form as appropriate  - Assess patient's ability to be responsible for managing their own health  - Refer to Case Management Department for coordinating discharge planning if the patient needs post-hospital services based on physician/LIP order or complex needs related to functional status, cognitive ability or social support system  Outcome: Progressing

## 2022-08-02 NOTE — PLAN OF CARE
Problem: Patient Centered Care  Goal: Patient preferences are identified and integrated in the patient's plan of care  Description: Interventions:  - What would you like us to know as we care for you? I would like to return home. - Provide timely, complete, and accurate information to patient/family  - Incorporate patient and family knowledge, values, beliefs, and cultural backgrounds into the planning and delivery of care  - Encourage patient/family to participate in care and decision-making at the level they choose  - Honor patient and family perspectives and choices  Outcome: Progressing   Patient had 1 unit of blood today-current Hgb-8.5. started on IV Bumex every 8 hours. Strict I&O's and daily weights.

## 2022-08-03 ENCOUNTER — APPOINTMENT (OUTPATIENT)
Dept: ULTRASOUND IMAGING | Facility: HOSPITAL | Age: 72
DRG: 377 | End: 2022-08-03
Attending: PHYSICIAN ASSISTANT
Payer: MEDICARE

## 2022-08-03 LAB
ADENOVIRUS PCR:: NOT DETECTED
ALBUMIN SERPL-MCNC: 1.8 G/DL (ref 3.4–5)
ALBUMIN SERPL-MCNC: 1.8 G/DL (ref 3.4–5)
ALBUMIN/GLOB SERPL: 0.5 {RATIO} (ref 1–2)
ALP LIVER SERPL-CCNC: 181 U/L
ALT SERPL-CCNC: 23 U/L
ANION GAP SERPL CALC-SCNC: 6 MMOL/L (ref 0–18)
ANION GAP SERPL CALC-SCNC: 6 MMOL/L (ref 0–18)
AST SERPL-CCNC: 25 U/L (ref 15–37)
B PARAPERT DNA SPEC QL NAA+PROBE: NOT DETECTED
B PERT DNA SPEC QL NAA+PROBE: NOT DETECTED
BASOPHILS # BLD AUTO: 0.01 X10(3) UL (ref 0–0.2)
BASOPHILS NFR BLD AUTO: 0.2 %
BILIRUB SERPL-MCNC: 0.4 MG/DL (ref 0.1–2)
BLOOD TYPE BARCODE: 6200
BUN BLD-MCNC: 77 MG/DL (ref 7–18)
BUN BLD-MCNC: 77 MG/DL (ref 7–18)
BUN/CREAT SERPL: 31.2 (ref 10–20)
BUN/CREAT SERPL: 31.2 (ref 10–20)
C PNEUM DNA SPEC QL NAA+PROBE: NOT DETECTED
CALCIUM BLD-MCNC: 7.9 MG/DL (ref 8.5–10.1)
CALCIUM BLD-MCNC: 7.9 MG/DL (ref 8.5–10.1)
CHLORIDE SERPL-SCNC: 110 MMOL/L (ref 98–112)
CHLORIDE SERPL-SCNC: 110 MMOL/L (ref 98–112)
CO2 SERPL-SCNC: 21 MMOL/L (ref 21–32)
CO2 SERPL-SCNC: 21 MMOL/L (ref 21–32)
CORONAVIRUS 229E PCR:: NOT DETECTED
CORONAVIRUS HKU1 PCR:: NOT DETECTED
CORONAVIRUS NL63 PCR:: NOT DETECTED
CORONAVIRUS OC43 PCR:: NOT DETECTED
CREAT BLD-MCNC: 2.47 MG/DL
CREAT BLD-MCNC: 2.47 MG/DL
DEPRECATED RDW RBC AUTO: 82.3 FL (ref 35.1–46.3)
EOSINOPHIL # BLD AUTO: 0.17 X10(3) UL (ref 0–0.7)
EOSINOPHIL NFR BLD AUTO: 3.6 %
ERYTHROCYTE [DISTWIDTH] IN BLOOD BY AUTOMATED COUNT: 19.7 % (ref 11–15)
FLUAV RNA SPEC QL NAA+PROBE: NOT DETECTED
FLUBV RNA SPEC QL NAA+PROBE: NOT DETECTED
GFR SERPLBLD BASED ON 1.73 SQ M-ARVRAT: 20 ML/MIN/1.73M2 (ref 60–?)
GFR SERPLBLD BASED ON 1.73 SQ M-ARVRAT: 20 ML/MIN/1.73M2 (ref 60–?)
GLOBULIN PLAS-MCNC: 3.8 G/DL (ref 2.8–4.4)
GLUCOSE BLD-MCNC: 157 MG/DL (ref 70–99)
GLUCOSE BLD-MCNC: 157 MG/DL (ref 70–99)
GLUCOSE BLDC GLUCOMTR-MCNC: 119 MG/DL (ref 70–99)
GLUCOSE BLDC GLUCOMTR-MCNC: 172 MG/DL (ref 70–99)
GLUCOSE BLDC GLUCOMTR-MCNC: 173 MG/DL (ref 70–99)
GLUCOSE BLDC GLUCOMTR-MCNC: 185 MG/DL (ref 70–99)
GLUCOSE BLDC GLUCOMTR-MCNC: 186 MG/DL (ref 70–99)
HCT VFR BLD AUTO: 29.6 %
HGB BLD-MCNC: 8.2 G/DL
IMM GRANULOCYTES # BLD AUTO: 0.01 X10(3) UL (ref 0–1)
IMM GRANULOCYTES NFR BLD: 0.2 %
LYMPHOCYTES # BLD AUTO: 0.85 X10(3) UL (ref 1–4)
LYMPHOCYTES NFR BLD AUTO: 17.8 %
MCH RBC QN AUTO: 31.1 PG (ref 26–34)
MCHC RBC AUTO-ENTMCNC: 27.7 G/DL (ref 31–37)
MCV RBC AUTO: 112.1 FL
METAPNEUMOVIRUS PCR:: NOT DETECTED
MONOCYTES # BLD AUTO: 0.37 X10(3) UL (ref 0.1–1)
MONOCYTES NFR BLD AUTO: 7.7 %
MYCOPLASMA PNEUMONIA PCR:: NOT DETECTED
NEUTROPHILS # BLD AUTO: 3.37 X10 (3) UL (ref 1.5–7.7)
NEUTROPHILS # BLD AUTO: 3.37 X10(3) UL (ref 1.5–7.7)
NEUTROPHILS NFR BLD AUTO: 70.5 %
OSMOLALITY SERPL CALC.SUM OF ELEC: 310 MOSM/KG (ref 275–295)
OSMOLALITY SERPL CALC.SUM OF ELEC: 310 MOSM/KG (ref 275–295)
PARAINFLUENZA 1 PCR:: NOT DETECTED
PARAINFLUENZA 2 PCR:: NOT DETECTED
PARAINFLUENZA 3 PCR:: NOT DETECTED
PARAINFLUENZA 4 PCR:: NOT DETECTED
PHOSPHATE SERPL-MCNC: 3.6 MG/DL (ref 2.5–4.9)
PLATELET # BLD AUTO: 89 10(3)UL (ref 150–450)
POTASSIUM SERPL-SCNC: 4.8 MMOL/L (ref 3.5–5.1)
POTASSIUM SERPL-SCNC: 4.8 MMOL/L (ref 3.5–5.1)
PROT SERPL-MCNC: 5.6 G/DL (ref 6.4–8.2)
RBC # BLD AUTO: 2.64 X10(6)UL
RHINOVIRUS/ENTERO PCR:: NOT DETECTED
RSV RNA SPEC QL NAA+PROBE: NOT DETECTED
SARS-COV-2 RNA NPH QL NAA+NON-PROBE: NOT DETECTED
SODIUM SERPL-SCNC: 137 MMOL/L (ref 136–145)
SODIUM SERPL-SCNC: 137 MMOL/L (ref 136–145)
WBC # BLD AUTO: 4.8 X10(3) UL (ref 4–11)

## 2022-08-03 PROCEDURE — 93971 EXTREMITY STUDY: CPT | Performed by: PHYSICIAN ASSISTANT

## 2022-08-03 PROCEDURE — 99233 SBSQ HOSP IP/OBS HIGH 50: CPT | Performed by: PHYSICIAN ASSISTANT

## 2022-08-03 RX ORDER — MAGNESIUM HYDROXIDE/ALUMINUM HYDROXICE/SIMETHICONE 120; 1200; 1200 MG/30ML; MG/30ML; MG/30ML
30 SUSPENSION ORAL 4 TIMES DAILY PRN
Status: DISCONTINUED | OUTPATIENT
Start: 2022-08-03 | End: 2022-08-10

## 2022-08-03 NOTE — PLAN OF CARE
Received A&Ox4 and alert. [place] in place and care provided. 2L NC. Denies SOB. Complain of RLE pain. US negative for DVT. Given stool softener. Fall precautions in place. Pt updated on plan of care  Problem: Patient Centered Care  Goal: Patient preferences are identified and integrated in the patient's plan of care  Description: Interventions:  - What would you like us to know as we care for you?   - Provide timely, complete, and accurate information to patient/family  - Incorporate patient and family knowledge, values, beliefs, and cultural backgrounds into the planning and delivery of care  - Encourage patient/family to participate in care and decision-making at the level they choose  - Honor patient and family perspectives and choices  Outcome: Progressing     Problem: CARDIOVASCULAR - ADULT  Goal: Maintains optimal cardiac output and hemodynamic stability  Description: INTERVENTIONS:  - Monitor vital signs, rhythm, and trends  - Monitor for bleeding, hypotension and signs of decreased cardiac output  - Evaluate effectiveness of vasoactive medications to optimize hemodynamic stability  - Monitor arterial and/or venous puncture sites for bleeding and/or hematoma  - Assess quality of pulses, skin color and temperature  - Assess for signs of decreased coronary artery perfusion - ex.  Angina  - Evaluate fluid balance, assess for edema, trend weights  Outcome: Progressing  Goal: Absence of cardiac arrhythmias or at baseline  Description: INTERVENTIONS:  - Continuous cardiac monitoring, monitor vital signs, obtain 12 lead EKG if indicated  - Evaluate effectiveness of antiarrhythmic and heart rate control medications as ordered  - Initiate emergency measures for life threatening arrhythmias  - Monitor electrolytes and administer replacement therapy as ordered  Outcome: Progressing     Problem: RESPIRATORY - ADULT  Goal: Achieves optimal ventilation and oxygenation  Description: INTERVENTIONS:  - Assess for changes in respiratory status  - Assess for changes in mentation and behavior  - Position to facilitate oxygenation and minimize respiratory effort  - Oxygen supplementation based on oxygen saturation or ABGs  - Provide Smoking Cessation handout, if applicable  - Encourage broncho-pulmonary hygiene including cough, deep breathe, Incentive Spirometry  - Assess the need for suctioning and perform as needed  - Assess and instruct to report SOB or any respiratory difficulty  - Respiratory Therapy support as indicated  - Manage/alleviate anxiety  - Monitor for signs/symptoms of CO2 retention  Outcome: Progressing     Problem: METABOLIC/FLUID AND ELECTROLYTES - ADULT  Goal: Glucose maintained within prescribed range  Description: INTERVENTIONS:  - Monitor Blood Glucose as ordered  - Assess for signs and symptoms of hyperglycemia and hypoglycemia  - Administer ordered medications to maintain glucose within target range  - Assess barriers to adequate nutritional intake and initiate nutrition consult as needed  - Instruct patient on self management of diabetes  Outcome: Progressing     Problem: HEMATOLOGIC - ADULT  Goal: Maintains hematologic stability  Description: INTERVENTIONS  - Assess for signs and symptoms of bleeding or hemorrhage  - Monitor labs and vital signs for trends  - Administer supportive blood products/factors, fluids and medications as ordered and appropriate  - Administer supportive blood products/factors as ordered and appropriate  Outcome: Progressing  Goal: Free from bleeding injury  Description: (Example usage: patient with low platelets)  INTERVENTIONS:  - Avoid intramuscular injections, enemas and rectal medication administration  - Ensure safe mobilization of patient  - Hold pressure on venipuncture sites to achieve adequate hemostasis  - Assess for signs and symptoms of internal bleeding  - Monitor lab trends  - Patient is to report abnormal signs of bleeding to staff  - Avoid use of toothpicks and dental floss  - Use electric shaver for shaving  - Use soft bristle tooth brush  - Limit straining and forceful nose blowing  Outcome: Progressing     Problem: PAIN - ADULT  Goal: Verbalizes/displays adequate comfort level or patient's stated pain goal  Description: INTERVENTIONS:  - Encourage pt to monitor pain and request assistance  - Assess pain using appropriate pain scale  - Administer analgesics based on type and severity of pain and evaluate response  - Implement non-pharmacological measures as appropriate and evaluate response  - Consider cultural and social influences on pain and pain management  - Manage/alleviate anxiety  - Utilize distraction and/or relaxation techniques  - Monitor for opioid side effects  - Notify MD/LIP if interventions unsuccessful or patient reports new pain  - Anticipate increased pain with activity and pre-medicate as appropriate  Outcome: Progressing     Problem: SAFETY ADULT - FALL  Goal: Free from fall injury  Description: INTERVENTIONS:  - Assess pt frequently for physical needs  - Identify cognitive and physical deficits and behaviors that affect risk of falls.   - [place] fall precautions as indicated by assessment.  - Educate pt/family on patient safety including physical limitations  - Instruct pt to call for assistance with activity based on assessment  - Modify environment to reduce risk of injury  - Provide assistive devices as appropriate  - Consider OT/PT consult to assist with strengthening/mobility  - Encourage toileting schedule  Outcome: Progressing     Problem: DISCHARGE PLANNING  Goal: Discharge to home or other facility with appropriate resources  Description: INTERVENTIONS:  - Identify barriers to discharge w/pt and caregiver  - Include patient/family/discharge partner in discharge planning  - Arrange for needed discharge resources and transportation as appropriate  - Identify discharge learning needs (meds, wound care, etc)  - Arrange for interpreters to assist at discharge as needed  - Consider post-discharge preferences of patient/family/discharge partner  - Complete POLST form as appropriate  - Assess patient's ability to be responsible for managing their own health  - Refer to Case Management Department for coordinating discharge planning if the patient needs post-hospital services based on physician/LIP order or complex needs related to functional status, cognitive ability or social support system  Outcome: Progressing

## 2022-08-03 NOTE — CM/SW NOTE
Patient is still not medically ready for transfer. PLAN:  Mariajose Brownlee at [facility], patient will be in LTC side and receive PT  And OT therapies as discussed with Sachin Other liaison. / to remain available for support and/or discharge planning. / to remain available for support and/or discharge planning.      Emi Nurse ANTONIO BSN RN [address]  RN Case Manager  [phone]

## 2022-08-03 NOTE — PLAN OF CARE
Problem: CARDIOVASCULAR - ADULT  Goal: Maintains optimal cardiac output and hemodynamic stability  Description: INTERVENTIONS:  - Monitor vital signs, rhythm, and trends  - Monitor for bleeding, hypotension and signs of decreased cardiac output  - Evaluate effectiveness of vasoactive medications to optimize hemodynamic stability  - Monitor arterial and/or venous puncture sites for bleeding and/or hematoma  - Assess quality of pulses, skin color and temperature  - Assess for signs of decreased coronary artery perfusion - ex.  Angina  - Evaluate fluid balance, assess for edema, trend weights  Outcome: Progressing  Goal: Absence of cardiac arrhythmias or at baseline  Description: INTERVENTIONS:  - Continuous cardiac monitoring, monitor vital signs, obtain 12 lead EKG if indicated  - Evaluate effectiveness of antiarrhythmic and heart rate control medications as ordered  - Initiate emergency measures for life threatening arrhythmias  - Monitor electrolytes and administer replacement therapy as ordered  Outcome: Progressing     Problem: RESPIRATORY - ADULT  Goal: Achieves optimal ventilation and oxygenation  Description: INTERVENTIONS:  - Assess for changes in respiratory status  - Assess for changes in mentation and behavior  - Position to facilitate oxygenation and minimize respiratory effort  - Oxygen supplementation based on oxygen saturation or ABGs  - Provide Smoking Cessation handout, if applicable  - Encourage broncho-pulmonary hygiene including cough, deep breathe, Incentive Spirometry  - Assess the need for suctioning and perform as needed  - Assess and instruct to report SOB or any respiratory difficulty  - Respiratory Therapy support as indicated  - Manage/alleviate anxiety  - Monitor for signs/symptoms of CO2 retention  Outcome: Progressing     Problem: METABOLIC/FLUID AND ELECTROLYTES - ADULT  Goal: Glucose maintained within prescribed range  Description: INTERVENTIONS:  - Monitor Blood Glucose as ordered  - Assess for signs and symptoms of hyperglycemia and hypoglycemia  - Administer ordered medications to maintain glucose within target range  - Assess barriers to adequate nutritional intake and initiate nutrition consult as needed  - Instruct patient on self management of diabetes  Outcome: Progressing     Problem: HEMATOLOGIC - ADULT  Goal: Maintains hematologic stability  Description: INTERVENTIONS  - Assess for signs and symptoms of bleeding or hemorrhage  - Monitor labs and vital signs for trends  - Administer supportive blood products/factors, fluids and medications as ordered and appropriate  - Administer supportive blood products/factors as ordered and appropriate  Outcome: Progressing  Goal: Free from bleeding injury  Description: (Example usage: patient with low platelets)  INTERVENTIONS:  - Avoid intramuscular injections, enemas and rectal medication administration  - Ensure safe mobilization of patient  - Hold pressure on venipuncture sites to achieve adequate hemostasis  - Assess for signs and symptoms of internal bleeding  - Monitor lab trends  - Patient is to report abnormal signs of bleeding to staff  - Avoid use of toothpicks and dental floss  - Use electric shaver for shaving  - Use soft bristle tooth brush  - Limit straining and forceful nose blowing  Outcome: Progressing     Problem: PAIN - ADULT  Goal: Verbalizes/displays adequate comfort level or patient's stated pain goal  Description: INTERVENTIONS:  - Encourage pt to monitor pain and request assistance  - Assess pain using appropriate pain scale  - Administer analgesics based on type and severity of pain and evaluate response  - Implement non-pharmacological measures as appropriate and evaluate response  - Consider cultural and social influences on pain and pain management  - Manage/alleviate anxiety  - Utilize distraction and/or relaxation techniques  - Monitor for opioid side effects  - Notify MD/LIP if interventions unsuccessful or patient reports new pain  - Anticipate increased pain with activity and pre-medicate as appropriate  Outcome: Progressing     Problem: SAFETY ADULT - FALL  Goal: Free from fall injury  Description: INTERVENTIONS:  - Assess pt frequently for physical needs  - Identify cognitive and physical deficits and behaviors that affect risk of falls. - [place] fall precautions as indicated by assessment.  - Educate pt/family on patient safety including physical limitations  - Instruct pt to call for assistance with activity based on assessment  - Modify environment to reduce risk of injury  - Provide assistive devices as appropriate  - Consider OT/PT consult to assist with strengthening/mobility  - Encourage toileting schedule  Outcome: Progressing    Received awake,oriented. SBP in the 90's,asymptomatic. Pt.tried to use BIPAP last night but unable to tolerate it. Pt.declined to use the BIPAP and SCD's. Repositioned. Incontinent care provided. Slept fairly during the night. Call light within reach. Instructed to call for assistance. Will continue to monitor pt.

## 2022-08-04 LAB
ALBUMIN SERPL-MCNC: 2 G/DL (ref 3.4–5)
ANION GAP SERPL CALC-SCNC: 7 MMOL/L (ref 0–18)
BUN BLD-MCNC: 88 MG/DL (ref 7–18)
BUN/CREAT SERPL: 35.9 (ref 10–20)
CALCIUM BLD-MCNC: 8.5 MG/DL (ref 8.5–10.1)
CHLORIDE SERPL-SCNC: 108 MMOL/L (ref 98–112)
CO2 SERPL-SCNC: 22 MMOL/L (ref 21–32)
CREAT BLD-MCNC: 2.45 MG/DL
GFR SERPLBLD BASED ON 1.73 SQ M-ARVRAT: 21 ML/MIN/1.73M2 (ref 60–?)
GLUCOSE BLD-MCNC: 137 MG/DL (ref 70–99)
GLUCOSE BLDC GLUCOMTR-MCNC: 143 MG/DL (ref 70–99)
GLUCOSE BLDC GLUCOMTR-MCNC: 147 MG/DL (ref 70–99)
GLUCOSE BLDC GLUCOMTR-MCNC: 164 MG/DL (ref 70–99)
GLUCOSE BLDC GLUCOMTR-MCNC: 196 MG/DL (ref 70–99)
OSMOLALITY SERPL CALC.SUM OF ELEC: 313 MOSM/KG (ref 275–295)
PHOSPHATE SERPL-MCNC: 3.7 MG/DL (ref 2.5–4.9)
POTASSIUM SERPL-SCNC: 4.7 MMOL/L (ref 3.5–5.1)
SODIUM SERPL-SCNC: 137 MMOL/L (ref 136–145)

## 2022-08-04 PROCEDURE — 99233 SBSQ HOSP IP/OBS HIGH 50: CPT | Performed by: INTERNAL MEDICINE

## 2022-08-04 RX ORDER — BUMETANIDE 0.25 MG/ML
2 INJECTION, SOLUTION INTRAMUSCULAR; INTRAVENOUS EVERY 12 HOURS
Status: DISCONTINUED | OUTPATIENT
Start: 2022-08-04 | End: 2022-08-06

## 2022-08-04 NOTE — CM/SW NOTE
[facility]  [facility]. Assigned SW/CM to follow up with patient/family on discharge plan.      Tru Marston   August 04, 2022   09:48

## 2022-08-04 NOTE — PLAN OF CARE
O2 2 l n/c. Uses bipap at night for 3 hours only. Iv bumex continues. Plan: back to [employer] [facility]. Problem: Patient Centered Care  Goal: Patient preferences are identified and integrated in the patient's plan of care  Description: Interventions:  - What would you like us to know as we care for you? Lives at [address] [place] [address].  - Provide timely, complete, and accurate information to patient/family  - Incorporate patient and family knowledge, values, beliefs, and cultural backgrounds into the planning and delivery of care  - Encourage patient/family to participate in care and decision-making at the level they choose  - Honor patient and family perspectives and choices  Outcome: Progressing     Problem: CARDIOVASCULAR - ADULT  Goal: Maintains optimal cardiac output and hemodynamic stability  Description: INTERVENTIONS:  - Monitor vital signs, rhythm, and trends  - Monitor for bleeding, hypotension and signs of decreased cardiac output  - Evaluate effectiveness of vasoactive medications to optimize hemodynamic stability  - Monitor arterial and/or venous puncture sites for bleeding and/or hematoma  - Assess quality of pulses, skin color and temperature  - Assess for signs of decreased coronary artery perfusion - ex.  Angina  - Evaluate fluid balance, assess for edema, trend weights  Outcome: Progressing  Goal: Absence of cardiac arrhythmias or at baseline  Description: INTERVENTIONS:  - Continuous cardiac monitoring, monitor vital signs, obtain 12 lead EKG if indicated  - Evaluate effectiveness of antiarrhythmic and heart rate control medications as ordered  - Initiate emergency measures for life threatening arrhythmias  - Monitor electrolytes and administer replacement therapy as ordered  Outcome: Progressing     Problem: RESPIRATORY - ADULT  Goal: Achieves optimal ventilation and oxygenation  Description: INTERVENTIONS:  - Assess for changes in respiratory status  - Assess for changes in mentation and behavior  - Position to facilitate oxygenation and minimize respiratory effort  - Oxygen supplementation based on oxygen saturation or ABGs  - Provide Smoking Cessation handout, if applicable  - Encourage broncho-pulmonary hygiene including cough, deep breathe, Incentive Spirometry  - Assess the need for suctioning and perform as needed  - Assess and instruct to report SOB or any respiratory difficulty  - Respiratory Therapy support as indicated  - Manage/alleviate anxiety  - Monitor for signs/symptoms of CO2 retention  Outcome: Progressing     Problem: METABOLIC/FLUID AND ELECTROLYTES - ADULT  Goal: Glucose maintained within prescribed range  Description: INTERVENTIONS:  - Monitor Blood Glucose as ordered  - Assess for signs and symptoms of hyperglycemia and hypoglycemia  - Administer ordered medications to maintain glucose within target range  - Assess barriers to adequate nutritional intake and initiate nutrition consult as needed  - Instruct patient on self management of diabetes  Outcome: Progressing     Problem: HEMATOLOGIC - ADULT  Goal: Maintains hematologic stability  Description: INTERVENTIONS  - Assess for signs and symptoms of bleeding or hemorrhage  - Monitor labs and vital signs for trends  - Administer supportive blood products/factors, fluids and medications as ordered and appropriate  - Administer supportive blood products/factors as ordered and appropriate  Outcome: Progressing  Goal: Free from bleeding injury  Description: (Example usage: patient with low platelets)  INTERVENTIONS:  - Avoid intramuscular injections, enemas and rectal medication administration  - Ensure safe mobilization of patient  - Hold pressure on venipuncture sites to achieve adequate hemostasis  - Assess for signs and symptoms of internal bleeding  - Monitor lab trends  - Patient is to report abnormal signs of bleeding to staff  - Avoid use of toothpicks and dental floss  - Use electric shaver for shaving  - Use soft bristle tooth brush  - Limit straining and forceful nose blowing  Outcome: Progressing     Problem: PAIN - ADULT  Goal: Verbalizes/displays adequate comfort level or patient's stated pain goal  Description: INTERVENTIONS:  - Encourage pt to monitor pain and request assistance  - Assess pain using appropriate pain scale  - Administer analgesics based on type and severity of pain and evaluate response  - Implement non-pharmacological measures as appropriate and evaluate response  - Consider cultural and social influences on pain and pain management  - Manage/alleviate anxiety  - Utilize distraction and/or relaxation techniques  - Monitor for opioid side effects  - Notify MD/LIP if interventions unsuccessful or patient reports new pain  - Anticipate increased pain with activity and pre-medicate as appropriate  Outcome: Progressing     Problem: SAFETY ADULT - FALL  Goal: Free from fall injury  Description: INTERVENTIONS:  - Assess pt frequently for physical needs  - Identify cognitive and physical deficits and behaviors that affect risk of falls.   - [place] fall precautions as indicated by assessment.  - Educate pt/family on patient safety including physical limitations  - Instruct pt to call for assistance with activity based on assessment  - Modify environment to reduce risk of injury  - Provide assistive devices as appropriate  - Consider OT/PT consult to assist with strengthening/mobility  - Encourage toileting schedule  Outcome: Progressing     Problem: DISCHARGE PLANNING  Goal: Discharge to home or other facility with appropriate resources  Description: INTERVENTIONS:  - Identify barriers to discharge w/pt and caregiver  - Include patient/family/discharge partner in discharge planning  - Arrange for needed discharge resources and transportation as appropriate  - Identify discharge learning needs (meds, wound care, etc)  - Arrange for interpreters to assist at discharge as needed  - Consider post-discharge preferences of patient/family/discharge partner  - Complete POLST form as appropriate  - Assess patient's ability to be responsible for managing their own health  - Refer to Case Management Department for coordinating discharge planning if the patient needs post-hospital services based on physician/LIP order or complex needs related to functional status, cognitive ability or social support system  Outcome: Progressing

## 2022-08-05 LAB
ALBUMIN SERPL-MCNC: 1.8 G/DL (ref 3.4–5)
ANION GAP SERPL CALC-SCNC: 6 MMOL/L (ref 0–18)
BASOPHILS # BLD AUTO: 0.01 X10(3) UL (ref 0–0.2)
BASOPHILS NFR BLD AUTO: 0.2 %
BUN BLD-MCNC: 95 MG/DL (ref 7–18)
BUN/CREAT SERPL: 38.6 (ref 10–20)
CALCIUM BLD-MCNC: 8 MG/DL (ref 8.5–10.1)
CHLORIDE SERPL-SCNC: 108 MMOL/L (ref 98–112)
CO2 SERPL-SCNC: 22 MMOL/L (ref 21–32)
CREAT BLD-MCNC: 2.46 MG/DL
DEPRECATED HBV CORE AB SER IA-ACNC: 35.9 NG/ML
DEPRECATED RDW RBC AUTO: 69 FL (ref 35.1–46.3)
EOSINOPHIL # BLD AUTO: 0.18 X10(3) UL (ref 0–0.7)
EOSINOPHIL NFR BLD AUTO: 4.1 %
ERYTHROCYTE [DISTWIDTH] IN BLOOD BY AUTOMATED COUNT: 18.4 % (ref 11–15)
GFR SERPLBLD BASED ON 1.73 SQ M-ARVRAT: 21 ML/MIN/1.73M2 (ref 60–?)
GLUCOSE BLD-MCNC: 133 MG/DL (ref 70–99)
GLUCOSE BLDC GLUCOMTR-MCNC: 134 MG/DL (ref 70–99)
GLUCOSE BLDC GLUCOMTR-MCNC: 151 MG/DL (ref 70–99)
GLUCOSE BLDC GLUCOMTR-MCNC: 197 MG/DL (ref 70–99)
GLUCOSE BLDC GLUCOMTR-MCNC: 205 MG/DL (ref 70–99)
HCT VFR BLD AUTO: 27 %
HGB BLD-MCNC: 8 G/DL
IMM GRANULOCYTES # BLD AUTO: 0.01 X10(3) UL (ref 0–1)
IMM GRANULOCYTES NFR BLD: 0.2 %
IRON SATN MFR SERPL: 11 %
IRON SERPL-MCNC: 25 UG/DL
LYMPHOCYTES # BLD AUTO: 0.77 X10(3) UL (ref 1–4)
LYMPHOCYTES NFR BLD AUTO: 17.6 %
MCH RBC QN AUTO: 30.2 PG (ref 26–34)
MCHC RBC AUTO-ENTMCNC: 29.6 G/DL (ref 31–37)
MCV RBC AUTO: 101.9 FL
MONOCYTES # BLD AUTO: 0.31 X10(3) UL (ref 0.1–1)
MONOCYTES NFR BLD AUTO: 7.1 %
NEUTROPHILS # BLD AUTO: 3.09 X10 (3) UL (ref 1.5–7.7)
NEUTROPHILS # BLD AUTO: 3.09 X10(3) UL (ref 1.5–7.7)
NEUTROPHILS NFR BLD AUTO: 70.8 %
OSMOLALITY SERPL CALC.SUM OF ELEC: 313 MOSM/KG (ref 275–295)
PHOSPHATE SERPL-MCNC: 3.4 MG/DL (ref 2.5–4.9)
PLATELET # BLD AUTO: 101 10(3)UL (ref 150–450)
POTASSIUM SERPL-SCNC: 4.7 MMOL/L (ref 3.5–5.1)
RBC # BLD AUTO: 2.65 X10(6)UL
SODIUM SERPL-SCNC: 136 MMOL/L (ref 136–145)
TIBC SERPL-MCNC: 234 UG/DL (ref 240–450)
TRANSFERRIN SERPL-MCNC: 157 MG/DL (ref 200–360)
WBC # BLD AUTO: 4.4 X10(3) UL (ref 4–11)

## 2022-08-05 PROCEDURE — 99232 SBSQ HOSP IP/OBS MODERATE 35: CPT | Performed by: INTERNAL MEDICINE

## 2022-08-05 NOTE — PLAN OF CARE
Received patient alert and orientated x4, on nasal cannula. Used lift to transfer patient. Patient refused chair for lunch and dinner. Problem: Patient Centered Care  Goal: Patient preferences are identified and integrated in the patient's plan of care  Description: Interventions:  - What would you like us to know as we care for you? I live at [facility]. - Provide timely, complete, and accurate information to patient/family  - Incorporate patient and family knowledge, values, beliefs, and cultural backgrounds into the planning and delivery of care  - Encourage patient/family to participate in care and decision-making at the level they choose  - Honor patient and family perspectives and choices  Outcome: Progressing     Problem: CARDIOVASCULAR - ADULT  Goal: Maintains optimal cardiac output and hemodynamic stability  Description: INTERVENTIONS:  - Monitor vital signs, rhythm, and trends  - Monitor for bleeding, hypotension and signs of decreased cardiac output  - Evaluate effectiveness of vasoactive medications to optimize hemodynamic stability  - Monitor arterial and/or venous puncture sites for bleeding and/or hematoma  - Assess quality of pulses, skin color and temperature  - Assess for signs of decreased coronary artery perfusion - ex.  Angina  - Evaluate fluid balance, assess for edema, trend weights  Outcome: Progressing  Goal: Absence of cardiac arrhythmias or at baseline  Description: INTERVENTIONS:  - Continuous cardiac monitoring, monitor vital signs, obtain 12 lead EKG if indicated  - Evaluate effectiveness of antiarrhythmic and heart rate control medications as ordered  - Initiate emergency measures for life threatening arrhythmias  - Monitor electrolytes and administer replacement therapy as ordered  Outcome: Progressing     Problem: RESPIRATORY - ADULT  Goal: Achieves optimal ventilation and oxygenation  Description: INTERVENTIONS:  - Assess for changes in respiratory status  - Assess for changes in mentation and behavior  - Position to facilitate oxygenation and minimize respiratory effort  - Oxygen supplementation based on oxygen saturation or ABGs  - Provide Smoking Cessation handout, if applicable  - Encourage broncho-pulmonary hygiene including cough, deep breathe, Incentive Spirometry  - Assess the need for suctioning and perform as needed  - Assess and instruct to report SOB or any respiratory difficulty  - Respiratory Therapy support as indicated  - Manage/alleviate anxiety  - Monitor for signs/symptoms of CO2 retention  Outcome: Progressing     Problem: METABOLIC/FLUID AND ELECTROLYTES - ADULT  Goal: Glucose maintained within prescribed range  Description: INTERVENTIONS:  - Monitor Blood Glucose as ordered  - Assess for signs and symptoms of hyperglycemia and hypoglycemia  - Administer ordered medications to maintain glucose within target range  - Assess barriers to adequate nutritional intake and initiate nutrition consult as needed  - Instruct patient on self management of diabetes  Outcome: Progressing     Problem: HEMATOLOGIC - ADULT  Goal: Maintains hematologic stability  Description: INTERVENTIONS  - Assess for signs and symptoms of bleeding or hemorrhage  - Monitor labs and vital signs for trends  - Administer supportive blood products/factors, fluids and medications as ordered and appropriate  - Administer supportive blood products/factors as ordered and appropriate  Outcome: Progressing  Goal: Free from bleeding injury  Description: (Example usage: patient with low platelets)  INTERVENTIONS:  - Avoid intramuscular injections, enemas and rectal medication administration  - Ensure safe mobilization of patient  - Hold pressure on venipuncture sites to achieve adequate hemostasis  - Assess for signs and symptoms of internal bleeding  - Monitor lab trends  - Patient is to report abnormal signs of bleeding to staff  - Avoid use of toothpicks and dental floss  - Use electric shaver for shaving  - Use soft bristle tooth brush  - Limit straining and forceful nose blowing  Outcome: Progressing     Problem: PAIN - ADULT  Goal: Verbalizes/displays adequate comfort level or patient's stated pain goal  Description: INTERVENTIONS:  - Encourage pt to monitor pain and request assistance  - Assess pain using appropriate pain scale  - Administer analgesics based on type and severity of pain and evaluate response  - Implement non-pharmacological measures as appropriate and evaluate response  - Consider cultural and social influences on pain and pain management  - Manage/alleviate anxiety  - Utilize distraction and/or relaxation techniques  - Monitor for opioid side effects  - Notify MD/LIP if interventions unsuccessful or patient reports new pain  - Anticipate increased pain with activity and pre-medicate as appropriate  Outcome: Progressing     Problem: SAFETY ADULT - FALL  Goal: Free from fall injury  Description: INTERVENTIONS:  - Assess pt frequently for physical needs  - Identify cognitive and physical deficits and behaviors that affect risk of falls.   - [place] fall precautions as indicated by assessment.  - Educate pt/family on patient safety including physical limitations  - Instruct pt to call for assistance with activity based on assessment  - Modify environment to reduce risk of injury  - Provide assistive devices as appropriate  - Consider OT/PT consult to assist with strengthening/mobility  - Encourage toileting schedule  Outcome: Progressing     Problem: DISCHARGE PLANNING  Goal: Discharge to home or other facility with appropriate resources  Description: INTERVENTIONS:  - Identify barriers to discharge w/pt and caregiver  - Include patient/family/discharge partner in discharge planning  - Arrange for needed discharge resources and transportation as appropriate  - Identify discharge learning needs (meds, wound care, etc)  - Arrange for interpreters to assist at discharge as needed  - Consider post-discharge preferences of patient/family/discharge partner  - Complete POLST form as appropriate  - Assess patient's ability to be responsible for managing their own health  - Refer to Case Management Department for coordinating discharge planning if the patient needs post-hospital services based on physician/LIP order or complex needs related to functional status, cognitive ability or social support system  Outcome: Progressing

## 2022-08-05 NOTE — PLAN OF CARE
Problem: Patient Centered Care  Goal: Patient preferences are identified and integrated in the patient's plan of care  Description: Interventions:  - What would you like us to know as we care for you?   - Provide timely, complete, and accurate information to patient/family  - Incorporate patient and family knowledge, values, beliefs, and cultural backgrounds into the planning and delivery of care  - Encourage patient/family to participate in care and decision-making at the level they choose  - Honor patient and family perspectives and choices  Outcome: Progressing     Problem: CARDIOVASCULAR - ADULT  Goal: Maintains optimal cardiac output and hemodynamic stability  Description: INTERVENTIONS:  - Monitor vital signs, rhythm, and trends  - Monitor for bleeding, hypotension and signs of decreased cardiac output  - Evaluate effectiveness of vasoactive medications to optimize hemodynamic stability  - Monitor arterial and/or venous puncture sites for bleeding and/or hematoma  - Assess quality of pulses, skin color and temperature  - Assess for signs of decreased coronary artery perfusion - ex.  Angina  - Evaluate fluid balance, assess for edema, trend weights  Outcome: Progressing  Goal: Absence of cardiac arrhythmias or at baseline  Description: INTERVENTIONS:  - Continuous cardiac monitoring, monitor vital signs, obtain 12 lead EKG if indicated  - Evaluate effectiveness of antiarrhythmic and heart rate control medications as ordered  - Initiate emergency measures for life threatening arrhythmias  - Monitor electrolytes and administer replacement therapy as ordered  Outcome: Progressing     Problem: RESPIRATORY - ADULT  Goal: Achieves optimal ventilation and oxygenation  Description: INTERVENTIONS:  - Assess for changes in respiratory status  - Assess for changes in mentation and behavior  - Position to facilitate oxygenation and minimize respiratory effort  - Oxygen supplementation based on oxygen saturation or ABGs  - Provide Smoking Cessation handout, if applicable  - Encourage broncho-pulmonary hygiene including cough, deep breathe, Incentive Spirometry  - Assess the need for suctioning and perform as needed  - Assess and instruct to report SOB or any respiratory difficulty  - Respiratory Therapy support as indicated  - Manage/alleviate anxiety  - Monitor for signs/symptoms of CO2 retention  Outcome: Progressing     Problem: METABOLIC/FLUID AND ELECTROLYTES - ADULT  Goal: Glucose maintained within prescribed range  Description: INTERVENTIONS:  - Monitor Blood Glucose as ordered  - Assess for signs and symptoms of hyperglycemia and hypoglycemia  - Administer ordered medications to maintain glucose within target range  - Assess barriers to adequate nutritional intake and initiate nutrition consult as needed  - Instruct patient on self management of diabetes  Outcome: Progressing     Problem: HEMATOLOGIC - ADULT  Goal: Maintains hematologic stability  Description: INTERVENTIONS  - Assess for signs and symptoms of bleeding or hemorrhage  - Monitor labs and vital signs for trends  - Administer supportive blood products/factors, fluids and medications as ordered and appropriate  - Administer supportive blood products/factors as ordered and appropriate  Outcome: Progressing  Goal: Free from bleeding injury  Description: (Example usage: patient with low platelets)  INTERVENTIONS:  - Avoid intramuscular injections, enemas and rectal medication administration  - Ensure safe mobilization of patient  - Hold pressure on venipuncture sites to achieve adequate hemostasis  - Assess for signs and symptoms of internal bleeding  - Monitor lab trends  - Patient is to report abnormal signs of bleeding to staff  - Avoid use of toothpicks and dental floss  - Use electric shaver for shaving  - Use soft bristle tooth brush  - Limit straining and forceful nose blowing  Outcome: Progressing     Problem: PAIN - ADULT  Goal: Verbalizes/displays adequate comfort level or patient's stated pain goal  Description: INTERVENTIONS:  - Encourage pt to monitor pain and request assistance  - Assess pain using appropriate pain scale  - Administer analgesics based on type and severity of pain and evaluate response  - Implement non-pharmacological measures as appropriate and evaluate response  - Consider cultural and social influences on pain and pain management  - Manage/alleviate anxiety  - Utilize distraction and/or relaxation techniques  - Monitor for opioid side effects  - Notify MD/LIP if interventions unsuccessful or patient reports new pain  - Anticipate increased pain with activity and pre-medicate as appropriate  Outcome: Progressing     Problem: SAFETY ADULT - FALL  Goal: Free from fall injury  Description: INTERVENTIONS:  - Assess pt frequently for physical needs  - Identify cognitive and physical deficits and behaviors that affect risk of falls.   - [place] fall precautions as indicated by assessment.  - Educate pt/family on patient safety including physical limitations  - Instruct pt to call for assistance with activity based on assessment  - Modify environment to reduce risk of injury  - Provide assistive devices as appropriate  - Consider OT/PT consult to assist with strengthening/mobility  - Encourage toileting schedule  Outcome: Progressing     Problem: DISCHARGE PLANNING  Goal: Discharge to home or other facility with appropriate resources  Description: INTERVENTIONS:  - Identify barriers to discharge w/pt and caregiver  - Include patient/family/discharge partner in discharge planning  - Arrange for needed discharge resources and transportation as appropriate  - Identify discharge learning needs (meds, wound care, etc)  - Arrange for interpreters to assist at discharge as needed  - Consider post-discharge preferences of patient/family/discharge partner  - Complete POLST form as appropriate  - Assess patient's ability to be responsible for managing their own health  - Refer to Case Management Department for coordinating discharge planning if the patient needs post-hospital services based on physician/LIP order or complex needs related to functional status, cognitive ability or social support system  Outcome: Progressing     Patient vital signs stable. PRN Tylenol and lidocaine patch given for pain. Educated patient on use of call light. Bed locked and in lowest position, bed alarm on.

## 2022-08-05 NOTE — PLAN OF CARE
Patient in chair after breakfast for one hour. Patient refused to sit in chair longer and returned to bed by lift and PCTs. Problem: Patient Centered Care  Goal: Patient preferences are identified and integrated in the patient's plan of care  Description: Interventions:  - What would you like us to know as we care for you? I live at [facility]. - Provide timely, complete, and accurate information to patient/family  - Incorporate patient and family knowledge, values, beliefs, and cultural backgrounds into the planning and delivery of care  - Encourage patient/family to participate in care and decision-making at the level they choose  - Honor patient and family perspectives and choices  Outcome: Progressing     Problem: CARDIOVASCULAR - ADULT  Goal: Maintains optimal cardiac output and hemodynamic stability  Description: INTERVENTIONS:  - Monitor vital signs, rhythm, and trends  - Monitor for bleeding, hypotension and signs of decreased cardiac output  - Evaluate effectiveness of vasoactive medications to optimize hemodynamic stability  - Monitor arterial and/or venous puncture sites for bleeding and/or hematoma  - Assess quality of pulses, skin color and temperature  - Assess for signs of decreased coronary artery perfusion - ex.  Angina  - Evaluate fluid balance, assess for edema, trend weights  Outcome: Progressing  Goal: Absence of cardiac arrhythmias or at baseline  Description: INTERVENTIONS:  - Continuous cardiac monitoring, monitor vital signs, obtain 12 lead EKG if indicated  - Evaluate effectiveness of antiarrhythmic and heart rate control medications as ordered  - Initiate emergency measures for life threatening arrhythmias  - Monitor electrolytes and administer replacement therapy as ordered  Outcome: Progressing     Problem: RESPIRATORY - ADULT  Goal: Achieves optimal ventilation and oxygenation  Description: INTERVENTIONS:  - Assess for changes in respiratory status  - Assess for changes in mentation and behavior  - Position to facilitate oxygenation and minimize respiratory effort  - Oxygen supplementation based on oxygen saturation or ABGs  - Provide Smoking Cessation handout, if applicable  - Encourage broncho-pulmonary hygiene including cough, deep breathe, Incentive Spirometry  - Assess the need for suctioning and perform as needed  - Assess and instruct to report SOB or any respiratory difficulty  - Respiratory Therapy support as indicated  - Manage/alleviate anxiety  - Monitor for signs/symptoms of CO2 retention  Outcome: Progressing     Problem: METABOLIC/FLUID AND ELECTROLYTES - ADULT  Goal: Glucose maintained within prescribed range  Description: INTERVENTIONS:  - Monitor Blood Glucose as ordered  - Assess for signs and symptoms of hyperglycemia and hypoglycemia  - Administer ordered medications to maintain glucose within target range  - Assess barriers to adequate nutritional intake and initiate nutrition consult as needed  - Instruct patient on self management of diabetes  Outcome: Progressing     Problem: HEMATOLOGIC - ADULT  Goal: Maintains hematologic stability  Description: INTERVENTIONS  - Assess for signs and symptoms of bleeding or hemorrhage  - Monitor labs and vital signs for trends  - Administer supportive blood products/factors, fluids and medications as ordered and appropriate  - Administer supportive blood products/factors as ordered and appropriate  Outcome: Progressing  Goal: Free from bleeding injury  Description: (Example usage: patient with low platelets)  INTERVENTIONS:  - Avoid intramuscular injections, enemas and rectal medication administration  - Ensure safe mobilization of patient  - Hold pressure on venipuncture sites to achieve adequate hemostasis  - Assess for signs and symptoms of internal bleeding  - Monitor lab trends  - Patient is to report abnormal signs of bleeding to staff  - Avoid use of toothpicks and dental floss  - Use electric shaver for shaving  - Use soft bristle tooth brush  - Limit straining and forceful nose blowing  Outcome: Progressing     Problem: PAIN - ADULT  Goal: Verbalizes/displays adequate comfort level or patient's stated pain goal  Description: INTERVENTIONS:  - Encourage pt to monitor pain and request assistance  - Assess pain using appropriate pain scale  - Administer analgesics based on type and severity of pain and evaluate response  - Implement non-pharmacological measures as appropriate and evaluate response  - Consider cultural and social influences on pain and pain management  - Manage/alleviate anxiety  - Utilize distraction and/or relaxation techniques  - Monitor for opioid side effects  - Notify MD/LIP if interventions unsuccessful or patient reports new pain  - Anticipate increased pain with activity and pre-medicate as appropriate  Outcome: Progressing     Problem: SAFETY ADULT - FALL  Goal: Free from fall injury  Description: INTERVENTIONS:  - Assess pt frequently for physical needs  - Identify cognitive and physical deficits and behaviors that affect risk of falls.   - [place] fall precautions as indicated by assessment.  - Educate pt/family on patient safety including physical limitations  - Instruct pt to call for assistance with activity based on assessment  - Modify environment to reduce risk of injury  - Provide assistive devices as appropriate  - Consider OT/PT consult to assist with strengthening/mobility  - Encourage toileting schedule  Outcome: Progressing     Problem: DISCHARGE PLANNING  Goal: Discharge to home or other facility with appropriate resources  Description: INTERVENTIONS:  - Identify barriers to discharge w/pt and caregiver  - Include patient/family/discharge partner in discharge planning  - Arrange for needed discharge resources and transportation as appropriate  - Identify discharge learning needs (meds, wound care, etc)  - Arrange for interpreters to assist at discharge as needed  - Consider post-discharge preferences of patient/family/discharge partner  - Complete POLST form as appropriate  - Assess patient's ability to be responsible for managing their own health  - Refer to Case Management Department for coordinating discharge planning if the patient needs post-hospital services based on physician/LIP order or complex needs related to functional status, cognitive ability or social support system  Outcome: Progressing

## 2022-08-06 LAB
ALBUMIN SERPL-MCNC: 2 G/DL (ref 3.4–5)
ANION GAP SERPL CALC-SCNC: 9 MMOL/L (ref 0–18)
BUN BLD-MCNC: 100 MG/DL (ref 7–18)
BUN/CREAT SERPL: 41.3 (ref 10–20)
CALCIUM BLD-MCNC: 8.7 MG/DL (ref 8.5–10.1)
CHLORIDE SERPL-SCNC: 108 MMOL/L (ref 98–112)
CO2 SERPL-SCNC: 21 MMOL/L (ref 21–32)
CREAT BLD-MCNC: 2.42 MG/DL
GFR SERPLBLD BASED ON 1.73 SQ M-ARVRAT: 21 ML/MIN/1.73M2 (ref 60–?)
GLUCOSE BLD-MCNC: 159 MG/DL (ref 70–99)
GLUCOSE BLDC GLUCOMTR-MCNC: 118 MG/DL (ref 70–99)
GLUCOSE BLDC GLUCOMTR-MCNC: 153 MG/DL (ref 70–99)
GLUCOSE BLDC GLUCOMTR-MCNC: 159 MG/DL (ref 70–99)
GLUCOSE BLDC GLUCOMTR-MCNC: 204 MG/DL (ref 70–99)
NT-PROBNP SERPL-MCNC: 4390 PG/ML (ref ?–125)
OSMOLALITY SERPL CALC.SUM OF ELEC: 321 MOSM/KG (ref 275–295)
PHOSPHATE SERPL-MCNC: 3.1 MG/DL (ref 2.5–4.9)
POTASSIUM SERPL-SCNC: 4.6 MMOL/L (ref 3.5–5.1)
SODIUM SERPL-SCNC: 138 MMOL/L (ref 136–145)

## 2022-08-06 PROCEDURE — 99232 SBSQ HOSP IP/OBS MODERATE 35: CPT | Performed by: INTERNAL MEDICINE

## 2022-08-06 PROCEDURE — 99233 SBSQ HOSP IP/OBS HIGH 50: CPT | Performed by: INTERNAL MEDICINE

## 2022-08-06 RX ORDER — ONDANSETRON 2 MG/ML
4 INJECTION INTRAMUSCULAR; INTRAVENOUS EVERY 8 HOURS PRN
Status: DISCONTINUED | OUTPATIENT
Start: 2022-08-06 | End: 2022-08-10

## 2022-08-06 RX ORDER — BUMETANIDE 0.25 MG/ML
0.25 INJECTION, SOLUTION INTRAMUSCULAR; INTRAVENOUS ONCE
Status: COMPLETED | OUTPATIENT
Start: 2022-08-06 | End: 2022-08-06

## 2022-08-06 RX ORDER — BISACODYL 10 MG
10 SUPPOSITORY, RECTAL RECTAL
Status: DISCONTINUED | OUTPATIENT
Start: 2022-08-06 | End: 2022-08-10

## 2022-08-06 RX ORDER — DOCUSATE SODIUM 100 MG/1
100 CAPSULE, LIQUID FILLED ORAL 2 TIMES DAILY PRN
Status: DISCONTINUED | OUTPATIENT
Start: 2022-08-06 | End: 2022-08-10

## 2022-08-06 RX ORDER — BUMETANIDE 1 MG/1
1 TABLET ORAL
Status: DISCONTINUED | OUTPATIENT
Start: 2022-08-06 | End: 2022-08-10

## 2022-08-06 RX ORDER — POLYETHYLENE GLYCOL 3350 17 G/17G
17 POWDER, FOR SOLUTION ORAL DAILY PRN
Status: DISCONTINUED | OUTPATIENT
Start: 2022-08-06 | End: 2022-08-10

## 2022-08-06 NOTE — PLAN OF CARE
Pt had no report of abdominal pin. No BM overnight. No signs and symptoms of bleeding. Had muscle spasm on right lower back which was relieved by Baclofen x 1. Pt tolerated only an hour of Bipap overnight. Refused to keep Bipap on despite education on its importance. [place] cath intact. IV Bumex continued. Problem: Patient Centered Care  Goal: Patient preferences are identified and integrated in the patient's plan of care  Description: Interventions:  - What would you like us to know as we care for you?   - Provide timely, complete, and accurate information to patient/family  - Incorporate patient and family knowledge, values, beliefs, and cultural backgrounds into the planning and delivery of care  - Encourage patient/family to participate in care and decision-making at the level they choose  - Honor patient and family perspectives and choices  Outcome: Progressing     Problem: CARDIOVASCULAR - ADULT  Goal: Maintains optimal cardiac output and hemodynamic stability  Description: INTERVENTIONS:  - Monitor vital signs, rhythm, and trends  - Monitor for bleeding, hypotension and signs of decreased cardiac output  - Evaluate effectiveness of vasoactive medications to optimize hemodynamic stability  - Monitor arterial and/or venous puncture sites for bleeding and/or hematoma  - Assess quality of pulses, skin color and temperature  - Assess for signs of decreased coronary artery perfusion - ex.  Angina  - Evaluate fluid balance, assess for edema, trend weights  Outcome: Progressing  Goal: Absence of cardiac arrhythmias or at baseline  Description: INTERVENTIONS:  - Continuous cardiac monitoring, monitor vital signs, obtain 12 lead EKG if indicated  - Evaluate effectiveness of antiarrhythmic and heart rate control medications as ordered  - Initiate emergency measures for life threatening arrhythmias  - Monitor electrolytes and administer replacement therapy as ordered  Outcome: Progressing     Problem: RESPIRATORY - ADULT  Goal: Achieves optimal ventilation and oxygenation  Description: INTERVENTIONS:  - Assess for changes in respiratory status  - Assess for changes in mentation and behavior  - Position to facilitate oxygenation and minimize respiratory effort  - Oxygen supplementation based on oxygen saturation or ABGs  - Provide Smoking Cessation handout, if applicable  - Encourage broncho-pulmonary hygiene including cough, deep breathe, Incentive Spirometry  - Assess the need for suctioning and perform as needed  - Assess and instruct to report SOB or any respiratory difficulty  - Respiratory Therapy support as indicated  - Manage/alleviate anxiety  - Monitor for signs/symptoms of CO2 retention  Outcome: Progressing     Problem: METABOLIC/FLUID AND ELECTROLYTES - ADULT  Goal: Glucose maintained within prescribed range  Description: INTERVENTIONS:  - Monitor Blood Glucose as ordered  - Assess for signs and symptoms of hyperglycemia and hypoglycemia  - Administer ordered medications to maintain glucose within target range  - Assess barriers to adequate nutritional intake and initiate nutrition consult as needed  - Instruct patient on self management of diabetes  Outcome: Progressing     Problem: HEMATOLOGIC - ADULT  Goal: Maintains hematologic stability  Description: INTERVENTIONS  - Assess for signs and symptoms of bleeding or hemorrhage  - Monitor labs and vital signs for trends  - Administer supportive blood products/factors, fluids and medications as ordered and appropriate  - Administer supportive blood products/factors as ordered and appropriate  Outcome: Progressing  Goal: Free from bleeding injury  Description: (Example usage: patient with low platelets)  INTERVENTIONS:  - Avoid intramuscular injections, enemas and rectal medication administration  - Ensure safe mobilization of patient  - Hold pressure on venipuncture sites to achieve adequate hemostasis  - Assess for signs and symptoms of internal bleeding  - Monitor lab trends  - Patient is to report abnormal signs of bleeding to staff  - Avoid use of toothpicks and dental floss  - Use electric shaver for shaving  - Use soft bristle tooth brush  - Limit straining and forceful nose blowing  Outcome: Progressing     Problem: PAIN - ADULT  Goal: Verbalizes/displays adequate comfort level or patient's stated pain goal  Description: INTERVENTIONS:  - Encourage pt to monitor pain and request assistance  - Assess pain using appropriate pain scale  - Administer analgesics based on type and severity of pain and evaluate response  - Implement non-pharmacological measures as appropriate and evaluate response  - Consider cultural and social influences on pain and pain management  - Manage/alleviate anxiety  - Utilize distraction and/or relaxation techniques  - Monitor for opioid side effects  - Notify MD/LIP if interventions unsuccessful or patient reports new pain  - Anticipate increased pain with activity and pre-medicate as appropriate  Outcome: Progressing     Problem: SAFETY ADULT - FALL  Goal: Free from fall injury  Description: INTERVENTIONS:  - Assess pt frequently for physical needs  - Identify cognitive and physical deficits and behaviors that affect risk of falls.   - [employer] fall precautions as indicated by assessment.  - Educate pt/family on patient safety including physical limitations  - Instruct pt to call for assistance with activity based on assessment  - Modify environment to reduce risk of injury  - Provide assistive devices as appropriate  - Consider OT/PT consult to assist with strengthening/mobility  - Encourage toileting schedule  Outcome: Progressing     Problem: DISCHARGE PLANNING  Goal: Discharge to home or other facility with appropriate resources  Description: INTERVENTIONS:  - Identify barriers to discharge w/pt and caregiver  - Include patient/family/discharge partner in discharge planning  - Arrange for needed discharge resources and transportation as appropriate  - Identify discharge learning needs (meds, wound care, etc)  - Arrange for interpreters to assist at discharge as needed  - Consider post-discharge preferences of patient/family/discharge partner  - Complete POLST form as appropriate  - Assess patient's ability to be responsible for managing their own health  - Refer to Case Management Department for coordinating discharge planning if the patient needs post-hospital services based on physician/LIP order or complex needs related to functional status, cognitive ability or social support system  Outcome: Progressing

## 2022-08-06 NOTE — PLAN OF CARE
At approximately 1250, patient vomitted and reported umbilical region pain 5/39. MD Malick Galvez notified via page. MD Mata informed face to face. Patient able to stand with walker and two person assist today in chair. Patient in chair for 3 hours today. Problem: Patient Centered Care  Goal: Patient preferences are identified and integrated in the patient's plan of care  Description: Interventions:  - What would you like us to know as we care for you? I live at [facility]  - Provide timely, complete, and accurate information to patient/family  - Incorporate patient and family knowledge, values, beliefs, and cultural backgrounds into the planning and delivery of care  - Encourage patient/family to participate in care and decision-making at the level they choose  - Honor patient and family perspectives and choices  Outcome: Progressing     Problem: CARDIOVASCULAR - ADULT  Goal: Maintains optimal cardiac output and hemodynamic stability  Description: INTERVENTIONS:  - Monitor vital signs, rhythm, and trends  - Monitor for bleeding, hypotension and signs of decreased cardiac output  - Evaluate effectiveness of vasoactive medications to optimize hemodynamic stability  - Monitor arterial and/or venous puncture sites for bleeding and/or hematoma  - Assess quality of pulses, skin color and temperature  - Assess for signs of decreased coronary artery perfusion - ex.  Angina  - Evaluate fluid balance, assess for edema, trend weights  Outcome: Progressing  Goal: Absence of cardiac arrhythmias or at baseline  Description: INTERVENTIONS:  - Continuous cardiac monitoring, monitor vital signs, obtain 12 lead EKG if indicated  - Evaluate effectiveness of antiarrhythmic and heart rate control medications as ordered  - Initiate emergency measures for life threatening arrhythmias  - Monitor electrolytes and administer replacement therapy as ordered  Outcome: Progressing     Problem: RESPIRATORY - ADULT  Goal: Achieves optimal ventilation and oxygenation  Description: INTERVENTIONS:  - Assess for changes in respiratory status  - Assess for changes in mentation and behavior  - Position to facilitate oxygenation and minimize respiratory effort  - Oxygen supplementation based on oxygen saturation or ABGs  - Provide Smoking Cessation handout, if applicable  - Encourage broncho-pulmonary hygiene including cough, deep breathe, Incentive Spirometry  - Assess the need for suctioning and perform as needed  - Assess and instruct to report SOB or any respiratory difficulty  - Respiratory Therapy support as indicated  - Manage/alleviate anxiety  - Monitor for signs/symptoms of CO2 retention  Outcome: Progressing     Problem: METABOLIC/FLUID AND ELECTROLYTES - ADULT  Goal: Glucose maintained within prescribed range  Description: INTERVENTIONS:  - Monitor Blood Glucose as ordered  - Assess for signs and symptoms of hyperglycemia and hypoglycemia  - Administer ordered medications to maintain glucose within target range  - Assess barriers to adequate nutritional intake and initiate nutrition consult as needed  - Instruct patient on self management of diabetes  Outcome: Progressing     Problem: HEMATOLOGIC - ADULT  Goal: Maintains hematologic stability  Description: INTERVENTIONS  - Assess for signs and symptoms of bleeding or hemorrhage  - Monitor labs and vital signs for trends  - Administer supportive blood products/factors, fluids and medications as ordered and appropriate  - Administer supportive blood products/factors as ordered and appropriate  Outcome: Progressing  Goal: Free from bleeding injury  Description: (Example usage: patient with low platelets)  INTERVENTIONS:  - Avoid intramuscular injections, enemas and rectal medication administration  - Ensure safe mobilization of patient  - Hold pressure on venipuncture sites to achieve adequate hemostasis  - Assess for signs and symptoms of internal bleeding  - Monitor lab trends  - Patient is to report abnormal signs of bleeding to staff  - Avoid use of toothpicks and dental floss  - Use electric shaver for shaving  - Use soft bristle tooth brush  - Limit straining and forceful nose blowing  Outcome: Progressing     Problem: PAIN - ADULT  Goal: Verbalizes/displays adequate comfort level or patient's stated pain goal  Description: INTERVENTIONS:  - Encourage pt to monitor pain and request assistance  - Assess pain using appropriate pain scale  - Administer analgesics based on type and severity of pain and evaluate response  - Implement non-pharmacological measures as appropriate and evaluate response  - Consider cultural and social influences on pain and pain management  - Manage/alleviate anxiety  - Utilize distraction and/or relaxation techniques  - Monitor for opioid side effects  - Notify MD/LIP if interventions unsuccessful or patient reports new pain  - Anticipate increased pain with activity and pre-medicate as appropriate  Outcome: Progressing     Problem: SAFETY ADULT - FALL  Goal: Free from fall injury  Description: INTERVENTIONS:  - Assess pt frequently for physical needs  - Identify cognitive and physical deficits and behaviors that affect risk of falls.   - [place] fall precautions as indicated by assessment.  - Educate pt/family on patient safety including physical limitations  - Instruct pt to call for assistance with activity based on assessment  - Modify environment to reduce risk of injury  - Provide assistive devices as appropriate  - Consider OT/PT consult to assist with strengthening/mobility  - Encourage toileting schedule  Outcome: Progressing     Problem: DISCHARGE PLANNING  Goal: Discharge to home or other facility with appropriate resources  Description: INTERVENTIONS:  - Identify barriers to discharge w/pt and caregiver  - Include patient/family/discharge partner in discharge planning  - Arrange for needed discharge resources and transportation as appropriate  - Identify discharge learning needs (meds, wound care, etc)  - Arrange for interpreters to assist at discharge as needed  - Consider post-discharge preferences of patient/family/discharge partner  - Complete POLST form as appropriate  - Assess patient's ability to be responsible for managing their own health  - Refer to Case Management Department for coordinating discharge planning if the patient needs post-hospital services based on physician/LIP order or complex needs related to functional status, cognitive ability or social support system  Outcome: Progressing

## 2022-08-07 ENCOUNTER — APPOINTMENT (OUTPATIENT)
Dept: GENERAL RADIOLOGY | Facility: HOSPITAL | Age: 72
DRG: 377 | End: 2022-08-07
Attending: INTERNAL MEDICINE
Payer: MEDICARE

## 2022-08-07 ENCOUNTER — APPOINTMENT (OUTPATIENT)
Dept: CT IMAGING | Facility: HOSPITAL | Age: 72
DRG: 377 | End: 2022-08-07
Attending: INTERNAL MEDICINE
Payer: MEDICARE

## 2022-08-07 LAB
ALBUMIN SERPL-MCNC: 2.1 G/DL (ref 3.4–5)
ALBUMIN/GLOB SERPL: 0.5 {RATIO} (ref 1–2)
ALP LIVER SERPL-CCNC: 215 U/L
ALT SERPL-CCNC: 26 U/L
ANION GAP SERPL CALC-SCNC: 10 MMOL/L (ref 0–18)
AST SERPL-CCNC: 29 U/L (ref 15–37)
BASOPHILS # BLD AUTO: 0.02 X10(3) UL (ref 0–0.2)
BASOPHILS NFR BLD AUTO: 0.3 %
BILIRUB SERPL-MCNC: 0.6 MG/DL (ref 0.1–2)
BUN BLD-MCNC: 94 MG/DL (ref 7–18)
BUN/CREAT SERPL: 39.5 (ref 10–20)
CALCIUM BLD-MCNC: 8.6 MG/DL (ref 8.5–10.1)
CHLORIDE SERPL-SCNC: 107 MMOL/L (ref 98–112)
CO2 SERPL-SCNC: 21 MMOL/L (ref 21–32)
CREAT BLD-MCNC: 2.38 MG/DL
DEPRECATED RDW RBC AUTO: 68.2 FL (ref 35.1–46.3)
EOSINOPHIL # BLD AUTO: 0.24 X10(3) UL (ref 0–0.7)
EOSINOPHIL NFR BLD AUTO: 3.6 %
ERYTHROCYTE [DISTWIDTH] IN BLOOD BY AUTOMATED COUNT: 18.6 % (ref 11–15)
GFR SERPLBLD BASED ON 1.73 SQ M-ARVRAT: 21 ML/MIN/1.73M2 (ref 60–?)
GLOBULIN PLAS-MCNC: 4 G/DL (ref 2.8–4.4)
GLUCOSE BLD-MCNC: 101 MG/DL (ref 70–99)
GLUCOSE BLDC GLUCOMTR-MCNC: 114 MG/DL (ref 70–99)
GLUCOSE BLDC GLUCOMTR-MCNC: 193 MG/DL (ref 70–99)
GLUCOSE BLDC GLUCOMTR-MCNC: 208 MG/DL (ref 70–99)
GLUCOSE BLDC GLUCOMTR-MCNC: 83 MG/DL (ref 70–99)
HCT VFR BLD AUTO: 30.2 %
HGB BLD-MCNC: 9.2 G/DL
IMM GRANULOCYTES # BLD AUTO: 0.02 X10(3) UL (ref 0–1)
IMM GRANULOCYTES NFR BLD: 0.3 %
LYMPHOCYTES # BLD AUTO: 1.09 X10(3) UL (ref 1–4)
LYMPHOCYTES NFR BLD AUTO: 16.6 %
MCH RBC QN AUTO: 30.5 PG (ref 26–34)
MCHC RBC AUTO-ENTMCNC: 30.5 G/DL (ref 31–37)
MCV RBC AUTO: 100 FL
MONOCYTES # BLD AUTO: 0.4 X10(3) UL (ref 0.1–1)
MONOCYTES NFR BLD AUTO: 6.1 %
NEUTROPHILS # BLD AUTO: 4.81 X10 (3) UL (ref 1.5–7.7)
NEUTROPHILS # BLD AUTO: 4.81 X10(3) UL (ref 1.5–7.7)
NEUTROPHILS NFR BLD AUTO: 73.1 %
OSMOLALITY SERPL CALC.SUM OF ELEC: 315 MOSM/KG (ref 275–295)
PHOSPHATE SERPL-MCNC: 3.2 MG/DL (ref 2.5–4.9)
PLATELET # BLD AUTO: 147 10(3)UL (ref 150–450)
POTASSIUM SERPL-SCNC: 4.3 MMOL/L (ref 3.5–5.1)
PROT SERPL-MCNC: 6.1 G/DL (ref 6.4–8.2)
RBC # BLD AUTO: 3.02 X10(6)UL
SARS-COV-2 RNA RESP QL NAA+PROBE: NOT DETECTED
SODIUM SERPL-SCNC: 138 MMOL/L (ref 136–145)
T4 FREE SERPL-MCNC: 1.6 NG/DL (ref 0.8–1.7)
TSI SER-ACNC: 2.79 MIU/ML (ref 0.36–3.74)
WBC # BLD AUTO: 6.6 X10(3) UL (ref 4–11)

## 2022-08-07 PROCEDURE — 74176 CT ABD & PELVIS W/O CONTRAST: CPT | Performed by: INTERNAL MEDICINE

## 2022-08-07 PROCEDURE — 99232 SBSQ HOSP IP/OBS MODERATE 35: CPT | Performed by: INTERNAL MEDICINE

## 2022-08-07 PROCEDURE — 74018 RADEX ABDOMEN 1 VIEW: CPT | Performed by: INTERNAL MEDICINE

## 2022-08-07 PROCEDURE — 99233 SBSQ HOSP IP/OBS HIGH 50: CPT | Performed by: INTERNAL MEDICINE

## 2022-08-07 RX ORDER — SODIUM CHLORIDE 0.9 % (FLUSH) 0.9 %
10 SYRINGE (ML) INJECTION AS NEEDED
Status: DISCONTINUED | OUTPATIENT
Start: 2022-08-08 | End: 2022-08-10

## 2022-08-07 RX ORDER — BUMETANIDE 0.25 MG/ML
0.5 INJECTION, SOLUTION INTRAMUSCULAR; INTRAVENOUS ONCE
Status: COMPLETED | OUTPATIENT
Start: 2022-08-07 | End: 2022-08-07

## 2022-08-07 RX ORDER — BENZONATATE 100 MG/1
200 CAPSULE ORAL 3 TIMES DAILY PRN
Status: DISCONTINUED | OUTPATIENT
Start: 2022-08-07 | End: 2022-08-10

## 2022-08-07 RX ORDER — SODIUM CHLORIDE 9 MG/ML
INJECTION, SOLUTION INTRAVENOUS
Status: COMPLETED | OUTPATIENT
Start: 2022-08-08 | End: 2022-08-08

## 2022-08-07 NOTE — SLP NOTE
SPEECH DAILY NOTE - INPATIENT    ASSESSMENT & PLAN   ASSESSMENT  PPE: DROPLET MASK AND GLOVES. HAND SANITIZED UPON ENTRANCE/EXIT. Dysphagia Service: Pt alert and upright in bedside chair. O2 saturation at 96% via 1L NC. Baseline coughing observed upon entry. Pt accepting of PO trials thin and easy to chew solid texture. Demo prolonged but functional prep and transit, as pt pausing to assist with breath coordination. Pharyngeal swallow appears timely with palpable ROM. Delayed coughing x1 following intake of thin liquids, however, suspect baseline cough vs prandial. No other overt s/s distress observed across trials. Pt with adequate recall of aspiration/swallowing precautions upon review. REC: continue current diet recs with aspiration/swallowing precautions as previously established. SLP to follow x1 to ensure safety and tolerance, as well as to promote carry-over of precautions. Diet Recommendations - Solids: Soft/ Easy to chew  Diet Recommendations - Liquids: Thin Liquids    Compensatory Strategies Recommended: Slow rate;Small bites and sips  Aspiration Precautions: Upright position; Slow rate;Small bites and sips  Medication Administration Recommendations: One pill at a time    Patient Experiencing Pain: No      Discharge Recommendations  Discharge Recommendations/Plan: Undetermined    Treatment Plan  Treatment Plan/Recommendations: Aspiration precautions    Interdisciplinary Communication: Discussed with RN  Recommendations posted at bedside      GOALS  Goal #1 The patient will tolerate soft easy to chew consistency and thin liquids without overt signs or symptoms of aspiration with 100 % accuracy over 1-2 session(s). Tolerating diet recs without s/s distress across 2 sessions. Delayed cough x1 post liquids likely baseline vs prandial. Will continue to monitor.     In Progress   Goal #2 The patient/family/caregiver will demonstrate understanding and implementation of aspiration precautions and swallow strategies independently over 1-2 session(s). Pt with adequate recall upon questioning, and demo functional sweet-over following review. Family not present for tx session. In Progress   Goal #3 The patient will utilize compensatory strategies as outlined by  BSSE (clinical evaluation) including Slow rate, Small bites, Small sips with min assistance 100 % of the time across 2 sessions. Demo adequate carry-over of precautions following initial review. In Progress   Goal #4 The patient will tolerate trial upgrade of regular consistency and thin liquids without overt signs or symptoms of aspiration with 100 % accuracy over 1-2 session(s). Did not address due to increased prep times required for current textures. In Progress     FOLLOW UP  Follow Up Needed (Documentation Required): Yes  SLP Follow-up Date: 08/06/22  Number of Visits to Meet Established Goals: 2    Session: 2 following BSE; f/u x1 to ensure safety and tolerance, as well as to continue training of precautions. If you have any questions, please contact FARIBA Hernandez.     Fantasma Franks MS CCC-SLP/L  Speech Language Pathologist  EXT [phone]

## 2022-08-07 NOTE — PLAN OF CARE
Pt is alert and oriented x4. Still on 1L nasal cannula. Pt was only on BIPAP for 2-3 hours last night. [place] catheter care provided. Call light within reach. Plan is back to [facility] when medically cleared for discharge. Problem: Patient Centered Care  Goal: Patient preferences are identified and integrated in the patient's plan of care  Description: Interventions:  - What would you like us to know as we care for you? From [facility]. - Provide timely, complete, and accurate information to patient/family  - Incorporate patient and family knowledge, values, beliefs, and cultural backgrounds into the planning and delivery of care  - Encourage patient/family to participate in care and decision-making at the level they choose  - Honor patient and family perspectives and choices  Outcome: Progressing     Problem: CARDIOVASCULAR - ADULT  Goal: Maintains optimal cardiac output and hemodynamic stability  Description: INTERVENTIONS:  - Monitor vital signs, rhythm, and trends  - Monitor for bleeding, hypotension and signs of decreased cardiac output  - Evaluate effectiveness of vasoactive medications to optimize hemodynamic stability  - Monitor arterial and/or venous puncture sites for bleeding and/or hematoma  - Assess quality of pulses, skin color and temperature  - Assess for signs of decreased coronary artery perfusion - ex.  Angina  - Evaluate fluid balance, assess for edema, trend weights  Outcome: Progressing  Goal: Absence of cardiac arrhythmias or at baseline  Description: INTERVENTIONS:  - Continuous cardiac monitoring, monitor vital signs, obtain 12 lead EKG if indicated  - Evaluate effectiveness of antiarrhythmic and heart rate control medications as ordered  - Initiate emergency measures for life threatening arrhythmias  - Monitor electrolytes and administer replacement therapy as ordered  Outcome: Progressing     Problem: RESPIRATORY - ADULT  Goal: Achieves optimal ventilation and oxygenation  Description: INTERVENTIONS:  - Assess for changes in respiratory status  - Assess for changes in mentation and behavior  - Position to facilitate oxygenation and minimize respiratory effort  - Oxygen supplementation based on oxygen saturation or ABGs  - Provide Smoking Cessation handout, if applicable  - Encourage broncho-pulmonary hygiene including cough, deep breathe, Incentive Spirometry  - Assess the need for suctioning and perform as needed  - Assess and instruct to report SOB or any respiratory difficulty  - Respiratory Therapy support as indicated  - Manage/alleviate anxiety  - Monitor for signs/symptoms of CO2 retention  Outcome: Progressing     Problem: METABOLIC/FLUID AND ELECTROLYTES - ADULT  Goal: Glucose maintained within prescribed range  Description: INTERVENTIONS:  - Monitor Blood Glucose as ordered  - Assess for signs and symptoms of hyperglycemia and hypoglycemia  - Administer ordered medications to maintain glucose within target range  - Assess barriers to adequate nutritional intake and initiate nutrition consult as needed  - Instruct patient on self management of diabetes  Outcome: Progressing     Problem: HEMATOLOGIC - ADULT  Goal: Maintains hematologic stability  Description: INTERVENTIONS  - Assess for signs and symptoms of bleeding or hemorrhage  - Monitor labs and vital signs for trends  - Administer supportive blood products/factors, fluids and medications as ordered and appropriate  - Administer supportive blood products/factors as ordered and appropriate  Outcome: Progressing  Goal: Free from bleeding injury  Description: (Example usage: patient with low platelets)  INTERVENTIONS:  - Avoid intramuscular injections, enemas and rectal medication administration  - Ensure safe mobilization of patient  - Hold pressure on venipuncture sites to achieve adequate hemostasis  - Assess for signs and symptoms of internal bleeding  - Monitor lab trends  - Patient is to report abnormal signs of bleeding to staff  - Avoid use of toothpicks and dental floss  - Use electric shaver for shaving  - Use soft bristle tooth brush  - Limit straining and forceful nose blowing  Outcome: Progressing     Problem: PAIN - ADULT  Goal: Verbalizes/displays adequate comfort level or patient's stated pain goal  Description: INTERVENTIONS:  - Encourage pt to monitor pain and request assistance  - Assess pain using appropriate pain scale  - Administer analgesics based on type and severity of pain and evaluate response  - Implement non-pharmacological measures as appropriate and evaluate response  - Consider cultural and social influences on pain and pain management  - Manage/alleviate anxiety  - Utilize distraction and/or relaxation techniques  - Monitor for opioid side effects  - Notify MD/LIP if interventions unsuccessful or patient reports new pain  - Anticipate increased pain with activity and pre-medicate as appropriate  Outcome: Progressing

## 2022-08-07 NOTE — PROGRESS NOTES
08/07/22 1608   Chest Physiotherapy Tx   $ Pep Therapy Subsequent Tx Yes   CPT Delivery Source PEP   CPT Duration 10   CPT Chest Site Full range   Breath Sounds Pre-Treatment Bilateral Clear   Breath Sounds Post-Treatment Bilateral Clear   Post-Treatment Pulse 79   Post-Treatment Respirations 22   Cough Non-productive;Strong   Position High fowlers   CPT Treatment Tolerance Tolerated well

## 2022-08-08 ENCOUNTER — APPOINTMENT (OUTPATIENT)
Dept: INTERVENTIONAL RADIOLOGY/VASCULAR | Facility: HOSPITAL | Age: 72
DRG: 377 | End: 2022-08-08
Attending: INTERNAL MEDICINE
Payer: MEDICARE

## 2022-08-08 ENCOUNTER — APPOINTMENT (OUTPATIENT)
Dept: CV DIAGNOSTICS | Facility: HOSPITAL | Age: 72
DRG: 377 | End: 2022-08-08
Attending: INTERNAL MEDICINE
Payer: MEDICARE

## 2022-08-08 LAB
ALBUMIN SERPL-MCNC: 1.8 G/DL (ref 3.4–5)
ANION GAP SERPL CALC-SCNC: 8 MMOL/L (ref 0–18)
BASOPHILS # BLD AUTO: 0.01 X10(3) UL (ref 0–0.2)
BASOPHILS NFR BLD AUTO: 0.2 %
BILIRUB UR QL: NEGATIVE
BUN BLD-MCNC: 94 MG/DL (ref 7–18)
BUN/CREAT SERPL: 41.4 (ref 10–20)
CALCIUM BLD-MCNC: 8.4 MG/DL (ref 8.5–10.1)
CHLORIDE SERPL-SCNC: 108 MMOL/L (ref 98–112)
CLARITY UR: CLEAR
CO2 SERPL-SCNC: 23 MMOL/L (ref 21–32)
COLOR UR: YELLOW
CREAT BLD-MCNC: 2.27 MG/DL
DEPRECATED RDW RBC AUTO: 66.5 FL (ref 35.1–46.3)
EOSINOPHIL # BLD AUTO: 0.16 X10(3) UL (ref 0–0.7)
EOSINOPHIL NFR BLD AUTO: 3.2 %
ERYTHROCYTE [DISTWIDTH] IN BLOOD BY AUTOMATED COUNT: 18.3 % (ref 11–15)
GFR SERPLBLD BASED ON 1.73 SQ M-ARVRAT: 23 ML/MIN/1.73M2 (ref 60–?)
GLUCOSE BLD-MCNC: 124 MG/DL (ref 70–99)
GLUCOSE BLDC GLUCOMTR-MCNC: 116 MG/DL (ref 70–99)
GLUCOSE BLDC GLUCOMTR-MCNC: 165 MG/DL (ref 70–99)
GLUCOSE BLDC GLUCOMTR-MCNC: 97 MG/DL (ref 70–99)
GLUCOSE BLDC GLUCOMTR-MCNC: 98 MG/DL (ref 70–99)
GLUCOSE UR-MCNC: NEGATIVE MG/DL
HCT VFR BLD AUTO: 26.6 %
HGB BLD-MCNC: 8 G/DL
IMM GRANULOCYTES # BLD AUTO: 0.02 X10(3) UL (ref 0–1)
IMM GRANULOCYTES NFR BLD: 0.4 %
KETONES UR-MCNC: NEGATIVE MG/DL
LYMPHOCYTES # BLD AUTO: 0.91 X10(3) UL (ref 1–4)
LYMPHOCYTES NFR BLD AUTO: 18.2 %
MCH RBC QN AUTO: 30.1 PG (ref 26–34)
MCHC RBC AUTO-ENTMCNC: 30.1 G/DL (ref 31–37)
MCV RBC AUTO: 100 FL
MONOCYTES # BLD AUTO: 0.35 X10(3) UL (ref 0.1–1)
MONOCYTES NFR BLD AUTO: 7 %
NEUTROPHILS # BLD AUTO: 3.56 X10 (3) UL (ref 1.5–7.7)
NEUTROPHILS # BLD AUTO: 3.56 X10(3) UL (ref 1.5–7.7)
NEUTROPHILS NFR BLD AUTO: 71 %
NITRITE UR QL STRIP.AUTO: NEGATIVE
OSMOLALITY SERPL CALC.SUM OF ELEC: 318 MOSM/KG (ref 275–295)
PH UR: 5.5 [PH] (ref 5–8)
PHOSPHATE SERPL-MCNC: 3.4 MG/DL (ref 2.5–4.9)
PLATELET # BLD AUTO: 115 10(3)UL (ref 150–450)
PLATELET MORPHOLOGY: NORMAL
POTASSIUM SERPL-SCNC: 4.3 MMOL/L (ref 3.5–5.1)
PROT UR-MCNC: NEGATIVE MG/DL
RBC # BLD AUTO: 2.66 X10(6)UL
SODIUM SERPL-SCNC: 139 MMOL/L (ref 136–145)
SP GR UR STRIP: 1.01 (ref 1–1.03)
UROBILINOGEN UR STRIP-ACNC: 0.2
WBC # BLD AUTO: 5 X10(3) UL (ref 4–11)
WBC #/AREA URNS AUTO: >50 /HPF
WBC #/AREA URNS AUTO: >50 /HPF

## 2022-08-08 PROCEDURE — 93325 DOPPLER ECHO COLOR FLOW MAPG: CPT | Performed by: INTERNAL MEDICINE

## 2022-08-08 PROCEDURE — 93320 DOPPLER ECHO COMPLETE: CPT | Performed by: INTERNAL MEDICINE

## 2022-08-08 PROCEDURE — 99232 SBSQ HOSP IP/OBS MODERATE 35: CPT | Performed by: PHYSICIAN ASSISTANT

## 2022-08-08 PROCEDURE — B24BZZ4 ULTRASONOGRAPHY OF HEART WITH AORTA, TRANSESOPHAGEAL: ICD-10-PCS | Performed by: INTERNAL MEDICINE

## 2022-08-08 RX ORDER — LIDOCAINE HYDROCHLORIDE 20 MG/ML
SOLUTION OROPHARYNGEAL
Status: COMPLETED
Start: 2022-08-08 | End: 2022-08-08

## 2022-08-08 RX ORDER — VANCOMYCIN HYDROCHLORIDE 125 MG/1
125 CAPSULE ORAL DAILY
Status: DISCONTINUED | OUTPATIENT
Start: 2022-08-08 | End: 2022-08-10

## 2022-08-08 RX ORDER — MIDAZOLAM HYDROCHLORIDE 1 MG/ML
INJECTION INTRAMUSCULAR; INTRAVENOUS
Status: COMPLETED
Start: 2022-08-08 | End: 2022-08-08

## 2022-08-08 RX ORDER — LIDOCAINE HYDROCHLORIDE 20 MG/ML
5 SOLUTION OROPHARYNGEAL ONCE
Status: COMPLETED | OUTPATIENT
Start: 2022-08-08 | End: 2022-08-08

## 2022-08-08 RX ORDER — MIDAZOLAM HYDROCHLORIDE 1 MG/ML
2 INJECTION INTRAMUSCULAR; INTRAVENOUS ONCE
Status: COMPLETED | OUTPATIENT
Start: 2022-08-08 | End: 2022-08-08

## 2022-08-08 NOTE — PROGRESS NOTES
[facility]    CDI Prediction Tool Protocol (Vancomycin Initiated)    OVP (oral vancomycin prophylaxis) 125 mg PO Daily is being started in this patient based on a score of 22.1. Score Breakdown:  History of CDI > 1 year ago AND high risk antibiotic use (8 points)  High risk antibiotic use (5 points)  Hospital length of stay > 7 days (3 points)  Age 77 - 78 years (2 points)  Long term care facility resident (1 point)  Hypoalbuminemia: < 3g/dL (1 point)  PPI use in hospital (1 point)  Recently hospitalized: within 90 days (1 point)  History of CDI regardless of high risk antibiotic use (0.1 point)    This patient is currently at high risk for developing CDI due to his/her score being >/=13 points and is being started on prophylactic oral vancomycin to prevent Cdiff. This patient does not have C. difficile infection. All measures taken within this protocol are to decrease the risk of CDI development.     Leticia Juarez, PharmD  8/8/2022  7:09 AM  [place]  [facility] Extension: [phone]

## 2022-08-08 NOTE — PLAN OF CARE
Problem: Patient Centered Care  Goal: Patient preferences are identified and integrated in the patient's plan of care  Description: Interventions:  - What would you like us to know as we care for you? \"I want to go home\"  - Provide timely, complete, and accurate information to patient/family  - Incorporate patient and family knowledge, values, beliefs, and cultural backgrounds into the planning and delivery of care  - Encourage patient/family to participate in care and decision-making at the level they choose  - Honor patient and family perspectives and choices  Outcome: Progressing     Problem: CARDIOVASCULAR - ADULT  Goal: Maintains optimal cardiac output and hemodynamic stability  Description: INTERVENTIONS:  - Monitor vital signs, rhythm, and trends  - Monitor for bleeding, hypotension and signs of decreased cardiac output  - Evaluate effectiveness of vasoactive medications to optimize hemodynamic stability  - Monitor arterial and/or venous puncture sites for bleeding and/or hematoma  - Assess quality of pulses, skin color and temperature  - Assess for signs of decreased coronary artery perfusion - ex.  Angina  - Evaluate fluid balance, assess for edema, trend weights  Outcome: Progressing  Goal: Absence of cardiac arrhythmias or at baseline  Description: INTERVENTIONS:  - Continuous cardiac monitoring, monitor vital signs, obtain 12 lead EKG if indicated  - Evaluate effectiveness of antiarrhythmic and heart rate control medications as ordered  - Initiate emergency measures for life threatening arrhythmias  - Monitor electrolytes and administer replacement therapy as ordered  Outcome: Progressing     Problem: RESPIRATORY - ADULT  Goal: Achieves optimal ventilation and oxygenation  Description: INTERVENTIONS:  - Assess for changes in respiratory status  - Assess for changes in mentation and behavior  - Position to facilitate oxygenation and minimize respiratory effort  - Oxygen supplementation based on oxygen saturation or ABGs  - Provide Smoking Cessation handout, if applicable  - Encourage broncho-pulmonary hygiene including cough, deep breathe, Incentive Spirometry  - Assess the need for suctioning and perform as needed  - Assess and instruct to report SOB or any respiratory difficulty  - Respiratory Therapy support as indicated  - Manage/alleviate anxiety  - Monitor for signs/symptoms of CO2 retention  Outcome: Progressing     Problem: METABOLIC/FLUID AND ELECTROLYTES - ADULT  Goal: Glucose maintained within prescribed range  Description: INTERVENTIONS:  - Monitor Blood Glucose as ordered  - Assess for signs and symptoms of hyperglycemia and hypoglycemia  - Administer ordered medications to maintain glucose within target range  - Assess barriers to adequate nutritional intake and initiate nutrition consult as needed  - Instruct patient on self management of diabetes  Outcome: Progressing     Problem: HEMATOLOGIC - ADULT  Goal: Maintains hematologic stability  Description: INTERVENTIONS  - Assess for signs and symptoms of bleeding or hemorrhage  - Monitor labs and vital signs for trends  - Administer supportive blood products/factors, fluids and medications as ordered and appropriate  - Administer supportive blood products/factors as ordered and appropriate  Outcome: Progressing  Goal: Free from bleeding injury  Description: (Example usage: patient with low platelets)  INTERVENTIONS:  - Avoid intramuscular injections, enemas and rectal medication administration  - Ensure safe mobilization of patient  - Hold pressure on venipuncture sites to achieve adequate hemostasis  - Assess for signs and symptoms of internal bleeding  - Monitor lab trends  - Patient is to report abnormal signs of bleeding to staff  - Avoid use of toothpicks and dental floss  - Use electric shaver for shaving  - Use soft bristle tooth brush  - Limit straining and forceful nose blowing  Outcome: Progressing     Problem: PAIN - ADULT  Goal: Verbalizes/displays adequate comfort level or patient's stated pain goal  Description: INTERVENTIONS:  - Encourage pt to monitor pain and request assistance  - Assess pain using appropriate pain scale  - Administer analgesics based on type and severity of pain and evaluate response  - Implement non-pharmacological measures as appropriate and evaluate response  - Consider cultural and social influences on pain and pain management  - Manage/alleviate anxiety  - Utilize distraction and/or relaxation techniques  - Monitor for opioid side effects  - Notify MD/LIP if interventions unsuccessful or patient reports new pain  - Anticipate increased pain with activity and pre-medicate as appropriate  Outcome: Progressing     Problem: SAFETY ADULT - FALL  Goal: Free from fall injury  Description: INTERVENTIONS:  - Assess pt frequently for physical needs  - Identify cognitive and physical deficits and behaviors that affect risk of falls.   - [place] fall precautions as indicated by assessment.  - Educate pt/family on patient safety including physical limitations  - Instruct pt to call for assistance with activity based on assessment  - Modify environment to reduce risk of injury  - Provide assistive devices as appropriate  - Consider OT/PT consult to assist with strengthening/mobility  - Encourage toileting schedule  Outcome: Progressing     Problem: DISCHARGE PLANNING  Goal: Discharge to home or other facility with appropriate resources  Description: INTERVENTIONS:  - Identify barriers to discharge w/pt and caregiver  - Include patient/family/discharge partner in discharge planning  - Arrange for needed discharge resources and transportation as appropriate  - Identify discharge learning needs (meds, wound care, etc)  - Arrange for interpreters to assist at discharge as needed  - Consider post-discharge preferences of patient/family/discharge partner  - Complete POLST form as appropriate  - Assess patient's ability to be responsible for managing their own health  - Refer to Case Management Department for coordinating discharge planning if the patient needs post-hospital services based on physician/LIP order or complex needs related to functional status, cognitive ability or social support system  Outcome: Progressing     Problem: GASTROINTESTINAL - ADULT  Goal: Maintains or returns to baseline bowel function  Description: INTERVENTIONS:  - Assess bowel function  - Maintain adequate hydration with IV or PO as ordered and tolerated  - Evaluate effectiveness of GI medications  - Encourage mobilization and activity  - Obtain nutritional consult as needed  - Establish a toileting routine/schedule  - Consider collaborating with pharmacy to review patient's medication profile  Outcome: Progressing

## 2022-08-08 NOTE — PLAN OF CARE
Pt complained of right lower quadrant pain. Given Miralax in the afternoon by AM shift nurse. Had 1 BM after treatment which slightly improved the pain but not completely. Dr Lakhwinder Guzman ordered stat X-ray of the abdomen, then CT of Abdomen/pelvis which was negative. Started on IV Ceftriaxone for UTI. No A/Rs noted. Kept NPO for CONRAD today. Problem: Patient Centered Care  Goal: Patient preferences are identified and integrated in the patient's plan of care  Description: Interventions:  - What would you like us to know as we care for you?   - Provide timely, complete, and accurate information to patient/family  - Incorporate patient and family knowledge, values, beliefs, and cultural backgrounds into the planning and delivery of care  - Encourage patient/family to participate in care and decision-making at the level they choose  - Honor patient and family perspectives and choices  Outcome: Progressing     Problem: CARDIOVASCULAR - ADULT  Goal: Maintains optimal cardiac output and hemodynamic stability  Description: INTERVENTIONS:  - Monitor vital signs, rhythm, and trends  - Monitor for bleeding, hypotension and signs of decreased cardiac output  - Evaluate effectiveness of vasoactive medications to optimize hemodynamic stability  - Monitor arterial and/or venous puncture sites for bleeding and/or hematoma  - Assess quality of pulses, skin color and temperature  - Assess for signs of decreased coronary artery perfusion - ex.  Angina  - Evaluate fluid balance, assess for edema, trend weights  Outcome: Progressing  Goal: Absence of cardiac arrhythmias or at baseline  Description: INTERVENTIONS:  - Continuous cardiac monitoring, monitor vital signs, obtain 12 lead EKG if indicated  - Evaluate effectiveness of antiarrhythmic and heart rate control medications as ordered  - Initiate emergency measures for life threatening arrhythmias  - Monitor electrolytes and administer replacement therapy as ordered  Outcome: Progressing     Problem: RESPIRATORY - ADULT  Goal: Achieves optimal ventilation and oxygenation  Description: INTERVENTIONS:  - Assess for changes in respiratory status  - Assess for changes in mentation and behavior  - Position to facilitate oxygenation and minimize respiratory effort  - Oxygen supplementation based on oxygen saturation or ABGs  - Provide Smoking Cessation handout, if applicable  - Encourage broncho-pulmonary hygiene including cough, deep breathe, Incentive Spirometry  - Assess the need for suctioning and perform as needed  - Assess and instruct to report SOB or any respiratory difficulty  - Respiratory Therapy support as indicated  - Manage/alleviate anxiety  - Monitor for signs/symptoms of CO2 retention  Outcome: Progressing     Problem: METABOLIC/FLUID AND ELECTROLYTES - ADULT  Goal: Glucose maintained within prescribed range  Description: INTERVENTIONS:  - Monitor Blood Glucose as ordered  - Assess for signs and symptoms of hyperglycemia and hypoglycemia  - Administer ordered medications to maintain glucose within target range  - Assess barriers to adequate nutritional intake and initiate nutrition consult as needed  - Instruct patient on self management of diabetes  8/8/2022 0214 by William Edwards, RN  Outcome: Progressing     Problem: HEMATOLOGIC - ADULT  Goal: Maintains hematologic stability  Description: INTERVENTIONS  - Assess for signs and symptoms of bleeding or hemorrhage  - Monitor labs and vital signs for trends  - Administer supportive blood products/factors, fluids and medications as ordered and appropriate  - Administer supportive blood products/factors as ordered and appropriate  8/8/2022 0214 by William Edwards, RN  Outcome: Progressing  Goal: Free from bleeding injury  Description: (Example usage: patient with low platelets)  INTERVENTIONS:  - Avoid intramuscular injections, enemas and rectal medication administration  - Ensure safe mobilization of patient  - Hold pressure on venipuncture sites to achieve adequate hemostasis  - Assess for signs and symptoms of internal bleeding  - Monitor lab trends  - Patient is to report abnormal signs of bleeding to staff  - Avoid use of toothpicks and dental floss  - Use electric shaver for shaving  - Use soft bristle tooth brush  - Limit straining and forceful nose blowing  8/8/2022 0214 by Franko Geller RN  Outcome: Progressing    Problem: PAIN - ADULT  Goal: Verbalizes/displays adequate comfort level or patient's stated pain goal  Description: INTERVENTIONS:  - Encourage pt to monitor pain and request assistance  - Assess pain using appropriate pain scale  - Administer analgesics based on type and severity of pain and evaluate response  - Implement non-pharmacological measures as appropriate and evaluate response  - Consider cultural and social influences on pain and pain management  - Manage/alleviate anxiety  - Utilize distraction and/or relaxation techniques  - Monitor for opioid side effects  - Notify MD/LIP if interventions unsuccessful or patient reports new pain  - Anticipate increased pain with activity and pre-medicate as appropriate  8/8/2022 0214 by Franko Geller RN  Outcome: Progressing     Problem: SAFETY ADULT - FALL  Goal: Free from fall injury  Description: INTERVENTIONS:  - Assess pt frequently for physical needs  - Identify cognitive and physical deficits and behaviors that affect risk of falls.   - [place] fall precautions as indicated by assessment.  - Educate pt/family on patient safety including physical limitations  - Instruct pt to call for assistance with activity based on assessment  - Modify environment to reduce risk of injury  - Provide assistive devices as appropriate  - Consider OT/PT consult to assist with strengthening/mobility  - Encourage toileting schedule  8/8/2022 0214 by Franko Geller RN  Outcome: Progressing     Problem: DISCHARGE PLANNING  Goal: Discharge to home or other facility with appropriate resources  Description: INTERVENTIONS:  - Identify barriers to discharge w/pt and caregiver  - Include patient/family/discharge partner in discharge planning  - Arrange for needed discharge resources and transportation as appropriate  - Identify discharge learning needs (meds, wound care, etc)  - Arrange for interpreters to assist at discharge as needed  - Consider post-discharge preferences of patient/family/discharge partner  - Complete POLST form as appropriate  - Assess patient's ability to be responsible for managing their own health  - Refer to Case Management Department for coordinating discharge planning if the patient needs post-hospital services based on physician/LIP order or complex needs related to functional status, cognitive ability or social support system  8/8/2022 0214 by Dixon Disla RN  Outcome: Progressing     Problem: GASTROINTESTINAL - ADULT  Goal: Maintains or returns to baseline bowel function  Description: INTERVENTIONS:  - Assess bowel function  - Maintain adequate hydration with IV or PO as ordered and tolerated  - Evaluate effectiveness of GI medications  - Encourage mobilization and activity  - Obtain nutritional consult as needed  - Establish a toileting routine/schedule  - Consider collaborating with pharmacy to review patient's medication profile  Outcome: Not Progressing

## 2022-08-08 NOTE — IVS NOTE
Hand-Off     Procedure hand off report given to [facility]. Pt's vital signs are stable. NPO status until 1130  Dr. Leonor Bowens spoke with patient pre procedure.

## 2022-08-08 NOTE — PLAN OF CARE
PT complains of abdominal pain. States she had a similar pain yesterday that resolved after having a bowel movement. PRN medications given. HMG stable today. Call light within reach. Fall precautions in place. Plan: [facility] tomorrow, NPO at midnight. Problem: Patient Centered Care  Goal: Patient preferences are identified and integrated in the patient's plan of care  Description: Interventions:  - Provide timely, complete, and accurate information to patient/family  - Incorporate patient and family knowledge, values, beliefs, and cultural backgrounds into the planning and delivery of care  - Encourage patient/family to participate in care and decision-making at the level they choose  - Honor patient and family perspectives and choices  Outcome: Progressing     Problem: CARDIOVASCULAR - ADULT  Goal: Maintains optimal cardiac output and hemodynamic stability  Description: INTERVENTIONS:  - Monitor vital signs, rhythm, and trends  - Monitor for bleeding, hypotension and signs of decreased cardiac output  - Evaluate effectiveness of vasoactive medications to optimize hemodynamic stability  - Monitor arterial and/or venous puncture sites for bleeding and/or hematoma  - Assess quality of pulses, skin color and temperature  - Assess for signs of decreased coronary artery perfusion - ex.  Angina  - Evaluate fluid balance, assess for edema, trend weights  Outcome: Progressing  Goal: Absence of cardiac arrhythmias or at baseline  Description: INTERVENTIONS:  - Continuous cardiac monitoring, monitor vital signs, obtain 12 lead EKG if indicated  - Evaluate effectiveness of antiarrhythmic and heart rate control medications as ordered  - Initiate emergency measures for life threatening arrhythmias  - Monitor electrolytes and administer replacement therapy as ordered  Outcome: Progressing     Problem: RESPIRATORY - ADULT  Goal: Achieves optimal ventilation and oxygenation  Description: INTERVENTIONS:  - Assess for changes in respiratory status  - Assess for changes in mentation and behavior  - Position to facilitate oxygenation and minimize respiratory effort  - Oxygen supplementation based on oxygen saturation or ABGs  - Provide Smoking Cessation handout, if applicable  - Encourage broncho-pulmonary hygiene including cough, deep breathe, Incentive Spirometry  - Assess the need for suctioning and perform as needed  - Assess and instruct to report SOB or any respiratory difficulty  - Respiratory Therapy support as indicated  - Manage/alleviate anxiety  - Monitor for signs/symptoms of CO2 retention  Outcome: Progressing     Problem: METABOLIC/FLUID AND ELECTROLYTES - ADULT  Goal: Glucose maintained within prescribed range  Description: INTERVENTIONS:  - Monitor Blood Glucose as ordered  - Assess for signs and symptoms of hyperglycemia and hypoglycemia  - Administer ordered medications to maintain glucose within target range  - Assess barriers to adequate nutritional intake and initiate nutrition consult as needed  - Instruct patient on self management of diabetes  Outcome: Progressing     Problem: HEMATOLOGIC - ADULT  Goal: Maintains hematologic stability  Description: INTERVENTIONS  - Assess for signs and symptoms of bleeding or hemorrhage  - Monitor labs and vital signs for trends  - Administer supportive blood products/factors, fluids and medications as ordered and appropriate  - Administer supportive blood products/factors as ordered and appropriate  Outcome: Progressing  Goal: Free from bleeding injury  Description: (Example usage: patient with low platelets)  INTERVENTIONS:  - Avoid intramuscular injections, enemas and rectal medication administration  - Ensure safe mobilization of patient  - Hold pressure on venipuncture sites to achieve adequate hemostasis  - Assess for signs and symptoms of internal bleeding  - Monitor lab trends  - Patient is to report abnormal signs of bleeding to staff  - Avoid use of toothpicks and dental floss  - Use electric shaver for shaving  - Use soft bristle tooth brush  - Limit straining and forceful nose blowing  Outcome: Progressing     Problem: PAIN - ADULT  Goal: Verbalizes/displays adequate comfort level or patient's stated pain goal  Description: INTERVENTIONS:  - Encourage pt to monitor pain and request assistance  - Assess pain using appropriate pain scale  - Administer analgesics based on type and severity of pain and evaluate response  - Implement non-pharmacological measures as appropriate and evaluate response  - Consider cultural and social influences on pain and pain management  - Manage/alleviate anxiety  - Utilize distraction and/or relaxation techniques  - Monitor for opioid side effects  - Notify MD/LIP if interventions unsuccessful or patient reports new pain  - Anticipate increased pain with activity and pre-medicate as appropriate  Outcome: Progressing     Problem: SAFETY ADULT - FALL  Goal: Free from fall injury  Description: INTERVENTIONS:  - Assess pt frequently for physical needs  - Identify cognitive and physical deficits and behaviors that affect risk of falls.   - [place] fall precautions as indicated by assessment.  - Educate pt/family on patient safety including physical limitations  - Instruct pt to call for assistance with activity based on assessment  - Modify environment to reduce risk of injury  - Provide assistive devices as appropriate  - Consider OT/PT consult to assist with strengthening/mobility  - Encourage toileting schedule  Outcome: Progressing     Problem: DISCHARGE PLANNING  Goal: Discharge to home or other facility with appropriate resources  Description: INTERVENTIONS:  - Identify barriers to discharge w/pt and caregiver  - Include patient/family/discharge partner in discharge planning  - Arrange for needed discharge resources and transportation as appropriate  - Identify discharge learning needs (meds, wound care, etc)  - Arrange for interpreters to assist at discharge as needed  - Consider post-discharge preferences of patient/family/discharge partner  - Complete POLST form as appropriate  - Assess patient's ability to be responsible for managing their own health  - Refer to Case Management Department for coordinating discharge planning if the patient needs post-hospital services based on physician/LIP order or complex needs related to functional status, cognitive ability or social support system  Outcome: Progressing

## 2022-08-09 LAB
ANION GAP SERPL CALC-SCNC: 5 MMOL/L (ref 0–18)
BUN BLD-MCNC: 85 MG/DL (ref 7–18)
BUN/CREAT SERPL: 37 (ref 10–20)
CALCIUM BLD-MCNC: 8.2 MG/DL (ref 8.5–10.1)
CHLORIDE SERPL-SCNC: 107 MMOL/L (ref 98–112)
CO2 SERPL-SCNC: 25 MMOL/L (ref 21–32)
CREAT BLD-MCNC: 2.3 MG/DL
DEPRECATED RDW RBC AUTO: 68.2 FL (ref 35.1–46.3)
ERYTHROCYTE [DISTWIDTH] IN BLOOD BY AUTOMATED COUNT: 18.7 % (ref 11–15)
GFR SERPLBLD BASED ON 1.73 SQ M-ARVRAT: 22 ML/MIN/1.73M2 (ref 60–?)
GLUCOSE BLD-MCNC: 109 MG/DL (ref 70–99)
GLUCOSE BLDC GLUCOMTR-MCNC: 114 MG/DL (ref 70–99)
GLUCOSE BLDC GLUCOMTR-MCNC: 149 MG/DL (ref 70–99)
GLUCOSE BLDC GLUCOMTR-MCNC: 184 MG/DL (ref 70–99)
GLUCOSE BLDC GLUCOMTR-MCNC: 86 MG/DL (ref 70–99)
HCT VFR BLD AUTO: 28.4 %
HGB BLD-MCNC: 8.6 G/DL
MCH RBC QN AUTO: 30.4 PG (ref 26–34)
MCHC RBC AUTO-ENTMCNC: 30.3 G/DL (ref 31–37)
MCV RBC AUTO: 100.4 FL
OSMOLALITY SERPL CALC.SUM OF ELEC: 310 MOSM/KG (ref 275–295)
PLATELET # BLD AUTO: 147 10(3)UL (ref 150–450)
POTASSIUM SERPL-SCNC: 4.3 MMOL/L (ref 3.5–5.1)
RBC # BLD AUTO: 2.83 X10(6)UL
SODIUM SERPL-SCNC: 137 MMOL/L (ref 136–145)
WBC # BLD AUTO: 5.9 X10(3) UL (ref 4–11)

## 2022-08-09 PROCEDURE — 99232 SBSQ HOSP IP/OBS MODERATE 35: CPT | Performed by: PHYSICIAN ASSISTANT

## 2022-08-09 NOTE — PLAN OF CARE
Kayleigh with cough overnight, treated per orders. She denied any pain or SOB and refused the Bipap overnight since it makes her feel claustrophobic. O2 needs supplemented with nasal cannula. Sats in mid 90s on RA when not coughing, but will desat if cough is present. Kept her on 1L. Plan for her is discharge to [facility] once medically cleared. Problem: Patient Centered Care  Goal: Patient preferences are identified and integrated in the patient's plan of care  Description: Interventions:  - What would you like us to know as we care for you? From [facility]  - Provide timely, complete, and accurate information to patient/family  - Incorporate patient and family knowledge, values, beliefs, and cultural backgrounds into the planning and delivery of care  - Encourage patient/family to participate in care and decision-making at the level they choose  - Honor patient and family perspectives and choices  Outcome: Progressing     Problem: CARDIOVASCULAR - ADULT  Goal: Maintains optimal cardiac output and hemodynamic stability  Description: INTERVENTIONS:  - Monitor vital signs, rhythm, and trends  - Monitor for bleeding, hypotension and signs of decreased cardiac output  - Evaluate effectiveness of vasoactive medications to optimize hemodynamic stability  - Monitor arterial and/or venous puncture sites for bleeding and/or hematoma  - Assess quality of pulses, skin color and temperature  - Assess for signs of decreased coronary artery perfusion - ex.  Angina  - Evaluate fluid balance, assess for edema, trend weights  Outcome: Progressing  Goal: Absence of cardiac arrhythmias or at baseline  Description: INTERVENTIONS:  - Continuous cardiac monitoring, monitor vital signs, obtain 12 lead EKG if indicated  - Evaluate effectiveness of antiarrhythmic and heart rate control medications as ordered  - Initiate emergency measures for life threatening arrhythmias  - Monitor electrolytes and administer replacement therapy as ordered  Outcome: Progressing     Problem: RESPIRATORY - ADULT  Goal: Achieves optimal ventilation and oxygenation  Description: INTERVENTIONS:  - Assess for changes in respiratory status  - Assess for changes in mentation and behavior  - Position to facilitate oxygenation and minimize respiratory effort  - Oxygen supplementation based on oxygen saturation or ABGs  - Provide Smoking Cessation handout, if applicable  - Encourage broncho-pulmonary hygiene including cough, deep breathe, Incentive Spirometry  - Assess the need for suctioning and perform as needed  - Assess and instruct to report SOB or any respiratory difficulty  - Respiratory Therapy support as indicated  - Manage/alleviate anxiety  - Monitor for signs/symptoms of CO2 retention  Outcome: Progressing     Problem: METABOLIC/FLUID AND ELECTROLYTES - ADULT  Goal: Glucose maintained within prescribed range  Description: INTERVENTIONS:  - Monitor Blood Glucose as ordered  - Assess for signs and symptoms of hyperglycemia and hypoglycemia  - Administer ordered medications to maintain glucose within target range  - Assess barriers to adequate nutritional intake and initiate nutrition consult as needed  - Instruct patient on self management of diabetes  Outcome: Progressing     Problem: HEMATOLOGIC - ADULT  Goal: Maintains hematologic stability  Description: INTERVENTIONS  - Assess for signs and symptoms of bleeding or hemorrhage  - Monitor labs and vital signs for trends  - Administer supportive blood products/factors, fluids and medications as ordered and appropriate  - Administer supportive blood products/factors as ordered and appropriate  Outcome: Progressing  Goal: Free from bleeding injury  Description: (Example usage: patient with low platelets)  INTERVENTIONS:  - Avoid intramuscular injections, enemas and rectal medication administration  - Ensure safe mobilization of patient  - Hold pressure on venipuncture sites to achieve adequate hemostasis  - Assess for signs and symptoms of internal bleeding  - Monitor lab trends  - Patient is to report abnormal signs of bleeding to staff  - Avoid use of toothpicks and dental floss  - Use electric shaver for shaving  - Use soft bristle tooth brush  - Limit straining and forceful nose blowing  Outcome: Progressing     Problem: PAIN - ADULT  Goal: Verbalizes/displays adequate comfort level or patient's stated pain goal  Description: INTERVENTIONS:  - Encourage pt to monitor pain and request assistance  - Assess pain using appropriate pain scale  - Administer analgesics based on type and severity of pain and evaluate response  - Implement non-pharmacological measures as appropriate and evaluate response  - Consider cultural and social influences on pain and pain management  - Manage/alleviate anxiety  - Utilize distraction and/or relaxation techniques  - Monitor for opioid side effects  - Notify MD/LIP if interventions unsuccessful or patient reports new pain  - Anticipate increased pain with activity and pre-medicate as appropriate  Outcome: Progressing     Problem: SAFETY ADULT - FALL  Goal: Free from fall injury  Description: INTERVENTIONS:  - Assess pt frequently for physical needs  - Identify cognitive and physical deficits and behaviors that affect risk of falls.   - [place] fall precautions as indicated by assessment.  - Educate pt/family on patient safety including physical limitations  - Instruct pt to call for assistance with activity based on assessment  - Modify environment to reduce risk of injury  - Provide assistive devices as appropriate  - Consider OT/PT consult to assist with strengthening/mobility  - Encourage toileting schedule  Outcome: Progressing     Problem: DISCHARGE PLANNING  Goal: Discharge to home or other facility with appropriate resources  Description: INTERVENTIONS:  - Identify barriers to discharge w/pt and caregiver  - Include patient/family/discharge partner in discharge planning  - Arrange for needed discharge resources and transportation as appropriate  - Identify discharge learning needs (meds, wound care, etc)  - Arrange for interpreters to assist at discharge as needed  - Consider post-discharge preferences of patient/family/discharge partner  - Complete POLST form as appropriate  - Assess patient's ability to be responsible for managing their own health  - Refer to Case Management Department for coordinating discharge planning if the patient needs post-hospital services based on physician/LIP order or complex needs related to functional status, cognitive ability or social support system  Outcome: Progressing     Problem: GASTROINTESTINAL - ADULT  Goal: Maintains or returns to baseline bowel function  Description: INTERVENTIONS:  - Assess bowel function  - Maintain adequate hydration with IV or PO as ordered and tolerated  - Evaluate effectiveness of GI medications  - Encourage mobilization and activity  - Obtain nutritional consult as needed  - Establish a toileting routine/schedule  - Consider collaborating with pharmacy to review patient's medication profile  Outcome: Progressing

## 2022-08-09 NOTE — PLAN OF CARE
Problem: Patient Centered Care  Goal: Patient preferences are identified and integrated in the patient's plan of care  Description: Interventions:  - What would you like us to know as we care for you? I am from [facility]  - Provide timely, complete, and accurate information to patient/family  - Incorporate patient and family knowledge, values, beliefs, and cultural backgrounds into the planning and delivery of care  - Encourage patient/family to participate in care and decision-making at the level they choose  - Honor patient and family perspectives and choices  Outcome: Progressing     Problem: CARDIOVASCULAR - ADULT  Goal: Maintains optimal cardiac output and hemodynamic stability  Description: INTERVENTIONS:  - Monitor vital signs, rhythm, and trends  - Monitor for bleeding, hypotension and signs of decreased cardiac output  - Evaluate effectiveness of vasoactive medications to optimize hemodynamic stability  - Monitor arterial and/or venous puncture sites for bleeding and/or hematoma  - Assess quality of pulses, skin color and temperature  - Assess for signs of decreased coronary artery perfusion - ex.  Angina  - Evaluate fluid balance, assess for edema, trend weights  Outcome: Progressing  Goal: Absence of cardiac arrhythmias or at baseline  Description: INTERVENTIONS:  - Continuous cardiac monitoring, monitor vital signs, obtain 12 lead EKG if indicated  - Evaluate effectiveness of antiarrhythmic and heart rate control medications as ordered  - Initiate emergency measures for life threatening arrhythmias  - Monitor electrolytes and administer replacement therapy as ordered  Outcome: Progressing     Problem: RESPIRATORY - ADULT  Goal: Achieves optimal ventilation and oxygenation  Description: INTERVENTIONS:  - Assess for changes in respiratory status  - Assess for changes in mentation and behavior  - Position to facilitate oxygenation and minimize respiratory effort  - Oxygen supplementation based on oxygen saturation or ABGs  - Provide Smoking Cessation handout, if applicable  - Encourage broncho-pulmonary hygiene including cough, deep breathe, Incentive Spirometry  - Assess the need for suctioning and perform as needed  - Assess and instruct to report SOB or any respiratory difficulty  - Respiratory Therapy support as indicated  - Manage/alleviate anxiety  - Monitor for signs/symptoms of CO2 retention  Outcome: Progressing     Problem: METABOLIC/FLUID AND ELECTROLYTES - ADULT  Goal: Glucose maintained within prescribed range  Description: INTERVENTIONS:  - Monitor Blood Glucose as ordered  - Assess for signs and symptoms of hyperglycemia and hypoglycemia  - Administer ordered medications to maintain glucose within target range  - Assess barriers to adequate nutritional intake and initiate nutrition consult as needed  - Instruct patient on self management of diabetes  Outcome: Progressing     Problem: HEMATOLOGIC - ADULT  Goal: Maintains hematologic stability  Description: INTERVENTIONS  - Assess for signs and symptoms of bleeding or hemorrhage  - Monitor labs and vital signs for trends  - Administer supportive blood products/factors, fluids and medications as ordered and appropriate  - Administer supportive blood products/factors as ordered and appropriate  Outcome: Progressing  Goal: Free from bleeding injury  Description: (Example usage: patient with low platelets)  INTERVENTIONS:  - Avoid intramuscular injections, enemas and rectal medication administration  - Ensure safe mobilization of patient  - Hold pressure on venipuncture sites to achieve adequate hemostasis  - Assess for signs and symptoms of internal bleeding  - Monitor lab trends  - Patient is to report abnormal signs of bleeding to staff  - Avoid use of toothpicks and dental floss  - Use electric shaver for shaving  - Use soft bristle tooth brush  - Limit straining and forceful nose blowing  Outcome: Progressing     Problem: PAIN - ADULT  Goal: Verbalizes/displays adequate comfort level or patient's stated pain goal  Description: INTERVENTIONS:  - Encourage pt to monitor pain and request assistance  - Assess pain using appropriate pain scale  - Administer analgesics based on type and severity of pain and evaluate response  - Implement non-pharmacological measures as appropriate and evaluate response  - Consider cultural and social influences on pain and pain management  - Manage/alleviate anxiety  - Utilize distraction and/or relaxation techniques  - Monitor for opioid side effects  - Notify MD/LIP if interventions unsuccessful or patient reports new pain  - Anticipate increased pain with activity and pre-medicate as appropriate  Outcome: Progressing     Problem: SAFETY ADULT - FALL  Goal: Free from fall injury  Description: INTERVENTIONS:  - Assess pt frequently for physical needs  - Identify cognitive and physical deficits and behaviors that affect risk of falls.   - [place] fall precautions as indicated by assessment.  - Educate pt/family on patient safety including physical limitations  - Instruct pt to call for assistance with activity based on assessment  - Modify environment to reduce risk of injury  - Provide assistive devices as appropriate  - Consider OT/PT consult to assist with strengthening/mobility  - Encourage toileting schedule  Outcome: Progressing     Problem: DISCHARGE PLANNING  Goal: Discharge to home or other facility with appropriate resources  Description: INTERVENTIONS:  - Identify barriers to discharge w/pt and caregiver  - Include patient/family/discharge partner in discharge planning  - Arrange for needed discharge resources and transportation as appropriate  - Identify discharge learning needs (meds, wound care, etc)  - Arrange for interpreters to assist at discharge as needed  - Consider post-discharge preferences of patient/family/discharge partner  - Complete POLST form as appropriate  - Assess patient's ability to be responsible for managing their own health  - Refer to Case Management Department for coordinating discharge planning if the patient needs post-hospital services based on physician/LIP order or complex needs related to functional status, cognitive ability or social support system  Outcome: Progressing     Problem: GASTROINTESTINAL - ADULT  Goal: Maintains or returns to baseline bowel function  Description: INTERVENTIONS:  - Assess bowel function  - Maintain adequate hydration with IV or PO as ordered and tolerated  - Evaluate effectiveness of GI medications  - Encourage mobilization and activity  - Obtain nutritional consult as needed  - Establish a toileting routine/schedule  - Consider collaborating with pharmacy to review patient's medication profile  Outcome: Progressing     Pt alert and oriented X 4. Pt on room air. Pt had complaints of shortness of breath, MD notified and PRN nebulizer given. Pt had complaints of a cough, PRN robitussin given. Safety precautions in place, call light within reach. Will continue to monitor Hgb.

## 2022-08-10 VITALS
WEIGHT: 200.88 LBS | BODY MASS INDEX: 33.47 KG/M2 | TEMPERATURE: 98 F | HEART RATE: 82 BPM | OXYGEN SATURATION: 93 % | RESPIRATION RATE: 18 BRPM | DIASTOLIC BLOOD PRESSURE: 69 MMHG | HEIGHT: 65 IN | SYSTOLIC BLOOD PRESSURE: 134 MMHG

## 2022-08-10 LAB
GLUCOSE BLDC GLUCOMTR-MCNC: 129 MG/DL (ref 70–99)
GLUCOSE BLDC GLUCOMTR-MCNC: 85 MG/DL (ref 70–99)
SARS-COV-2 RNA RESP QL NAA+PROBE: NOT DETECTED

## 2022-08-10 PROCEDURE — 99232 SBSQ HOSP IP/OBS MODERATE 35: CPT | Performed by: INTERNAL MEDICINE

## 2022-08-10 RX ORDER — LEVOFLOXACIN 500 MG/1
500 TABLET, FILM COATED ORAL DAILY
Qty: 5 TABLET | Refills: 0 | Status: SHIPPED | OUTPATIENT
Start: 2022-08-10 | End: 2022-08-25

## 2022-08-10 RX ORDER — BUMETANIDE 1 MG/1
1 TABLET ORAL
Qty: 60 TABLET | Refills: 0 | Status: SHIPPED | OUTPATIENT
Start: 2022-08-10 | End: 2022-08-25

## 2022-08-10 RX ORDER — ALPRAZOLAM 0.5 MG/1
0.5 TABLET ORAL NIGHTLY PRN
Qty: 30 TABLET | Refills: 0 | Status: SHIPPED | OUTPATIENT
Start: 2022-08-10 | End: 2022-08-25

## 2022-08-10 NOTE — DIETARY NOTE
Brief Nutrition Note:    Pt admitted for gastrointestinal hemorrhage. Pt screened at no nutrition risk at admission then re-screened no nutrition risk at LOS (7 days). Pt re-screened for prolonged LOS (14 days). PO Intake remains stable, averaging % on soft/easy to chew diet. Wt relatively stable, slight weight gain over past year. +1 BLE edema and Oral Bumex noted. Pt continues at no nutrition risk at this time. F/u per protocol. Please consult nutrition services if earlier intervention is indicated.     Wt Readings from Last 6 Encounters:  08/10/22 : 91.1 kg (200 lb 14.4 oz)  06/30/22 : 80.2 kg (176 lb 14.4 oz)  11/20/21 : 73.9 kg (163 lb)  10/01/21 : 77 kg (169 lb 12.1 oz)  05/19/21 : 75.3 kg (166 lb 1.6 oz)  03/30/21 : 78.2 kg (172 lb 8 oz)    Percent Meals Eaten (last 3 days)     Date/Time Percent Meals Eaten (%)    08/07/22 1120 100 %    08/07/22 1300 100 %    08/08/22 0900 0 %     Percent Meals Eaten (%): NPO at 08/08/22 0900    08/08/22 1500 50 %    08/08/22 1700 50 %    08/09/22 1109 100 %    08/09/22 1347 100 %    08/09/22 1744 100 %        Morgan Joseph MS, ANTONIO, RDN, LDN  Clinical Dietitian  P: [phone]

## 2022-08-10 NOTE — PLAN OF CARE
Kayleigh's cough treated per orders. She complained of a mild headache that improved with tylenol. She refused Bipap while sleeping and rested comfortably on RA. Plan for her is to be discharged back to [facility] once medically cleared. Problem: Patient Centered Care  Goal: Patient preferences are identified and integrated in the patient's plan of care  Description: Interventions:  - What would you like us to know as we care for you? From [facility]  - Provide timely, complete, and accurate information to patient/family  - Incorporate patient and family knowledge, values, beliefs, and cultural backgrounds into the planning and delivery of care  - Encourage patient/family to participate in care and decision-making at the level they choose  - Honor patient and family perspectives and choices  Outcome: Progressing     Problem: CARDIOVASCULAR - ADULT  Goal: Maintains optimal cardiac output and hemodynamic stability  Description: INTERVENTIONS:  - Monitor vital signs, rhythm, and trends  - Monitor for bleeding, hypotension and signs of decreased cardiac output  - Evaluate effectiveness of vasoactive medications to optimize hemodynamic stability  - Monitor arterial and/or venous puncture sites for bleeding and/or hematoma  - Assess quality of pulses, skin color and temperature  - Assess for signs of decreased coronary artery perfusion - ex.  Angina  - Evaluate fluid balance, assess for edema, trend weights  Outcome: Progressing  Goal: Absence of cardiac arrhythmias or at baseline  Description: INTERVENTIONS:  - Continuous cardiac monitoring, monitor vital signs, obtain 12 lead EKG if indicated  - Evaluate effectiveness of antiarrhythmic and heart rate control medications as ordered  - Initiate emergency measures for life threatening arrhythmias  - Monitor electrolytes and administer replacement therapy as ordered  Outcome: Progressing     Problem: RESPIRATORY - ADULT  Goal: Achieves optimal ventilation and oxygenation  Description: INTERVENTIONS:  - Assess for changes in respiratory status  - Assess for changes in mentation and behavior  - Position to facilitate oxygenation and minimize respiratory effort  - Oxygen supplementation based on oxygen saturation or ABGs  - Provide Smoking Cessation handout, if applicable  - Encourage broncho-pulmonary hygiene including cough, deep breathe, Incentive Spirometry  - Assess the need for suctioning and perform as needed  - Assess and instruct to report SOB or any respiratory difficulty  - Respiratory Therapy support as indicated  - Manage/alleviate anxiety  - Monitor for signs/symptoms of CO2 retention  Outcome: Progressing     Problem: METABOLIC/FLUID AND ELECTROLYTES - ADULT  Goal: Glucose maintained within prescribed range  Description: INTERVENTIONS:  - Monitor Blood Glucose as ordered  - Assess for signs and symptoms of hyperglycemia and hypoglycemia  - Administer ordered medications to maintain glucose within target range  - Assess barriers to adequate nutritional intake and initiate nutrition consult as needed  - Instruct patient on self management of diabetes  Outcome: Progressing     Problem: HEMATOLOGIC - ADULT  Goal: Maintains hematologic stability  Description: INTERVENTIONS  - Assess for signs and symptoms of bleeding or hemorrhage  - Monitor labs and vital signs for trends  - Administer supportive blood products/factors, fluids and medications as ordered and appropriate  - Administer supportive blood products/factors as ordered and appropriate  Outcome: Progressing  Goal: Free from bleeding injury  Description: (Example usage: patient with low platelets)  INTERVENTIONS:  - Avoid intramuscular injections, enemas and rectal medication administration  - Ensure safe mobilization of patient  - Hold pressure on venipuncture sites to achieve adequate hemostasis  - Assess for signs and symptoms of internal bleeding  - Monitor lab trends  - Patient is to report abnormal signs of bleeding to staff  - Avoid use of toothpicks and dental floss  - Use electric shaver for shaving  - Use soft bristle tooth brush  - Limit straining and forceful nose blowing  Outcome: Progressing     Problem: PAIN - ADULT  Goal: Verbalizes/displays adequate comfort level or patient's stated pain goal  Description: INTERVENTIONS:  - Encourage pt to monitor pain and request assistance  - Assess pain using appropriate pain scale  - Administer analgesics based on type and severity of pain and evaluate response  - Implement non-pharmacological measures as appropriate and evaluate response  - Consider cultural and social influences on pain and pain management  - Manage/alleviate anxiety  - Utilize distraction and/or relaxation techniques  - Monitor for opioid side effects  - Notify MD/LIP if interventions unsuccessful or patient reports new pain  - Anticipate increased pain with activity and pre-medicate as appropriate  Outcome: Progressing     Problem: SAFETY ADULT - FALL  Goal: Free from fall injury  Description: INTERVENTIONS:  - Assess pt frequently for physical needs  - Identify cognitive and physical deficits and behaviors that affect risk of falls.   - [place] fall precautions as indicated by assessment.  - Educate pt/family on patient safety including physical limitations  - Instruct pt to call for assistance with activity based on assessment  - Modify environment to reduce risk of injury  - Provide assistive devices as appropriate  - Consider OT/PT consult to assist with strengthening/mobility  - Encourage toileting schedule  Outcome: Progressing     Problem: DISCHARGE PLANNING  Goal: Discharge to home or other facility with appropriate resources  Description: INTERVENTIONS:  - Identify barriers to discharge w/pt and caregiver  - Include patient/family/discharge partner in discharge planning  - Arrange for needed discharge resources and transportation as appropriate  - Identify discharge learning needs (meds, wound care, etc)  - Arrange for interpreters to assist at discharge as needed  - Consider post-discharge preferences of patient/family/discharge partner  - Complete POLST form as appropriate  - Assess patient's ability to be responsible for managing their own health  - Refer to Case Management Department for coordinating discharge planning if the patient needs post-hospital services based on physician/LIP order or complex needs related to functional status, cognitive ability or social support system  Outcome: Progressing     Problem: GASTROINTESTINAL - ADULT  Goal: Maintains or returns to baseline bowel function  Description: INTERVENTIONS:  - Assess bowel function  - Maintain adequate hydration with IV or PO as ordered and tolerated  - Evaluate effectiveness of GI medications  - Encourage mobilization and activity  - Obtain nutritional consult as needed  - Establish a toileting routine/schedule  - Consider collaborating with pharmacy to review patient's medication profile  Outcome: Progressing [facility]

## 2022-08-10 NOTE — CM/SW NOTE
DC order placed. Pt to return to LTC. MONSE discussed DC plan w RN and liaison at [facility]. Janet/liaison requesting 3PM . PLAN: DC to [address] today 8/10 @3PM via [address] [place] [address] ([phone]), PCS done   Report: [phone]    SW remains available for support and/or discharge planning. Please do not hesitate to call/chat MONSE if further DC needs arise.      Davida Cody MSW, [place], [place]   Ext [phone]

## 2022-08-10 NOTE — DISCHARGE PLANNING
I called and gave report to nurse Yamilex Gambino at [facility] rehab facility. Patient's physical and history were relayed to nursing staff and included past medical history, admitting diagnosis of gastrointestinal hemorrhage. Patient will be discharging at 3pm as arranged by social work/case management. Phone number of primary nurse provided for follow up questions. Patient's diet: Cardiac diet with soft/easy to chew texture consistency  Patient takes medication whole with water  Patient's mobility status is: able to stand-pivot with 2 person assistance and a walker  Medical device going with patient?  Glasses  Patient has a pacemaker: A-V paced    Aylin WILSON, Discharge Leader [id number]

## 2022-08-10 NOTE — PLAN OF CARE
Problem: Patient Centered Care  Goal: Patient preferences are identified and integrated in the patient's plan of care  Description: Interventions:  - What would you like us to know as we care for you? From [facility]  - Provide timely, complete, and accurate information to patient/family  - Incorporate patient and family knowledge, values, beliefs, and cultural backgrounds into the planning and delivery of care  - Encourage patient/family to participate in care and decision-making at the level they choose  - Honor patient and family perspectives and choices  Outcome: Progressing     Problem: CARDIOVASCULAR - ADULT  Goal: Maintains optimal cardiac output and hemodynamic stability  Description: INTERVENTIONS:  - Monitor vital signs, rhythm, and trends  - Monitor for bleeding, hypotension and signs of decreased cardiac output  - Evaluate effectiveness of vasoactive medications to optimize hemodynamic stability  - Monitor arterial and/or venous puncture sites for bleeding and/or hematoma  - Assess quality of pulses, skin color and temperature  - Assess for signs of decreased coronary artery perfusion - ex.  Angina  - Evaluate fluid balance, assess for edema, trend weights  Outcome: Progressing  Goal: Absence of cardiac arrhythmias or at baseline  Description: INTERVENTIONS:  - Continuous cardiac monitoring, monitor vital signs, obtain 12 lead EKG if indicated  - Evaluate effectiveness of antiarrhythmic and heart rate control medications as ordered  - Initiate emergency measures for life threatening arrhythmias  - Monitor electrolytes and administer replacement therapy as ordered  Outcome: Progressing     Problem: RESPIRATORY - ADULT  Goal: Achieves optimal ventilation and oxygenation  Description: INTERVENTIONS:  - Assess for changes in respiratory status  - Assess for changes in mentation and behavior  - Position to facilitate oxygenation and minimize respiratory effort  - Oxygen supplementation based on oxygen saturation or ABGs  - Provide Smoking Cessation handout, if applicable  - Encourage broncho-pulmonary hygiene including cough, deep breathe, Incentive Spirometry  - Assess the need for suctioning and perform as needed  - Assess and instruct to report SOB or any respiratory difficulty  - Respiratory Therapy support as indicated  - Manage/alleviate anxiety  - Monitor for signs/symptoms of CO2 retention  Outcome: Progressing     Problem: METABOLIC/FLUID AND ELECTROLYTES - ADULT  Goal: Glucose maintained within prescribed range  Description: INTERVENTIONS:  - Monitor Blood Glucose as ordered  - Assess for signs and symptoms of hyperglycemia and hypoglycemia  - Administer ordered medications to maintain glucose within target range  - Assess barriers to adequate nutritional intake and initiate nutrition consult as needed  - Instruct patient on self management of diabetes  Outcome: Progressing     Problem: HEMATOLOGIC - ADULT  Goal: Maintains hematologic stability  Description: INTERVENTIONS  - Assess for signs and symptoms of bleeding or hemorrhage  - Monitor labs and vital signs for trends  - Administer supportive blood products/factors, fluids and medications as ordered and appropriate  - Administer supportive blood products/factors as ordered and appropriate  Outcome: Progressing  Goal: Free from bleeding injury  Description: (Example usage: patient with low platelets)  INTERVENTIONS:  - Avoid intramuscular injections, enemas and rectal medication administration  - Ensure safe mobilization of patient  - Hold pressure on venipuncture sites to achieve adequate hemostasis  - Assess for signs and symptoms of internal bleeding  - Monitor lab trends  - Patient is to report abnormal signs of bleeding to staff  - Avoid use of toothpicks and dental floss  - Use electric shaver for shaving  - Use soft bristle tooth brush  - Limit straining and forceful nose blowing  Outcome: Progressing     Problem: PAIN - ADULT  Goal: Verbalizes/displays adequate comfort level or patient's stated pain goal  Description: INTERVENTIONS:  - Encourage pt to monitor pain and request assistance  - Assess pain using appropriate pain scale  - Administer analgesics based on type and severity of pain and evaluate response  - Implement non-pharmacological measures as appropriate and evaluate response  - Consider cultural and social influences on pain and pain management  - Manage/alleviate anxiety  - Utilize distraction and/or relaxation techniques  - Monitor for opioid side effects  - Notify MD/LIP if interventions unsuccessful or patient reports new pain  - Anticipate increased pain with activity and pre-medicate as appropriate  Outcome: Progressing     Problem: SAFETY ADULT - FALL  Goal: Free from fall injury  Description: INTERVENTIONS:  - Assess pt frequently for physical needs  - Identify cognitive and physical deficits and behaviors that affect risk of falls.   - [place] fall precautions as indicated by assessment.  - Educate pt/family on patient safety including physical limitations  - Instruct pt to call for assistance with activity based on assessment  - Modify environment to reduce risk of injury  - Provide assistive devices as appropriate  - Consider OT/PT consult to assist with strengthening/mobility  - Encourage toileting schedule  Outcome: Progressing     Problem: DISCHARGE PLANNING  Goal: Discharge to home or other facility with appropriate resources  Description: INTERVENTIONS:  - Identify barriers to discharge w/pt and caregiver  - Include patient/family/discharge partner in discharge planning  - Arrange for needed discharge resources and transportation as appropriate  - Identify discharge learning needs (meds, wound care, etc)  - Arrange for interpreters to assist at discharge as needed  - Consider post-discharge preferences of patient/family/discharge partner  - Complete POLST form as appropriate  - Assess patient's ability to be responsible for managing their own health  - Refer to Case Management Department for coordinating discharge planning if the patient needs post-hospital services based on physician/LIP order or complex needs related to functional status, cognitive ability or social support system  Outcome: Progressing     Problem: GASTROINTESTINAL - ADULT  Goal: Maintains or returns to baseline bowel function  Description: INTERVENTIONS:  - Assess bowel function  - Maintain adequate hydration with IV or PO as ordered and tolerated  - Evaluate effectiveness of GI medications  - Encourage mobilization and activity  - Obtain nutritional consult as needed  - Establish a toileting routine/schedule  - Consider collaborating with pharmacy to review patient's medication profile  Outcome: Progressing     Pt alert and oriented X 4. No complaints throughout the day. Pt on room air. Pt still has a cough. Velarde discontinued per MD orders. Safety precautions in place, call light within reach. Plan is discharge to [facility] today.

## 2022-08-10 NOTE — PROGRESS NOTES
[facility]ave [facility] pt's discharge packet along with the printed prescriptions in the packet. IV and tele removed. Answered all questions. Returned all bedside belongings. Pt discharging to [facility].

## 2022-08-12 NOTE — CDS QUERY
.How to Answer this Query    1.) Click \"Edit\" button on the toolbar.  2.) Type an \"X\" in the bracket for the diagnosis that applies. (You may also add additional clinical details as you feel necessary to substantiate your response). 3.) Finally click \"Sign\" to complete response. Thank you    . Heart Failure  CLINICAL DOCUMENTATION CLARIFICATION FORM  Dear Doctor Socorro Hobbs:  Clinical information (provided below) includes a diagnosis of Heart Failure. For accurate ICD-10-CM code assignment to reflect severity of illness and risk of mortality,  PLEASE (X) ALL DIAGNOSES THAT APPLY. (    ) Acute Systolic Heart Failure   (    ) Acute on Chronic Systolic Heart Failure    (    ) Chronic Systolic Heart Failure      (    ) Acute Diastolic Heart Failure  (    ) Acute on Chronic Diastolic Heart Failure   (    ) Chronic Diastolic Heart Failure       (    ) Acute combined systolic and diastolic Heart Failure  (  X  ) Acute on chronic combined systolic and diastolic Heart Failure  (    ) Chronic combined systolic and diastolic Heart Failure     (    ) Other - please specify:   (    ) Unable to determine - please comment:         Clinical Indicators:  * Per Dr. Socorro Hobbs (8/3/22): Acute on chronic CHF   Dyspnea, poorly compensated    CXR: edema and atelectasis   Entresto stopped per renal  Lasix stopped per renal, cont bumex   Cardiology following   Dyspnea  Secondary to acute on chronic CHF w/ pulmonary edema and anemia     * Per Dr. Piero Saunders (8/3/22): Acute on chronic systolic and diastolic heart failure, with most recent ejection fraction improved to 50%. Patient improved with IV Lasix yesterday however creatinine increased from 2.2-2.4. *ECHO   Study Conclusions    Left ventricle: The cavity size was normal. Wall thickness was      increased in a pattern of mild LVH. Systolic function was    normal. The estimated ejection fraction was 50%, by visual    assessment.  Wall motion was normal; there were no regional wall motion abnormalities. Features are consistent with a    pseudonormal left ventricular filling pattern, with concomitant    abnormal relaxation and increased filling pressure (grade 2     diastolic dysfunction). If you have any questions, please contact Clinical :  Jerrell Vela RN at [phone]     Thank You!      THIS FORM IS A PERMANENT PART OF THE MEDICAL RECORD      '

## 2022-08-14 ENCOUNTER — HOSPITAL ENCOUNTER (INPATIENT)
Facility: HOSPITAL | Age: 72
LOS: 11 days | Discharge: NURSING, REHAB OR LONG-TERM CARE | End: 2022-08-25
Attending: EMERGENCY MEDICINE | Admitting: INTERNAL MEDICINE
Payer: MEDICARE

## 2022-08-14 ENCOUNTER — APPOINTMENT (OUTPATIENT)
Dept: GENERAL RADIOLOGY | Facility: HOSPITAL | Age: 72
End: 2022-08-14
Attending: EMERGENCY MEDICINE
Payer: MEDICARE

## 2022-08-14 ENCOUNTER — APPOINTMENT (OUTPATIENT)
Dept: CT IMAGING | Facility: HOSPITAL | Age: 72
End: 2022-08-14
Attending: EMERGENCY MEDICINE
Payer: MEDICARE

## 2022-08-14 DIAGNOSIS — R11.2 NAUSEA AND VOMITING IN ADULT: Primary | ICD-10-CM

## 2022-08-14 DIAGNOSIS — J69.0 ASPIRATION PNEUMONITIS (HCC): ICD-10-CM

## 2022-08-14 LAB
ALBUMIN SERPL-MCNC: 2.2 G/DL (ref 3.4–5)
ALP LIVER SERPL-CCNC: 242 U/L
ALT SERPL-CCNC: 34 U/L
ANION GAP SERPL CALC-SCNC: 9 MMOL/L (ref 0–18)
AST SERPL-CCNC: 53 U/L (ref 15–37)
BASOPHILS # BLD AUTO: 0.01 X10(3) UL (ref 0–0.2)
BASOPHILS NFR BLD AUTO: 0.2 %
BILIRUB DIRECT SERPL-MCNC: 0.2 MG/DL (ref 0–0.2)
BILIRUB SERPL-MCNC: 0.5 MG/DL (ref 0.1–2)
BILIRUB UR QL CFM: NEGATIVE
BUN BLD-MCNC: 87 MG/DL (ref 7–18)
BUN/CREAT SERPL: 30.9 (ref 10–20)
CALCIUM BLD-MCNC: 8.1 MG/DL (ref 8.5–10.1)
CHLORIDE SERPL-SCNC: 106 MMOL/L (ref 98–112)
CO2 SERPL-SCNC: 22 MMOL/L (ref 21–32)
COLOR UR: YELLOW
CREAT BLD-MCNC: 2.82 MG/DL
DEPRECATED RDW RBC AUTO: 78.3 FL (ref 35.1–46.3)
EOSINOPHIL # BLD AUTO: 0.05 X10(3) UL (ref 0–0.7)
EOSINOPHIL NFR BLD AUTO: 0.8 %
ERYTHROCYTE [DISTWIDTH] IN BLOOD BY AUTOMATED COUNT: 20.7 % (ref 11–15)
GFR SERPLBLD BASED ON 1.73 SQ M-ARVRAT: 17 ML/MIN/1.73M2 (ref 60–?)
GLUCOSE BLD-MCNC: 136 MG/DL (ref 70–99)
GLUCOSE BLDC GLUCOMTR-MCNC: 118 MG/DL (ref 70–99)
GLUCOSE BLDC GLUCOMTR-MCNC: 124 MG/DL (ref 70–99)
GLUCOSE UR-MCNC: NEGATIVE MG/DL
HCT VFR BLD AUTO: 31.1 %
HGB BLD-MCNC: 9.2 G/DL
HGB UR QL STRIP.AUTO: NEGATIVE
HYALINE CASTS #/AREA URNS AUTO: PRESENT /LPF
IMM GRANULOCYTES # BLD AUTO: 0.02 X10(3) UL (ref 0–1)
IMM GRANULOCYTES NFR BLD: 0.3 %
KETONES UR-MCNC: NEGATIVE MG/DL
LACTATE SERPL-SCNC: 1.5 MMOL/L (ref 0.4–2)
LYMPHOCYTES # BLD AUTO: 0.8 X10(3) UL (ref 1–4)
LYMPHOCYTES NFR BLD AUTO: 13 %
MCH RBC QN AUTO: 30.6 PG (ref 26–34)
MCHC RBC AUTO-ENTMCNC: 29.6 G/DL (ref 31–37)
MCV RBC AUTO: 103.3 FL
MONOCYTES # BLD AUTO: 0.22 X10(3) UL (ref 0.1–1)
MONOCYTES NFR BLD AUTO: 3.6 %
NEUTROPHILS # BLD AUTO: 5.06 X10 (3) UL (ref 1.5–7.7)
NEUTROPHILS # BLD AUTO: 5.06 X10(3) UL (ref 1.5–7.7)
NEUTROPHILS NFR BLD AUTO: 82.1 %
NITRITE UR QL STRIP.AUTO: NEGATIVE
OSMOLALITY SERPL CALC.SUM OF ELEC: 313 MOSM/KG (ref 275–295)
PH UR: 5 [PH] (ref 5–8)
PLATELET # BLD AUTO: 161 10(3)UL (ref 150–450)
POTASSIUM SERPL-SCNC: 4.5 MMOL/L (ref 3.5–5.1)
PROCALCITONIN SERPL-MCNC: 0.4 NG/ML (ref ?–0.16)
PROT SERPL-MCNC: 6.2 G/DL (ref 6.4–8.2)
PROT UR-MCNC: NEGATIVE MG/DL
RBC # BLD AUTO: 3.01 X10(6)UL
SARS-COV-2 RNA RESP QL NAA+PROBE: NOT DETECTED
SARS-COV-2 RNA RESP QL NAA+PROBE: NOT DETECTED
SODIUM SERPL-SCNC: 137 MMOL/L (ref 136–145)
SP GR UR STRIP: 1.01 (ref 1–1.03)
UROBILINOGEN UR STRIP-ACNC: 0.2
WBC # BLD AUTO: 6.2 X10(3) UL (ref 4–11)
WBC #/AREA URNS AUTO: >50 /HPF
WBC CLUMPS UR QL AUTO: PRESENT /HPF

## 2022-08-14 PROCEDURE — 99223 1ST HOSP IP/OBS HIGH 75: CPT | Performed by: INTERNAL MEDICINE

## 2022-08-14 PROCEDURE — 71045 X-RAY EXAM CHEST 1 VIEW: CPT | Performed by: EMERGENCY MEDICINE

## 2022-08-14 PROCEDURE — 70450 CT HEAD/BRAIN W/O DYE: CPT | Performed by: EMERGENCY MEDICINE

## 2022-08-14 PROCEDURE — 74176 CT ABD & PELVIS W/O CONTRAST: CPT | Performed by: EMERGENCY MEDICINE

## 2022-08-14 PROCEDURE — 72125 CT NECK SPINE W/O DYE: CPT | Performed by: EMERGENCY MEDICINE

## 2022-08-14 RX ORDER — CLOPIDOGREL BISULFATE 75 MG/1
75 TABLET ORAL DAILY
Status: DISCONTINUED | OUTPATIENT
Start: 2022-08-15 | End: 2022-08-25

## 2022-08-14 RX ORDER — FLUTICASONE PROPIONATE 50 MCG
1 SPRAY, SUSPENSION (ML) NASAL DAILY
Status: DISCONTINUED | OUTPATIENT
Start: 2022-08-15 | End: 2022-08-25

## 2022-08-14 RX ORDER — ACETAMINOPHEN 325 MG/1
650 TABLET ORAL EVERY 6 HOURS PRN
Status: DISCONTINUED | OUTPATIENT
Start: 2022-08-14 | End: 2022-08-25

## 2022-08-14 RX ORDER — ATORVASTATIN CALCIUM 80 MG/1
80 TABLET, FILM COATED ORAL NIGHTLY
Status: DISCONTINUED | OUTPATIENT
Start: 2022-08-14 | End: 2022-08-25

## 2022-08-14 RX ORDER — MIRTAZAPINE 7.5 MG/1
7.5 TABLET, FILM COATED ORAL NIGHTLY
Status: DISCONTINUED | OUTPATIENT
Start: 2022-08-14 | End: 2022-08-25

## 2022-08-14 RX ORDER — OLANZAPINE 2.5 MG/1
2.5 TABLET ORAL NIGHTLY
Status: DISCONTINUED | OUTPATIENT
Start: 2022-08-14 | End: 2022-08-25

## 2022-08-14 RX ORDER — SODIUM CHLORIDE 9 MG/ML
INJECTION, SOLUTION INTRAVENOUS CONTINUOUS
Status: DISCONTINUED | OUTPATIENT
Start: 2022-08-14 | End: 2022-08-15

## 2022-08-14 RX ORDER — ONDANSETRON 2 MG/ML
4 INJECTION INTRAMUSCULAR; INTRAVENOUS ONCE
Status: COMPLETED | OUTPATIENT
Start: 2022-08-14 | End: 2022-08-14

## 2022-08-14 RX ORDER — ALPRAZOLAM 0.5 MG/1
0.5 TABLET ORAL NIGHTLY PRN
Status: DISCONTINUED | OUTPATIENT
Start: 2022-08-14 | End: 2022-08-25

## 2022-08-14 RX ORDER — PANTOPRAZOLE SODIUM 40 MG/1
40 TABLET, DELAYED RELEASE ORAL
Status: DISCONTINUED | OUTPATIENT
Start: 2022-08-15 | End: 2022-08-25

## 2022-08-14 RX ORDER — HEPARIN SODIUM 5000 [USP'U]/ML
5000 INJECTION, SOLUTION INTRAVENOUS; SUBCUTANEOUS EVERY 8 HOURS SCHEDULED
Status: DISCONTINUED | OUTPATIENT
Start: 2022-08-15 | End: 2022-08-25

## 2022-08-14 RX ORDER — RANOLAZINE 500 MG/1
500 TABLET, EXTENDED RELEASE ORAL 2 TIMES DAILY
Status: DISCONTINUED | OUTPATIENT
Start: 2022-08-14 | End: 2022-08-25

## 2022-08-14 NOTE — ED INITIAL ASSESSMENT (HPI)
Pt to the ed from [facility] for stated unwitnessed fall this afternoon.  Per staff, pt was found sitting on the floor around 130pm  Nurse who found her stated that she was more disoriented  Pt with hx of dementia  Ens also stated pt was hypotensive in the 80s   Pt arrived in c-collar  Upon arrival pt vomited x 2  Pt rectal temp 94.4, warm blankets applied

## 2022-08-15 ENCOUNTER — APPOINTMENT (OUTPATIENT)
Dept: GENERAL RADIOLOGY | Facility: HOSPITAL | Age: 72
End: 2022-08-15
Attending: INTERNAL MEDICINE
Payer: MEDICARE

## 2022-08-15 LAB
AMMONIA PLAS-MCNC: 33 UMOL/L (ref 11–32)
ANION GAP SERPL CALC-SCNC: 9 MMOL/L (ref 0–18)
BASOPHILS # BLD AUTO: 0.01 X10(3) UL (ref 0–0.2)
BASOPHILS NFR BLD AUTO: 0.1 %
BUN BLD-MCNC: 85 MG/DL (ref 7–18)
BUN/CREAT SERPL: 31.8 (ref 10–20)
CALCIUM BLD-MCNC: 8 MG/DL (ref 8.5–10.1)
CHLORIDE SERPL-SCNC: 108 MMOL/L (ref 98–112)
CO2 SERPL-SCNC: 24 MMOL/L (ref 21–32)
CREAT BLD-MCNC: 2.67 MG/DL
DEPRECATED RDW RBC AUTO: 78.9 FL (ref 35.1–46.3)
EOSINOPHIL # BLD AUTO: 0.05 X10(3) UL (ref 0–0.7)
EOSINOPHIL NFR BLD AUTO: 0.6 %
ERYTHROCYTE [DISTWIDTH] IN BLOOD BY AUTOMATED COUNT: 20.6 % (ref 11–15)
GFR SERPLBLD BASED ON 1.73 SQ M-ARVRAT: 19 ML/MIN/1.73M2 (ref 60–?)
GLUCOSE BLD-MCNC: 128 MG/DL (ref 70–99)
GLUCOSE BLDC GLUCOMTR-MCNC: 125 MG/DL (ref 70–99)
GLUCOSE BLDC GLUCOMTR-MCNC: 88 MG/DL (ref 70–99)
GLUCOSE BLDC GLUCOMTR-MCNC: 91 MG/DL (ref 70–99)
HCT VFR BLD AUTO: 31.2 %
HGB BLD-MCNC: 9.3 G/DL
IMM GRANULOCYTES # BLD AUTO: 0.03 X10(3) UL (ref 0–1)
IMM GRANULOCYTES NFR BLD: 0.4 %
LYMPHOCYTES # BLD AUTO: 0.77 X10(3) UL (ref 1–4)
LYMPHOCYTES NFR BLD AUTO: 9 %
MCH RBC QN AUTO: 31.3 PG (ref 26–34)
MCHC RBC AUTO-ENTMCNC: 29.8 G/DL (ref 31–37)
MCV RBC AUTO: 105.1 FL
MONOCYTES # BLD AUTO: 0.31 X10(3) UL (ref 0.1–1)
MONOCYTES NFR BLD AUTO: 3.6 %
NEUTROPHILS # BLD AUTO: 7.38 X10 (3) UL (ref 1.5–7.7)
NEUTROPHILS # BLD AUTO: 7.38 X10(3) UL (ref 1.5–7.7)
NEUTROPHILS NFR BLD AUTO: 86.3 %
OSMOLALITY SERPL CALC.SUM OF ELEC: 319 MOSM/KG (ref 275–295)
PLATELET # BLD AUTO: 158 10(3)UL (ref 150–450)
POTASSIUM SERPL-SCNC: 4.3 MMOL/L (ref 3.5–5.1)
RBC # BLD AUTO: 2.97 X10(6)UL
SODIUM SERPL-SCNC: 141 MMOL/L (ref 136–145)
TSI SER-ACNC: 3.7 MIU/ML (ref 0.36–3.74)
WBC # BLD AUTO: 8.6 X10(3) UL (ref 4–11)

## 2022-08-15 PROCEDURE — 71045 X-RAY EXAM CHEST 1 VIEW: CPT | Performed by: INTERNAL MEDICINE

## 2022-08-15 PROCEDURE — 99233 SBSQ HOSP IP/OBS HIGH 50: CPT | Performed by: INTERNAL MEDICINE

## 2022-08-15 PROCEDURE — 99223 1ST HOSP IP/OBS HIGH 75: CPT | Performed by: OTHER

## 2022-08-15 RX ORDER — DEXTROSE AND SODIUM CHLORIDE 5; .45 G/100ML; G/100ML
INJECTION, SOLUTION INTRAVENOUS CONTINUOUS
Status: DISCONTINUED | OUTPATIENT
Start: 2022-08-15 | End: 2022-08-21

## 2022-08-15 RX ORDER — LACTULOSE 10 G/15ML
20 SOLUTION ORAL 2 TIMES DAILY
Status: DISCONTINUED | OUTPATIENT
Start: 2022-08-15 | End: 2022-08-25

## 2022-08-15 RX ORDER — BUMETANIDE 0.25 MG/ML
1 INJECTION, SOLUTION INTRAMUSCULAR; INTRAVENOUS
Status: DISCONTINUED | OUTPATIENT
Start: 2022-08-15 | End: 2022-08-17

## 2022-08-15 NOTE — CM/SW NOTE
BPCI-Advanced Medicare Program Note:   Plan of care reviewed for care coordination and discharge planning. Noted that patient is a BPCI-A readmission, previously enrolled under [facility] Congestive Heart Failure (291, W), currently on day 47 of 90 day in the current BPCI-A episode. NSOC recommendation is for return to LTC facility pending patient progress. Case Management team is following for care coordination and discharge planning needs.     Mikel Freeman MBA MSN, RN CTL/  [id number]

## 2022-08-15 NOTE — PROGRESS NOTES
[address] [facility]    CDI Prediction Tool Protocol (Vancomycin Prophylaxis Deferred)      This patient is currently at high risk for developing CDI due to his/her score being >/= 13 points. The current score is: 19.1    Score Breakdown:  History of CDI > 1 year ago AND high risk antibiotic use (8 points)  High risk antibiotic use (5 points)  Age 77 - 78 years (2 points)  Long term care facility resident (1 point)  Hypoalbuminemia: < 3g/dL (1 point)  PPI use in hospital (1 point)  Recently hospitalized: within 90 days (1 point)  History of CDI regardless of high risk antibiotic use (0.1 point)      However, they are not being initiated on OVP (oral vancomycin prophylaxis) at this time because NPO. This patient does not have C. difficile infection. All measures taken within this protocol are to assess this patient and potentially decrease the risk of CDI development.     Mariela Montez, PharmD  8/15/2022  7:16 AM  [place]  Pharmacy Extension: [phone] [No Acute Distress] : no acute distress [Well Nourished] : well nourished [Well Developed] : well developed [Well-Appearing] : well-appearing [Normal Voice/Communication] : normal voice/communication [Normal Sclera/Conjunctiva] : normal sclera/conjunctiva [EOMI] : extraocular movements intact [Normal Outer Ear/Nose] : the outer ears and nose were normal in appearance [Supple] : supple [No Lymphadenopathy] : no lymphadenopathy [No Respiratory Distress] : no respiratory distress  [Clear to Auscultation] : lungs were clear to auscultation bilaterally [No Accessory Muscle Use] : no accessory muscle use [Normal Rate] : normal rate  [Regular Rhythm] : with a regular rhythm [Normal S1, S2] : normal S1 and S2 [No Murmur] : no murmur heard [No Edema] : there was no peripheral edema [Soft] : abdomen soft [Non Tender] : non-tender [Non-distended] : non-distended [No Masses] : no abdominal mass palpated [No HSM] : no HSM [Normal Bowel Sounds] : normal bowel sounds [Normal Posterior Cervical Nodes] : no posterior cervical lymphadenopathy [Normal Anterior Cervical Nodes] : no anterior cervical lymphadenopathy [No CVA Tenderness] : no CVA  tenderness [No Spinal Tenderness] : no spinal tenderness [Grossly Normal Strength/Tone] : grossly normal strength/tone [Normal Gait] : normal gait [Coordination Grossly Intact] : coordination grossly intact [No Focal Deficits] : no focal deficits [Normal Affect] : the affect was normal [Alert and Oriented x3] : oriented to person, place, and time [Normal Insight/Judgement] : insight and judgment were intact [de-identified] : morbidly obese [de-identified] : tearful, anxous about pneumococcal vaccine

## 2022-08-15 NOTE — CM/SW NOTE
Department  notified of request for ana laura SMI[facility] referrals started. Assigned CM/SW to follow up with pt/family on further discharge planning.      Talon Medina   August 15, 2022   09:09

## 2022-08-15 NOTE — CM/SW NOTE
08/15/22 0800   CM/SW Referral Data   Referral Source    Reason for Referral Discharge planning;Readmission   Informant EMR;Clinical Staff Member   Readmission Assessment   Pt's living situation prior to admission? Long term care facility  [facility])   Was full assessment completed by SW/CM on prior admission? Yes   Was the recommended discharge plan achieved? Yes   Was pt. discharged w/out services? No   Discharge Needs   Anticipated D/C needs Long term care facility     CM self referred to case for discharge planning and readmission. Per chart review, pt is a LTC resident at [facility] and she was recently admitted to [address] [place] [address] twice in the past 3 months, now admitted after a fall at the nursing home with hypotension noted by paramedics. CM requested department  [facility]) to initiate [address] referral for return to [facility]. SW/CM will continue to follow.      Es Santos MBA MSN, RN CTL/  [id number]

## 2022-08-16 ENCOUNTER — APPOINTMENT (OUTPATIENT)
Dept: ULTRASOUND IMAGING | Facility: HOSPITAL | Age: 72
End: 2022-08-16
Attending: INTERNAL MEDICINE
Payer: MEDICARE

## 2022-08-16 PROBLEM — G93.40 ENCEPHALOPATHY: Status: ACTIVE | Noted: 2020-11-03

## 2022-08-16 LAB
AFP-TM SERPL-MCNC: 1.7 NG/ML (ref ?–8)
ALBUMIN FLD-MCNC: 0.4 G/DL
ALBUMIN SERPL-MCNC: 2 G/DL (ref 3.4–5)
AMYLASE PRT-CCNC: 8 U/L
ANION GAP SERPL CALC-SCNC: 8 MMOL/L (ref 0–18)
BASOPHILS # BLD AUTO: 0.02 X10(3) UL (ref 0–0.2)
BASOPHILS NFR BLD AUTO: 0.2 %
BASOPHILS NFR PRT: 0 %
BUN BLD-MCNC: 80 MG/DL (ref 7–18)
BUN/CREAT SERPL: 30.7 (ref 10–20)
CALCIUM BLD-MCNC: 7.8 MG/DL (ref 8.5–10.1)
CHLORIDE SERPL-SCNC: 110 MMOL/L (ref 98–112)
CO2 SERPL-SCNC: 22 MMOL/L (ref 21–32)
CREAT BLD-MCNC: 2.61 MG/DL
DEPRECATED RDW RBC AUTO: 81.8 FL (ref 35.1–46.3)
EOSINOPHIL # BLD AUTO: 0.16 X10(3) UL (ref 0–0.7)
EOSINOPHIL NFR BLD AUTO: 1.8 %
EOSINOPHIL NFR PRT: 0 %
ERYTHROCYTE [DISTWIDTH] IN BLOOD BY AUTOMATED COUNT: 21.2 % (ref 11–15)
GFR SERPLBLD BASED ON 1.73 SQ M-ARVRAT: 19 ML/MIN/1.73M2 (ref 60–?)
GLUCOSE BLD-MCNC: 122 MG/DL (ref 70–99)
GLUCOSE BLDC GLUCOMTR-MCNC: 104 MG/DL (ref 70–99)
GLUCOSE BLDC GLUCOMTR-MCNC: 105 MG/DL (ref 70–99)
GLUCOSE BLDC GLUCOMTR-MCNC: 95 MG/DL (ref 70–99)
GLUCOSE BLDC GLUCOMTR-MCNC: 96 MG/DL (ref 70–99)
GLUCOSE PRT-MCNC: 108 MG/DL
HCT VFR BLD AUTO: 28.3 %
HGB BLD-MCNC: 8.5 G/DL
IMM GRANULOCYTES # BLD AUTO: 0.04 X10(3) UL (ref 0–1)
IMM GRANULOCYTES NFR BLD: 0.4 %
INR BLD: 1.32 (ref 0.85–1.16)
LDH FLD L TO P-CCNC: 48 U/L
LYMPHOCYTES # BLD AUTO: 1.08 X10(3) UL (ref 1–4)
LYMPHOCYTES NFR BLD AUTO: 12.1 %
LYMPHOCYTES NFR PRT: 54 %
MCH RBC QN AUTO: 31.3 PG (ref 26–34)
MCHC RBC AUTO-ENTMCNC: 30 G/DL (ref 31–37)
MCV RBC AUTO: 104 FL
MONOCYTES # BLD AUTO: 0.46 X10(3) UL (ref 0.1–1)
MONOCYTES NFR BLD AUTO: 5.1 %
MONOS+MACROS NFR PRT: 41 %
NEUTROPHILS # BLD AUTO: 7.19 X10 (3) UL (ref 1.5–7.7)
NEUTROPHILS # BLD AUTO: 7.19 X10(3) UL (ref 1.5–7.7)
NEUTROPHILS NFR BLD AUTO: 80.4 %
NEUTROPHILS NFR FLD: 4 %
OSMOLALITY SERPL CALC.SUM OF ELEC: 315 MOSM/KG (ref 275–295)
PHOSPHATE SERPL-MCNC: 3.2 MG/DL (ref 2.5–4.9)
PLATELET # BLD AUTO: 174 10(3)UL (ref 150–450)
PLATELET MORPHOLOGY: NORMAL
POTASSIUM SERPL-SCNC: 4.1 MMOL/L (ref 3.5–5.1)
PROT PRT-MCNC: <1 G/DL
PROTHROMBIN TIME: 16.3 SECONDS (ref 11.6–14.8)
RBC # BLD AUTO: 2.72 X10(6)UL
RBC # FLD: 10 /CUMM (ref ?–1)
SODIUM SERPL-SCNC: 140 MMOL/L (ref 136–145)
TOTAL CELLS COUNTED FLD: 100
TOTAL CELLS COUNTED PRT: 276 /CUMM (ref ?–1)
WBC # BLD AUTO: 9 X10(3) UL (ref 4–11)
WBC # PRT: 273 /CUMM
WBC OTHER NFR PRT: 1 %

## 2022-08-16 PROCEDURE — 0W9G3ZZ DRAINAGE OF PERITONEAL CAVITY, PERCUTANEOUS APPROACH: ICD-10-PCS | Performed by: INTERNAL MEDICINE

## 2022-08-16 PROCEDURE — 49083 ABD PARACENTESIS W/IMAGING: CPT | Performed by: INTERNAL MEDICINE

## 2022-08-16 PROCEDURE — 99232 SBSQ HOSP IP/OBS MODERATE 35: CPT | Performed by: OTHER

## 2022-08-16 PROCEDURE — 95816 EEG AWAKE AND DROWSY: CPT | Performed by: OTHER

## 2022-08-16 PROCEDURE — 99233 SBSQ HOSP IP/OBS HIGH 50: CPT | Performed by: INTERNAL MEDICINE

## 2022-08-16 RX ORDER — LEVETIRACETAM 500 MG/5ML
1000 INJECTION, SOLUTION, CONCENTRATE INTRAVENOUS EVERY 12 HOURS
Status: DISCONTINUED | OUTPATIENT
Start: 2022-08-16 | End: 2022-08-25

## 2022-08-16 RX ORDER — DIAZEPAM 5 MG/ML
INJECTION, SOLUTION INTRAMUSCULAR; INTRAVENOUS
Status: DISPENSED
Start: 2022-08-16 | End: 2022-08-17

## 2022-08-16 RX ORDER — MORPHINE SULFATE 2 MG/ML
INJECTION, SOLUTION INTRAMUSCULAR; INTRAVENOUS
Status: COMPLETED
Start: 2022-08-16 | End: 2022-08-16

## 2022-08-16 RX ORDER — VANCOMYCIN HYDROCHLORIDE 125 MG/1
125 CAPSULE ORAL DAILY
Status: DISCONTINUED | OUTPATIENT
Start: 2022-08-16 | End: 2022-08-25

## 2022-08-16 RX ORDER — LACOSAMIDE 10 MG/ML
200 INJECTION, SOLUTION INTRAVENOUS EVERY 12 HOURS
Status: DISCONTINUED | OUTPATIENT
Start: 2022-08-16 | End: 2022-08-25

## 2022-08-16 RX ORDER — DIAZEPAM 5 MG/ML
5 INJECTION, SOLUTION INTRAMUSCULAR; INTRAVENOUS ONCE
Status: COMPLETED | OUTPATIENT
Start: 2022-08-16 | End: 2022-08-16

## 2022-08-16 RX ORDER — MORPHINE SULFATE 2 MG/ML
2 INJECTION, SOLUTION INTRAMUSCULAR; INTRAVENOUS
Status: DISCONTINUED | OUTPATIENT
Start: 2022-08-16 | End: 2022-08-25

## 2022-08-16 NOTE — IMAGING NOTE
1520 Pt to ultrasound room 3, pt does not open eyes or respond to questions/commands, moves legs slightly. Intermittent raspy-sounding but nonproductive cough, then moans w/pain briefly. Moans at times but does not respond to questions. Pt scouts taken by Robert Peng at [phone] Phone consent obtained by [facility] RN, pt not able to consent due to cognitive state. Pt to get albumin 25% 25 grams  marzena THOMPSON  Here to access- scanning completed and reviewed. PLATELETS = 807  PT= 16.3    INR= 1.32    1540 Timeout taken    1540 Chloro prep  as skin prep sterile drape applied lidocaine 1% 10 milligrams per ml from kit was given for anesthetic affect 5 ml total given. Incision made with scalpel. 1541 5 Azeri 10 cm Epoqueeh catheter placed right lower abdomen    Fluid aspirated for labs  YES     1542 Catheter connected  to tubing then connected to [employer] to [employer]. Draining begins continuously via  [place] [facility]. 1544 Draining completed. Patient re scanned. total amount drained 139 ml. Drain was dc'd. Pressure to site for 5 minutes. Area was cleaned steri strips to site. 1600 called Glory Dumont RN, w/report & site dressing instructions . 1610 [place] system was cleaned after procedure by ERWIN Campos, [facility] Transport pending to return pt to room

## 2022-08-16 NOTE — PLAN OF CARE
Patient confused and not following commands. Moaning in pain. PRNs utilized. Paracentesis performed today. Abnormal EEG with continuous EEG ordered overnight. CPAP while sleeping. Attempted PO meal, patient moaning and not taking good swallows of food. Coughing but also coughing when not eating. Plan for MRI when able to comply    Problem: Diabetes/Glucose Control  Goal: Glucose maintained within prescribed range  Description: INTERVENTIONS:  - Monitor Blood Glucose as ordered  - Assess for signs and symptoms of hyperglycemia and hypoglycemia  - Administer ordered medications to maintain glucose within target range  - Assess barriers to adequate nutritional intake and initiate nutrition consult as needed  - Instruct patient on self management of diabetes  Outcome: Progressing     Problem: CARDIOVASCULAR - ADULT  Goal: Maintains optimal cardiac output and hemodynamic stability  Description: INTERVENTIONS:  - Monitor vital signs, rhythm, and trends  - Monitor for bleeding, hypotension and signs of decreased cardiac output  - Evaluate effectiveness of vasoactive medications to optimize hemodynamic stability  - Monitor arterial and/or venous puncture sites for bleeding and/or hematoma  - Assess quality of pulses, skin color and temperature  - Assess for signs of decreased coronary artery perfusion - ex.  Angina  - Evaluate fluid balance, assess for edema, trend weights  Outcome: Progressing     Problem: RESPIRATORY - ADULT  Goal: Achieves optimal ventilation and oxygenation  Description: INTERVENTIONS:  - Assess for changes in respiratory status  - Assess for changes in mentation and behavior  - Position to facilitate oxygenation and minimize respiratory effort  - Oxygen supplementation based on oxygen saturation or ABGs  - Provide Smoking Cessation handout, if applicable  - Encourage broncho-pulmonary hygiene including cough, deep breathe, Incentive Spirometry  - Assess the need for suctioning and perform as needed  - Assess and instruct to report SOB or any respiratory difficulty  - Respiratory Therapy support as indicated  - Manage/alleviate anxiety  - Monitor for signs/symptoms of CO2 retention  Outcome: Progressing     Problem: METABOLIC/FLUID AND ELECTROLYTES - ADULT  Goal: Glucose maintained within prescribed range  Description: INTERVENTIONS:  - Monitor Blood Glucose as ordered  - Assess for signs and symptoms of hyperglycemia and hypoglycemia  - Administer ordered medications to maintain glucose within target range  - Assess barriers to adequate nutritional intake and initiate nutrition consult as needed  - Instruct patient on self management of diabetes  Outcome: Progressing

## 2022-08-16 NOTE — PROGRESS NOTES
Received call from CT to send for patient, consent still pending. Attempt to call POA at [phone] and [phone], message left for call back. Notified CT and will hold off and wait for consent. Will attempt at a later time to call.

## 2022-08-16 NOTE — PROCEDURES
EEG report    REFERRING PHYSICIAN: Kelsie Encinas MD  PCP and phone number:  None Pcp  None    TECHNIQUE: 21 channels of EEG, 2 channels of EOG, and 1 channel of EKG were recorded utilizing the International 10/20 System. The recording was performed in a digitized monopolar referential format and playback was reformatted into various referential and bipolar montages utilizing appropriate filter settings. Automatic seizure and spike detection programs were utilized throughout the recording. Video was recorded during the study    CLINICAL DATA:  Patient is sent for the evaluation of possible seizures and to help with a prognosis. MEDICATION:  No current outpatient medications on file. ACTIVATION:  Hyperventilation: Not done  Photic stimulation: Not done  Sleep: No sleep architecture was seen. BACKGROUND    Background slow activity was seen. Throughout the record, a marked amount of low to medium voltage, polymorphic, 3-5 Hz slow wave activity was seen diffusely over both hemispheres without localization or lateralization. No clear posterior dominant rhythm was seen. Frequent periodic generalized sharp wave activity was seen. IMPRESSION:    This is an abnormal EEG. 0.5-1 Hz generalized periodic pattern was seen. This finding is consistent with acute or subacute, severe diffuse encephalopathy as seen in acute cerebral anoxia and severe metabolic derangements. Of note, post-ictal or ictal states can result in this pattern. Status epilepticus can be considered.

## 2022-08-17 ENCOUNTER — APPOINTMENT (OUTPATIENT)
Dept: GENERAL RADIOLOGY | Facility: HOSPITAL | Age: 72
End: 2022-08-17
Attending: INTERNAL MEDICINE
Payer: MEDICARE

## 2022-08-17 LAB
ALBUMIN SERPL-MCNC: 2.1 G/DL (ref 3.4–5)
ANION GAP SERPL CALC-SCNC: 6 MMOL/L (ref 0–18)
BASOPHILS # BLD AUTO: 0.01 X10(3) UL (ref 0–0.2)
BASOPHILS NFR BLD AUTO: 0.2 %
BUN BLD-MCNC: 74 MG/DL (ref 7–18)
BUN/CREAT SERPL: 31.4 (ref 10–20)
CALCIUM BLD-MCNC: 8.1 MG/DL (ref 8.5–10.1)
CHLORIDE SERPL-SCNC: 114 MMOL/L (ref 98–112)
CO2 SERPL-SCNC: 22 MMOL/L (ref 21–32)
CREAT BLD-MCNC: 2.36 MG/DL
DEPRECATED RDW RBC AUTO: 83.7 FL (ref 35.1–46.3)
EOSINOPHIL # BLD AUTO: 0.13 X10(3) UL (ref 0–0.7)
EOSINOPHIL NFR BLD AUTO: 2.2 %
ERYTHROCYTE [DISTWIDTH] IN BLOOD BY AUTOMATED COUNT: 21.2 % (ref 11–15)
GFR SERPLBLD BASED ON 1.73 SQ M-ARVRAT: 22 ML/MIN/1.73M2 (ref 60–?)
GLUCOSE BLD-MCNC: 94 MG/DL (ref 70–99)
GLUCOSE BLDC GLUCOMTR-MCNC: 101 MG/DL (ref 70–99)
GLUCOSE BLDC GLUCOMTR-MCNC: 107 MG/DL (ref 70–99)
GLUCOSE BLDC GLUCOMTR-MCNC: 93 MG/DL (ref 70–99)
GLUCOSE BLDC GLUCOMTR-MCNC: 94 MG/DL (ref 70–99)
GLUCOSE BLDC GLUCOMTR-MCNC: 97 MG/DL (ref 70–99)
HCT VFR BLD AUTO: 28.8 %
HGB BLD-MCNC: 8.6 G/DL
IMM GRANULOCYTES # BLD AUTO: 0.02 X10(3) UL (ref 0–1)
IMM GRANULOCYTES NFR BLD: 0.3 %
LYMPHOCYTES # BLD AUTO: 1.25 X10(3) UL (ref 1–4)
LYMPHOCYTES NFR BLD AUTO: 21 %
MCH RBC QN AUTO: 31.9 PG (ref 26–34)
MCHC RBC AUTO-ENTMCNC: 29.9 G/DL (ref 31–37)
MCV RBC AUTO: 106.7 FL
MONOCYTES # BLD AUTO: 0.35 X10(3) UL (ref 0.1–1)
MONOCYTES NFR BLD AUTO: 5.9 %
NEUTROPHILS # BLD AUTO: 4.2 X10 (3) UL (ref 1.5–7.7)
NEUTROPHILS # BLD AUTO: 4.2 X10(3) UL (ref 1.5–7.7)
NEUTROPHILS NFR BLD AUTO: 70.4 %
OSMOLALITY SERPL CALC.SUM OF ELEC: 316 MOSM/KG (ref 275–295)
PHOSPHATE SERPL-MCNC: 3.4 MG/DL (ref 2.5–4.9)
PLATELET # BLD AUTO: 135 10(3)UL (ref 150–450)
POTASSIUM SERPL-SCNC: 4.2 MMOL/L (ref 3.5–5.1)
RBC # BLD AUTO: 2.7 X10(6)UL
SODIUM SERPL-SCNC: 142 MMOL/L (ref 136–145)
WBC # BLD AUTO: 6 X10(3) UL (ref 4–11)

## 2022-08-17 PROCEDURE — 99233 SBSQ HOSP IP/OBS HIGH 50: CPT | Performed by: OTHER

## 2022-08-17 PROCEDURE — 99233 SBSQ HOSP IP/OBS HIGH 50: CPT | Performed by: INTERNAL MEDICINE

## 2022-08-17 PROCEDURE — 71045 X-RAY EXAM CHEST 1 VIEW: CPT | Performed by: INTERNAL MEDICINE

## 2022-08-17 PROCEDURE — 95720 EEG PHY/QHP EA INCR W/VEEG: CPT | Performed by: OTHER

## 2022-08-17 RX ORDER — BUMETANIDE 0.25 MG/ML
2 INJECTION, SOLUTION INTRAMUSCULAR; INTRAVENOUS
Status: DISCONTINUED | OUTPATIENT
Start: 2022-08-17 | End: 2022-08-19

## 2022-08-17 NOTE — PROCEDURES
Continuous video EEG report    REFERRING PHYSICIAN: Norbert Hi MD  PCP and phone number:  None Pcp  None    TECHNIQUE: 21 channels of EEG, 2 channels of EOG, and 1 channel of EKG were recorded utilizing the International 10/20 System. The recording was performed in a digitized monopolar referential format and playback was reformatted into various referential and bipolar montages utilizing appropriate filter settings. Automatic seizure and spike detection programs were utilized throughout the recording. Video was recorded during the study    Total recording 13 hours. CLINICAL DATA:  Patient is sent for the evaluation of possible seizures and to help with a prognosis. MEDICATION:  No current outpatient medications on file. ACTIVATION:  Hyperventilation: Not done  Photic stimulation: Not done  Sleep: No sleep architecture was seen. BACKGROUND    Background slow activity was seen. Throughout the record, a marked amount of low to medium voltage, polymorphic, 3-5 Hz slow wave activity was seen diffusely over both hemispheres without localization or lateralization. No clear posterior dominant rhythm was seen. Frequent periodic generalized sharp wave activity was seen first.  Later part of the recording, through the night, discharges became less periodic, occasional.  By the morning there was a further improvement in frequency of discharges, some of them obtaining triphasic appearance. IMPRESSION:    This is an abnormal EEG. 0.5-1 Hz generalized periodic pattern was seen at first, throughout the recording there was further improvement of frequency and losing periodicity, eventually becoming pattern of occasional sharply contoured slow wave skills, some of them with triphasic appearance. This finding is consistent with acute or subacute, severe diffuse encephalopathy as seen in acute cerebral anoxia and severe metabolic derangements.  Of note, post-ictal or ictal states can result in this pattern.

## 2022-08-17 NOTE — PLAN OF CARE
Problem: Patient Centered Care  Goal: Patient preferences are identified and integrated in the patient's plan of care  Description: Interventions:  - What would you like us to know as we care for you? Your pain level  - Provide timely, complete, and accurate information to patient/family  - Incorporate patient and family knowledge, values, beliefs, and cultural backgrounds into the planning and delivery of care  - Encourage patient/family to participate in care and decision-making at the level they choose  - Honor patient and family perspectives and choices  Outcome: Progressing     Problem: Diabetes/Glucose Control  Goal: Glucose maintained within prescribed range  Description: INTERVENTIONS:  - Monitor Blood Glucose as ordered  - Assess for signs and symptoms of hyperglycemia and hypoglycemia  - Administer ordered medications to maintain glucose within target range  - Assess barriers to adequate nutritional intake and initiate nutrition consult as needed  - Instruct patient on self management of diabetes  Outcome: Progressing     Problem: CARDIOVASCULAR - ADULT  Goal: Maintains optimal cardiac output and hemodynamic stability  Description: INTERVENTIONS:  - Monitor vital signs, rhythm, and trends  - Monitor for bleeding, hypotension and signs of decreased cardiac output  - Evaluate effectiveness of vasoactive medications to optimize hemodynamic stability  - Monitor arterial and/or venous puncture sites for bleeding and/or hematoma  - Assess quality of pulses, skin color and temperature  - Assess for signs of decreased coronary artery perfusion - ex.  Angina  - Evaluate fluid balance, assess for edema, trend weights  Outcome: Progressing     Problem: RESPIRATORY - ADULT  Goal: Achieves optimal ventilation and oxygenation  Description: INTERVENTIONS:  - Assess for changes in respiratory status  - Assess for changes in mentation and behavior  - Position to facilitate oxygenation and minimize respiratory effort  - Oxygen supplementation based on oxygen saturation or ABGs  - Provide Smoking Cessation handout, if applicable  - Encourage broncho-pulmonary hygiene including cough, deep breathe, Incentive Spirometry  - Assess the need for suctioning and perform as needed  - Assess and instruct to report SOB or any respiratory difficulty  - Respiratory Therapy support as indicated  - Manage/alleviate anxiety  - Monitor for signs/symptoms of CO2 retention  Outcome: Progressing     Problem: GASTROINTESTINAL - ADULT  Goal: Minimal or absence of nausea and vomiting  Description: INTERVENTIONS:  - Maintain adequate hydration with IV or PO as ordered and tolerated  - Nasogastric tube to low intermittent suction as ordered  - Evaluate effectiveness of ordered antiemetic medications  - Provide nonpharmacologic comfort measures as appropriate  - Advance diet as tolerated, if ordered  - Obtain nutritional consult as needed  - Evaluate fluid balance  Outcome: Progressing  Goal: Maintains or returns to baseline bowel function  Description: INTERVENTIONS:  - Assess bowel function  - Maintain adequate hydration with IV or PO as ordered and tolerated  - Evaluate effectiveness of GI medications  - Encourage mobilization and activity  - Obtain nutritional consult as needed  - Establish a toileting routine/schedule  - Consider collaborating with pharmacy to review patient's medication profile  Outcome: Progressing     Problem: METABOLIC/FLUID AND ELECTROLYTES - ADULT  Goal: Glucose maintained within prescribed range  Description: INTERVENTIONS:  - Monitor Blood Glucose as ordered  - Assess for signs and symptoms of hyperglycemia and hypoglycemia  - Administer ordered medications to maintain glucose within target range  - Assess barriers to adequate nutritional intake and initiate nutrition consult as needed  - Instruct patient on self management of diabetes  Outcome: Progressing  Goal: Electrolytes maintained within normal limits  Description: INTERVENTIONS:  - Monitor labs and rhythm and assess patient for signs and symptoms of electrolyte imbalances  - Administer electrolyte replacement as ordered  - Monitor response to electrolyte replacements, including rhythm and repeat lab results as appropriate  - Fluid restriction as ordered  - Instruct patient on fluid and nutrition restrictions as appropriate  Outcome: Progressing     Problem: SKIN/TISSUE INTEGRITY - ADULT  Goal: Oral mucous membranes remain intact  Description: INTERVENTIONS  - Assess oral mucosa and hygiene practices  - Implement preventative oral hygiene regimen  - Implement oral medicated treatments as ordered  Outcome: Progressing

## 2022-08-18 ENCOUNTER — APPOINTMENT (OUTPATIENT)
Dept: MRI IMAGING | Facility: HOSPITAL | Age: 72
End: 2022-08-18
Attending: Other
Payer: MEDICARE

## 2022-08-18 PROBLEM — Z71.89 GOALS OF CARE, COUNSELING/DISCUSSION: Status: ACTIVE | Noted: 2022-08-18

## 2022-08-18 PROBLEM — Z71.89 ADVANCE CARE PLANNING: Status: ACTIVE | Noted: 2022-08-18

## 2022-08-18 LAB
ALBUMIN SERPL-MCNC: 2.1 G/DL (ref 3.4–5)
AMMONIA PLAS-MCNC: 43 UMOL/L (ref 11–32)
ANION GAP SERPL CALC-SCNC: 8 MMOL/L (ref 0–18)
BASOPHILS # BLD AUTO: 0.02 X10(3) UL (ref 0–0.2)
BASOPHILS NFR BLD AUTO: 0.2 %
BUN BLD-MCNC: 68 MG/DL (ref 7–18)
BUN/CREAT SERPL: 28.9 (ref 10–20)
CALCIUM BLD-MCNC: 8 MG/DL (ref 8.5–10.1)
CHLORIDE SERPL-SCNC: 114 MMOL/L (ref 98–112)
CO2 SERPL-SCNC: 21 MMOL/L (ref 21–32)
CREAT BLD-MCNC: 2.35 MG/DL
DEPRECATED RDW RBC AUTO: 83.7 FL (ref 35.1–46.3)
EOSINOPHIL # BLD AUTO: 0.11 X10(3) UL (ref 0–0.7)
EOSINOPHIL NFR BLD AUTO: 1.2 %
ERYTHROCYTE [DISTWIDTH] IN BLOOD BY AUTOMATED COUNT: 21.2 % (ref 11–15)
GFR SERPLBLD BASED ON 1.73 SQ M-ARVRAT: 22 ML/MIN/1.73M2 (ref 60–?)
GLUCOSE BLD-MCNC: 118 MG/DL (ref 70–99)
GLUCOSE BLDC GLUCOMTR-MCNC: 106 MG/DL (ref 70–99)
GLUCOSE BLDC GLUCOMTR-MCNC: 114 MG/DL (ref 70–99)
GLUCOSE BLDC GLUCOMTR-MCNC: 127 MG/DL (ref 70–99)
GLUCOSE BLDC GLUCOMTR-MCNC: 144 MG/DL (ref 70–99)
HCT VFR BLD AUTO: 29.6 %
HGB BLD-MCNC: 8.9 G/DL
IMM GRANULOCYTES # BLD AUTO: 0.05 X10(3) UL (ref 0–1)
IMM GRANULOCYTES NFR BLD: 0.5 %
LYMPHOCYTES # BLD AUTO: 1.14 X10(3) UL (ref 1–4)
LYMPHOCYTES NFR BLD AUTO: 12.3 %
MCH RBC QN AUTO: 32.4 PG (ref 26–34)
MCHC RBC AUTO-ENTMCNC: 30.1 G/DL (ref 31–37)
MCV RBC AUTO: 107.6 FL
MONOCYTES # BLD AUTO: 0.4 X10(3) UL (ref 0.1–1)
MONOCYTES NFR BLD AUTO: 4.3 %
NEUTROPHILS # BLD AUTO: 7.58 X10 (3) UL (ref 1.5–7.7)
NEUTROPHILS # BLD AUTO: 7.58 X10(3) UL (ref 1.5–7.7)
NEUTROPHILS NFR BLD AUTO: 81.5 %
OSMOLALITY SERPL CALC.SUM OF ELEC: 317 MOSM/KG (ref 275–295)
PHOSPHATE SERPL-MCNC: 3.7 MG/DL (ref 2.5–4.9)
PLATELET # BLD AUTO: 186 10(3)UL (ref 150–450)
POTASSIUM SERPL-SCNC: 4.2 MMOL/L (ref 3.5–5.1)
RBC # BLD AUTO: 2.75 X10(6)UL
SODIUM SERPL-SCNC: 143 MMOL/L (ref 136–145)
WBC # BLD AUTO: 9.3 X10(3) UL (ref 4–11)

## 2022-08-18 PROCEDURE — 99233 SBSQ HOSP IP/OBS HIGH 50: CPT | Performed by: OTHER

## 2022-08-18 PROCEDURE — 99222 1ST HOSP IP/OBS MODERATE 55: CPT | Performed by: REGISTERED NURSE

## 2022-08-18 PROCEDURE — 99232 SBSQ HOSP IP/OBS MODERATE 35: CPT | Performed by: INTERNAL MEDICINE

## 2022-08-18 PROCEDURE — 70551 MRI BRAIN STEM W/O DYE: CPT | Performed by: OTHER

## 2022-08-18 RX ORDER — MULTIPLE VITAMINS W/ MINERALS TAB 9MG-400MCG
1 TAB ORAL DAILY
Status: DISCONTINUED | OUTPATIENT
Start: 2022-08-18 | End: 2022-08-25

## 2022-08-18 RX ORDER — DIAZEPAM 5 MG/ML
5 INJECTION, SOLUTION INTRAMUSCULAR; INTRAVENOUS ONCE
Status: COMPLETED | OUTPATIENT
Start: 2022-08-18 | End: 2022-08-18

## 2022-08-18 NOTE — CONSULTS
[facility]    PATIENT'S NAME: Jaylene Waters   ATTENDING PHYSICIAN: Robert Rico MD   CONSULTING PHYSICIAN: Sherita Bowser DO   PATIENT ACCOUNT#:   [de-identified]    LOCATION:  [address] [place] [address] #:   [id number]       YOB: 1950  ADMISSION DATE:       08/14/2022      CONSULT DATE:  08/17/2022    REPORT OF CONSULTATION    ####EDITING MAY BE REQUIRED####    REASON FOR CONSULTATION:  Pre-MRI evaluation. HISTORY OF PRESENT ILLNESS:  Patient is well known to the cardiology service, admitted with altered mental status, fairly acute onset, as I had seen her last week and she seemed like in good condition. 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 She was found down at the nursing home with confusion and lethargy. Patient has been confused and somnolent here. She has been diagnosed here with urinary tract infection. She has received a lactulose enema yesterday. She also has history of esophageal varices with portal hypertensive gastropathy. There was plan for Dobbhoff placement for nutrition, but this was unable to be inserted today. Patient was placed on IV Bumex 2 mg b.i.d. for increased diuresis. PAST MEDICAL HISTORY:  Significant for as above. 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 The patient has been turned down for surgery by 2 surgeons here at [facility] and also another surgeon at [facility].       SOCIAL HISTORY:  No current smoking. REVIEW OF SYSTEMS:  Could not be performed in patient's clinical condition. PHYSICAL EXAMINATION:    GENERAL:  Patient appears somnolent. VITAL SIGNS:  Blood pressure 93/65, pulse 66. Patient afebrile. HEENT:  Head is atraumatic. Oral mucosa appears dry. Sclerae not well seen due to patient cooperation. NECK:  Veins are not seen. LUNGS:  Diminished due to patient cooperation. HEART:  S1, S2.  Regular. Systolic murmur at the apex. ABDOMEN:  Soft and mildly distended. EXTREMITIES:  With bilateral pitting edema. NEUROLOGIC:  Patient is somnolent. LABORATORY DATA:  Creatinine is 2.36, BUN 74. Hemoglobin 8.6, platelet count 807. Pacemaker check performed, demonstrating pacing threshold 2.1 at 0.4 milliseconds in the ventricle. Telemetry shows AV pacing. Most recent EF 50%,  _____ of 20. Moderate to severe mitral regurgitation. Chest x-ray demonstrating improvement in the chest with mild right upper lobe airspace disease, persistent linear increased density at the lung base. IMPRESSION:    1. Acute on chronic resolved systolic heart failure with mostly preserved ejection fraction at this point. 2.   Moderate to severe mitral regurgitation with flail posterior mitral leaflet. The patient has been turned down by Surgery several times here and [facility]. 3.   Liver cirrhosis, suspected to be nonalcoholic steatohepatitis or cardiogenic in nature. Patient is status post thoracentesis. 4.   Altered mental status with suspected sepsis, hypothermia, and hypotension from urinary tract infection. Patient is on antibiotics. 5.   History of permanent pacemaker. 6.   Altered mental status due to above, and there is an MRI of the brain ordered, unable to be performed yet. RECOMMENDATIONS:  May proceed from cardiology standpoint for MRI of the brain. Patient has been placed on increased dose of Bumex for diuresis.   We are holding other heart failure medications at this time. There was plan to send her for evaluation for possibility of a mitral valve clip to see if it would help her condition. We were planning to send her to the [address] [place] [address]. We will still plan to do so once and if her condition improves. At this point her prognosis is guarded. Thank you for the consult.      Dictated By Guido Levy DO  d: 08/17/2022 16:43:04  t: 08/17/2022 18:08:05  [facility] [id number]  XX/

## 2022-08-18 NOTE — SLP NOTE
SPEECH PATHOLOGY NOTE    Attempted to see pt for dysphagia reassessment. Pt off the floor at a procedure and unavailable for the clinical reassessment. SLP service will continue to follow pt and offer this evaluation as pt and slp able and available.        Kenny Cheung MA, CCC-SLP  Speech and Language Pathologist

## 2022-08-19 ENCOUNTER — APPOINTMENT (OUTPATIENT)
Dept: PICC SERVICES | Facility: HOSPITAL | Age: 72
End: 2022-08-19
Attending: INTERNAL MEDICINE
Payer: MEDICARE

## 2022-08-19 ENCOUNTER — APPOINTMENT (OUTPATIENT)
Dept: GENERAL RADIOLOGY | Facility: HOSPITAL | Age: 72
End: 2022-08-19
Attending: INTERNAL MEDICINE
Payer: MEDICARE

## 2022-08-19 ENCOUNTER — APPOINTMENT (OUTPATIENT)
Dept: ULTRASOUND IMAGING | Facility: HOSPITAL | Age: 72
End: 2022-08-19
Attending: INTERNAL MEDICINE
Payer: MEDICARE

## 2022-08-19 LAB
GLUCOSE BLDC GLUCOMTR-MCNC: 125 MG/DL (ref 70–99)
GLUCOSE BLDC GLUCOMTR-MCNC: 130 MG/DL (ref 70–99)
GLUCOSE BLDC GLUCOMTR-MCNC: 157 MG/DL (ref 70–99)

## 2022-08-19 PROCEDURE — 5A09357 ASSISTANCE WITH RESPIRATORY VENTILATION, LESS THAN 24 CONSECUTIVE HOURS, CONTINUOUS POSITIVE AIRWAY PRESSURE: ICD-10-PCS | Performed by: INTERNAL MEDICINE

## 2022-08-19 PROCEDURE — 99232 SBSQ HOSP IP/OBS MODERATE 35: CPT | Performed by: REGISTERED NURSE

## 2022-08-19 PROCEDURE — 93971 EXTREMITY STUDY: CPT | Performed by: INTERNAL MEDICINE

## 2022-08-19 PROCEDURE — 99232 SBSQ HOSP IP/OBS MODERATE 35: CPT | Performed by: OTHER

## 2022-08-19 PROCEDURE — 99233 SBSQ HOSP IP/OBS HIGH 50: CPT | Performed by: INTERNAL MEDICINE

## 2022-08-19 RX ORDER — BUMETANIDE 0.25 MG/ML
2 INJECTION, SOLUTION INTRAMUSCULAR; INTRAVENOUS EVERY 6 HOURS
Status: DISCONTINUED | OUTPATIENT
Start: 2022-08-19 | End: 2022-08-20

## 2022-08-19 RX ORDER — ZINC SULFATE 50(220)MG
220 CAPSULE ORAL DAILY
Status: DISCONTINUED | OUTPATIENT
Start: 2022-08-19 | End: 2022-08-25

## 2022-08-19 RX ORDER — LIDOCAINE HYDROCHLORIDE 10 MG/ML
5 INJECTION, SOLUTION EPIDURAL; INFILTRATION; INTRACAUDAL; PERINEURAL
Status: ACTIVE | OUTPATIENT
Start: 2022-08-19 | End: 2022-08-20

## 2022-08-19 NOTE — CM/SW NOTE
Received order from [facility] for [facility]. Notified [facility] liaison Kingsley Barfield of referral.  [facility] does follow patients at patient's [address] [place] [address] worker/ to remain available for support and/or discharge planning.      Mikel Freeman MBA MSN, RN CTL/  [id number]

## 2022-08-19 NOTE — PALLIATIVE CARE NOTE
[facility] Liaison received referral for community palliative care. Waiting to receive insurance verification before pursing.      Charlene Dominguez  Palliative Liaison  [id number]

## 2022-08-19 NOTE — PROGRESS NOTES
Called for no peripheral access. Procedure:    Patient with poor IV access. r EJ vein located. Chlorhexadine prep, patient in trendelenberg. Vein canulated with 20 gauge angiocath. Line secured. Patient tolerated procedure well.

## 2022-08-20 LAB
ALBUMIN SERPL-MCNC: 2 G/DL (ref 3.4–5)
ANION GAP SERPL CALC-SCNC: 8 MMOL/L (ref 0–18)
BASOPHILS # BLD AUTO: 0 X10(3) UL (ref 0–0.2)
BASOPHILS NFR BLD AUTO: 0 %
BUN BLD-MCNC: 72 MG/DL (ref 7–18)
BUN/CREAT SERPL: 29.6 (ref 10–20)
CALCIUM BLD-MCNC: 8.1 MG/DL (ref 8.5–10.1)
CHLORIDE SERPL-SCNC: 117 MMOL/L (ref 98–112)
CO2 SERPL-SCNC: 23 MMOL/L (ref 21–32)
CREAT BLD-MCNC: 2.43 MG/DL
DEPRECATED RDW RBC AUTO: 86.7 FL (ref 35.1–46.3)
EOSINOPHIL # BLD AUTO: 0.07 X10(3) UL (ref 0–0.7)
EOSINOPHIL NFR BLD AUTO: 0.9 %
ERYTHROCYTE [DISTWIDTH] IN BLOOD BY AUTOMATED COUNT: 22.4 % (ref 11–15)
GFR SERPLBLD BASED ON 1.73 SQ M-ARVRAT: 21 ML/MIN/1.73M2 (ref 60–?)
GLUCOSE BLD-MCNC: 161 MG/DL (ref 70–99)
GLUCOSE BLDC GLUCOMTR-MCNC: 163 MG/DL (ref 70–99)
GLUCOSE BLDC GLUCOMTR-MCNC: 192 MG/DL (ref 70–99)
GLUCOSE BLDC GLUCOMTR-MCNC: 199 MG/DL (ref 70–99)
GLUCOSE BLDC GLUCOMTR-MCNC: 210 MG/DL (ref 70–99)
HCT VFR BLD AUTO: 24.6 %
HGB BLD-MCNC: 7.5 G/DL
IMM GRANULOCYTES # BLD AUTO: 0.05 X10(3) UL (ref 0–1)
IMM GRANULOCYTES NFR BLD: 0.6 %
LYMPHOCYTES # BLD AUTO: 1.36 X10(3) UL (ref 1–4)
LYMPHOCYTES NFR BLD AUTO: 16.9 %
MCH RBC QN AUTO: 32.8 PG (ref 26–34)
MCHC RBC AUTO-ENTMCNC: 30.5 G/DL (ref 31–37)
MCV RBC AUTO: 107.4 FL
MONOCYTES # BLD AUTO: 0.38 X10(3) UL (ref 0.1–1)
MONOCYTES NFR BLD AUTO: 4.7 %
NEUTROPHILS # BLD AUTO: 6.2 X10 (3) UL (ref 1.5–7.7)
NEUTROPHILS # BLD AUTO: 6.2 X10(3) UL (ref 1.5–7.7)
NEUTROPHILS NFR BLD AUTO: 76.9 %
OSMOLALITY SERPL CALC.SUM OF ELEC: 331 MOSM/KG (ref 275–295)
PHOSPHATE SERPL-MCNC: 2.8 MG/DL (ref 2.5–4.9)
PLATELET # BLD AUTO: 127 10(3)UL (ref 150–450)
POTASSIUM SERPL-SCNC: 3.9 MMOL/L (ref 3.5–5.1)
RBC # BLD AUTO: 2.29 X10(6)UL
SODIUM SERPL-SCNC: 148 MMOL/L (ref 136–145)
WBC # BLD AUTO: 8.1 X10(3) UL (ref 4–11)

## 2022-08-20 PROCEDURE — 99232 SBSQ HOSP IP/OBS MODERATE 35: CPT | Performed by: INTERNAL MEDICINE

## 2022-08-20 RX ORDER — BUMETANIDE 0.25 MG/ML
2 INJECTION, SOLUTION INTRAMUSCULAR; INTRAVENOUS
Status: DISCONTINUED | OUTPATIENT
Start: 2022-08-20 | End: 2022-08-22

## 2022-08-20 RX ORDER — BUMETANIDE 0.25 MG/ML
2 INJECTION, SOLUTION INTRAMUSCULAR; INTRAVENOUS
Status: DISCONTINUED | OUTPATIENT
Start: 2022-08-20 | End: 2022-08-20

## 2022-08-20 RX ORDER — BUMETANIDE 0.25 MG/ML
2 INJECTION, SOLUTION INTRAMUSCULAR; INTRAVENOUS EVERY 6 HOURS
Status: DISCONTINUED | OUTPATIENT
Start: 2022-08-20 | End: 2022-08-20

## 2022-08-20 NOTE — PROGRESS NOTES
Skin note     Skin check done by Brian Marcelo and Kristine Pina RN.     DTI sacrum   Bruising on right side, bilateral legs and bilateral arms

## 2022-08-21 LAB
ALBUMIN SERPL-MCNC: 1.9 G/DL (ref 3.4–5)
ANION GAP SERPL CALC-SCNC: 5 MMOL/L (ref 0–18)
BASOPHILS # BLD AUTO: 0 X10(3) UL (ref 0–0.2)
BASOPHILS NFR BLD AUTO: 0 %
BUN BLD-MCNC: 72 MG/DL (ref 7–18)
BUN/CREAT SERPL: 29.6 (ref 10–20)
CALCIUM BLD-MCNC: 7.9 MG/DL (ref 8.5–10.1)
CHLORIDE SERPL-SCNC: 120 MMOL/L (ref 98–112)
CO2 SERPL-SCNC: 24 MMOL/L (ref 21–32)
CREAT BLD-MCNC: 2.43 MG/DL
DEPRECATED RDW RBC AUTO: 90 FL (ref 35.1–46.3)
EOSINOPHIL # BLD AUTO: 0.14 X10(3) UL (ref 0–0.7)
EOSINOPHIL NFR BLD AUTO: 2.4 %
ERYTHROCYTE [DISTWIDTH] IN BLOOD BY AUTOMATED COUNT: 22.9 % (ref 11–15)
GFR SERPLBLD BASED ON 1.73 SQ M-ARVRAT: 21 ML/MIN/1.73M2 (ref 60–?)
GLUCOSE BLD-MCNC: 155 MG/DL (ref 70–99)
GLUCOSE BLDC GLUCOMTR-MCNC: 143 MG/DL (ref 70–99)
GLUCOSE BLDC GLUCOMTR-MCNC: 157 MG/DL (ref 70–99)
GLUCOSE BLDC GLUCOMTR-MCNC: 165 MG/DL (ref 70–99)
GLUCOSE BLDC GLUCOMTR-MCNC: 168 MG/DL (ref 70–99)
HCT VFR BLD AUTO: 24.4 %
HGB BLD-MCNC: 7.3 G/DL
IMM GRANULOCYTES # BLD AUTO: 0.04 X10(3) UL (ref 0–1)
IMM GRANULOCYTES NFR BLD: 0.7 %
LYMPHOCYTES # BLD AUTO: 1.11 X10(3) UL (ref 1–4)
LYMPHOCYTES NFR BLD AUTO: 19.2 %
MCH RBC QN AUTO: 32.6 PG (ref 26–34)
MCHC RBC AUTO-ENTMCNC: 29.9 G/DL (ref 31–37)
MCV RBC AUTO: 108.9 FL
MONOCYTES # BLD AUTO: 0.34 X10(3) UL (ref 0.1–1)
MONOCYTES NFR BLD AUTO: 5.9 %
NEUTROPHILS # BLD AUTO: 4.14 X10 (3) UL (ref 1.5–7.7)
NEUTROPHILS # BLD AUTO: 4.14 X10(3) UL (ref 1.5–7.7)
NEUTROPHILS NFR BLD AUTO: 71.8 %
OSMOLALITY SERPL CALC.SUM OF ELEC: 332 MOSM/KG (ref 275–295)
PHOSPHATE SERPL-MCNC: 2.6 MG/DL (ref 2.5–4.9)
PLATELET # BLD AUTO: 101 10(3)UL (ref 150–450)
POTASSIUM SERPL-SCNC: 3.9 MMOL/L (ref 3.5–5.1)
RBC # BLD AUTO: 2.24 X10(6)UL
SODIUM SERPL-SCNC: 149 MMOL/L (ref 136–145)
WBC # BLD AUTO: 5.8 X10(3) UL (ref 4–11)

## 2022-08-21 PROCEDURE — 99232 SBSQ HOSP IP/OBS MODERATE 35: CPT | Performed by: INTERNAL MEDICINE

## 2022-08-21 RX ORDER — CALCIUM CARBONATE 200(500)MG
1000 TABLET,CHEWABLE ORAL 3 TIMES DAILY PRN
Status: DISCONTINUED | OUTPATIENT
Start: 2022-08-21 | End: 2022-08-25

## 2022-08-22 ENCOUNTER — APPOINTMENT (OUTPATIENT)
Dept: PICC SERVICES | Facility: HOSPITAL | Age: 72
End: 2022-08-22
Attending: INTERNAL MEDICINE
Payer: MEDICARE

## 2022-08-22 ENCOUNTER — APPOINTMENT (OUTPATIENT)
Dept: GENERAL RADIOLOGY | Facility: HOSPITAL | Age: 72
End: 2022-08-22
Attending: INTERNAL MEDICINE
Payer: MEDICARE

## 2022-08-22 LAB
ALBUMIN SERPL-MCNC: 1.9 G/DL (ref 3.4–5)
ANION GAP SERPL CALC-SCNC: 6 MMOL/L (ref 0–18)
BUN BLD-MCNC: 71 MG/DL (ref 7–18)
BUN/CREAT SERPL: 31.8 (ref 10–20)
CALCIUM BLD-MCNC: 8 MG/DL (ref 8.5–10.1)
CHLORIDE SERPL-SCNC: 122 MMOL/L (ref 98–112)
CO2 SERPL-SCNC: 25 MMOL/L (ref 21–32)
CREAT BLD-MCNC: 2.23 MG/DL
GFR SERPLBLD BASED ON 1.73 SQ M-ARVRAT: 23 ML/MIN/1.73M2 (ref 60–?)
GLUCOSE BLD-MCNC: 131 MG/DL (ref 70–99)
OSMOLALITY SERPL CALC.SUM OF ELEC: 339 MOSM/KG (ref 275–295)
PHOSPHATE SERPL-MCNC: 2.8 MG/DL (ref 2.5–4.9)
POTASSIUM SERPL-SCNC: 3.8 MMOL/L (ref 3.5–5.1)
SARS-COV-2 RNA RESP QL NAA+PROBE: NOT DETECTED
SODIUM SERPL-SCNC: 153 MMOL/L (ref 136–145)

## 2022-08-22 PROCEDURE — 99231 SBSQ HOSP IP/OBS SF/LOW 25: CPT | Performed by: REGISTERED NURSE

## 2022-08-22 PROCEDURE — 71045 X-RAY EXAM CHEST 1 VIEW: CPT | Performed by: INTERNAL MEDICINE

## 2022-08-22 PROCEDURE — 99233 SBSQ HOSP IP/OBS HIGH 50: CPT | Performed by: INTERNAL MEDICINE

## 2022-08-22 PROCEDURE — 74230 X-RAY XM SWLNG FUNCJ C+: CPT | Performed by: INTERNAL MEDICINE

## 2022-08-22 RX ORDER — BUMETANIDE 1 MG/1
1 TABLET ORAL
Status: DISCONTINUED | OUTPATIENT
Start: 2022-08-22 | End: 2022-08-23

## 2022-08-22 RX ORDER — BUMETANIDE 1 MG/1
1 TABLET ORAL
Status: DISCONTINUED | OUTPATIENT
Start: 2022-08-22 | End: 2022-08-22

## 2022-08-22 RX ORDER — DEXTROSE MONOHYDRATE 50 MG/ML
INJECTION, SOLUTION INTRAVENOUS CONTINUOUS
Status: DISCONTINUED | OUTPATIENT
Start: 2022-08-22 | End: 2022-08-25

## 2022-08-22 NOTE — OCCUPATIONAL THERAPY NOTE
Patient currently off unit for VSS, will f/u as she is available and schedule permits.      Avani Orourke OTR/L  [facility]   #[phone]

## 2022-08-23 LAB
ANION GAP SERPL CALC-SCNC: 5 MMOL/L (ref 0–18)
BUN BLD-MCNC: 66 MG/DL (ref 7–18)
BUN/CREAT SERPL: 30.8 (ref 10–20)
CALCIUM BLD-MCNC: 8.1 MG/DL (ref 8.5–10.1)
CHLORIDE SERPL-SCNC: 120 MMOL/L (ref 98–112)
CO2 SERPL-SCNC: 26 MMOL/L (ref 21–32)
CREAT BLD-MCNC: 2.14 MG/DL
GFR SERPLBLD BASED ON 1.73 SQ M-ARVRAT: 24 ML/MIN/1.73M2 (ref 60–?)
GLUCOSE BLD-MCNC: 171 MG/DL (ref 70–99)
OSMOLALITY SERPL CALC.SUM OF ELEC: 335 MOSM/KG (ref 275–295)
POTASSIUM SERPL-SCNC: 3.7 MMOL/L (ref 3.5–5.1)
SODIUM SERPL-SCNC: 151 MMOL/L (ref 136–145)

## 2022-08-23 PROCEDURE — 99233 SBSQ HOSP IP/OBS HIGH 50: CPT | Performed by: PHYSICIAN ASSISTANT

## 2022-08-23 PROCEDURE — 99231 SBSQ HOSP IP/OBS SF/LOW 25: CPT | Performed by: REGISTERED NURSE

## 2022-08-23 RX ORDER — BENZONATATE 100 MG/1
100 CAPSULE ORAL 3 TIMES DAILY PRN
Status: DISCONTINUED | OUTPATIENT
Start: 2022-08-23 | End: 2022-08-25

## 2022-08-23 RX ORDER — ALBUTEROL SULFATE 2.5 MG/3ML
SOLUTION RESPIRATORY (INHALATION)
Status: DISPENSED
Start: 2022-08-23 | End: 2022-08-24

## 2022-08-23 RX ORDER — ALBUTEROL SULFATE 2.5 MG/3ML
2.5 SOLUTION RESPIRATORY (INHALATION) 2 TIMES DAILY
Status: DISCONTINUED | OUTPATIENT
Start: 2022-08-23 | End: 2022-08-25

## 2022-08-23 RX ORDER — BUMETANIDE 0.5 MG/1
0.5 TABLET ORAL
Status: DISCONTINUED | OUTPATIENT
Start: 2022-08-23 | End: 2022-08-25

## 2022-08-23 NOTE — SLP NOTE
SPEECH DAILY NOTE - INPATIENT    ASSESSMENT & PLAN   ASSESSMENT  PPE REQUIRED. THIS THERAPIST WORE GLOVES, GOWN, AND DROPLET MASK FOR DURATION OF TX SESSION. HANDS WASHED UPON ENTRANCE/EXIT. SLP f/u for ongoing meal assessment per recommendations of TEASPOON AMOUNTS of puree diet and moderately thick liquids per VFSS. RN reports pt tolerates diet and medication well with no overt clinical s/s aspiration. Pt denies any swallowing challenges. Pt positioned upright to 90 degrees in bed. RN assisted with positioning the pt upright in bed. Pt afebrile, tolerating 1L O2NC with oxygen status 98% prior to the start of oral trials. SLP reviewed aspiration precautions and safe swallowing compensatory strategies with the patient per VFSS recommendations. Patient acknowledged understanding of education but would benefit from continued tx. No family was present at this therapy session. Left handouts at bedside for family education on new diet recommendations, swallowing precautions on orange sheet, how to thicken liquids, and home exercise program.  Provided max assistance with 1:1 feeding throughout the session. Pt with weak cough at baseline prior to any po intake. Good tolerance on current diet of 1/2 teaspoon amounts of puree and 1/2 teaspoon amounts of moderately thick liquids with no overt clinical signs/symptoms of aspiration (e.g., immediate/delayed throat clear, immediate/delayed cough, wet vocal quality, increased O2 effort) observed across all trials. Adequate oral acceptance with no anterior spillage. Oropharyngeal phases of the swallow are delayed with reduced hyolaryngeal elevation to palpation during the swallow. Cues were provided for pt to complete multiple dry swallows. Oxygen status remained >96% throughout the entire session. Oral/buccal cavities clear of residue at the end of the session. No change in diet at this time.   Training and education provided on dysphagia exercises with oral motor, laryngeal, and tongue base exercises. The pt completed exercises with max cues verbal and visual cues in reps of 5. Pt had difficulty following directions for effortful swallows. Handout left at bedside and with encouragement for pt and family to complete 2-3x a day. PLAN: SLP to f/u with x3 meal assessment, monitor CXR, and train swallow precautions/dysphagia exercises. RN alerted with results and recommendations. Diet Recommendations - Solids: Puree  By 1/2 teaspoons  Diet Recommendations - Liquids: Honey thick liquids/ Moderately thick   By 1/2 teaspoons    Compensatory Strategies Recommended: Alternate consistencies; Slow rate;Small bites;Multiple swallows; Liquids via spoon; No straws  Aspiration Precautions: Upright position; Slow rate;Small bites; No straw;Cueing to swallow (cue for 2nd swallow)  Medication Administration Recommendations: Crushed in puree    Patient Experiencing Pain: No                Discharge Recommendations  Discharge Recommendations/Plan: Undetermined    Treatment Plan  Treatment Plan/Recommendations: Aspiration precautions; Dysphagia therapy    Interdisciplinary Communication: Discussed with RN  Plan posted at bedside  Recommendations posted at bedside  [facility] - ALISSON Score: 3   FCM Impression: Abnormal     GOALS  Goal #1 The patient will tolerate puree diet consistency and moderately thick liquids without overt signs or symptoms of aspiration with 100 % accuracy over 4 session(s). Provided max assistance with 1:1 feeding throughout the session. Pt with weak cough at baseline prior to any po intake. Good tolerance on current diet of 1/2 teaspoon amounts of puree and 1/2 teaspoon amounts of moderately thick liquids with no overt clinical signs/symptoms of aspiration (e.g., immediate/delayed throat clear, immediate/delayed cough, wet vocal quality, increased O2 effort) observed across all trials. Adequate oral acceptance with no anterior spillage.   Oropharyngeal phases of the swallow are delayed with reduced hyolaryngeal elevation to palpation during the swallow. Cues were provided for pt to complete multiple dry swallows. Oxygen status remained >96% throughout the entire session. Oral/buccal cavities clear of residue at the end of the session. No change in diet at this time. In Progress   Goal #2 The patient/family/caregiver will demonstrate understanding and implementation of aspiration precautions and swallow strategies independently over 2 session(s). SLP reviewed aspiration precautions and safe swallowing compensatory strategies with the patient per VFSS recommendations. Patient acknowledged understanding of education but would benefit from continued tx. No family was present at this therapy session. Left handouts at bedside for family education on new diet recommendations, swallowing precautions on orange sheet, how to thicken liquids, and home exercise program.  Complete family education, as able. In Progress   Goal #3 The patient will utilize compensatory strategies as outlined by  VFSS including Slow rate, Small bites, Small sips, Multiple swallows, Alternate liquids/solids, No straws, Upright 90 degrees, Liquids via tsp amount only, Feed patient with 1:1 assistance 90 % of the time across 2 sessions. Provided 1:1 feeding with sitting the pt upright in bed to 90 degrees. Presentations presented by 1/2 teaspoon amounts at a slow rate and alternating consistencies. Max cues were provided for pt to complete a multiple dry swallow after each consistency. In Progress   Goal #4 The patient will complete OMEs targeting Lingual strength, ROM, and coordination, effortful swallow, Mendelsohn, laryngeal adduction, laryngeal elevation, david with 90 % accuracy within 4 session(s). Training and education provided on dysphagia exercises with oral motor, laryngeal, and tongue base exercises. The pt completed exercises with max cues verbal and visual cues in reps of 5.   Pt had difficulty following directions for effortful swallows. Handout left at bedside and with encouragement for pt and family to complete 2-3x a day. In Progress     FOLLOW UP  Follow Up Needed (Documentation Required): Yes  SLP Follow-up Date: 08/24/22  Number of Visits to Meet Established Goals: 4    Session: 1 post VFSS    If you have any questions, please contact    [address] [place] [address] MS/CCC-SLP  Speech Language Pathologist  [address] [facility]  EXT.  [phone]

## 2022-08-23 NOTE — PROGRESS NOTES
Residential hospice received referral for patient.  left for daughter/POLLO Muhammad, with callback # to set up meeting time. [address] [facility] with daughter. Explained hospice benefit and philosophy. Daughter would like to try to talk to patient about decision of hospice at [facility] before signing consents. Daughter said she would likely sign consents tomorrow for patient. Follow up tomorrow.      Virginia Chatham  Residential Liaison  [phone]

## 2022-08-24 LAB
ALBUMIN SERPL-MCNC: 1.9 G/DL (ref 3.4–5)
ANION GAP SERPL CALC-SCNC: 3 MMOL/L (ref 0–18)
BUN BLD-MCNC: 62 MG/DL (ref 7–18)
BUN/CREAT SERPL: 29.8 (ref 10–20)
CALCIUM BLD-MCNC: 7.8 MG/DL (ref 8.5–10.1)
CHLORIDE SERPL-SCNC: 119 MMOL/L (ref 98–112)
CO2 SERPL-SCNC: 27 MMOL/L (ref 21–32)
CREAT BLD-MCNC: 2.08 MG/DL
GFR SERPLBLD BASED ON 1.73 SQ M-ARVRAT: 25 ML/MIN/1.73M2 (ref 60–?)
GLUCOSE BLD-MCNC: 143 MG/DL (ref 70–99)
OSMOLALITY SERPL CALC.SUM OF ELEC: 328 MOSM/KG (ref 275–295)
PHOSPHATE SERPL-MCNC: 2.8 MG/DL (ref 2.5–4.9)
POTASSIUM SERPL-SCNC: 4.1 MMOL/L (ref 3.5–5.1)
SODIUM SERPL-SCNC: 149 MMOL/L (ref 136–145)

## 2022-08-24 PROCEDURE — 99231 SBSQ HOSP IP/OBS SF/LOW 25: CPT | Performed by: REGISTERED NURSE

## 2022-08-24 PROCEDURE — 99232 SBSQ HOSP IP/OBS MODERATE 35: CPT | Performed by: INTERNAL MEDICINE

## 2022-08-24 RX ORDER — ISOSORBIDE DINITRATE 5 MG/1
5 TABLET ORAL
Status: DISCONTINUED | OUTPATIENT
Start: 2022-08-24 | End: 2022-08-25

## 2022-08-24 RX ORDER — CARVEDILOL 3.12 MG/1
3.12 TABLET ORAL 2 TIMES DAILY WITH MEALS
Status: DISCONTINUED | OUTPATIENT
Start: 2022-08-24 | End: 2022-08-25

## 2022-08-24 RX ORDER — HYDRALAZINE HYDROCHLORIDE 10 MG/1
10 TABLET, FILM COATED ORAL EVERY 8 HOURS SCHEDULED
Status: DISCONTINUED | OUTPATIENT
Start: 2022-08-24 | End: 2022-08-25

## 2022-08-24 NOTE — CM/SW NOTE
BPCI-Advanced Medicare Program Note:   Plan of care reviewed for care coordination and discharge planning. Noted that patient is a BPCI-A readmission, previously enrolled under , W for Congestive Heart Failure , currently on day 56 of 90 day in the current BPCI-A episode. LYNNE tool was used to help determine next care setting. NSOC recommendation is for Post Acute Care pending patient progress. Case Management team is following for care coordination and discharge planning needs. Plan: [facility] for LTC pending medical clearance.     BETSEY Corbin    [phone]

## 2022-08-24 NOTE — PROGRESS NOTES
LVM for daughter Re hospice decision. Hospice line left in voicemail.      Zach [place]  Residential Liaison  [id number]

## 2022-08-25 VITALS
RESPIRATION RATE: 22 BRPM | WEIGHT: 193 LBS | HEART RATE: 75 BPM | HEIGHT: 67 IN | TEMPERATURE: 97 F | BODY MASS INDEX: 30.29 KG/M2 | OXYGEN SATURATION: 98 % | SYSTOLIC BLOOD PRESSURE: 133 MMHG | DIASTOLIC BLOOD PRESSURE: 65 MMHG

## 2022-08-25 LAB
ALBUMIN SERPL-MCNC: 2.2 G/DL (ref 3.4–5)
AMMONIA PLAS-MCNC: 23 UMOL/L (ref 11–32)
ANION GAP SERPL CALC-SCNC: 7 MMOL/L (ref 0–18)
BASOPHILS # BLD AUTO: 0.02 X10(3) UL (ref 0–0.2)
BASOPHILS NFR BLD AUTO: 0.4 %
BUN BLD-MCNC: 55 MG/DL (ref 7–18)
BUN/CREAT SERPL: 26.8 (ref 10–20)
CALCIUM BLD-MCNC: 8.3 MG/DL (ref 8.5–10.1)
CHLORIDE SERPL-SCNC: 118 MMOL/L (ref 98–112)
CO2 SERPL-SCNC: 25 MMOL/L (ref 21–32)
CREAT BLD-MCNC: 2.05 MG/DL
DEPRECATED RDW RBC AUTO: 92.6 FL (ref 35.1–46.3)
EOSINOPHIL # BLD AUTO: 0.28 X10(3) UL (ref 0–0.7)
EOSINOPHIL NFR BLD AUTO: 5 %
ERYTHROCYTE [DISTWIDTH] IN BLOOD BY AUTOMATED COUNT: 21.7 % (ref 11–15)
GFR SERPLBLD BASED ON 1.73 SQ M-ARVRAT: 26 ML/MIN/1.73M2 (ref 60–?)
GLUCOSE BLD-MCNC: 159 MG/DL (ref 70–99)
HCT VFR BLD AUTO: 33.1 %
HGB BLD-MCNC: 9.5 G/DL
IMM GRANULOCYTES # BLD AUTO: 0.02 X10(3) UL (ref 0–1)
IMM GRANULOCYTES NFR BLD: 0.4 %
LYMPHOCYTES # BLD AUTO: 1.07 X10(3) UL (ref 1–4)
LYMPHOCYTES NFR BLD AUTO: 19.1 %
MCH RBC QN AUTO: 32.6 PG (ref 26–34)
MCHC RBC AUTO-ENTMCNC: 28.7 G/DL (ref 31–37)
MCV RBC AUTO: 113.7 FL
MONOCYTES # BLD AUTO: 0.28 X10(3) UL (ref 0.1–1)
MONOCYTES NFR BLD AUTO: 5 %
NEUTROPHILS # BLD AUTO: 3.94 X10 (3) UL (ref 1.5–7.7)
NEUTROPHILS # BLD AUTO: 3.94 X10(3) UL (ref 1.5–7.7)
NEUTROPHILS NFR BLD AUTO: 70.1 %
OSMOLALITY SERPL CALC.SUM OF ELEC: 328 MOSM/KG (ref 275–295)
PHOSPHATE SERPL-MCNC: 3.3 MG/DL (ref 2.5–4.9)
PLATELET # BLD AUTO: 107 10(3)UL (ref 150–450)
POTASSIUM SERPL-SCNC: 3.8 MMOL/L (ref 3.5–5.1)
RBC # BLD AUTO: 2.91 X10(6)UL
SARS-COV-2 RNA RESP QL NAA+PROBE: NOT DETECTED
SODIUM SERPL-SCNC: 150 MMOL/L (ref 136–145)
WBC # BLD AUTO: 5.6 X10(3) UL (ref 4–11)

## 2022-08-25 PROCEDURE — 99232 SBSQ HOSP IP/OBS MODERATE 35: CPT | Performed by: PHYSICIAN ASSISTANT

## 2022-08-25 RX ORDER — FLUTICASONE PROPIONATE 50 MCG
1 SPRAY, SUSPENSION (ML) NASAL DAILY
Qty: 1 EACH | Refills: 0 | Status: SHIPPED | OUTPATIENT
Start: 2022-08-26

## 2022-08-25 RX ORDER — LACTULOSE 10 G/15ML
20 SOLUTION ORAL 2 TIMES DAILY
Qty: 473 ML | Refills: 0 | Status: SHIPPED | OUTPATIENT
Start: 2022-08-25

## 2022-08-25 RX ORDER — CARVEDILOL 3.12 MG/1
3.12 TABLET ORAL 2 TIMES DAILY WITH MEALS
Qty: 60 TABLET | Refills: 0 | Status: SHIPPED | OUTPATIENT
Start: 2022-08-25

## 2022-08-25 RX ORDER — BUMETANIDE 0.5 MG/1
0.5 TABLET ORAL
Qty: 60 TABLET | Refills: 0 | Status: SHIPPED | OUTPATIENT
Start: 2022-08-25

## 2022-08-25 NOTE — OCCUPATIONAL THERAPY NOTE
Plans moving forward for discharge to [facility] with hospice care. Will discontinue skilled OT services at this time.

## 2022-08-25 NOTE — DISCHARGE PLANNING
I called and gave [facility] [facility] facility. Patient's physical and history were relayed to nursing staff and included past medical history, diagnosis of hepatic encephalopathy. Patient will be discharging at 4pm as arranged by social work/case management. Phone number of primary nurse provided for follow up questions. Patient's diet: Regular/general pureed diet with honey thick/moderately thick liquids   Patient takes medication: crushed in puree teaspoon  Patient's mobility status is: Requires complete care, dependent  Medical device going with patient?  Velarde urethral catheter; ventricular paced     Aylin WILSON, Discharge Leader [id number]

## 2022-08-25 NOTE — CM/SW NOTE
08/25/22 1317   Discharge disposition   Expected discharge disposition [id number] Provider   ([facility] [address])   Discharge transportation [facility]     Pt discussed during nursing rounds. Pt is stable for dc today. MD dc order entered. Pt will return to [facility] [address] for [address] [place] [address], pt's daughter agreeable to transfer today. Brei Ortega at [facility]. notified CM that pt's daughter will sign hospice consents tomorrow when daughter is able to see patient at [address] [place] [address] facility. [facility] scheduled for 4pm . Number for nurse report is [phone]. Plan: [facility] for LTC today. / to remain available for support and/or discharge planning.      BETSEY Durham    [phone]

## 2022-08-25 NOTE — CM/SW NOTE
MSW spoke with DTR , set up meeting for hospice consult at [facility] tomorrow at [address]  [facility]  [phone]

## 2022-08-25 NOTE — CM/SW NOTE
MSW left  for Viktoriya Duran at [address] to inform of pt's impending DC back there, with hospice likely on board. RH team awaiting DTR's CB so that consents can be signed.   Lawrence Hicks, MSW  [facility]  [phone]

## 2022-09-06 NOTE — PROCEDURES
Procedure: Transesophageal echocardiogram    Indications: Moderate to severe mitral regurgitation with recurrent congestive heart failure. Procedure and findings:  Transesophageal echocardiogram was performed using anesthesia help by anesthesia department. Esophageal probe was inserted without any difficulties and positioned in the upper mid and lower esophagus as well as in this stomach for transgastric views. Color Doppler, sonohysterogram bubble study was performed. Patient tolerated procedure well. Complications: None    Findings:  Left ventricle is of normal size and shows normal systolic function estimated range of 55 to 60%. -Flail posterior mitral leaflet with possible partial rupture of chordae. Moderate to severe eccentric mitral regurgitation was noted with moderately enlarged left atrium. Normal left atrial appendage and right atrium. Bubble study performed did not show any suggestion for PFO or left-to-right communication. Please see transesophageal echo report for full details.     Merna Jorgensen MD, [facility]  cardiovascular specialist.

## 2023-02-22 NOTE — PLAN OF CARE
Problem: Patient Centered Care  Goal: Patient preferences are identified and integrated in the patient's plan of care  Description  Interventions:  - What would you like us to know as we care for you?  I live at [facility] and I want to be called Dre Brooks Provid normal mood with appropriate affect cultural backgrounds into the planning and delivery of care  - Encourage patient/family to participate in care and decision-making at the level they choose  - Honor patient and family perspectives and choices   3/5/2020 2337 by Les Stewart RN  O obtain 12 lead EKG if indicated  - Evaluate effectiveness of antiarrhythmic and heart rate control medications as ordered  - Initiate emergency measures for life threatening arrhythmias  - Monitor electrolytes and administer replacement therapy as ordered

## 2023-02-22 NOTE — TELEPHONE ENCOUNTER
Date of Service: 02/21/2023    CHIEF COMPLAINT:    1.  Allergic rhinitis.    2.  Asthma.    3.  Food allergy.    4.  Atopic dermatitis.    HISTORY OF PRESENT ILLNESS:    The patient is a very pleasant 16-year-old male coming in for evaluation of the above noted concern.  Of note, I did evaluate him in our clinic approximately 1 week ago, at which time we gave him his first allergy immunotherapy injection.  He tolerated that injection without difficulty.  At home, he did not have any swelling of the lips, tongue or soft palate.  There is no lightheadedness or dizziness.  There is no shortness of breath or wheeze.  He is getting a second injection today.  He feels that his nasal and pulmonary issues have been better controlled over the course of the last week, and he is currently taking the Flovent, Zyrtec, and nasal spray.  He denies any fevers or chills consistently.  He denies any nosebleeds or irritation.  He denies any purulence including yellow or green coloration.  He has not had to use his Albuterol frequently.  He is much more consistent with his medications than he has been in the past and he definitely feels that he has been improved since starting on these medications.    PAST MEDICAL HISTORY, SOCIAL HISTORY AND FAMILY HISTORY:  All reviewed and essentially unchanged from previous exam.  Please see EHR for an updated list.       ALLERGIES:  ALLERGIES:   Allergen Reactions    Nuts   (Food) ANAPHYLAXIS     Patient is allergic to tree nuts    Can eat peanut butter    Banana   (Food And Med) GI UPSET     Headaches, and GI issues    Cat Dander HIVES     Per skin test by Dr Joyce    Dog Dander HIVES     Per skin test by Dr Joyce    Dust Other (See Comments)     sneezing    Grass HIVES     Per skin test by Dr Joyce    Mold   (Environmental) HIVES     Per skin test by Dr Joyce    Rabbit Epithelium HIVES     Per skin test by Dr Joyce    Trees HIVES     Per skin test by Dr Joyce            CURRENT  Patient seen by [facility] 10/8/20    Copy of LOV notes copied below. No diagnosis found.     Patient is a 77year old female who presents to the clinic for a follow up. Dangler in place.   Stent was removed without difficulty and patient tolerated very well wi MEDICATIONS:  Current Outpatient Medications   Medication Sig Dispense Refill    cetirizine (ZyrTEC) 10 MG tablet Take 1 tablet by mouth daily. 90 tablet 3    fluticasone (FLONASE) 50 MCG/ACT nasal spray Spray 2 sprays in each nostril daily. 48 g 1    albuterol 108 (90 Base) MCG/ACT inhaler Inhale 2 puffs into the lungs every 4 hours as needed for Shortness of Breath or Wheezing (cough). 36 g 4    montelukast (Singulair) 5 MG chewable tablet Chew 1 tablet by mouth nightly. 90 tablet 1    triamcinolone (Nasacort Allergy 24HR) 55 MCG/ACT nasal inhaler Spray 2 sprays in each nostril daily. 1 each 5    olopatadine (Patanol) 0.1 % ophthalmic solution Place 1 drop into both eyes in the morning and 1 drop in the evening. 1 each 5    fluticasone propionate (Flovent HFA) 220 MCG/ACT inhaler Inhale 1 puff into the lungs in the morning and 1 puff in the evening. 1 each 11    EPINEPHrine (EpiPen 2-Nima) 0.3 MG/0.3ML auto-injector Inject 0.3 mLs into the muscle 1 time as needed for Anaphylaxis. 2 each 1    azithromycin (ZITHROMAX) 250 MG tablet 2 tabs po once today, then 1 tab po once daily for the next 4 days. 6 tablet 0    Spacer/Aero-Holding Chambers Device Use  aerochamber/spacer as directed with metered-dose inhaler. 1 each 1    mometasone (ELOCON) 0.1 % cream Apply to affected area daily as directed by MD.  Avoid face and groin areas. 45 g 2    ibuprofen (IBUPROFEN JR STRENGTH) 100 MG chewable tablet Chew 3 tablets by mouth every 8 hours as needed for Fever. 30 tablet 0    EPINEPHrine (EPIPEN 2-NIMA) 0.3 MG/0.3ML injection Inject 0.3 mLs into the muscle once for 1 dose. 1 each 1    Pocket Peak Flow Meter (PEAK FLOW METER UNIVERSAL RANG) BRANDON Use as instructed 1 Device 0     No current facility-administered medications for this visit.     Please see Epic for a complete list of medications which were updated today.     TOBACCO HISTORY:  Social History     Tobacco Use   Smoking Status Never   Smokeless Tobacco Never        REVIEW OF SYSTEMS:  A full 10-point review of systems is completed and essentially negative except as mentioned above.    PHYSICAL EXAMINATION:   VITAL SIGNS: Within normal limits and available in EHR.  GENERAL: The patient is alert and oriented x3 and in no apparent distress. The patient is very pleasant and appears well nourished.  CARDIOVASCULAR: Regular rate and rhythm, normal S1, S2.  RESPIRATORY: Good inspiratory and expiratory airflow. No wheezes or crackles. There is no prolonged expiratory phase or expiratory wheeze.  ABDOMEN: Soft, nontender, nondistended. There is no noted hepatosplenomegaly.  SKIN: No rashes, lesions, ulcerations noted.  EXTREMITIES: Warm and noncyanotic. There is no clubbing or edema.  LYMPHADENOPATHY: There is no significant lymphadenopathy in anterior cervical triangle or supraclavicular region.   HEENT:  The external ear canals and tympanic membranes are intact on both sides with no bulging, retraction or scarring.  The nasal mucosa is a very mild amount of bogginess with a scant increase in clear nasal discharge.  There is a touch of posterior pharyngeal mucus with very mild cobblestoning.  The oral mucosa is otherwise normal.  The ocular conjunctivae and sclerae are normal.  There is no frontal or maxillary sinus tenderness.    DIAGNOSTIC PROCEDURES DONE IN CLINIC:   1.  We did give him his allergy immunotherapy injection and he tolerated it without difficulty.  He was discharged from our clinic after 30 minutes.  2.  Asthma control test today was 25.    ASSESSMENT AND DIAGNOSES:    1.  Allergic rhinitis.  2.  Asthma, mild, persistent, currently doing better on a regular inhaled corticosteroid.  3.  Atopic dermatitis.  4.  History of food allergy.    PLAN:    1.  At this time, I would like to continue with the nasal corticosteroid Flonase 2 puffs once daily.  2.  Continue with Zyrtec 10 once daily.  3.  Continue with Albuterol as needed per the asthma action plan.  4.   Continue with avoidance of the foods that give him issues.  5.  Continue with more aggressive moisturization regimen including a daily bath or shower followed by moisturizer.  6.  Continue with the Flovent 1 puff twice a day, but he can bump it up to 2 puffs twice a day if needed with an upper respiratory infection or any shortness of breath.  7.  Continue with immunotherapy on a buildup phase.    Thank you very much for this visit.       Dictated By: Real Joyce MD  Signing Provider: Real Joyce MD    NML/wisam ([id number])   DD: 02/21/2023 10:01:56 AM TD: 02/22/2023 4:27:10 AM

## 2023-04-10 NOTE — PROGRESS NOTES
Grace White  : 1950  Age 77year old  female patient is admitted to [place] [address] for rehab and strengthening      Chief complaint: Dyspnea CINDY SOB, Discharge plan     [facility] Admission : 20 to 20      HPI  70 y/o female with pm Keep previously scheduled clinic appointment  Return to L&D if you experience contractions every 2-5 minutes for two consecutive hours  Vaginal Bleeding  Decreased fetal movement  Leaking of fluid  Headache, dizziness or blurred vision  Temperature 100.4 or greater  Call the clinic [phone]) during the day time or L&D [phone]) after hours for any questions/concerns.  Drink 8-10 glasses of water a day     discharge.     ALLERGIES:    Metformin               DIARRHEA  Tetracycline            RASH    CODE STATUS:  Full Code    ADVANCED CARE PLANNING TEAM:  family care plan , discharge date 9/23/20 in am      CURRENT MEDICATIONS - reviewed and updated     Francheska pain.     • linagliptin 5 mg Oral Tab Take 5 mg by mouth daily.          VITALS:  /68   Pulse 69   Temp 97.7 °F (36.5 °C) (Tympanic)   Resp 20   Wt 170 lb (77.1 kg)   SpO2 98%   BMI 28.29 kg/m²       REVIEW OF SYSTEMS:    GENERAL HEALTH:feels well oth edema  NEUROLOGIC: follows commands, rocking motion with sitting  PSYCHIATRIC: alert and oriented x 3; affect appropriate      SEE PLAN BELOW  1. Acute on chronic diastolic congestive heart failure  -BNP ~ 4000 which close to baseline.  -Seen by   Cardiolo grade AV block.   -CONT MEDS AS ABOVE   11. HTN- controlled   -carvedilol 6.25 po bid   -cont isosorbide mononitrate ER 60mg po daily   -VS q shift   12. Depressant- mood improved    -cont trazodone 50mg po day   -does not want to see Psych at this time in

## 2023-05-30 NOTE — ED PROVIDER NOTES
Patient Seen in: [facility] AND [facility] Emergency Department      History   Patient presents with:  Chest Pain Angina    Stated Complaint: CP    HPI    Patient is a 70-year-old female with history as listed below.   She complains of onset of chest discomfort a 81.6 kg   SpO2 94%   BMI 29.95 kg/m²         Physical Exam    Constitutional: Oriented to person, place, and time. Appears well-developed and well-nourished. HEENT:   Head: Normocephalic and atraumatic.    Right Ear: External ear normal.   Left Ear: Exter 3.56 (*)     HGB 11.6 (*)     HCT 33.7 (*)     All other components within normal limits   TROPONIN I - Normal   LIPASE - Normal   CBC WITH DIFFERENTIAL WITH PLATELET    Narrative:      The following orders were created for panel order CBC WITH DIFFERENTIAL Discharge Medication List                   Present on Admission  Date Reviewed: 6/13/2019          ICD-10-CM Noted POA    Acute chest pain R07.9 7/2/2019 Unknown done

## 2023-10-17 NOTE — ED NOTES
CRS  Pt with postop pain. Not hungry. No flatus, no stool (exam assisted with whiteboard, Son via [address])  Flowsheet, labs reviewed  Abd: soft, more tender on right than left    Plan:  Adjust pain med regimen. Clears, restart IVF. BS elev- will ask DM management service to see tomorrow.  Follow labs Pt denies vomiting after Tylenol was given.

## 2023-10-26 NOTE — PROGRESS NOTES
[facility]    History and Physical    Ayala Nino Patient Status:  Inpatient    1950 MRN [id number]   Location [facility] 5SW/SE Attending Dhara Valentin MD   Hosp Day # 8 PCP Alesha Hercules MD     Date:  2019  Date MG Oral Tab Take 10 mg by mouth 2 (two) times daily. Clopidogrel Bisulfate 75 MG Oral Tab Take 75 mg by mouth daily. Coenzyme Q-10 100 MG Oral Cap Take 100 mg by mouth daily.    ferrous sulfate 325 (65 FE) MG Oral Tab EC Take 325 mg by mouth 2 (two) violette kg), SpO2 97 %. Nursing note and vitals reviewed. Constitutional: She is oriented to person, place, and time. She appears well-developed and well-nourished. No distress. HENT:   Head: Normocephalic and atraumatic.    Eyes: Pupils are equal, round, an suggesting possible prerenal component. .6/16 creat now stable at a CKD 4 level. On clopidogrel therapy  This is for cardiac stents. Long-term use of aspirin therapy  As above.       Acute on chronic diastolic heart failure (HCC)  Acute pulmonary e decreased balance during turns/decreased step length/inability to maintain weight-bearing restrictions w/o assist/decreased weight-shifting ability

## 2024-02-27 NOTE — PLAN OF CARE
Problem: Patient Centered Care  Goal: Patient preferences are identified and integrated in the patient's plan of care  Description  Interventions:  - What would you like us to know as we care for you?   - Provide timely, complete, and accurate informatio Respiratory Therapy support as indicated  - Manage/alleviate anxiety  - Monitor for signs/symptoms of CO2 retention  Outcome: Progressing     Problem: CARDIOVASCULAR - ADULT  Goal: Maintains optimal cardiac output and hemodynamic stability  Description  IN weight  - Monitor urine specific gravity, serum osmolarity and serum sodium as indicated or ordered  - Monitor response to interventions for patient's volume status, including labs, urine output, blood pressure (other measures as available)  - Encourage or Name band;

## 2024-12-28 NOTE — ED INITIAL ASSESSMENT (HPI)
Pt states she has chest pain that started 45 mins PTA, rating pain at 6/10 EMS gave Asprin and nitroglycerin and her pain level is now 4/10. Also c/of of shortness of breath.
Private car

## 2025-04-10 NOTE — PROGRESS NOTES
Ganga Bowen  : 1950  Age 77year old  female patient is admitted to [place] [address] 20      Chief complaint: Dyspnea CINDY SOB     [address] Admission : 20 to 20      HPI  78 y/o female with pmh severe MR, HFpEF, CAD post multiple stents Knee Arthritis: Care Instructions  Overview     Knee arthritis is a breakdown of the cartilage that cushions your knee joint. When the cartilage wears down, your bones rub against each other. This causes pain and stiffness. Knee arthritis tends to get worse with time.  Treatment for knee arthritis involves reducing pain, making the leg muscles stronger, and staying at a healthy body weight. The treatment usually does not improve the health of the cartilage, but it can reduce pain and improve how well your knee works.  You can take simple measures to protect your knee joints, ease your pain, and help you stay active.  Follow-up care is a key part of your treatment and safety. Be sure to make and go to all appointments, and call your doctor if you are having problems. It's also a good idea to know your test results and keep a list of the medicines you take.  How can you care for yourself at home?  Know that knee arthritis will cause more pain on some days than on others.  Stay at a healthy weight. Lose weight if you are overweight. When you stand up, the pressure on your knees from every pound of body weight is multiplied four times. So if you lose 10 pounds, you will reduce the pressure on your knees by 40 pounds.  Talk to your doctor or physical therapist about exercises that will help ease joint pain.  Stretch to help prevent stiffness and to prevent injury before you exercise. You may enjoy gentle forms of yoga to help keep your knee joints and muscles flexible.  Walk instead of jog.  Ride a bike. This makes your thigh muscles stronger and takes pressure off your knee.  Wear well-fitting and comfortable shoes.  Exercise in chest-deep water. This can help you exercise longer with less pain.  Avoid exercises that include squatting or kneeling. They can put a lot of strain on your knees.  Talk to your doctor to make sure that the exercise you do is not making the arthritis worse.  Do not sit for long periods of time.  traMADol HCl 50 MG Oral Tab Take 1 tablet (50 mg total) by mouth every 12 (twelve) hours as needed. 60 tablet 5   • Potassium Chloride ER 10 MEQ Oral Tab CR Take 10 mEq by mouth Before Dinner. • loratadine 10 MG Oral Tab Take 10 mg by mouth daily. denies nasal congestion, sinus pain or sore throat;  RESPIRATORY: SOB on exertion    CARDIOVASCULAR:denies chest pain, no palpitations   GI: denies nausea, vomiting, constipation, diarrhea; no rectal bleeding; no heartburn  :no dysuria, urgency or freque to follow, was seen this pm  -scheduled duonebs po bid and every 6 hrs prn   -vs q shift  -monitor    2.  Ureteral stent retained  -Likely source of recurring UTI's with same organisms.    -Attempting to arrange OP stent removal but insurance issues and COV

## 2025-05-05 NOTE — ED PROVIDER NOTES
Patient Seen in: [facility] AND [facility] Emergency Department    History   Patient presents with:  Altered Mental Status    Stated Complaint: ams+ hypotentention     HPI    71 yo F with PMH CAD, DM, HTN, HL, hypothyroid, HFpEF presenting via ambulance from [facility] Patient states left sided back, hip, and groin pain that started Saturday night. Also report dysuria and nausea. Denies any fall.   days.   QUEtiapine Fumarate 25 MG Oral Tab,  Take 1 tablet (25 mg total) by mouth nightly. vancomycin HCl 125 MG Oral Cap,  Take 1 capsule (125 mg total) by mouth daily.    rOPINIRole HCl 0.25 MG Oral Tab,  Take 1 tablet (0.25 mg total) by mouth 2 (two) t total) by mouth daily. atorvastatin 40 MG Oral Tab,  Take 80 mg by mouth nightly. acetaminophen 325 MG Oral Tab,  Take 650 mg by mouth every 6 (six) hours as needed for Pain.    Levothyroxine Sodium 50 MCG Oral Tab,  Take 50 mcg by mouth before breakf Abnormal; Notable for the following components:       Result Value    Clarity Urine Hazy (*)     Blood Urine Small (*)     Leukocyte Esterase Urine Trace (*)     Bacteria Urine Few (*)     All other components within normal limits   BASIC METABOLIC PANEL ( individual orders.    ETHYL ALCOHOL   RAINBOW DRAW BLUE   RAINBOW DRAW LAVENDER   RAINBOW DRAW LIGHT GREEN   RAINBOW DRAW GOLD   BLOOD CULTURE   BLOOD CULTURE            Preliminary Radiology Report  [facility], [facility] 32  [phone] - Phone    [facility] of this report is strictly prohibited and may be unlawful. If you have received this report in error, please notify the sender immediately at [phone] and permanently delete the original report and destroy any copies or printouts.   Preliminary Radiolog without photophobia/nuchal ridigity and without cutaneous manifestations of trauma. Patient with similar presentation recently. Patient apparently treated with Zosyn and eventually improved prior to discharge to [facility] without overt pain.  Labs notable

## (undated) DEVICE — CYSTO PACK: Brand: MEDLINE INDUSTRIES, INC.

## (undated) DEVICE — ZIPWIRE GUIDEWIRE .035X150 STR

## (undated) DEVICE — REM POLYHESIVE ADULT PATIENT RETURN ELECTRODE: Brand: VALLEYLAB

## (undated) DEVICE — KIT CLEAN ENDOKIT 1.1OZ GOWNX2

## (undated) DEVICE — FORCEP RADIAL JAW 4

## (undated) DEVICE — 6 ML SYRINGE LUER-LOCK TIP: Brand: MONOJECT

## (undated) DEVICE — 1010 S-DRAPE TOWEL DRAPE 10/BX: Brand: STERI-DRAPE™

## (undated) DEVICE — SOL H2O 1000ML BTL

## (undated) DEVICE — 35 ML SYRINGE REGULAR TIP: Brand: MONOJECT

## (undated) DEVICE — NITINOL STONE RETRIEVAL BASKET: Brand: ZERO TIP

## (undated) DEVICE — KIT ENDO ORCAPOD 160/180/190

## (undated) DEVICE — SNARE CAPTIFLEX MICRO-OVL OLY

## (undated) DEVICE — SOLO FLEX HYBRID GUIDEWIRE .03

## (undated) DEVICE — CONMED SCOPE SAVER BITE BLOCK, 20X27 MM: Brand: SCOPE SAVER

## (undated) DEVICE — Device: Brand: DEFENDO AIR/WATER/SUCTION AND BIOPSY VALVE

## (undated) DEVICE — 3 ML SYRINGE LUER-LOCK TIP: Brand: MONOJECT

## (undated) DEVICE — HYDROGEL COATED URETERAL DILATOR: Brand: NOTTINGHAM ONE-STEP

## (undated) DEVICE — SOL  .9 3000ML

## (undated) DEVICE — SNARE OPTMZ PLPCTM TRP

## (undated) DEVICE — DUAL LUMEN URETERAL CATHETER

## (undated) DEVICE — LINE MNTR ADLT SET O2 INTMD

## (undated) DEVICE — FLEXIVA PULSE ID 242 BOX 5

## (undated) DEVICE — MEDI-VAC NON-CONDUCTIVE SUCTION TUBING 6MM X 1.8M (6FT.) L: Brand: CARDINAL HEALTH

## (undated) DEVICE — GAMMEX® NON-LATEX SIZE 7.5, STERILE NEOPRENE POWDER-FREE SURGICAL GLOVE: Brand: GAMMEX

## (undated) DEVICE — SOL  .9 1000ML BTL

## (undated) DEVICE — EYE PADSSTERILENOT MADE WITH NATURAL RUBBER LATEXSINGLE USE ONLYDO NOT USE IF PACKAGE OPENED OR DAMAGED: Brand: CARDINAL HEALTH

## (undated) DEVICE — ISOVUE 300 10X100ML VIAL

## (undated) DEVICE — STERILE LATEX POWDER-FREE SURGICAL GLOVESWITH NITRILE COATING: Brand: PROTEXIS

## (undated) DEVICE — URETERAL ACCESS SHEATH SET: Brand: NAVIGATOR HD

## (undated) DEVICE — ENDOSCOPIC VALVE WITH ADAPTER.: Brand: SURSEAL® II

## (undated) DEVICE — SOL H2O 3000ML IRRIG

## (undated) DEVICE — PUMP SAPS 1  ACT 1 WY VLV

## (undated) DEVICE — TOWEL OR BLU 16X26 STRL

## (undated) DEVICE — CATH URET CONE TIP 8FR 138008

## (undated) DEVICE — UROLOGY DRAIN BAG

## (undated) NOTE — IP AVS SNAPSHOT
[facility]            (For Outpatient Use Only) Initial Admit Date: 3/5/2020   Inpt/Obs Admit Date: Inpt: N/A / Obs: 03/05/20   Discharge Date:    Jeanine Proper:  [de-identified]   MRN: [de-identified]   CSN: [id number]   CEID: [id number]        [facility] Subscriber Name:  Holden Benitez :    Subscriber ID:  Pt Rel to Subscriber:    Hospital Account Financial Class: Medicare    2020

## (undated) NOTE — IP AVS SNAPSHOT
Patient Demographics     Address  [address] [place] [address] Phone  [phone] [facility])  [phone] [facility]      Emergency Contact(s)     Name Relation Home Work Xiao Hernándezma Daughter   [phone]    Reji Reilly Son   [id number] B Complex-C-Folic Acid Tabs  Next dose due: Tomorrow 7/6      Take 1 tablet by mouth daily. baclofen 10 MG Tabs  Commonly known as:  LIORESAL  Next dose due: Tonight 7/5      Take 10 mg by mouth 2 (two) times daily.           Clindamycin HCl 300 Take 40 mg by mouth every morning before breakfast.          Potassium Chloride ER 20 MEQ Tbcr  Commonly known as:  K-DUR M20  Next dose due: Tomorrow 7/6      Take 20 mEq by mouth daily.           traMADol HCl 50 MG Tabs  Commonly known as:  ULTRAM  Next [id number] furosemide (LASIX) tab 60 mg 07/05/19 0854 Given      [id number] hydrALAzine HCl (APRESOLINE) tab 50 mg 07/04/19 2218 Given      [id number] hydrALAzine HCl (APRESOLINE) tab 50 mg 07/05/19 0552 Given      [id number] hydrALAzine HCl (APRESOLINE) tab Hold Lavender Auto Resulted — — [facility]            BASIC METABOLIC PANEL (8) [[id number]] (Abnormal)  Resulted: 07/05/19 0634, Result status: Final result   Ordering provider:  Ngozi Rodgers, [address] [place] [address] Drive  07/04/19 2300 Resulting lab:  [facility]    Spec PATIENT ACCOUNT#:   [de-identified]    LOCATION:  [facility] 28 76 [facility] 10  MEDICAL RECORD #:   [id number]       YOB: 1950  ADMISSION DATE:       07/02/2019    HISTORY AND PHYSICAL EXAMINATION    CHIEF COMPLAINT:  Acute on chronic diastolic heart feliciano REVIEW OF SYSTEMS:  The patient reports progressive dyspnea on exertion, increased leg swelling and mild erythema both legs, increased tightness in her chest.  The patient had orthopnea. She said she cannot follow a low-salt diet at [employer].   The patient for her bilateral lower leg cellulitis. Further recommendations to follow.     Dictated By Rain Simms MD  d: 07/02/2019 15:55:42  t: 07/02/2019 16:15:25  [facility] [id number]  FB/  [FB.1]  Electronically signed by Vance Wilde MD on 7/3/2019 • Stroke [facility])    • Thyroid disease    • UTI (urinary tract infection)        Past Surgical History  Past Surgical History:   Procedure Laterality Date   • CHOLECYSTECTOMY     • SINUS SURGERY           Family History  Family History   Problem Relation Age  (H) 07/02/2019    CA 8.7 07/02/2019    ALB 2.8 (L) 06/11/2019    ALKPHO 245 (H) 06/11/2019    TP 7.2 06/11/2019    AST 29 06/11/2019    ALT 30 06/11/2019    TROP <0.045 07/02/2019         Imaging:  Xr Chest Ap Portable  (cpt=71045)    Result Date: Ahsangeovanny Vimal Lehman[AP.2]  [id number][AG.8]  53 year old[AP.2]  11/9/1950    Admit date: 7/2/2019  Discharge date: 7/5/2019  Primary Care Physician:[AP.1] Vicky Blunt MD[AP.2]   Attending Physician: Jarrell Peabody, MD   Consults:   Consultants     Provid Diabetes mellitus type 2  - A1C- 6.1  - Sliding scale with meals   - Linagliptin 5mg daily      Chronic renal insufficiency stage 3  - BUN/Creat at baseline.   - monitor      H/x Left renal calculus  - Urology follow up once cardiac issues stabilized  - Alden Pina Commonly known as:  PROTONIX     Potassium Chloride ER 20 MEQ Tbcr  Commonly known as:  K-DUR M20     traMADol HCl 50 MG Tabs  Commonly known as:  ULTRAM  Take 1 tablet (50 mg total) by mouth every 12 (twelve) hours as needed.            Where to Get Your M Result Date: 6/10/2019                    [facility]* -------------------------------------------------------------------      Transthoracic Echocardiography M-mode, complete 2D, complete spectral Doppler, and color Doppler ------------ data:  No prior study was available for comparison. Study status:  Elective. Procedure:  Transthoracic echocardiography. Scanning was performed from the parasternal, apical, and subcostal acoustic windows.   Study completion:  The patient tolerated the pr gradient (D): 13mm Hg. Left atrium:  The atrium was mildly dilated. Atrial septum:  The septum was normal. Right ventricle: The cavity size was normal. Wall thickness was normal. Systolic function was normal. Pulmonic valve:    Structurally normal valve. 8.58  cm/sec   ---------  LV E/e&apos;, lateral                          21             ---------  LV e&apos;, medial                             5.65  cm/sec   ---------  LV E/e&apos;, medial                           31.9           ------ 201   ms       ---------  Mitral mean gradient, D                   69    mm Hg    ---------  Mitral peak gradient, D                   13    mm Hg    ---------  Mitral E/A ratio, peak                    1.1            ---------  Mitral reg

## (undated) NOTE — LETTER
[address], [place]  Authorization for Invasive Procedures  1.  I hereby authorize Dr. Chhaya French , my physician and whomever may be designated as the doctor's assistant, to perform the following operation and/or procedure:  CARDIAC 5. I consent to the photographing of the operations or procedures to be performed for the purposes of advancing medicine, science, and/or education, provided my identity is not revealed.  If the procedure has been videotaped, the physician/surgeon will obta __________ Time: ___________    Statement of Physician  My signature below affirms that prior to the time of the procedure, I have explained to the patient and/or her legal representative, the risks and benefits involved in the proposed treatment and any r

## (undated) NOTE — IP AVS SNAPSHOT
[facility]            (For Outpatient Use Only) Initial Admit Date: 6/8/2019   Inpt/Obs Admit Date: Inpt: 6/8/19 / Obs: N/A   Discharge Date:    Mitali Serrato:  [de-identified]   MRN: [de-identified]   CSN: [id number]   CEID: [id number]        [facility] June 18, 2019

## (undated) NOTE — LETTER
[facility]  Administration of Anesthesia  1. Geoff Hood, or _________________________________ acting on her behalf, (Patient) (Dependent/Representative) request to receive anesthesia for my pending procedure/operation/treatment. bleeding, seizure, cardiac arrest and death. 7. AWARENESS: I understand that it is possible (but unlikely) to have explicit memory of events from the operating room while under general anesthesia.   8. ELECTROCONVULSIVE THERAPY PATIENTS: This consent serve below affirms that prior to the time of the procedure, I have explained to the patient and/or his/her guardian, the risks and benefits of undergoing anesthesia, as well as any reasonable alternatives.     ___________________________________________________

## (undated) NOTE — LETTER
Date & Time: 6/5/2019, 12:21 PM  Patient: Tereso Jenkins    Dear Tereso Jenkins,    We are unable to reach you by phone and would like to follow up with you regarding your visit to the Emergency Department.         Please contact the Emergency Depar

## (undated) NOTE — LETTER
[address]  [address] [place], [place]      Authorization for Surgical Operation and Procedure     Date:___________                                                                                                         Time:_______ risks that can occur: fever and allergic reactions, hemolytic reactions, transmission of diseases such as Hepatitis, AIDS and Cytomegalovirus (CMV) and fluid overload.   In the event that I wish to have an autologous transfusion of my own blood, or a direct determine when the applicable recovery period ends for purposes of reinstating the DNAR order.   10. Patients having a sterilization procedure: I understand that if the procedure is successful the results will be permanent and it will therefore be impossibl _______________________________________________________________ _____________________________  Garnell Favor of Physician)                                                                                         (Date)                                   (Ti

## (undated) NOTE — IP AVS SNAPSHOT
[facility]            (For Outpatient Use Only) Initial Admit Date: 12/21/2020   Inpt/Obs Admit Date: Inpt: 12/21/20 / Obs: N/A   Discharge Date:    Judah Cooper:  [de-identified]   MRN: [de-identified]   CSN: [id number]   CEID: [id number]        E Group Number:  Insurance Type:    Subscriber Name:  Subscriber :    Subscriber ID:  Pt Rel to Subscriber:    Hospital Account Financial Class: Medicare    2020

## (undated) NOTE — IP AVS SNAPSHOT
[facility]            (For Outpatient Use Only) Initial Admit Date: 8/21/2020   Inpt/Obs Admit Date: Inpt: 8/21/20 / Obs: N/A   Discharge Date:    Juan Kyle:  [de-identified]   MRN: [de-identified]   CSN: [id number]   CEID: [id number]        [facility] Subscriber ID: [id number] Pt Rel to Subscriber: SELF   TERTIARY INSURANCE   Payor:  Plan:    Group Number:  Insurance Type:    Subscriber Name:  Subscriber :    Subscriber ID:  Pt Rel to Subscriber:    Hospital Account Financial Class: Medicare    August

## (undated) NOTE — IP AVS SNAPSHOT
Patient Demographics     Address  [facility] [address] Phone  [phone] (Home) *Preferred*      Emergency Contact(s)     Name Relation Home Work Mobile    Tiara Mix [phone]      Oj Murry Daughter   62 Inject 3.375 g into the vein every 8 (eight) hours for 3 days.   Stop taking on: November 10, 2020   Gisella James MD         ferrous sulfate 325 (65 FE) MG Tbec  Next dose due: 11/8      Take 325 mg by mouth daily with breakfast.          furosemide 40 Take 1 tablet (40 mg total) by mouth every morning before breakfast.   Aline Amanda MD         QUEtiapine Fumarate 25 MG Tabs  Commonly known as: SEROQUEL  Next dose due: 11/7/2020      Take 1 tablet (25 mg total) by mouth nightly.   Stop taking on: Levi [id number] Pantoprazole Sodium (PROTONIX) EC tab 40 mg 11/07/20 0511 Given      [id number] Piperacillin Sod-Tazobactam So (ZOSYN) 3.375 g in dextrose 5 % 100 mL ADD-vantage 11/06/20 2027 New Bag      [id number] Piperacillin Sod-Tazobactam So (ZOSYN) 3.375 g Ordering provider: Johan Iglesias MD  11/07/20 1540 Resulting lab: [facility])    Specimen Information    Type Source Collected On   Blood — 11/07/20 1434          Components    Component Value Reference Range Flag Lab HCT 28.8 35.0 - 48.0 % L [facility] ([facility])   MCV 92.3 80.0 - 100.0 fL — [facility] ([facility])   MCH 30.4 26.0 - 34.0 pg — [facility] ([facility])   MCHC 33.0 31.0 - 37.0 g/dL — [facility])   RDW 14.2 11.0 - 15.0 % — [facility])   RDW-SD 47.6 35.1 - H&P signed by Charbel Tafoya MD at 11/3/2020  6:05 PM  Version 1 of 1    Author: Charbel Tafoya MD Service: Internal Medicine Author Type: Physician    Filed: 11/3/2020  6:05 PM Date of Service: 11/3/2020  1:39 PM Status: Signed    : Charbel Tafoya • CYSTOSCOPY RETROGRADE Left 9/26/2020    Performed by Pina Contreras MD at [facility] OR   • CYSTOSCOPY URETEROSCOPY Left 9/29/2020    Performed by Pina Contreras MD at [facility] OR   • LASER HOLMIUM LITHOTRIPSY Left 9/29/2020    Performed by Harleen Dimas •  Nephro-Ramses 0.8 MG Oral Tab, Take 0.8 mg by mouth daily. •  carvedilol 12.5 MG Oral Tab, Take 1 tablet (12.5 mg total) by mouth 2 (two) times daily with meals. •  furosemide 40 MG Oral Tab, Take 1 tablet (40 mg total) by mouth BID (Diuretic).  Edison Oconnor Nursing note and vitals reviewed. Constitutional: She appears well-developed. HENT:   Head: Normocephalic. Eyes: Pupils are equal, round, and reactive to light. Conjunctivae are normal.   Neck: No JVD present.    Cardiovascular: Normal rate, regular r Result Date: 11/3/2020  CONCLUSION:  1. Interval improvement in the appearance of the left kidney left perirenal space since previous study with removal of the left-sided stent and dominant calculus the lower pole the left kidney.   Resolution of the hydron Iv antibiotics/await cultures    Hx Chf/CAD/MR  Check bnp/ cont meds/monitor    Hx cva  Cont meds/monitor    Recent renal stone surgery-litotrpysy  Check urine culture    Dm  Monitor/ sliding scale    ckd  Monitor for now    Full QHEH[IQ.3]      **Certific Record reviewed, communication with attending, communication with RN and patient seen face to face evaluation.     History of Present Illness:   Patient is a 77year old[GA.1]   female with history of CAD, diabetes mellitus, HTN and previou Patient during our conversation demonstrated rocking movement and muscular tics and patient reported that have been a year and a half and she has not been able to get over it but does not know what caused it.     The patient was agitated yesterday but she i • Peripheral vascular disease ([facility] [address]    • Pneumonia due to organism    • Renal disorder    • Stroke [facility])    • UTI (urinary tract infection)        Past Surgical History  Past Surgical History:   Procedure Laterality Date   • CHOLECYSTECTOMY     • CYSTOSCOP •  nitroGLYCERIN (NITROSTAT) SL tab 0.4 mg, 0.4 mg, Sublingual, Q5 Min PRN    •  linagliptin (TRADJENTA) tab TABS 5 mg, 5 mg, Oral, Daily    •  Isosorbide Mononitrate ER (IMDUR) 24 hr tab 60 mg, 60 mg, Oral, Daily    •  ALPRAZolam (XANAX) tab 0.25 mg, 0. •  ALPRAZolam 0.25 MG Oral Tab, Take 0.25 mg by mouth 2 (two) times daily as needed for Sleep or Anxiety. •  Nephro-Ramses 0.8 MG Oral Tab, Take 0.8 mg by mouth daily.     •  carvedilol 12.5 MG Oral Tab, Take 1 tablet (12.5 mg total) by mouth 2 (two) times Laboratory Data:  Lab Results   Component Value Date    WBC 5.7 11/06/2020    HGB 10.1 (L) 11/06/2020    HCT 30.6 (L) 11/06/2020    .0 11/06/2020    CREATSERUM 2.09 (H) 11/06/2020    BUN 45 (H) 11/06/2020     11/06/2020    K 3.5 11/06/2020 Thought content:  The patient did deny any suicidal ideation, homicidal ideation but noticeably fixating on her medical care. Language is intact. Intellect is limited. Cognition impaired with impaired recent memory.   Perceptions: no hallucinations repor Comp Metabolic Panel (14)      Urinalysis with Culture Reflex Once      Basic Metabolic Panel (8)      CBC With Differential With Platelet      Hemoglobin A1C      CBC With Differential With Platelet      Basic Metabolic Panel (8)      Basic Metabolic Physician Order: IP Consult to Occupational Therapy  Reason for Therapy: ADL/IADL Dysfunction and Discharge Planning    OCCUPATIONAL THERAPY ASSESSMENT     Patient is a[HW.3 77year old[HW.2] female admitted 11/2/2020 for[HW.1] ESBL resistance, admitted f DISCHARGE RECOMMENDATIONS[HW.1]  OT Discharge Recommendations: Sub-acute rehabilitation  OT Device Recommendations: TBD[HW.2]    PLAN[HW.1]  OT Treatment Plan: Balance activities; Energy conservation/work simplification techniques;ADL training;Functional tr • SINUS SURGERY[HW.2]           HOME SITUATION[HW.1]  Type of Home: Assisted living facility  Home Layout: One level(elevator building)  Lives With: Staff 24 hours     Toilet and Equipment: Standard height toilet  Shower/Tub and Equipment: Walk-in shower -   Bathing (including washing, rinsing, drying)?: A Little  -   Toileting, which includes using toilet, bedpan or urinal? : A Little  -   Putting on and taking off regular upper body clothing?: A Little  -   Taking care of personal grooming such as [place] HW.1 - Amos Cameron, OT on 11/7/2020 11:17 AM  HW.2 - Amos Cameron, OT on 11/7/2020  1:14 PM  HW.3 - Amos Cameron, OT on 11/7/2020 12:55 PM                     Video Swallow Study Notes    No notes of this type exist for this encounter.      SLP No

## (undated) NOTE — LETTER
[address] [place] [address]      Authorization for Surgical Operation and Procedure     Date:___________                                                                                                         Time:__________  1. I hereby authorize Kristina Lugo, my physician and his/her assistants (if applicable), which may include medical students, residents, and/or fellows, to perform the following surgical operation/ procedure and administer such anesthesia as may be determined necessary by my physician:  Operation/Procedure name (s) 57Vandana Ríos   2. I recognize that during the surgical operation/procedure, unforeseen conditions may necessitate additional or different procedures than those listed above. I, therefore, further authorize and request that the above-named surgeon, assistants, or designees perform such procedures as are, in their judgment, necessary and desirable. 3.   My surgeon/physician has discussed prior to my surgery the potential benefits, risks and side effects of this procedure; the likelihood of achieving goals; and potential problems that might occur during recuperation. They also discussed reasonable alternatives to the procedure, including risks, benefits, and side effects related to the alternatives and risks related to not receiving this procedure. I have had all my questions answered and I acknowledge that no guarantee has been made as to the result that may be obtained. 4.   Should the need arise during my operation or immediate post-operative period, I also consent to the administration of blood and/or blood products. Further, I understand that despite careful testing and screening of blood or blood products by collecting agencies, I may still be subject to ill effects as a result of receiving a blood transfusion and/or blood products.   The following are some, but not all, of the potential risks that can occur: fever and allergic reactions, hemolytic reactions, transmission of diseases such as Hepatitis, AIDS and Cytomegalovirus (CMV) and fluid overload. In the event that I wish to have an autologous transfusion of my own blood, or a directed donor transfusion. I will discuss this with my physician. 5.   I authorize the use of any specimen, organs, tissues, body parts or foreign objects that may be removed from my body during the operation/procedure for diagnosis, research or teaching purposes and their subsequent disposal by hospital authorities. I also authorize the release of specimen test results and/or written reports to my treating physician on the hospital medical staff or other referring or consulting physicians involved in my care, at the discretion of the Pathologist or my treating physician. 6.   I consent to the photographing or videotaping of the operations or procedures to be performed, including appropriate portions of my body for medical, scientific, or educational purposes, provided my identity is not revealed by the pictures or by descriptive texts accompanying them. If the procedure has been photographed/videotaped, the surgeon will obtain the original picture, image, videotape or CD. The hospital will not be responsible for storage, release or maintenance of the picture, image, tape or CD.    7.   I consent to the presence of a  or observers in the operating room as deemed necessary by my physician or their designees. 8.   I recognize that in the event my procedure results in extended X-Ray/fluoroscopy time, I may develop a skin reaction. 9. If I have a Do Not Attempt Resuscitation (DNAR) order in place, that status will be suspended while in the operating room, procedural suite, and during the recovery period unless otherwise explicitly stated by me (or a person authorized to consent on my behalf).  The surgeon or my attending physician will determine when the applicable recovery period ends for purposes of reinstating the DNAR order. 10. Patients having a sterilization procedure: I understand that if the procedure is successful the results will be permanent and it will therefore be impossible for me to inseminate, conceive, or bear children. I also understand that the procedure is intended to result in sterility, although the result has not been guaranteed. 11. I acknowledge that my physician has explained sedation/analgesia administration to me including the risk and benefits I consent to the administration of sedation/analgesia as may be necessary or desirable in the judgment of my physician. I CERTIFY THAT I HAVE READ AND FULLY UNDERSTAND THE ABOVE CONSENT TO OPERATION and/or OTHER PROCEDURE.    _________________________________________  __________________________________  Signature of Patient     Signature of Responsible Person         ___________________________________         Printed Name of Responsible Person           _________________________________                  Relationship to Patient  _________________________________________  ______________________________  Signature of Witness          Date  Time    STATEMENT OF PHYSICIAN My signature below affirms that prior to the time of the procedure; I have explained to the patient and/or his/her legal representative, the risks and benefits involved in the proposed treatment and any reasonable alternative to the proposed treatment. I have also explained the risks and benefits involved in refusal of the proposed treatment and alternatives to the proposed treatment and have answered the patient's questions. If I have a significant financial interest in a co-management agreement or a significant financial interest in any product or implant, or other significant relationship used in this procedure/surgery, I have disclosed this and had a discussion with my patient. _______________________________________________________________ _____________________________  Miley Aldana Physician)                                                                                         (Date)                                   (Time)        Patient Name: Veronika Marcial    : 1950   Printed: 2022      Medical Record #: [id number]                                              Page 1 of 1

## (undated) NOTE — LETTER
[address], [place]  Authorization for Invasive Procedures  1. I hereby authorize Dr. Amber Briones , my physician and whomever may be designated as the doctor's assistant, to perform the following operation and/or procedure:  Esophagogastroduodenoscopy on Sean Michele at [facility].    2. My physician has explained to me the nature and purpose of the operation or other procedure, possible alternative methods of treatment, the risks involved and the possibility of complications to me. I understand the probable consequences of declining the recommended procedure and the alternative methods of treatment. I acknowledge that no guarantee has been made as to the result that may be obtained. 3. I recognize that during the course of this operation or other procedure, unforeseen conditions may necessitate additional or different procedures than those listed above. I, therefore, further authorize and request that the above-named physician, his/her physician assistants, or designees perform such procedures as are, in his/her professional opinion, necessary and desirable. If I have a Do Not Attempt Resuscitation (DNAR) order in place, that status will be suspended while in the operating room, procedural suite, and during the recovery period unless otherwise explicitly stated by me (or a person authorized to consent on my behalf). The surgeon or my attending physician will determine when the applicable recovery period ends for purposes of reinstating the DNAR order. 4. Should the need arise during my operation or immediate post-operative period; I also consent to the administration of blood and/or blood products.  Further, I understand that despite careful testing and screening of blood and blood products, I may still be subject to ill effects as a result of recieving a blood transfusion an/or blood producst. The following are some, but not all, of the potential risks that can occur: fever and allergic reactions, hemolytic reactions, transmission of disease such as hepatitis, AIDS, cytomegalovirus (CMV), and flluid overload. In the event that I wish to have autologous transfusions of my own blood, or a directed donor transfusion, I will discuss this with my physician. 5. I consent to the photographing of the operations or procedures to be performed for the purposes of advancing medicine, science, and/or education, provided my identity is not revealed. If the procedure has been videotaped, the physician/surgeon will obtain the original videotape. The hospital will not be responsible for storage or maintenance of this tape. 6. I consent to the presence of a  or observer as deemed necessary by my physician or his designee. 7. Any tissues or organs removed in the operation or other procedure may be disposed of by and at the discretion of [facility].    8. I understand that the physician and his/her physician assistants may not be employees or agents of [facility], [facility], nor [facility], but are independent medical practitioners who have been permitted to use its facilities for the care and treatment of their patients. 9. Patients having a sterilization procedure: I understand that if the procedure is successful the results will be permanent and it will therefore be impossible for me to inseminate, conceive or bear children. I also understand that the procedure is intended to result in sterility, although the result has not been guaranteed. 10. I CERTIFY THAT I HAVE READ AND FULLY UNDERSTAND THE ABOVE CONSENT TO OPERATION and/or OTHER PROCEDURE. 11. I acknowledge that my physician has explained sedation/analgesia administration to me including the risks and benefits. I consent to the administration of sedation/analgesia as may be necessary or desirable in the judgment of my physician. Signature of Patient:  ________________________________________________ Date: _________Time: _________    Responsible person in case of minor or unconscious: _____________________________Relationship: ____________     Witness Signature: ____________________________________________ Date: __________ Time: ___________    Statement of Physician  My signature below affirms that prior to the time of the procedure, I have explained to the patient and/or her legal representative, the risks and benefits involved in the proposed treatment and any reasonable alternative to the proposed treatment. I have also explained the risks and benefits involved in the refusal of the proposed treatment and have answered the patient's questions. If I have a significant financial interest in this procedure/surgery, I have disclosed this and had a discussion with my patient.     Signature of Physician:   ________________________________________Date: _________Time:_______ Patient Name: Veronika Marcial  : 1950   Printed: 2022    Medical Record #: [id number]

## (undated) NOTE — LETTER
[address], [place]  Authorization for Invasive Procedures  1.  I hereby authorize Dr. Terrell Mcneill , my physician and whomever may be designated as the doctor's assistant, to perform the following operation and/or procedure:  Esophagoga fever and allergic reactions, hemolytic reactions, transmission of disease such as hepatitis, AIDS, cytomegalovirus (CMV), and flluid overload.  In the event that I wish to have autologous transfusions of my own blood, or a directed donor transfusion, I perez Signature of Patient:  ________________________________________________ Date: _________Time: _________    Responsible person in case of minor or unconscious: _____________________________Relationship: ____________     Witness Signature: _______________

## (undated) NOTE — ED AVS SNAPSHOT
Ms. Albin Posada   MRN: [id number]    Department:  [facility] Emergency Department   Date of Visit:  10/14/2019           Disclosure     Insurance plans vary and the physician(s) referred by the ER may not be covered by your plan.  Please c within the next three months to obtain basic health screening including reassessment of your blood pressure.     IF THERE IS ANY CHANGE OR WORSENING OF YOUR CONDITION, CALL YOUR PRIMARY CARE PHYSICIAN AT ONCE OR RETURN IMMEDIATELY TO THE EMERGENCY DEPARTMEN

## (undated) NOTE — IP AVS SNAPSHOT
Patient Demographics     Address  [place] [address] Phone  [phone] [facility])  [phone] (Mobile) *Preferred*      Emergency Contact(s)     Name Relation Home Work Mobile    Jenn Dates [phone]      [facility] Take 3 tablets (60 mg total) by mouth BID (Diuretic). Ron Hudson MD         ipratropium-albuterol 0.5-2.5 (3) MG/3ML Soln  Commonly known as:  DUONEB  Next dose due:  AS NEEDED      Take 3 mL by nebulization every 4 (four) hours as needed.           I [id number] acetaminophen (TYLENOL) tab 650 mg 03/06/20 0405 Given      [id number] amLODIPine Besylate (NORVASC) tab 10 mg 03/06/20 1023 Given      [id number] aspirin chewable tab 81 mg 03/06/20 1024 Given      [id number] atorvastatin (LIPITOR) tab 80 mg 03/05 TROPONIN I [[id number]] (Normal)  Resulted: 03/05/20 1821, Result status: Final result   Ordering provider:  Vale Yates MD  03/05/20 164 Resulting lab:  [place] [facility]    Specimen Information    Type Source Collected On   Blood — 03/05/20 5792          Co Ordering provider:  Naina Chacko MD  03/05/20 1544 Resulting lab:  [facility]    Specimen Information    Type Source Collected On   Blood — 03/05/20 2589          Components    Component Value Reference Range Flag Lab   Hold Gold Auto Resulted — — [place] HEPATIC FUNCTION PANEL (7) [[id number]] (Abnormal)  Resulted: 03/05/20 1638, Result status: Final result   Ordering provider:  Naina Chacko MD  03/05/20 1840 Resulting lab:  [place] [facility]    Specimen Information    Type Source Collected On   Blood — 03/05/2 determined by the formula: patient's age x 8 (example: patient is 77years old:    D-dimer cutoff value is 68 x 10 = 680 ng FEU/ml = 0.68 ug/mL FEU. In this patient a value of >= 0.68 ug/mL FEU would be considered abnormal).     Mariposa SALAS et al. AUBREY, 2015 Date:  3/6/2020  Date of Admission:  3/5/2020    History provided by:patient  HPI:   Patient presents with:  Chest Pain Angina    HPI Atypical (L) parasternal chest pain. Nonexertional and no associated ischemic or HF symptoms.     History     Past Medical traZODone HCl 50 MG Oral Tab, Take 50 mg by mouth nightly as needed for Sleep. aspirin 81 MG Oral Chew Tab, Chew 1 tablet (81 mg total) by mouth daily. atorvastatin 40 MG Oral Tab, Take 80 mg by mouth nightly.     Levothyroxine Sodium 50 MCG Oral Tab, Deepak Joseph There is no tenderness. Musculoskeletal: Normal range of motion. Neurological: She is alert and oriented to person, place, and time. No cranial nerve deficit, sensory deficit or motor deficit. Skin: Skin is warm and dry.    Psychiatric: She has a normal Chest XR, V/Q scan, EKG, and troponin level are all normal and pain has resolved. Advised pt to use her tramadol if symptoms recur.       Type 2 diabetes mellitus with circulatory disorder, without long-term current use of insulin (HCC)  Controlled       Ch ------------------------------------------------------------------- Study Conclusions 1. Left ventricle: The cavity size was normal. Wall thickness was    normal. Systolic function was normal. The estimated ejection    fraction was 55-60%.  Regional wall mo 0.49cm^2/m^2. Mean velocity ratio of LVOT to aortic valve: 0.43. Mean gradient (S): 13mm Hg. Peak gradient (S): 25mm Hg. Aorta: The aorta was not dilated. Aortic root: The aortic root was normal in size. Mitral valve: Moderately calcified annulus.  Trang Nesbitt major axis, ES, A4C                6.3   cm       ---------  LV PW thickness, ED               (H)     1.0   cm       0.6 - 0.9  IVS/LV PW ratio, ED                       0.8            ---------  LV end-diastolic volume, 2-p      (H)     163   ml       46 ---------  Aortic regurg peak velocity               2.69  m/sec    ---------  Aortic regurg pressure half-time          1188  ms       ---------  Aortic regurg peak gradient               29    mm Hg    ---------   Aorta Physical Therapy Notes (last 72 hours) (Notes from 3/3/2020 12:40 PM through 3/6/2020 12:40 PM)    No notes of this type exist for this encounter.      Occupational Therapy Notes (last 72 hours) (Notes from 3/3/2020 12:40 PM through 3/6/2020 12:40 PM)    No

## (undated) NOTE — IP AVS SNAPSHOT
[facility]            (For Outpatient Use Only) Initial Admit Date: 2/10/2020   Inpt/Obs Admit Date: Inpt: 2/10/20 / Obs: N/A   Discharge Date:    Carylon Kawasaki:  [de-identified]   MRN: [de-identified]   CSN: [id number]   CEID: [id number]        [facility] Payor:  Plan:    Group Number:  Insurance Type:    Subscriber Name:  Subscriber :    Subscriber ID:  Pt Rel to Subscriber:    Hospital Account Financial Class: Medicare    2020

## (undated) NOTE — LETTER
[address] [facility]     I agree to have a Peripherally Inserted Central Catheter (PICC) placed in my arm.    1. The PICC insertion procedure, care, maintenance, risks, benefits, and complications have been explained to me b 7. The person performing this procedure has discussed the potential benefits, risks, and side effects of the PICC; the likelihood of achieving goals; and potential problems that might occur during recuperation.  They also discussed reasonable alternatives t

## (undated) NOTE — IP AVS SNAPSHOT
Patient Demographics     Address  [address] [place] [id number] Phone  [phone] (Home) *Preferred*      Emergency Contact(s)     Name Relation Home Work Mobile    Rory Geronimo [phone]      Edgar Campbell Daughter   [phone] sodium 100 MG Caps  Commonly known as: COLACE  Next dose due: Tonight       Take 100 mg by mouth 2 (two) times daily. ferrous sulfate 325 (65 FE) MG Tbec  Next dose due:  Tomorrow morning with breakfast      Take 325 mg by mouth daily with breakfas Loperamide HCl 2 MG Caps  Commonly known as: IMODIUM      Take 2 mg by mouth 4 (four) times daily as needed for Diarrhea.          loratadine 10 MG Tabs  Commonly known as: CLARITIN  Next dose due: Tomorrow morning      Take 10 mg by mouth daily. Johanny Robertson MD               Where to Get Your Medications      Please  your prescriptions at the location directed by your doctor or nurse    Bring a paper prescription for each of these medications  furosemide 10 MG/ML Soln           305-305- Most Recent Value   Vitals  113/66 Filed at 05/19/2021 0856   Pulse  71 Filed at 05/19/2021 1119   Resp  22 Filed at 05/19/2021 0856   Temp  97.6 °F (36.4 °C) Filed at 05/19/2021 0856   SpO2  95 % Filed at 05/19/2021 0856      Patient's Most Recent [facility] 8.5 - 10.1 mg/dL [facility])   Calculated Osmolality 303 275 - 295 mOsm/kg [facility] ([facility])   GFR, Non-African American 27 >=60 [facility] ([facility])   GFR, -American 31 >=60 [facility])            Testing Performed By     Lab - weight gain or swelling of the legs. No cough or hemoptysis. No chest pain. She did admit to one episode of vomiting today morning. No fevers or chills. No abdominal pain. [MR.1]    She was diuresed overnight.   Creatinine now better[MR.3]    Past Medic (MAALOX) oral suspension 30 mL, 30 mL, Oral, QID PRN  aspirin chewable tab 81 mg, 81 mg, Oral, Daily  atorvastatin (LIPITOR) tab 80 mg, 80 mg, Oral, Nightly  baclofen (LIORESAL) tab 10 mg, 10 mg, Oral, BID  carvedilol (COREG) tab 12.5 mg, 12.5 mg, Oral, BI DOSE WITHOUT A PHYSICIAN ORDER     Give 1 unit for blood glucose 160-200 mg/dL  Give 2 units for blood glucose 201-240 mg/dL  Give 3 units for blood glucose 241-280 mg/dL  Give 4 units for blood glucose 281-320 mg/dL  Give 5 units for blood glucose 321-360 every 6 (six) hours as needed for Pain.  linagliptin 5 mg Oral Tab, Take 5 mg by mouth daily. Loperamide HCl 2 MG Oral Cap, Take 2 mg by mouth 4 (four) times daily as needed for Diarrhea.   Potassium Chloride ER 10 MEQ Oral Tab CR, Take 10 mEq by mouth cammie MCHC 31.7 30.8*   RDW 13.8 13.6   NEPRELIM 5.21 4.12   WBC 7.2 5.8   .0 187.0         Recent Labs   Lab 05/18/21  1132 05/19/21  0838   * 118*   BUN 48* 45*   CREATSERUM 2.13* 1.87*   GFRAA 27* 31*   GFRNAA 23* 27*   CA 7.9* 8.4*    140 Problem: acute on chronic diastolic BRN[IQ.7]  Reason for Therapy: Mobility Dysfunction and Discharge Planning    PHYSICAL THERAPY ASSESSMENT   During this session the following PPE was worn by this therapist: surgical mask, gloves, goggles, gown    Patien of assist is not available, pt will require sub-acute rehab. Patient expressed that she can request increased caregiver hours if needed. Patient will benefit from continued IP PT services to address these deficits in preparation for discharge.     Via Nevin Vasquez SINUS SURGERY[TL.2]           HOME SITUATION[TL.1]  Type of Home: Independent living facility [facility] )   Home Layout: One level                Lives With: Alone  Drives: No  Patient Owned Equipment:  (rollator)  Patient Regularly Uses: None[TL.2] on and standing up from a chair with arms (e.g., wheelchair, bedside commode, etc.): A Little   -   Moving from lying on back to sitting on the side of the bed?: A Little[TL.2]   How much help from another person does the patient currently need. ..[TL.1] Mary Shah, PT on 5/19/2021  3:16 PM  TL. 2 - Mary Shah, PT on 5/19/2021  3:18 PM  TL. 3 - Mary Shah, PT on 5/19/2021 11:04 AM                        Occupational Therapy Notes (last 72 hours) (Notes from 5/16/2021  3:57 PM through 5/19/2 prior level of function. The patient's Approx Degree of Impairment: 38.32% has been calculated based on documentation in the [facility] '6 clicks' Inpatient Daily Activity Short Form.   Research supports that patients with this level of impairment may benefit living facility [facility] )     Lives With: Alone                      Drives: No  Patient Regularly Uses: None    Stairs in Home: Elevator accessible   Use of Assistive Device(s): Rollator; has w/c-not using     Prior Level of [place]: Pt lives [place]  -   Taking care of personal grooming such as brushing teeth?: None  -   Eating meals?: None    AM-PAC Score:  Score: 20  Approx Degree of Impairment: 38.32%  Standardized Score (AM-PAC Scale): 42.03  CMS Modifier (G-Code): CJ    FUNCTIONAL TRANSFER for specific interventions   Patient/Family Short Term Goal     Interdisciplinary Progressing    Description: Patient's Short Term Goal: manage SOB    Interventions:   - supplemental O2, IV lasix, working with PT/OT  - See additional Care Plan goals for sp

## (undated) NOTE — IP AVS SNAPSHOT
[facility]            (For Outpatient Use Only) Initial Admit Date: 1/13/2020   Inpt/Obs Admit Date: Inpt: 1/13/20 / Obs: 01/14/20   Discharge Date:    Avis Wilkinson:  [de-identified]   MRN: [de-identified]   CSN: [id number]   CEID: [id number] Hospital Account Financial Class: Medicaid    January 16, 2020

## (undated) NOTE — IP AVS SNAPSHOT
[facility]            (For Outpatient Use Only) Initial Admit Date: 3/22/2020   Inpt/Obs Admit Date: Inpt: 3/22/20 / Obs: N/A   Discharge Date:    Mary Ricci:  [de-identified]   MRN: [de-identified]   CSN: [id number]   CEID: [id number]        [facility] Subscriber ID:  Pt Rel to Subscriber:    Hospital Account Financial Class: Medicare    March 31, 2020

## (undated) NOTE — IP AVS SNAPSHOT
Patient Demographics     Address  [facility] [address] Phone  [phone] (Home) *Preferred*      Emergency Contact(s)     Name Relation Home Work Mobile    Carmela Armendariz [phone]      Jacquesbetzaida Links Daughter   62 Take 1 tablet (12.5 mg total) by mouth 2 (two) times daily with meals. Yoli Paul MD, MD         Clopidogrel Bisulfate 75 MG Tabs  Commonly known as: PLAVIX  Next dose due: Tomorrow 9 am      Take 75 mg by mouth daily.           Deep Sea Nasal Spray nitroGLYCERIN 0.4 MG Subl  Commonly known as: NITROSTAT  Next dose due: Anytime as Needed      Place 0.4 mg under the tongue every 5 (five) minutes as needed for Chest pain.           ondansetron HCl 4 MG/2ML Soln  Commonly known as: Margi Aus  Next do ** SITE UNKNOWN **     Order ID Medication Name Action Time Action Reason Comments    [id number] 0.9% NaCl infusion 10/01/20 2138 New Bag      [id number] Cetirizine HCl (ZYRTEC) 5 MG tab 5 mg 10/02/20 0823 Given      [id number] Fluticasone Propionate (FLONAS Calculi, Urinary Release Upon Ordering [[id number]] Collected: 09/29/20 1222    Order Status: Sent Lab Status:  In process Updated: 09/29/20 1324    Specimen: Kidney stone (calculus)     Blood Culture FREQ X 2 [[id number]]  (Abnormal) Collected: 09/26/20 058 Tobramycin 8  Intermediate    Trimethoprim/Sulfa >=320  Resistant                   Blood Culture PCR Once [[id number]]  (Abnormal) Collected: 09/26/20 0556    Order Status: Completed Lab Status: Preliminary result Updated: 09/27/20 1012    Specimen: Blood HISTORY OF PRESENT ILLNESS:  The patient gave the information that she developed abdominal pain and then had blood in the urine.   It was persistent and the patient decided to come to the emergency room at [facility].  In the ER, the patient NEUROLOGIC:  Patient has minimal weakness on the left upper and lower extremities. Otherwise, generalized weakness. Higher functions are intact. ASSESSMENT:    1. Left hydronephrosis/pyelonephritis. 2.   Left ureter stone.   3.   Left aorta sten 18-year-old female with history of CHF, nephrolithiasis, diabetes, hypertension, hyperlipidemia, on aspirin and Plavix, here with complaints of hematuria for 1 episode yesterday. Patient is also complaining of lower abdominal and back pain.   patient needs •  ipratropium-albuterol (DUONEB) nebulizer solution 3 mL, 3 mL, Nebulization, Q4H PRN    •  Isosorbide Mononitrate ER (IMDUR) 24 hr tab 60 mg, 60 mg, Oral, Daily    •  Levothyroxine Sodium (SYNTHROID) tab 50 mcg, 50 mcg, Oral, Before breakfast    •  cetir •  carvedilol 6.25 MG Oral Tab, Take 1 tablet (6.25 mg total) by mouth 2 (two) times daily with meals. •  Isosorbide Mononitrate ER 60 MG Oral Tablet 24 Hr, Take 60 mg by mouth daily.     •  Clopidogrel Bisulfate 75 MG Oral Tab, Take 75 mg by mouth daily • Isosorbide Mononitrate ER  60 mg Oral Daily   • Levothyroxine Sodium  50 mcg Oral Before breakfast   • cetirizine  10 mg Oral Daily   • Pantoprazole Sodium  40 mg Oral QAM AC   • Potassium Chloride ER  10 mEq Oral Before dinner   • traZODone HCl  75 mg O CONCLUSION:   1. Mild to moderate hydronephrosis, slightly worsened from prior, with calculus near the ureteropelvic junction and ureteral stent in place.   There is new severe perinephric stranding and air throughout the urinary collecting system on the le    mildly calcified leaflets. Severe regurgitation. Cath 7/19  1. A 40% to 50% in-stent restenosis of the proximal and mid left anterior descending artery stents.   2.       Jailed marginal branch behind previously placed stent, but vessel size is No notes of this type exist for this encounter. Imaging Results (HF patients)    Chest X-Ray Results (HF patients only)    No exam resulted this encounter. 2D Echocardiogram Results (HF patients only)    No exam resulted this encounter.       Cath Pt ambulated 15' to bathroom with rolling walker at MIN A, demonstrated slow shuffled gait, flexed posture, mild instability. Pt assisted standing to seated on toilet at MIN A with grab bars, verbal cues for hand placement.  Pt with small bowel movement, ab BALANCE                                                                                                                     Static Sitting: Fair  Dynamic Sitting: Fair -           Static Standing: Fair -  Dynamic Standing: Poor +    ACTIVITY TOLERANCE[AO.1 Current Status Pt performs bed mobility at [facility] with HOB 30 deg and use of rail    Goal #2 Patient is able to demonstrate transfers Sit to/from Stand at assistance level: modified independent with walker - rolling      Goal #2  Current Status Pt performs tx Pt seen up in chair PPE worn by staff, gloves, gown and mask. Pt performed sit to stand with min assist, and ambulated to toilet with CGA , RW for support. Cues for safety with ambulating with RW placement.   Pt transferred to toilet with CGA with cues for -   Toileting, which includes using toilet, bedpan or urinal? : A Lot  -   Putting on and taking off regular upper body clothing?: A Little  -   Taking care of personal grooming such as brushing teeth?: A Little  -   Eating meals?: A Little    AM-PAC Score Active Goals        Problem: Patient/Family Goals    Goal Priority Disciplines Outcome Interventions   Patient/Family Long Term Goal     Interdisciplinary Progressing    Description: Patient's Long Term Goal: \"to go home\"    Interventions:  - take medic

## (undated) NOTE — IP AVS SNAPSHOT
[facility]            (For Outpatient Use Only) Initial Admit Date: 11/10/2020   Inpt/Obs Admit Date: Inpt: 11/11/20 / Obs: N/A   Discharge Date:    Stone Zuleta:  [de-identified]   MRN: [de-identified]   CSN: [id number]   CEID: [id number]        E TERTIARY INSURANCE   Payor:  Plan:    Group Number:  Insurance Type:    Subscriber Name:  Subscriber :    Subscriber ID:  Pt Rel to Subscriber:    Hospital Account Financial Class: Medicare    2020

## (undated) NOTE — LETTER
[facility]  Administration of Anesthesia  1. Sung Marrero, or _________________________________ acting on her behalf, (Patient) (Dependent/Representative) request to receive anesthesia for my pending procedure/operation/treatment. 6. OBSTETRIC PATIENTS: Specific risks/consequences of spinal/epidural anesthesia may include itching, low blood pressure, difficulty urinating, slowing of the baby's heart rate and headache.  Rare risks include infections, high spinal block, spinal bleeding ___________________________________________________           _____________________________________________________  Date/Time                                                                                               Responsible person in case of minor

## (undated) NOTE — IP AVS SNAPSHOT
[facility]            (For Outpatient Use Only) Initial Admit Date: 5/18/2021   Inpt/Obs Admit Date: Inpt: 5/18/21 / Obs: N/A   Discharge Date:    El Terry:  [de-identified]   MRN: [de-identified]   CSN: [id number]   CEID: [id number]        [facility] SELF   TERTIARY INSURANCE   Payor:  Plan:    Group Number:  Insurance Type:    Subscriber Name:  Subscriber :    Subscriber ID:  Pt Rel to Subscriber:    Hospital Account Financial Class: Medicare    May 19, 2021

## (undated) NOTE — IP AVS SNAPSHOT
Patient Demographics     Address  [facility] [address] Phone  [phone] (Home) *Preferred*      Emergency Contact(s)     Name Relation Home Work Mobile    [facility] [phone]      Vincematthew Chamorro Daughter   62 ferrous sulfate 325 (65 FE) MG Tbec  Next dose due: Tomorrow morning with breakfast      Take 325 mg by mouth daily with breakfast.          Insulin Aspart Pen 100 UNIT/ML Sopn  Commonly known as: NOVOLOG  Next dose due:  Today with your next meal      Lucero OLANZapine 2.5 MG Tabs  Commonly known as: ZYPREXA  Next dose due: **This evening**      Take 1 tablet (2.5 mg total) by mouth nightly. Yakov Fagan MD         Pantoprazole Sodium 40 MG Tbec  Commonly known as: PROTONIX  Next dose due:  Tomorrow dirk [id number] acetaminophen (TYLENOL) tab 650 mg 11/16/20 1854 Given      [id number] aspirin chewable tab 81 mg 11/17/20 0815 Given      [id number] atorvastatin (LIPITOR) tab 80 mg 11/16/20 2030 Given      [id number] carvedilol (COREG) tab 12.5 mg 11/16/20 1831 Order Status: Completed Lab Status: Final result Updated: 11/15/20 2300    Specimen: Blood,peripheral      Blood Culture Result No Growth 5 Days    Clostridium difficile(toxigenic)PCR Once [[id number]]  (Normal) Collected: 11/11/20 2124    Order Status: Co • Essential hypertension    • Heart attack [facility])    • Hemiplegia and hemiparesis following cerebral infarction affecting unspecified side ([facility] [address]    • High blood pressure    • High cholesterol    • Hyperlipidemia    • Hypothyroidism    • Incontinence    • Mu •  Meclizine HCl 25 MG Oral Tab, Take 25 mg by mouth 3 (three) times daily as needed. •  Melatonin 3 MG Oral Cap, Take 2 capsules by mouth nightly as needed.     •  ALPRAZolam 0.25 MG Oral Tab, Take 0.25 mg by mouth 2 (two) times daily as needed for [facility] Constitutional: Negative for[OR.1] chills[OR.2],[OR.1] fatigue[OR.2] and[OR.1] fever[OR.2]. Physical Exam:   Vital Signs:  Blood pressure 121/52, pulse 114, temperature 97.6 °F (36.4 °C), temperature source Axillary, resp.  rate 20, height 66\", weigh CONCLUSION:  1. No acute intracranial process by noncontrast CT technique. 2. There may be a chronic infarct of the right occipital lobe in the vascular territory of the right posterior cerebral artery.   3. Senescent changes of parenchymal volume loss wit OR.1 Srinivas Callahan MD on 11/11/2020  9:34 AM  OR. 2 - Jaime Marie MD on 11/11/2020 10:52 AM                        Consults - MD Consult Notes      Consults signed by Chris Corcoran MD at 11/11/2020  9:22 PM     Author:  MD Jfef Ferris The patient today alert but not communicative with cervical tic movement and noticeable restlessness and catatonic behavior.     Patient reported to me in the last admission that she has been having those episode frequently and she has not been able to know • Hypothyroidism    • Incontinence    • Muscle weakness    • NSTEMI (non-ST elevated myocardial infarction) [facility])    • Peripheral vascular disease (HCC)    • Pneumonia due to organism    • Renal disorder    • Stroke [facility])    • UTI (urinary tract infection) •  Levothyroxine Sodium (SYNTHROID) tab 50 mcg, 50 mcg, Oral, Before breakfast    •  linagliptin (TRADJENTA) tab TABS 5 mg, 5 mg, Oral, Daily    •  lisinopril tab 20 mg, 20 mg, Oral, Daily    •  Cetirizine HCl (ZYRTEC) 5 MG tab 5 mg, 5 mg, Oral, Daily    • •  Nephro-Ramses 0.8 MG Oral Tab, Take 0.8 mg by mouth daily. •  Insulin Aspart Pen 100 UNIT/ML Subcutaneous Solution Pen-injector, Inject 1-5 Units into the skin 3 (three) times daily with meals.     •  carvedilol 12.5 MG Oral Tab, Take 1 tablet (12.5 mg  11/10/2020    K 3.9 11/10/2020     (H) 11/10/2020    CO2 22.0 11/10/2020     (H) 11/10/2020    CA 8.9 11/10/2020    ALB 2.8 (L) 11/10/2020    ALKPHO 190 (H) 11/10/2020    TP 7.5 11/10/2020    AST 22 11/10/2020    ALT 29 11/10/2020    P Blood pressure 131/82, pulse 80, temperature 97.9 °F (36.6 °C), temperature source Axillary, resp. rate 12, height 66\", weight 71.5 kg (157 lb 9.6 oz), SpO2 96 %.     Mental Status Exam:     The patient is a stated age female in hospital gown laying down i 3.  Restart Remeron 7.5 mg nightly. Hold if the patient is sedated. 4.  Provide Ativan 0.5 mg IV nightly again to help with catatonia. 5.  Otherwise if there is no agitation hold on Seroquel for tonight for the patient is sedated.   6.  Communicate with Author: Timmy Thorne OT Service: Rehab Author Type: Occupational Therapist    Filed: 11/14/2020  3:33 PM Date of Service: 11/14/2020  3:16 PM Status: Signed    : Timmy Thorne OT (Occupational Therapist)       OCCUPATIONAL THERAPY EVALUATION The patient's Approx Degree of Impairment: 59.67% has been calculated based on documentation in the [facility] '6 clicks' Inpatient Daily Activity Short Form. Research supports that patients with this level of impairment may benefit from SHERICE.      DISCHARGE REC Performed by Geoff Nuñez MD at [facility] OR   • CYSTOSCOPY URETEROSCOPY Left 9/29/2020    Performed by Geoff Nuñez MD at [facility] OR   • LASER HOLMIUM LITHOTRIPSY Left 9/29/2020    Performed by Geoff Nuñez MD at [facility] OR   • SINUS S -   Toileting, which includes using toilet, bedpan or urinal? : A Lot  -   Putting on and taking off regular upper body clothing?: A Lot  -   Taking care of personal grooming such as brushing teeth?: A Little  -   Eating meals?: A Little    AM-PAC Score: Filed: 11/16/2020 10:08 AM Date of Service: 11/16/2020  9:45 AM Status: Signed    : Leopold Madison, SLP (Speech and Language Pathologist)       SPEECH DAILY NOTE - INPATIENT    ASSESSMENT & PLAN   ASSESSMENT    Patient seen for dysphagia f/u per plan Goal #1 The patient will tolerate pureed consistency and thin liquids without overt signs or symptoms of aspiration with 100 % accuracy over 2-3 session(s).   Pt tolerates pureed solids and thin liquids via pinched straw with 1:1 feed assist with no overt C

## (undated) NOTE — IP AVS SNAPSHOT
[facility]            (For Outpatient Use Only) Initial Admit Date: 2022   Inpt/Obs Admit Date: Inpt: 22 / Obs: N/A   Discharge Date:    Cyrus Feliciano:  [de-identified]   MRN: [de-identified]   CSN: [id number]   CEID: [id number]        ENCOUNTER  Patient Class: Inpatient Admitting Provider: Lorena Rosenthal MD Unit: [facility] Service: Cardiac Telemetry Attending Provider: Dianne Graves MD   Bed: 522-G   Visit Type:   Referring Physician: No ref. provider found Billing Flag:    Admit Diagnosis: Aspiration pneumonitis ([facility] [address] [J69.0]      PATIENT  Legal Name:   Debbie Crystal   Legal Sex: Female  Gender ID:              [address] [facility] Name:    PCP:  Hudson Mckenzie MD Home: [phone]   Address:  [address] [place] [address] : 1950 (71 yrs) Mobile: No mobile phone on file. City/State/Zip: [place] [address] Marital:  Language: [address] [place] [address] Drive: Thanh Canard: [phone] Church: [address] No*     Race: White Ethnicity: Non  Or [address] [place] [address]   Name Relationship Legal Guardian? Home Phone Work Phone Mobile Phone   1. Spencer Briseyda  2.  [place] [phone]       GUARANTOR  Guarantor: Tanja Cummings : 1950 Home Phone: [phone]   Address: [address] [place] [address]  Sex: Female Work Phone:    City/State/Zip: [place] [address]   Rel. to Patient: Self Guarantor ID: [id number]   GUARANTOR EMPLOYER   Employer:  Status: RETIRED     COVERAGE  PRIMARY INSURANCE   Payor: MEDICARE Plan: MEDICARE PART A&B   Group Number:  Insurance Type: INDEMNITY   Subscriber Name: Brenda Jeronimo : 1950   Subscriber ID: [id number] Pt Rel to Subscriber: Self   SECONDARY INSURANCE   Payor: MEDICAID Plan: MEDICAID   Group Number: [id number] Insurance Type: [address] Name: Brenda Jeronimo : 1950   Subscriber ID: [id number] Pt Rel to Subscriber: SELF   TERTIARY INSURANCE   Payor: Plan:    Group Number:  Insurance Type:    Subscriber Name:  Subscriber :    Subscriber ID:  Pt Rel to Subscriber:    Hospital Account Financial Class: Medicare    2022

## (undated) NOTE — IP AVS SNAPSHOT
Patient Demographics     Address  [address] [place] [address] Phone  [phone] (Home) *Preferred*      Emergency Contact(s)     Name Relation Home Work Mobile    Rosetta Dougherty [phone]      Monica Hernandez Daughter   [phone] carvedilol 12.5 MG Tabs  Commonly known as: COREG  Next dose due: Tonight with dinner      Take 1 tablet (12.5 mg total) by mouth 2 (two) times daily with meals.    Opal Fitzpatrick MD, MD         Clopidogrel Bisulfate 75 MG Tabs  Commonly known as: Take 1 tablet (7.5 mg total) by mouth nightly. Josh Lagos MD         Nephro-Ramses 0.8 MG Tabs  Next dose due: Tomorrow morning      Take 0.8 mg by mouth daily.           nitroGLYCERIN 0.4 MG Subl  Commonly known as: NITROSTAT      Place 0.4 mg under [id number] Isosorbide Mononitrate ER (IMDUR) 24 hr tab 60 mg 12/24/20 0835 Given      [id number] Levothyroxine Sodium tab 25 mcg 12/24/20 0613 Given      [id number] OLANZapine (ZYPREXA) tab 2.5 mg 12/23/20 2114 Given      [id number] Pantoprazole Sodium (SAMUEL [id number] vancomycin HCl [facility]) 50 MG/ML oral solution 250 mg 12/24/20 0425 Given      [id number] vancomycin HCl [facility]) 50 MG/ML oral solution 250 mg 12/24/20 1111 Given              Recent Vital Signs       Most Recent Value   Vitals  108/62 Filed a 20-year-old female who presents with sudden onset sharp left-sided nonradiating chest pain. Patient states it started at rest at 630 this morning. She states it is somewhat improved although still present. She also feels short of breath.   She denies eber Alcohol use: Not Currently    Drug use: Never    Allergies/Medications: Allergies:   Tetracyclines & Rel*    RASH  Metformin               DIARRHEA  Tetracycline            RASH    •  furosemide 40 MG Oral Tab, Take 40 mg by mouth daily.     •  docusate •  Clopidogrel Bisulfate 75 MG Oral Tab, Take 75 mg by mouth daily. •  ipratropium-albuterol 0.5-2.5 (3) MG/3ML Inhalation Solution, Take 3 mL by nebulization every 4 (four) hours as needed.     •  aspirin 81 MG Oral Chew Tab, Chew 1 tablet (81 mg total) Blood pressure 136/73, pulse 69, temperature 98.2 °F (36.8 °C), temperature source Oral, resp. rate 18, height 5' 5\" (1.651 m), weight 174 lb 3.2 oz (79 kg), SpO2 95 %. Nursing note and vitals reviewed.    Constitutional: She is oriented to person, plac Xr Chest Ap Portable  (cpt=71045)    Result Date: 12/21/2020  CONCLUSION:  Development of mild CHF/fluid overload in this pacemaker patient including central pulmonary venous congestion.     Dictated by (CST): Ashley Foley MD on 12/21/2020 at 7:55 AM -Cont Remeron, Zyprexa.  Xanax prn agitation  -Monitor behavior      VTE Px: n/a  GI Px: PPI  Dispo: admit    **Certification      PHYSICIAN Certification of Need for Inpatient Hospitalization -     Patient will require inpatient services that will reasonab • Hemiplegia and hemiparesis following cerebral infarction affecting unspecified side (HCC)    • High blood pressure    • High cholesterol    • Hyperlipidemia    • Hypothyroidism    • Incontinence    • Muscle weakness    • NSTEMI (non-ST elevated myocardia •  Levothyroxine Sodium tab 25 mcg, 25 mcg, Oral, Before breakfast    •  linagliptin (TRADJENTA) tab TABS 5 mg, 5 mg, Oral, Daily    •  lisinopril tab 10 mg, 10 mg, Oral, Daily    •  cetirizine (ZYRTEC) tab 10 mg, 10 mg, Oral, Daily    •  Meclizine HCl (AN •  rOPINIRole HCl 0.25 MG Oral Tab, Take 1 tablet (0.25 mg total) by mouth 2 (two) times a day. •  baclofen 10 MG Oral Tab, Take 10 mg by mouth 2 (two) times daily.     •  ferrous sulfate 325 (65 FE) MG Oral Tab EC, Take 325 mg by mouth daily with breakf Tetracycline            RASH    Review of Systems:    Pertinent items are noted in HPI. Physical Exam:   Blood pressure 124/66, pulse 70, temperature 97.6 °F (36.4 °C), temperature source Oral, resp.  rate 22, height 165.1 cm (5' 5\"), weight 190 lb 1.6 CA 8.5 12/21/2020    ALB 2.8 (L) 11/10/2020    ALKPHO 190 (H) 11/10/2020    TP 7.5 11/10/2020    AST 22 11/10/2020    ALT 29 11/10/2020    PTT 30.1 07/21/2020    INR 1.24 (H) 07/21/2020    PTP 15.4 (H) 07/21/2020    T4F 2.0 (H) 11/10/2020    TSH 0.039 (L) 6. Hx of chest pain with patent circumflex stent and a jailed marginal branch, vessel size being small also with apical RCA CAD and vessel being too small for stenting, 40-50% in-stent restenosis of the proximal mid LAD stents by cardiac cath 7/2019 on sta [facility]* -------------------------------------------------------------------      Transthoracic Echocardiography M-mode, complete 2D, complete spectral Doppler, and color Doppler ------------------------------------ half-time: 2.34cm^2. Valve area by continuity equation (using    LVOT flow): 1.3cm^2. 5. Left atrium: The atrium was severely dilated. 6. Right atrium: The atrium was mildly dilated.  ------------------------------------------------------------------- Grabiel Vuong LVOT to aortic valve: 0.42. Mean gradient (S): 16mm Hg. Peak gradient (S): 30mm Hg. Aorta: Aortic root: The aortic root was normal in size. Mitral valve: Moderately calcified annulus. Normal thickened leaflets. Mobility was not restricted.   Doppler:   T -----------  ES, A4C  LV PW thickness, ED          0.8   cm       1.0        0.6 - 0.9  IVS/LV PW ratio, ED          1.25           0.8        -----------  LV end-diastolic             776   ml       163        46 - 106  volume, 2-p  LV end-systolic      ( Velocity ratio,              0. 44           0.41       -----------  peak, LVOT/AV  Aortic valve area,           1.11  cm^2     ---------- -----------  peak velocity  Aortic valve                 0.58  cm^2/m^2 0.49       -----------  area/bsa, peak  veloci Value          02/11/2020 Reference  RV ID, ED, PLAX              2.7   cm       ---------- -----------  TAPSE                        2.0   cm       ---------- 1.7 - 3.1  RV pressure, S, DP           35    mm Hg    ---------- -----------  RV s', latera

## (undated) NOTE — IP AVS SNAPSHOT
Patient Demographics     Address  [address] [place] [address] Phone  [phone] (Home) *Preferred*      Emergency Contact(s)     Name Relation Home Work Mobile    Padmaja Wood [phone]      Priscilla Larsen   [phone] Edwar Rocha MD, MD         Clopidogrel Bisulfate 75 MG Tabs  Commonly known as: PLAVIX  Next dose due: Tomorrow morning      Take 75 mg by mouth daily. docusate sodium 100 MG Caps  Commonly known as: COLACE  Next dose due:  Tonight      Take 1 Take 1 tablet (60 mg total) by mouth daily. Nayana oLwry MD         Levothyroxine Sodium 25 MCG Tabs  Next dose due:  Tomorrow morning      Take 1 tablet (25 mcg total) by mouth before breakfast.   Belvin Koyanagi, MD         linagliptin 5 mg Tabs  Com Take 1 tablet (500 mg total) by mouth 2 (two) times daily. Pratik Aranda MD         rOPINIRole HCl 0.25 MG Tabs  Commonly known as: REQUIP  Next dose due: Tonight       Take 1 tablet (0.25 mg total) by mouth 2 (two) times a day.    Juan Marquez MD RENAL FUNCTION PANEL [[id number]] (Abnormal)  Resulted: 03/02/21 0617, Result status: Final result   Ordering provider: Tg Condon MD  03/01/21 2300 Resulting lab: [facility] LAB [facility])    Specimen Information    Type Source Co Filed: 2021  1:04 PM Date of Service: 2021 10:09 AM Status: Signed    : Gorge Schofield MD (Physician)       [facility]    History and Physical[OR.1]    Reshma Lehman[OR.2] Patient Status:  Inpatient    1950 Malik Samples Performed by Rocky Bee MD at [address] [facility] MAIN OR   • SINUS SURGERY         Family History   Problem Relation Age of Onset   • Other (ULCERS) Father    • Cancer Mother         UTERINE/COLON[OR.2]     Social History:[OR.1]  Social History    Tobacco Use •  Pantoprazole Sodium 40 MG Oral Tab EC, Take 1 tablet (40 mg total) by mouth every morning before breakfast.    •  Clopidogrel Bisulfate 75 MG Oral Tab, Take 75 mg by mouth daily.     •  aspirin 81 MG Oral Chew Tab, Chew 1 tablet (81 mg total) by mouth da Blood pressure 116/60, pulse 63, temperature 97.6 °F (36.4 °C), temperature source Oral, resp. rate 18, height 5' 5\" (1.651 m), weight 187 lb 14.4 oz (85.2 kg), SpO2 97 %. [OR.2]     Constitutional: She appears[OR. 1] well-developed[OR.4] and[OR. 1] well-nou CONCLUSION:   Cardiomegaly with bilateral interstitial opacity concerning for interstitial edema. Suspected developing right perihilar opacity may reflect mild developing edema. Mild pneumonia not excluded.   Recommend short-term follow-up chest radiograp -Cont Remeron, Zyprexa.  Xanax prn agitation  -Monitor behavior      ADMIT  FULL CODE  DVT px: subcutaneous heparin  FROM [facility][VH.7]      **Certification      PHYSICIAN Certification of Need for Inpatient Hospitalization -[AA.9] Initial Certificati Pt went to the ER with chest pain. She had sharp pain to her anterior chest without radiation. She denied associated dyspnea. She did report some associated palpitations. She had no cough, fever, or chills.   She denied any associated nausea or vomiting • Cancer Mother         UTERINE/COLON[MR.2]       Social History[MR.1]  Social History    Tobacco Use      Smoking status: Former Smoker      Smokeless tobacco: Never Used    Alcohol use: Not Currently    Drug use: Never[MR.2]      Current Medications:[MR. •  lisinopril 10 MG Oral Tab, Take 1 tablet (10 mg total) by mouth daily. •  OLANZapine 2.5 MG Oral Tab, Take 1 tablet (2.5 mg total) by mouth nightly.     •  Levothyroxine Sodium 25 MCG Oral Tab, Take 1 tablet (25 mcg total) by mouth before breakfast. •  ipratropium-albuterol 0.5-2.5 (3) MG/3ML Inhalation Solution, Take 3 mL by nebulization every 4 (four) hours as needed. •  acetaminophen 325 MG Oral Tab, Take 650 mg by mouth every 6 (six) hours as needed for Pain.     •  nitroGLYCERIN 0.4 MG Sublingu CO2 22.0 24.0 22. 0[MR.2]        Imaging:[MR.1]  No results found. [MR.2]            Impression/Receommendations:     1 - PHILIP  Check renal US  Check UA   Would continue diuretics - suspect cardiorenal as the cause  Agree with holding lisinopril    2 - fluid Patient received supine in bed, agreeable to physical therapy. KATIE Pastrana approved participation in physical therapy, gait belt donned. PT wore mask, gloves and gown. Patient slumped to left side in bed with head of bed completely elevated.  Patient agreeable t Dynamic Sitting: Fair +           Static Standing: Fair -  Dynamic Standing: Poor +[PS.7]    AM-PAC '6-Clicks' INPATIENT SHORT FORM - BASIC MOBILITY  How much difficulty does the patient currently have. ..[JG.1]  -   Turning over in bed (including adjusting Goal #4 Patient to demonstrate independence with home activity/exercise instructions provided to patient in preparation for discharge. Goal #4   Current Status In progress       [JG.1]         Attribution Key    JG. 1 - Nathaly Monroy, PT on 3/1/2021

## (undated) NOTE — IP AVS SNAPSHOT
Patient Demographics     Address  [place] [address] Phone  [phone] [facility])  [phone] [facility]      Emergency Contact(s)     Name Relation Home Work Betty [place] Daughter   [phone]    [place], [place] Deep Sea Nasal Spray 0.65 % Soln  Generic drug:  Saline Nasal [place]      2 sprays by Nasal route 3 (three) times daily as needed for congestion. Fluticasone Propionate 50 MCG/ACT Susp  Commonly known as:  FLONASE  Next dose due:   Tonight       2 s Take 75 mg by mouth nightly.                 Where to Get Your Medications      These medications were sent to [id number] [place] [address] [phone], [address] [facility] [place], [place]  [address]    Phone:  [phone] [id number] furosemide (LASIX) tab 40 mg 08/27/20 1647 Given      [id number] furosemide (LASIX) tab 40 mg 08/28/20 0922 Given      [id number] linagliptin (TRADJENTA) tab TABS 5 mg 08/28/20 0909 Given      [id number] traMADol HCl (ULTRAM) tab 50 mg 08/27/20 210 Cefazolin >=64  Resistant    Cefepime  Resistant    Ceftazidime  Resistant    Ceftriaxone >=64  Resistant    Ciprofloxacin >=4  Resistant    Gentamicin >=16  Resistant    Levofloxacin >=8  Resistant    Meropenem <=0.25  Sensitive    Nitrofurantoin <=16  S • Anemia    • Calculus of kidney    • Congestive heart disease (HCC)    • Coronary atherosclerosis    • Diabetes [facility])    • Dysphagia    • Esophageal reflux    • Essential hypertension    • Heart attack (HCC)    • Hemiplegia and hemiparesis following cereb Clopidogrel Bisulfate 75 MG Oral Tab, Take 75 mg by mouth daily. traZODone HCl 50 MG Oral Tab, Take 75 mg by mouth nightly. aspirin 81 MG Oral Chew Tab, Chew 1 tablet (81 mg total) by mouth daily.   atorvastatin 40 MG Oral Tab, Take 80 mg by mouth night Pulmonary/Chest: Effort normal. No respiratory distress. She has rales. Abdominal: Soft. Bowel sounds are normal. She exhibits no distension. There is no tenderness. Musculoskeletal: Normal range of motion.    Neurological: She is alert and oriented to pe as renal function permits. Ureteral stent retained  Likely source of recurring UTI's with same organisms. Attempting to arrange OP stent removal but insurance issues and COVID restrictions have stymied this so far.       Type 2 diabetes mellitus with c Physical Therapy Note signed by Sofía Guillen PT at 8/26/2020  6:48 PM  Version 2 of 2    Author:  Sofía Guillen PT Service:  Rehab Author Type:  Physical Therapist    Filed:  8/26/2020  6:48 PM Date of Service:  8/26/2020  5:56 PM Stat nia prior to participation. Pt[KS. 2] reports HA at 3/10[KS.3] . Pt with mild SOB with activity reported. [KS.2]        Patient's current functional deficits include[KS. 1] need for assisted mobility, increased higher fall risk , generalized weakness an impairment may benefit from[KS. 1] [facility] . Pt reports a goal to return to residential. Pt reports she is at \" [facility]\". Pt reports indep dressing , bathing and tolieting and indep apartment ambulation with a rollator.  Pt reports O2 depe - volume overload noted may be improving  - on lasix 80 mg IV BID, creat increasing decrease lasix to 60 po BID      2. Dual chamber PPM  - DDD PPM 1/16/20 [employer] serial number [id number]     3. CKD stage 3  - creat 2.09 up from 1.9     4. HTN     5.  CAD BUN and creat close to baseline. Will monitor closely while being diuresed.       Anemia  Mild, stablle and likely due to chronic disease       Urinary tract infection without hematuria, site unspecified  Preliminary culture growing E coli and K pneumo. Nick Type of Home: Assisted living facility([facility]   ? ?group home / ILF  )   Home Layout: One level                Lives With: Staff 24 hours  Drives: No  Patient Owned Equipment: (rollator  shower chair  O2  w/c )  Patient Regularly Uses: Home O2(rollator )[KS.3] Pt with WFL strength of ankle DF and PF With ankle pumps   Pt with generalized fxn weakness of LE and trunk noted with fxn mobility by extra time and difficulty with transfers ,decrease gait quality , decrease posture and decrease activity tolerance[KS. 2] aware of session/findings; All patient questions and concerns addressed(per RN Staff , pt with consistent use of call light no alarm)[KS. 3]    CURRENT GOALS    Goals to be met by:[KS.1] 1 week[KS. 3]   Patient Goal Patient's self-stated goal is:[KS.1] return Presenting Problem: acute on chronic HF /UTI / severe mitral insuffiency --pt with sign cardiac history see below reports. Pt with multiple acute admissions . Pt is currently O2 dependent . PMH sign for pacemaker and DM[KS. 3]   Reason for Therapy: Mobil 97 % HR 80.[KS.3]    Pt able to amb with RW min assist provided for pt safety and attention to multiple lines of IV And[KS.2]  2L[KS.3] O2 tolerating 80 ft when rest needed .   Pt with decrease gait quality , slower gait speed and decrease posture with ambu Therapy is recommending pt need for 24 hr assist and supervision at d/c with continued skilled therapy .    At this time therapy would recommend [facility] as next level of care due to current status with O2 dependence , multiple recent admissions , and need to ma surgeon there as well she is high surgical risk which is why to be done at tertiary center once better if at all, she is also not a candidate for mitral clip due to severe MAC     Plan: Decrease lasix to 40 mg BID.     Sebastian Arnold, DO FACC  [facility] Type 2 diabetes mellitus with circulatory disorder, without long-term current use of insulin (HCC)    Mitral valve insufficiency    Hyperlipidemia    Hypothyroidism    Stage 3 chronic kidney disease (HCC)    Anemia    Urinary tract infection without hema call light for help or they will yell at me . \"   Pt  has a rollator and shower chair. Pt reports she wants a shower bench as having difficulty with just chair.[KS.2]     SUBJECTIVE[KS. 1]  Denies pain      mild SOB reported after activity        Pt report After activity BP  120/83 HR 84 O2 sats 93 %[KS. 3]                   AM-PAC '6-Clicks' INPATIENT SHORT FORM - BASIC MOBILITY  How much difficulty does the patient currently have. ..[KS.1]  -   Turning over in bed (including adjusting bedclothes, sheets and supervision improved safety awareness[KS. 3]    Goal #3   Current Status    Goal #4    Goal #4   Current Status    Goal #5 Patient to[KS. 1] verbalize good understanding of pt education with improved safety , tolerance and proper sequencing with fxn mobility sob/chf/uti. In this OT evaluation patient presents with the following impairments: decreased activity tolerance, decreased endurance,winded w/ exertion. These deficits manifest functionally while performing adls and transfers.    The patient is below bas education;Patient/Family training; Compensatory technique education[NICHELLE.2]    OCCUPATIONAL THERAPY MEDICAL/SOCIAL HISTORY     Problem List[NICHELLE.1]  Principal Problem:    Acute on chronic congestive heart failure, unspecified heart failure type ([facility] [address]  Active Pr Prior Level of [place]: Pta pt was living in and an assisted living apartment at [facility]. Pt reports having meals delivered to her apartment. Pt has assist for showering and is independent w/ dressing. Pt leaves her apartment to get her mail or ice.  P Sit to Stand: Supervision[NICHELLE.2]    Bedroom Mobility: rw and cga    BALANCE ASSESSMENT  Static Sitting: independent  Dynamic Sitting:na  Static Standing: rw and supervison  Dynamic Standing: rw and cga    FUNCTIONAL ADL ASSESSMENT  Grooming: set up  Feeding Interventions:   - monitor vital signs and O2 sat  - See additional Care Plan goals for specific interventions

## (undated) NOTE — LETTER
[address], [place]  Authorization for Invasive Procedures  1. I hereby authorize Dr. Kale Kenyon , my physician and whomever may be designated as the doctor's assistant, to perform the following operation and/or procedure: Transesophageal Echocardiogram  on Luis Cazares at [facility].    2. My physician has explained to me the nature and purpose of the operation or other procedure, possible alternative methods of treatment, the risks involved and the possibility of complications to me. I understand the probable consequences of declining the recommended procedure and the alternative methods of treatment. I acknowledge that no guarantee has been made as to the result that may be obtained. 3. I recognize that during the course of this operation or other procedure, unforeseen conditions may necessitate additional or different procedures than those listed above. I, therefore, further authorize and request that the above-named physician, his/her physician assistants, or designees perform such procedures as are, in his/her professional opinion, necessary and desirable. If I have a Do Not Attempt Resuscitation (DNAR) order in place, that status will be suspended while in the operating room, procedural suite, and during the recovery period unless otherwise explicitly stated by me (or a person authorized to consent on my behalf). The surgeon or my attending physician will determine when the applicable recovery period ends for purposes of reinstating the DNAR order. 4. Should the need arise during my operation or immediate post-operative period; I also consent to the administration of blood and/or blood products.  Further, I understand that despite careful testing and screening of blood and blood products, I may still be subject to ill effects as a result of recieving a blood transfusion an/or blood producst. The following are some, but not all, of the potential risks that can occur: fever and allergic reactions, hemolytic reactions, transmission of disease such as hepatitis, AIDS, cytomegalovirus (CMV), and flluid overload. In the event that I wish to have autologous transfusions of my own blood, or a directed donor transfusion, I will discuss this with my physician. 5. I consent to the photographing of the operations or procedures to be performed for the purposes of advancing medicine, science, and/or education, provided my identity is not revealed. If the procedure has been videotaped, the physician/surgeon will obtain the original videotape. The hospital will not be responsible for storage or maintenance of this tape. 6. I consent to the presence of a  or observer as deemed necessary by my physician or his designee. 7. Any tissues or organs removed in the operation or other procedure may be disposed of by and at the discretion of [facility].    8. I understand that the physician and his/her physician assistants may not be employees or agents of [facility], [facility], nor [facility], but are independent medical practitioners who have been permitted to use its facilities for the care and treatment of their patients. 9. Patients having a sterilization procedure: I understand that if the procedure is successful the results will be permanent and it will therefore be impossible for me to inseminate, conceive or bear children. I also understand that the procedure is intended to result in sterility, although the result has not been guaranteed. 10. I CERTIFY THAT I HAVE READ AND FULLY UNDERSTAND THE ABOVE CONSENT TO OPERATION and/or OTHER PROCEDURE. 11. I acknowledge that my physician has explained sedation/analgesia administration to me including the risks and benefits. I consent to the administration of sedation/analgesia as may be necessary or desirable in the judgment of my physician. Signature of Patient:  ________________________________________________ Date: _________Time: _________    Responsible person in case of minor or unconscious: _____________________________Relationship: ____________     Witness Signature: ____________________________________________ Date: __________ Time: ___________    Statement of Physician  My signature below affirms that prior to the time of the procedure, I have explained to the patient and/or her legal representative, the risks and benefits involved in the proposed treatment and any reasonable alternative to the proposed treatment. I have also explained the risks and benefits involved in the refusal of the proposed treatment and have answered the patient's questions. If I have a significant financial interest in this procedure/surgery, I have disclosed this and had a discussion with my patient.     Signature of Physician:   ________________________________________Date: _________Time:_______ Patient Name: Yordy Velasquez  : 1950   Printed: 2022    Medical Record #: [id number]

## (undated) NOTE — IP AVS SNAPSHOT
[facility]            (For Outpatient Use Only) Initial Admit Date: 11/2/2020   Inpt/Obs Admit Date: Inpt: 11/3/20 / Obs: N/A   Discharge Date:    Karly Colon:  [de-identified]   MRN: [de-identified]   CSN: [id number]   CEID: [id number]        [facility] Payor:  Plan:    Group Number:  Insurance Type:    Subscriber Name:  Subscriber :    Subscriber ID:  Pt Rel to Subscriber:    Hospital Account Financial Class: Medicare    2020

## (undated) NOTE — LETTER
[address] [place] [address]      Authorization for Surgical Operation and Procedure     Date:___________                                                                                                         Time:_______ fever and allergic reactions, hemolytic reactions, transmission of diseases such as Hepatitis, AIDS and Cytomegalovirus (CMV) and fluid overload. In the event that I wish to have an autologous transfusion of my own blood, or a directed donor transfusion. applicable recovery period ends for purposes of reinstating the DNAR order.   10. Patients having a sterilization procedure: I understand that if the procedure is successful the results will be permanent and it will therefore be impossible for me to insemin _______________________________________________________________ _____________________________  [facility] Physician)                                                                                         (Date)                                   (Ti

## (undated) NOTE — IP AVS SNAPSHOT
[facility]            (For Outpatient Use Only) Initial Admit Date: 9/26/2020   Inpt/Obs Admit Date: Inpt: 9/26/20 / Obs: N/A   Discharge Date:    Jose Yates:  [de-identified]   MRN: [de-identified]   CSN: [id number]   CEID: [id number]        [facility] Subscriber ID: [id number] Pt Rel to Subscriber: SELF   TERTIARY INSURANCE   Payor:  Plan:    Group Number:  Insurance Type:    Subscriber Name:  Subscriber :    Subscriber ID:  Pt Rel to Subscriber:    Hospital Account Financial Class: Medicare    [place]

## (undated) NOTE — ED AVS SNAPSHOT
Ms. Leonardo Juarez   MRN: [id number]    Department:  [facility] Emergency Department   Date of Visit:  7/17/2019           Disclosure     Insurance plans vary and the physician(s) referred by the ER may not be covered by your plan.  Please co within the next three months to obtain basic health screening including reassessment of your blood pressure.     IF THERE IS ANY CHANGE OR WORSENING OF YOUR CONDITION, CALL YOUR PRIMARY CARE PHYSICIAN AT ONCE OR RETURN IMMEDIATELY TO THE EMERGENCY DEPARTMEN

## (undated) NOTE — ED AVS SNAPSHOT
Ms. Nicole Roche   MRN: [id number]    Department:  [facility] Emergency Department   Date of Visit:  12/6/2019           Disclosure     Insurance plans vary and the physician(s) referred by the ER may not be covered by your plan.  Please co within the next three months to obtain basic health screening including reassessment of your blood pressure.     IF THERE IS ANY CHANGE OR WORSENING OF YOUR CONDITION, CALL YOUR PRIMARY CARE PHYSICIAN AT ONCE OR RETURN IMMEDIATELY TO THE EMERGENCY DEPARTMEN

## (undated) NOTE — IP AVS SNAPSHOT
[facility]            (For Outpatient Use Only) Initial Admit Date: 7/21/2020   Inpt/Obs Admit Date: Inpt: 7/21/20 / Obs: N/A   Discharge Date:    Gege Hurst:  [de-identified]   MRN: [de-identified]   CSN: [id number]   CEID: [id number]        [facility] Payor:  Plan:    Group Number:  Insurance Type:    Subscriber Name:  Subscriber :    Subscriber ID:  Pt Rel to Subscriber:    Hospital Account Financial Class: Medicare    2020

## (undated) NOTE — IP AVS SNAPSHOT
Patient Demographics     Address  [facility] [address] Phone  [phone] [facility] [place])  [phone] (Mobile) *Preferred*      Emergency Contact(s)     Name Relation Home Work Amanda Hernández Daughter   Jorge Marie Commonly known as:  PLAVIX  Next dose due: Tomorrow morning      Take 75 mg by mouth daily. furosemide 80 MG Tabs  Commonly known as:  LASIX  Next dose due: Tonight       Take 1 tablet (80 mg total) by mouth BID (Diuretic).    Chu Gaitan MD Stop taking on:  April 10, 2020   Chiara Guzman MD               Where to Get Your Medications      These medications were sent to [address] [facility] [phone], [address] [facility] [place] [address] [id number] hydrALAzine HCl (APRESOLINE) tab 25 mg 03/31/20 0555 Given      [id number] hydrALAzine HCl (APRESOLINE) tab 25 mg 03/31/20 1350 Given      [id number] vancomycin HCl [facility]) 50 MG/ML oral solution 250 mg 03/30/20 2101 Given      [id number] vancomy Anion Gap 7 0 - 18 mmol/L — [facility]   BUN 59 7 - 18 mg/dL [facility]   Creatinine 2.08 0.55 - 1.02 mg/dL [facility]   BUN/CREA Ratio 28.4 10.0 - 20.0 H [facility]   Calcium, Total 9.4 8.5 - 10.1 mg/dL [employer]   Calculated Osmolality 3 1946    Specimen:  Stool      C.  Difficile Toxin B Gene Positive    SARS-CoV-2 by PCR Downtime [[id number]]  (Normal) Collected:  03/22/20 0819    Order Status:  Completed Lab Status:  Final result Updated:  03/23/20 3833    Specimen:  Other from [address] [place] [address] HPI Worsening SOB and chest discomfort over past 24-48 hrs. Chest XR consistent with bibasilar pneumonia.      History     Past Medical History:   Diagnosis Date   • Acute ischemic heart disease ([facility] [address]    • Anemia    • Calculus of kidney    • Congestive hear traZODone HCl 50 MG Oral Tab, Take 50 mg by mouth nightly as needed for Sleep. aspirin 81 MG Oral Chew Tab, Chew 1 tablet (81 mg total) by mouth daily. atorvastatin 40 MG Oral Tab, Take 80 mg by mouth nightly.     acetaminophen 325 MG Oral Tab, Take 650 m cranial nerve deficit, sensory deficit or motor deficit. Skin: Skin is warm and dry. Psychiatric: She has a normal mood and affect.      Cervical Papanicolaou contraindicated    Results:     Lab Results   Component Value Date    WBC 6.7 03/22/2020    HG Type 2 diabetes mellitus with circulatory disorder, without long-term current use of insulin (HCC)  Controlled on OHA and diet. Mitral valve insufficiency  Valve replacement surgery in planning stages.       Heart block, AV  Recent PPM placement for Patient is a[MD.3 77year old[MD.2] female who was admitted to the hospital for[MD.1] Acute on chronic congestive heart failure, unspecified heart failure type (HCC)[MD.2]:  She is a nursing home resident and is very impaired by previous stroke.   The lindsay Tobacco Use      Smoking status: Former Smoker      Smokeless tobacco: Never Used    Alcohol use: Not Currently    Drug use: Never[MD.2]          Current Medications:[MD.1]  hydrALAzine HCl (APRESOLINE) tab 25 mg, 25 mg, Oral, Q8H KYLEE  vancomycin HCl (FI Or  Glucose-Vitamin C (DEX-4) chewable tab 8 tablet, 8 tablet, Oral, Q15 Min PRN  LORazepam (ATIVAN) injection 1 mg, 1 mg, Intravenous, Q2H PRN  propofol (DIPRIVAN) infusion, 5-100 mcg/kg/min (Dosing Weight), Intravenous, Continuous      amLODIPine Besyl This shift:[MD.1] No intake/output data recorded. [MD.2]     Vent Settings:[MD.1] Vent Mode: CPAP  FiO2 (%):  [40 %] 40 %  S RR:  [14] 14  S VT:  [500 mL] 500 mL  PEEP/CPAP (cm H2O):  [5 cm H20] 5 cm H20  MAP (cm H2O):  [9-10] 10[MD.2]    Hemodynamic etelvina Skin is warm and dry without obvious pathological lesions ulcerations or wounds  Neurologic exam is limited by patient's weakness and unable to follow commands.     Results:     Laboratory Data:[MD.1]  Lab Results   Component Value Date    WBC 7.9 03/26/202 CONCLUSION:  1. Stable normal heart size with mild vascular prominence with slight redistribution unchanged.  2. Improving chest with some clearing of interstitial changes and continued infiltrate or atelectasis at the left base with a left effusion and cedric 2.       Jailed marginal branch behind previously placed stent, but vessel size is small. 3.       Apical right coronary artery disease.   Vessel size is too small for stenting.     RECOMMENDATIONS:  Medical management would be recommended based on these i Recommendation would be evaluation for mitral clip or surgery at a tertiary care center as she has multiple comorbidities as well as anatomic issues with calcified annulus to make her very poor surgical candidate    Thank you for allowing me to participate Stage 3 chronic kidney disease (HCC)    Hypoxia    Pneumonia of both lower lobes due to infectious organism    C. difficile enteritis      PHYSICAL THERAPY ASSESSMENT   Pt is received in the bed. Co treat therapy session with OT.  Pt is CGA with bed mobil How much difficulty does the patient currently have. ..  -   Turning over in bed (including adjusting bedclothes, sheets and blankets)?: A Little   -   Sitting down on and standing up from a chair with arms (e.g., wheelchair, bedside commode, etc.): A Billtl instructions provided to patient in preparation for discharge. Goal #5   Current Status IN PROGRESS   Goal #6    Goal #6  Current Status[RM. 1]         Attribution Key    RM. 1 - Wilder Oliver, PTA on 3/30/2020 11:49 AM                        Occupatio OT Discharge Recommendations: Sub-acute rehabilitation  OT Device Recommendations: TBD     PLAN  OT Treatment Plan: Balance activities; Energy conservation/work simplification techniques;ADL training;Functional transfer training; Endurance training;Patient/F Attribution Terry    AA. 1 - Mike Soler OT on 3/30/2020  1:43 PM                     Video Swallow Study Notes    No notes of this type exist for this encounter.         SLP Note - SLP Notes      SLP Note signed by FARIBA Lemon at 3/31/2020  3 1. Interval improvement in bibasilar airspace disease right more than left with mild to moderate amount remaining. Probable superimposed left basal atelectasis. No large pleural effusion. Continued follow-up is advised. [MD.1]       PLAN: Pt is discharge Alternate liquids/solids, No straws, Upright 90 degrees with PRN feeding assistance 90 % of the time across 4 sessions. [MD.1]    Pt seen upright in bed self-feeding oral trials. Swallowing precautions/strategies discussed and demonstrated.  Pt able to [employer] Description:  Patient's Short Term Goal: to breathe better    Interventions:   - sit up in bed/chair, deep breathing exercises, IV lasix/abx  - See additional Care Plan goals for specific interventions

## (undated) NOTE — IP AVS SNAPSHOT
Patient Demographics     Address  [address] [facility] Phone  [phone] (Home) *Preferred*      Emergency Contact(s)     Name Relation Home Work Shannan Hernández [address] [address] [address] results before they are released to [address] If you have questions regarding your results contact the physician who ordered the test/exam by phone or via [address] by choosing \"Ask a Medical Question. \"     Chacorta Carlisle MD In Take 1 capsule (500 mg total) by mouth every 12 (twelve) hours. DONTE Bateman         clopidogrel 75 MG Tabs  Commonly known as: PLAVIX  Next dose due: Take tomorrow morning. Take 75 mg by mouth daily.           Diclofenac Sodium 1 % Gel  Com dose due: Take tomorrow morning. Take 1 tablet (30 mg total) by mouth daily. DONTE Zacarias         levothyroxine 25 MCG Tabs  Commonly known as: Synthroid  Next dose due:  Take tomorrow morning before breakfast.       Take 1 tablet (25 mcg total by mouth nightly. Laya Granger MD         ondansetron 4 MG Tbdp  Commonly known as: ZOFRAN-ODT  Next dose due: Take as needed. Take 4 mg by mouth every 6 (six) hours as needed for Nausea.           pantoprazole 40 MG Tbec  Commonly known as: PROT 0.9 % 10 mL IV push 10/04/21 0533 Given      [id number] atorvastatin (LIPITOR) tab 80 mg 10/03/21 2236 Given      [id number] carvedilol (COREG) tab 12.5 mg 10/03/21 1702 Given      [id number] carvedilol (COREG) tab 12.5 mg 10/04/21 0801 Given      [id number] c °F (36.3 °C) Filed at 10/04/2021 0531   SpO2 100 % Filed at 10/04/2021 9459      Patient's Most Recent Weight       Most Recent Value   Patient Weight 77 kg (169 lb 12.1 oz)         Lab Results Last 24 Hours      CBC With Differential With Platelet [[id number] 26.0 21.0 - 32.0 mmol/L — [facility]   Anion Gap 7 0 - 18 mmol/L — [facility]   BUN 58 7 - 18 mg/dL [facility])   Creatinine 2.06 0.55 - 1.02 mg/dL [facility])   BUN/CREA Ratio 28.2 10.0 - 20.0 [facility])   Calci 0930          Components    Component Value Reference Range Flag Lab   Iron 17 50 - 170 ug/dL L [place] [facility])            Testing Performed By     Lab - Abbreviation Name Director Address Valid Date Range    Kerrie [address] Lab [facility]) [address]  susceptibility testing, appropriate culture is needed. PLEASE REFER TO MRSA SCREENING PROTOCOL FOR TREATMENT.       Rapid SARS-CoV-2 by PCR [[id number]]  (Normal) Collected: 09/29/21 1828    Order Status: Completed Lab Status: Final result Updated: 09/29/ Diabetes ([facility] [place] [address]    • Dysphagia    • Esophageal reflux    • Essential hypertension    • Heart attack [facility])    • Hemiplegia and hemiparesis following cerebral infarction affecting unspecified side (HCC)    • High blood pressure    • High cholesterol    • Hype mg/dL  Give 2 units for blood glucose 201-240 mg/dL  Give 3 units for blood glucose 241-280 mg/dL  Give 4 units for blood glucose 281-320 mg/dL  Give 5 units for blood glucose 321-360 mg/dL  Call physician if blood glucose is greater than 360 mg/dL  Roger mg total) by mouth every morning before breakfast.  Clopidogrel Bisulfate 75 MG Oral Tab, Take 75 mg by mouth daily. aspirin 81 MG Oral Chew Tab, Chew 1 tablet (81 mg total) by mouth daily. atorvastatin 40 MG Oral Tab, Take 80 mg by mouth nightly.     ace 11/10/2020    LIP 83 05/18/2021    DDIMER 1.23 (H) 03/05/2020    MG 2.0 03/26/2021    PHOS 4.1 03/29/2021    TROP <0.045 05/18/2021    CK 73 11/10/2020    ETOH 3 (H) 11/10/2020     CT ABDOMEN+PELVIS(CPT=74176)    Result Date: 9/29/2021  CONCLUSION:  1.  No contact family for consent for endoscopy, patient stable presently, will hold on endoscopy for now.      *UTI  Urine cx: gram neg rods  Blood cx pending  Afebrile, no leukoytosis  -IV zosyn    *HFpEF  -cxr shows congestion, CT a/p shows bilateral pleural ef c/o nausea, SOB , fatigue & dizziness . Noted to have severe anemia ( Hgb 4.5 g/dl) and referred to ED by PCP . Transfused PRBC ( several units),  EGD yesterday showed no abnormality.  Scheduled for colonoscopy today , dark stool noted by RN during  Colonic (PROTONIX) 40 mg in Sodium Chloride (PF) 0.9 % 10 mL IV push, 40 mg, Intravenous, Q12H  atorvastatin (LIPITOR) tab 80 mg, 80 mg, Oral, Nightly  carvedilol (COREG) tab 12.5 mg, 12.5 mg, Oral, BID with meals  isosorbide mononitrate ER (IMDUR) 24 hr tab 60 mg blood glucose 241-280 mg/dL  Give 4 units for blood glucose 281-320 mg/dL  Give 5 units for blood glucose 321-360 mg/dL  Call physician if blood glucose is greater than 360 mg/dL  Potassium Chloride ER 10 MEQ Oral Tab CR, Take 10 mEq by mouth daily.   mupir Bisulfate 75 MG Oral Tab, Take 75 mg by mouth daily. aspirin 81 MG Oral Chew Tab, Chew 1 tablet (81 mg total) by mouth daily. atorvastatin 40 MG Oral Tab, Take 80 mg by mouth nightly.     acetaminophen 325 MG Oral Tab, Take 650 mg by mouth every 6 (six) h 03/05/2020    MG 2.0 03/26/2021    PHOS 4.1 03/29/2021    TROP <0.045 05/18/2021    CK 73 11/10/2020    ETOH 3 (H) 11/10/2020         Imaging:  CT ABDOMEN+PELVIS(CPT=74176)    Result Date: 9/29/2021  CONCLUSION:  1.  No evidence of intra-abdominal hemorrhag today. Hgb being monitored     Acute on chronic systolic HF / S/p PPM / CAD s/p PCI   Continue IV Lasix 40 mg daily . Cardiology note reviewed     PAF   Cardiology note & plan reviewed .  Off AC     T2DM with CKD   No proteinuria in the past . On SSI     D/

## (undated) NOTE — IP AVS SNAPSHOT
[facility]            (For Outpatient Use Only) Initial Admit Date: 3/25/2021   Inpt/Obs Admit Date: Inpt: 3/26/21 / Obs: 03/25/21   Discharge Date:    Jose Yates:  [de-identified]   MRN: [de-identified]   CSN: [id number]   CEID: [id number] INSURANCE   Payor:  Plan:    Group Number:  Insurance Type:    Subscriber Name:  Subscriber :    Subscriber ID:  Pt Rel to Subscriber:    Hospital Account Financial Class: Medicare    2021

## (undated) NOTE — LETTER
[address] [facility] [place], BELLA Whitlock     AUTHORIZATION FOR SURGICAL OPERATION OR PROCEDURE    Date:___________                                                                                                         Time:__________ 4.   Should the need arise during my operation or immediate post-operative period, I also consent to the administration of blood and/or blood products.   Further, I understand that despite careful testing and screening of blood or blood products by guanaco 8.   I recognize that in the event my procedure results in extended X-Ray/fluoroscopy time, I may develop a skin reaction. 9.  If I have a Do Not Attempt Resuscitation (DNAR) order in place, that status will be suspended while in the operating room, proc _________________________________________  ______________________________  Signature of Witness          Date  Time    STATEMENT OF PHYSICIAN My signature below affirms that prior to the time of the procedure; I have explained to the patient and/or his/her

## (undated) NOTE — ED AVS SNAPSHOT
Ms. Delia Herndon   MRN: [id number]    Department:  [facility] Emergency Department   Date of Visit:  9/29/2019           Disclosure     Insurance plans vary and the physician(s) referred by the ER may not be covered by your plan.  Please co within the next three months to obtain basic health screening including reassessment of your blood pressure.     IF THERE IS ANY CHANGE OR WORSENING OF YOUR CONDITION, CALL YOUR PRIMARY CARE PHYSICIAN AT ONCE OR RETURN IMMEDIATELY TO THE EMERGENCY DEPARTMEN

## (undated) NOTE — IP AVS SNAPSHOT
Patient Demographics     Address  [address] [place] [address] Phone  [phone] (Home) *Preferred*      Emergency Contact(s)     Name Relation Home Work Mobile    Tiara Mix [phone]      Oj Murry Daughter   [phone] Caps  Commonly known as: COLACE  Next dose due: Tonight       Take 100 mg by mouth 2 (two) times daily. ferrous sulfate 325 (65 FE) MG Tbec  Next dose due:  Tomorrow morning with breakfast      Take 325 mg by mouth daily with breakfast.          fu Take 2 capsules by mouth nightly as needed. mirtazapine 7.5 MG Tabs  Commonly known as: REMERON  Next dose due: Tonight       Take 1 tablet (7.5 mg total) by mouth nightly.    Yakov Fagan MD         mupirocin 2 % Oint  Commonly known as: Elroy Bush Levothyroxine Sodium tab 25 mcg 03/30/21 0511 Given      [id number] OLANZapine (ZYPREXA) tab 2.5 mg 03/29/21 2124 Given      [id number] Pantoprazole Sodium (PROTONIX) EC tab 40 mg 03/30/21 0511 Given      [id number] Ranolazine ER (RANEXA) 12 hr tab 1,000 mg 0 Microbiology Results (All)     Procedure Component Value Units Date/Time    Rapid SARS-CoV-2 by PCR STAT [[id number]]  (Normal) Collected: 03/25/21 2128    Order Status: Completed Lab Status: Final result Updated: 03/25/21 2148    Specimen: Other from Nares and hemiparesis following cerebral infarction affecting unspecified side (HCC)    • High blood pressure    • High cholesterol    • Hyperlipidemia    • Hypothyroidism    • Hypothyroidism    • Incontinence    • Muscle weakness    • NSTEMI (non-ST elevated my Tab, Take 1 tablet (10 mg total) by mouth daily. (Patient taking differently: Take 5 mg by mouth daily.  )  OLANZapine 2.5 MG Oral Tab, Take 1 tablet (2.5 mg total) by mouth nightly.   Levothyroxine Sodium 25 MCG Oral Tab, Take 1 tablet (25 mcg total) by mo and[KY. 1] fever[KY. 2]. HENT:[KY.1] Negative[KY. 2]. Respiratory: Positive for[KY. 1] chest tightness[KY. 2] and[KY. 1] shortness of breath[KY.2]. Negative for[KY. 1] cough[KY.2]. Cardiovascular: Positive for[KY. 1] chest pain[KY. 2]. Negative for[KY. 1] pa respiratory distress[KY. 2]. She has[KY. 1] no wheezes[KY. 2]. She has[KY. 1] rales[KY.2]. Abdominal:[KY.1] Soft[KY. 2]. She exhibits[KY. 1] no mass[KY. 2]. There is[KY. 1] no abdominal tenderness[KY. 2]. Neurological: She is[KY. 1] alert[KY. 2] and[KY. 1] oriente worsening in the chest with now moderate pulmonary edema pattern increased from previous earlier study. Linear right basal atelectasis. No large pleural effusion but I cannot exclude underlying pneumonia.   Correlate clinically and follow-up  studies are *DM2  -A1c 5.2%  -ADA diet, carb controlled  -Hypoglycemia protocol  -BS stable  -Novolog Insulin SS, Tradjenta     *Hypothyroidism  -Cont  Levothyroxine     *Dementia  -No SI, no AMS, no hallucinations, no agitation  -Cont Remeron, Zyprexa.  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 Isosorbide Mononitrate ER 60 MG Oral Tablet 24 Hr, Take 1 tablet (60 mg total) by mouth daily. furosemide 40 MG Oral Tab, Take 20 mg by mouth daily.     ondansetron 4 MG Oral Tablet Dispersible, Take 4 mg by mouth e mouth nightly. acetaminophen 325 MG Oral Tab, Take 650 mg by mouth every 6 (six) hours as needed for Pain. nitroGLYCERIN 0.4 MG Sublingual SL Tab, Place 0.4 mg under the tongue every 5 (five) minutes as needed for Chest pain.   linagliptin 5 mg Oral Tab bilaterally  Cardiovascular: S1, S2 normal, no murmur, click, rub or gallop, regular rate and rhythm  Abdominal: soft, non-tender; bowel sounds normal; no masses,  no organomegaly  Extremities: extremities normal, atraumatic, no cyanosis or edema    Result 2. The examination was read on call by [facility] radiology services with a similar interpretation given.     Dictated by (CST): Alphonse Negrete MD on 3/26/2021 at 6:37 AM     Finalized by (CST): Alphonse Negrete MD on 3/26/2021 at 6:38 AM          XR CHEST AP POR in 12/2020 showed EF 55-60% and grade 3 DD,    -Overloaded   -monitor urine output and I/O. -BNP 3841 on admission, similar to previous BNP levels  - lasix 60 IV[KS.1] Q[KS. 3]D started     2. Hyperlipidemia  -Atorvastatin 40 mg will be increased to 80 . (HF patients)    Chest X-Ray Results (HF patients only)    No exam resulted this encounter. 2D Echocardiogram Results (HF patients only)    No exam resulted this encounter.       Cath Angiogram Results (HF Patients only)    No exam resulted this encoun with activity: 3 out of 10 on scale  SpO2 on room air with activity: 91%    Patient in chair at end of session with needs in reach and alarm activated.      The patient's Approx Degree of Impairment: 54.16% has been calculated based on documentation in the A Little   -   Need to walk in hospital room?: A Little   -   Climbing 3-5 steps with a railing?: A Lot     AM-PAC Score:  Raw Score: 16   Approx Degree of Impairment: 54.16%   Standardized Score (AM-PAC Scale): 40.78   CMS Modifier (G-Code): TAYLOR GILLESPIE Chest pain    Problem List  Principal Problem:    Chest pain of uncertain etiology  Active Problems:    Coronary artery disease involving native heart with other form of angina pectoris, unspecified vessel or lesion type (HCC)      PHYSICAL THERAPY ASSESSM OBJECTIVE  Precautions: Bed/chair alarm    WEIGHT BEARING RESTRICTION  Weight Bearing Restriction: None                PAIN ASSESSMENT   Ratin          BALANCE be met by: 4/9/21, goals updated 3/29/21  Patient Goal Patient's self-stated goal is: return to PLOF   Goal #1 Patient is able to demonstrate supine - sit EOB @ level: mod I      Goal #1   Current Status  NT   Goal #2 Patient is able to demonstrate transfe

## (undated) NOTE — IP AVS SNAPSHOT
[facility]            (For Outpatient Use Only) Initial Admit Date: 2022   Inpt/Obs Admit Date: Inpt: 22 / Obs: N/A   Discharge Date:    Liberty Mckee:  [de-identified]   MRN: [de-identified]   CSN: [id number]   CEID: [id number]        ENCOUNTER  Patient Class: Inpatient Admitting Provider: Indio Baron MD Unit: [address] Service: Cardiac Telemetry Attending Provider: Indio Baron MD   Bed: 331-A   Visit Type:   Referring Physician: No ref. provider found Billing Flag:    Admit Diagnosis: Acute pulmonary edema ([facility] [address] [J81.0]      PATIENT  Legal Name:   Betty Cronin   Legal Sex: Female  Gender ID:              [address] [facility] Name:    PCP:  Shirley Givens MD Home: [phone]   Address:  [address] [place] [address] : 1950 (71 yrs) Mobile: No mobile phone on file. City/State/Zip: [facility] [place] [address] Marital:  Language: [place] park: [place]: [id number] Jew: [address] No*     Race: White Ethnicity: Non  Or [address] [place] [address]   Name Relationship Legal Guardian? Home Phone Work Phone Mobile Phone   1. Ever Nelson  2.  Misa Vega [facility]      [phone]       GUARANTOR  Guarantor: Hilary Simms : 1950 Home Phone: [phone]   Address: [address] [place] [address]  Sex: Female Work Phone:    City/State/Zip: [place] [address]   Rel. to Patient: Self Guarantor ID: [id number]   GUARANTOR EMPLOYER   Employer:  Status: RETIRED     COVERAGE  PRIMARY INSURANCE   Payor: MEDICARE Plan: MEDICARE PART A&B   Group Number:  Insurance Type: INDEMNITY   Subscriber Name: Margean Goodell : 1950   Subscriber ID: [id number] Pt Rel to Subscriber: Self   SECONDARY INSURANCE   Payor: [place] Plan: [place]   Group Number:  Insurance Type: INDEMNITY   Subscriber Name: Margean Goodell : 1950   Subscriber ID: [id number] Pt Rel to Subscriber: SELF   TERTIARY INSURANCE   Payor:  Plan:    Group Number:  Insurance Type:    Subscriber Name:  Subscriber :    Subscriber ID:  Pt Rel to Subscriber:    Hospital Account Financial Class: Medicare    2022

## (undated) NOTE — ED AVS SNAPSHOT
Ms. Ganga Bowen   MRN: [id number]    Department:  [facility] Emergency Department   Date of Visit:  1/17/2020           Disclosure     Insurance plans vary and the physician(s) referred by the ER may not be covered by your plan.  Please co within the next three months to obtain basic health screening including reassessment of your blood pressure.     IF THERE IS ANY CHANGE OR WORSENING OF YOUR CONDITION, CALL YOUR PRIMARY CARE PHYSICIAN AT ONCE OR RETURN IMMEDIATELY TO THE EMERGENCY DEPARTMEN

## (undated) NOTE — IP AVS SNAPSHOT
[facility]            (For Outpatient Use Only) Initial Admit Date: 9/29/2021   Inpt/Obs Admit Date: Inpt: 9/30/21 / Obs: N/A   Discharge Date:    Clark Captain:  [de-identified]   MRN: [de-identified]   CSN: [id number]   CEID: [id number]        [facility] Payor:  Plan:    Group Number:  Insurance Type:    Subscriber Name:  Subscriber :    Subscriber ID:  Pt Rel to Subscriber:    Hospital Account Financial Class: Medicare    2021

## (undated) NOTE — IP AVS SNAPSHOT
[facility]            (For Outpatient Use Only) Initial Admit Date: 2022   Inpt/Obs Admit Date: Inpt: 22 / Obs: N/A   Discharge Date:    Erica Thrasher:  [de-identified]   MRN: [de-identified]   CSN: [id number]   CEID: [id number]        ENCOUNTER  Patient Class: Inpatient Admitting Provider: Mallory Mason MD Unit: [address] [place] [address]   Hospital Service: Cardiac Telemetry Attending Provider: Mallory Mason MD   Bed: 340-A   Visit Type:   Referring Physician: No ref. provider found Billing Flag:    Admit Diagnosis: Gastrointestinal hemorrhage, unspecified gastrointestinal hemorrhage type [K92.2]      PATIENT  Legal Name:   Colt Zambrano   Legal Sex: Female  Gender ID:              [address] [place] [address],[facility] Floor Name:    PCP:  Varsha Peterson MD Home: [phone]   Address:  [address] [place] [address] : 1950 (71 yrs) Mobile: No mobile phone on file. City/State/Zip: [place] [address] Marital:  Language: [place] park: Dianna [place]: [id number] Sabianist: [address] No*     Race: White Ethnicity: Non  Or [address] [place] [address]   Name Relationship Legal Guardian? Home Phone Work Phone Mobile Phone   1. Hilario Hutchinson  [place].  [place] [phone]       GUARANTOR  Guarantor: Shweta Bravo : 1950 Home Phone: [phone]   Address: [address] [place] [address]  Sex: Female Work Phone:    City/State/Zip: [place] [address]   Rel. to Patient: Self Guarantor ID: [id number]   GUARANTOR EMPLOYER   Employer:  Status: RETIRED     COVERAGE  PRIMARY INSURANCE   Payor: MEDICARE Plan: MEDICARE PART A&B   Group Number:  Insurance Type: INDEMNITY   Subscriber Name: Oksanamilind Sadler : 1950   Subscriber ID: [id number] Pt Rel to Subscriber: Self   SECONDARY INSURANCE   Payor: MEDICAID Plan: MEDICAID   Group Number: [id number] Insurance Type: [id number] Name: Oksana Sadler : 1950   Subscriber ID: [id number] Pt Rel to Subscriber: SELF   TERTIARY INSURANCE   Payor:  Plan:    Group Number:  Insurance Type:    Subscriber Name:  Subscriber :    Subscriber ID:  Pt Rel to Subscriber:    Hospital Account Financial Class: Medicare    August 10, 2022

## (undated) NOTE — IP AVS SNAPSHOT
[facility]            (For Outpatient Use Only) Initial Admit Date: 7/2/2019   Inpt/Obs Admit Date: Inpt: 7/2/19 / Obs: N/A   Discharge Date:    Evelyn Friedman:  [de-identified]   MRN: [de-identified]   CSN: [id number]   CEID: [id number]        [facility] Hospital Account Financial Class: Medicare Advantage    July 5, 2019

## (undated) NOTE — LETTER
[address]      Authorization for Surgical Operation and Procedure     Date:___________                                                                                                         Time:_______ 4.   Should the need arise during my operation or immediate post-operative period, I also consent to the administration of blood and/or blood products.   Further, I understand that despite careful testing and screening of blood or blood products by guanaco 8.   I recognize that in the event my procedure results in extended X-Ray/fluoroscopy time, I may develop a skin reaction. 9.  If I have a Do Not Attempt Resuscitation (DNAR) order in place, that status will be suspended while in the operating room, proc STATEMENT OF PHYSICIAN My signature below affirms that prior to the time of the procedure; I have explained to the patient and/or his/her legal representative, the risks and benefits involved in the proposed treatment and any reasonable alternative to the

## (undated) NOTE — IP AVS SNAPSHOT
[facility]            (For Outpatient Use Only) Initial Admit Date: 2/23/2021   Inpt/Obs Admit Date: Inpt: 2/23/21 / Obs: N/A   Discharge Date:    Mayela Hurst:  [de-identified]   MRN: [de-identified]   CSN: [id number]   CEID: [id number]        [facility] Payor:  Plan:    Group Number:  Insurance Type:    Subscriber Name:  Subscriber :    Subscriber ID:  Pt Rel to Subscriber:    Hospital Account Financial Class: Medicare    2021

## (undated) NOTE — ED AVS SNAPSHOT
Ms. Grace White   MRN: [id number]    Department:  [facility] Emergency Department   Date of Visit:  11/19/2019           Disclosure     Insurance plans vary and the physician(s) referred by the ER may not be covered by your plan.  Please c within the next three months to obtain basic health screening including reassessment of your blood pressure.     IF THERE IS ANY CHANGE OR WORSENING OF YOUR CONDITION, CALL YOUR PRIMARY CARE PHYSICIAN AT ONCE OR RETURN IMMEDIATELY TO THE EMERGENCY DEPARTMEN

## (undated) NOTE — ED AVS SNAPSHOT
Delia Herndon   MRN: [id number]    Department:  [facility] Emergency Department   Date of Visit:  5/28/2019           Disclosure     Insurance plans vary and the physician(s) referred by the ER may not be covered by your plan.  Please contac within the next three months to obtain basic health screening including reassessment of your blood pressure.     IF THERE IS ANY CHANGE OR WORSENING OF YOUR CONDITION, CALL YOUR PRIMARY CARE PHYSICIAN AT ONCE OR RETURN IMMEDIATELY TO THE EMERGENCY DEPARTMEN